# Patient Record
Sex: MALE | Employment: FULL TIME | URBAN - METROPOLITAN AREA
[De-identification: names, ages, dates, MRNs, and addresses within clinical notes are randomized per-mention and may not be internally consistent; named-entity substitution may affect disease eponyms.]

---

## 2017-02-22 ENCOUNTER — TRANSFERRED RECORDS (OUTPATIENT)
Dept: HEALTH INFORMATION MANAGEMENT | Facility: CLINIC | Age: 37
End: 2017-02-22

## 2017-07-10 DIAGNOSIS — A60.00 GENITAL HERPES SIMPLEX: ICD-10-CM

## 2017-07-10 RX ORDER — VALACYCLOVIR HYDROCHLORIDE 500 MG/1
TABLET, FILM COATED ORAL
Qty: 30 TABLET | Refills: 4 | Status: SHIPPED | OUTPATIENT
Start: 2017-07-10 | End: 2017-08-17

## 2017-07-10 NOTE — TELEPHONE ENCOUNTER
valACYclovir (VALTREX) 500 MG tablet  Last Written Prescription Date: 7/18/2016  Last Fill Quantity: 30, # refills: 11  Last Office Visit with [facility] or [facility] prescribing provider: 12/30/2016        Creatinine   Date Value Ref Range Status   07/01/2016 0.85 0.66 - 1.25 mg/dL Final     Refilled per [facility] protocol.    Neela Mitchell RN

## 2017-08-13 DIAGNOSIS — A60.00 GENITAL HERPES SIMPLEX: ICD-10-CM

## 2017-08-14 RX ORDER — VALACYCLOVIR HYDROCHLORIDE 500 MG/1
TABLET, FILM COATED ORAL
Qty: 30 TABLET | Refills: 10 | OUTPATIENT
Start: 2017-08-14

## 2017-08-14 NOTE — TELEPHONE ENCOUNTER
valACYclovir (VALTREX) 500 MG tablet   Last Written Prescription Date: 7/10/2017  Last Fill Quantity: 30, # refills: 4  Last Office Visit with [facility], [facility] or [facility] prescribing provider:         Creatinine   Date Value Ref Range Status   07/01/2016 0.85 0.66 - 1.25 mg/dL Final     Duplicate request. Refused. Neela Mitchell RN

## 2017-08-17 DIAGNOSIS — A60.00 GENITAL HERPES SIMPLEX: ICD-10-CM

## 2017-08-21 RX ORDER — VALACYCLOVIR HYDROCHLORIDE 500 MG/1
TABLET, FILM COATED ORAL
Qty: 30 TABLET | Refills: 0 | Status: SHIPPED | OUTPATIENT
Start: 2017-08-21 | End: 2017-10-02

## 2017-08-21 NOTE — TELEPHONE ENCOUNTER
Valacyvlovir (VALTREX) 500 mg tablet  Last Written Prescription Date: 7/10/2017  Last Fill Quantity: 30, # refills: 4  Last Office Visit with [facility] or [facility] prescribing provider: 12/30/2016   Next 5 appointments (look out 90 days)     Sep 01, 2017  3:20 PM CDT   Return Visit with MARILIN Lehman CNP   [facility]    [address] [place] [id number] [phone]                   Creatinine   Date Value Ref Range Status   07/01/2016 0.85 0.66 - 1.25 mg/dL Final     30 day palmira refill. Apt sched for 9/1/2017. Neela Mitchell RN

## 2017-10-02 ENCOUNTER — OFFICE VISIT (OUTPATIENT)
Dept: PEDIATRICS | Facility: CLINIC | Age: 37
End: 2017-10-02
Payer: COMMERCIAL

## 2017-10-02 VITALS
TEMPERATURE: 97.9 F | HEIGHT: 64 IN | BODY MASS INDEX: 30.08 KG/M2 | OXYGEN SATURATION: 97 % | HEART RATE: 85 BPM | WEIGHT: 176.2 LBS | SYSTOLIC BLOOD PRESSURE: 130 MMHG | DIASTOLIC BLOOD PRESSURE: 60 MMHG

## 2017-10-02 DIAGNOSIS — Z13.1 SCREENING FOR DIABETES MELLITUS: ICD-10-CM

## 2017-10-02 DIAGNOSIS — F41.0 ANXIETY ATTACK: Primary | ICD-10-CM

## 2017-10-02 DIAGNOSIS — A60.00 GENITAL HERPES SIMPLEX, UNSPECIFIED SITE: ICD-10-CM

## 2017-10-02 DIAGNOSIS — R79.89 ELEVATED TSH: ICD-10-CM

## 2017-10-02 DIAGNOSIS — Z13.6 CARDIOVASCULAR SCREENING; LDL GOAL LESS THAN 160: ICD-10-CM

## 2017-10-02 PROCEDURE — 99214 OFFICE O/P EST MOD 30 MIN: CPT | Performed by: NURSE PRACTITIONER

## 2017-10-02 RX ORDER — VALACYCLOVIR HYDROCHLORIDE 500 MG/1
TABLET, FILM COATED ORAL
Qty: 90 TABLET | Refills: 3 | Status: SHIPPED | OUTPATIENT
Start: 2017-10-02 | End: 2018-01-01

## 2017-10-02 RX ORDER — HYDROXYZINE HYDROCHLORIDE 25 MG/1
25-50 TABLET, FILM COATED ORAL EVERY 6 HOURS PRN
Qty: 20 TABLET | Refills: 3 | Status: SHIPPED | OUTPATIENT
Start: 2017-10-02 | End: 2018-01-01

## 2017-10-02 NOTE — MR AVS SNAPSHOT
After Visit Summary   10/2/2017    Ernie Song    MRN: [id number]           Patient Information     Date Of Birth          1980        Visit Information        Provider Department      10/2/2017 3:50 PM Gay Winslow APRN [facility]        Today's Diagnoses     Anxiety attack    -  1    Genital herpes simplex, unspecified site        Elevated TSH        CARDIOVASCULAR SCREENING; LDL GOAL LESS THAN 160        Screening for diabetes mellitus          Care Instructions    PLAN:   1.   Symptomatic therapy suggested: Continue current medications.  2.  Orders Placed This Encounter   Medications     valACYclovir (VALTREX) 500 MG tablet     Sig: TAKE 1 TABLET (500 MG) BY MOUTH DAILY     Dispense:  90 tablet     Refill:  3     hydrOXYzine (ATARAX) 25 MG tablet     Sig: Take 1-2 tablets (25-50 mg) by mouth every 6 hours as needed     Dispense:  20 tablet     Refill:  3       3. Patient needs to follow up in if no improvement,or sooner if worsening of symptoms or other symptoms develop.  FUTURE LABS:       - Schedule a fasting blood draw   Will follow up and/or notify patient of  results via My Chart to determine further need for followup    It was a pleasure seeing you today at the [facility]. Thank you for allowing us to care for you today. We truly hope we provided you with the excellent service you deserve. Please let us know if there is anything else we can do for you so we can be sure you are leaving completley satisfied with your care experience.       General information about your clinic   Clinic Hours Lab Hours (Appointments are required)   Mon-Thurs: 7:30 AM - 7 PM Mon-Thurs: 7:30 AM - 7 PM   Fri: 7:30 AM - 5 PM Fri: 7:30 AM - 5 PM        After Hours Nurse Advise & Appts:  [facility] Nurse Advisors: [phone]  [facility] On Call: to make appointments anytime: [phone] On Call Physician: call [phone] and answering  service will page the on call physician.        For urgent appointments, please call [phone] and ask for the triage nurse or your care team clinic nurse.  How to contact my care team:  [facility]: [email or web address]   Phone: [phone]   Fax: [phone]       [facility]:   Phone: [phone]  Hours: 8:00 AM - 6:00 PM  Medication Refills:  Call your pharmacy and they will forward the refill to us. Please allow 3 business days for your refills to be completed.       Normal or non-critical lab and imaging results will be communicated to you by MyChart, letter or phone within 7 days.  If you do not hear from us within 10 days, please call the clinic. If you have a critical or abnormal lab result, we will notify you by phone as soon as possible.       We now have PWIC (Pediatric Walk in Care)  Monday-Friday from 7:30-4. Simply walk in and be seen for your urgent needs like cough, fever, rash, diarrhea or vomiting, pink eye, UTI. No appointments needed. Ask one of the team for more information      -Your Care Team:    Dr. Jaky Young - Internal Medicine/Pediatrics   Dr. Nini Marcial - Family Medicine  Dr. Lissy Thomas - Pediatrics  Gay Winslow CNP - Family Practice Nurse Practitioner  Dr. Concha Rosado - Pediatrics                       Follow-ups after your visit        Future tests that were ordered for you today     Open Future Orders        Priority Expected Expires Ordered    JUST IN CASE Routine  12/2/2017 10/2/2017    Comprehensive metabolic panel Routine  12/2/2017 10/2/2017    Lipid panel reflex to direct LDL Routine  12/2/2017 10/2/2017    TSH Routine  12/2/2017 10/2/2017    T4 free Routine  12/2/2017 10/2/2017            Who to contact     If you have questions or need follow up information about today's clinic visit or your schedule please contact [facility] directly at [phone].  Normal or non-critical lab and imaging results will be communicated to you by  "MyChart, letter or phone within 4 business days after the clinic has received the results. If you do not hear from us within 7 days, please contact the clinic through [employer] or phone. If you have a critical or abnormal lab result, we will notify you by phone as soon as possible.  Submit refill requests through [facility] or call your pharmacy and they will forward the refill request to us. Please allow 3 business days for your refill to be completed.          Additional Information About Your Visit        [employer] App Information     [facility] gives you secure access to your electronic health record. If you see a primary care provider, you can also send messages to your care team and make appointments. If you have questions, please call your primary care clinic.  If you do not have a primary care provider, please call [phone] and they will assist you.      [facility] is an electronic gateway that provides easy, online access to your medical records. With [facility], you can request a clinic appointment, read your test results, renew a prescription or communicate with your care team.     To access your existing account, please contact your [facility] or call [phone] for assistance.        Care EveryWhere ID     This is your Care EveryWhere ID. This could be used by other organizations to access your [place] medical records  [id number]        Your Vitals Were     Pulse Temperature Height Pulse Oximetry BMI (Body Mass Index)       85 97.9  F (36.6  C) (Temporal) 5' 3.75\" (1.619 m) 97% 30.48 kg/m2        Blood Pressure from Last 3 Encounters:   10/02/17 130/60   12/30/16 115/71   06/30/16 113/69    Weight from Last 3 Encounters:   10/02/17 176 lb 3.2 oz (79.9 kg)   12/30/16 165 lb 9.6 oz (75.1 kg)   06/30/16 164 lb 6.4 oz (74.6 kg)                 Today's Medication Changes          These changes are accurate as of: 10/2/17  4:19 PM.  If you have any questions, ask your nurse or doctor.    "            These medicines have changed or have updated prescriptions.        Dose/Directions    hydrOXYzine 25 MG tablet   Commonly known as:  ATARAX   This may have changed:    - when to take this  - reasons to take this   Used for:  Anxiety attack   Changed by:  Gay Winslow APRN CNP        Dose:  25-50 mg   Take 1-2 tablets (25-50 mg) by mouth every 6 hours as needed   Quantity:  20 tablet   Refills:  3            Where to get your medicines      These medications were sent to [facility] - [facility], [place] - [address]  [address], [facility] [address]     Phone:  [phone]     hydrOXYzine 25 MG tablet    valACYclovir 500 MG tablet                Primary Care Provider Office Phone # Fax #    MARILIN Lehman [phone]       [address]  [place] [address]        Equal Access to Services     [facility] Hospital[facility]INALDO : Hadii panda ku hadasho Soomaali, waaxda luqadaha, qaybta kaalmada adeegyada, waxay aleain hayphuc sutton . So [facility] [phone].    ATENCIÓN: Si habla español, tiene a valente disposición servicios gratuitos de asistencia lingüística. Anju al [phone].    We comply with applicable federal civil rights laws and [place] laws. We do not discriminate on the basis of race, color, national origin, age, disability, sex, sexual orientation, or gender identity.            Thank you!     Thank you for choosing [facility]  for your care. Our goal is always to provide you with excellent care. Hearing back from our patients is one way we can continue to improve our services. Please take a few minutes to complete the written survey that you may receive in the mail after your visit with us. Thank you!             Your Updated Medication List - Protect others around you: Learn how to safely use, store and throw away your medicines at www.disposemymeds.org.          This list is accurate as of: 10/2/17  4:19  PM.  Always use your most recent med list.                   Brand Name Dispense Instructions for use Diagnosis    hydrOXYzine 25 MG tablet    ATARAX    20 tablet    Take 1-2 tablets (25-50 mg) by mouth every 6 hours as needed    Anxiety attack       valACYclovir 500 MG tablet    VALTREX    90 tablet    TAKE 1 TABLET (500 MG) BY MOUTH DAILY    Genital herpes simplex, unspecified site

## 2017-10-02 NOTE — PATIENT INSTRUCTIONS
PLAN:   1.   Symptomatic therapy suggested: Continue current medications.  2.  Orders Placed This Encounter   Medications     valACYclovir (VALTREX) 500 MG tablet     Sig: TAKE 1 TABLET (500 MG) BY MOUTH DAILY     Dispense:  90 tablet     Refill:  3     hydrOXYzine (ATARAX) 25 MG tablet     Sig: Take 1-2 tablets (25-50 mg) by mouth every 6 hours as needed     Dispense:  20 tablet     Refill:  3       3. Patient needs to follow up in if no improvement,or sooner if worsening of symptoms or other symptoms develop.  FUTURE LABS:       - Schedule a fasting blood draw   Will follow up and/or notify patient of  results via My Chart to determine further need for followup    It was a pleasure seeing you today at the [facility]. Thank you for allowing us to care for you today. We truly hope we provided you with the excellent service you deserve. Please let us know if there is anything else we can do for you so we can be sure you are leaving completley satisfied with your care experience.       General information about your clinic   Clinic Hours Lab Hours (Appointments are required)   Mon-Thurs: 7:30 AM - 7 PM Mon-Thurs: 7:30 AM - 7 PM   Fri: 7:30 AM - 5 PM Fri: 7:30 AM - 5 PM        After Hours Nurse Advise & Appts:  [facility] Nurse Advisors: [phone]  [facility] On Call: to make appointments anytime: [phone] On Call Physician: call [phone] and answering service will page the on call physician.        For urgent appointments, please call [phone] and ask for the triage nurse or your care team clinic nurse.  How to contact my care team:  MyChart: [email or web address]   Phone: [phone]   Fax: [phone]       [facility]:   Phone: [phone]  Hours: 8:00 AM - 6:00 PM  Medication Refills:  Call your pharmacy and they will forward the refill to us. Please allow 3 business days for your refills to be completed.       Normal or non-critical lab and imaging results  will be communicated to you by MyChart, letter or phone within 7 days.  If you do not hear from us within 10 days, please call the clinic. If you have a critical or abnormal lab result, we will notify you by phone as soon as possible.       We now have PWIC (Pediatric Walk in Care)  Monday-Friday from 7:30-4. Simply walk in and be seen for your urgent needs like cough, fever, rash, diarrhea or vomiting, pink eye, UTI. No appointments needed. Ask one of the team for more information      -Your Care Team:    Dr. Jaky Young - Internal Medicine/Pediatrics   Dr. Nini Marcial - Family Medicine  Dr. Lissy Thomas - Pediatrics  Gay Winslow CNP - Family Practice Nurse Practitioner  Dr. Concha Rosado - Pediatrics

## 2017-10-02 NOTE — PROGRESS NOTES
SUBJECTIVE:   Ernie Song is a 37 year old male who presents to clinic today for the following health issues:      Medication Followup of valtrex     Taking Medication as prescribed: yes    Side Effects:  None    Medication Helping Symptoms:  yes     Dizziness  Onset: 2 months    Description:   Do you feel faint:  YES  Does it feel like the surroundings (bed, room) are moving: YES  Unsteady/off balance: YES  Have you passed out or fallen: no     Intensity: moderate, severe    Progression of Symptoms:  same    Accompanying Signs & Symptoms:  Heart palpitations: YES- feels like heart is racing  Nausea, vomiting: YES  Weakness in arms or legs: YES- tingling in the arms  Fatigue: no   Vision or speech changes: YES- blurred   Ringing in ears (Tinnitus): no   Hearing Loss: no     History:   Head trauma/concussion hx: no   Previous similar symptoms: no   Recent bleeding history: no     Precipitating factors:   Worse with activity or head movement: no   Any new medications (BP?): no   Alcohol/drug abuse/withdrawal: no     Alleviating factors:   Does staying in a fixed position give relief:  YES    Therapies Tried and outcome: none  States will feel a little bit weird.  Feels like heart is going fast and can not breath very well and arms will feel tingly  Has a history of a panic attack about a year ago. Was seen in the ER and given some Atarax which helped.  Does drink about 5 or 6 beers a night for 4 months or longer.      Problem list and histories reviewed & adjusted, as indicated.  Additional history: as documented    Patient Active Problem List   Diagnosis     CARDIOVASCULAR SCREENING; LDL GOAL LESS THAN 160     Genital herpes     Tension headache     Blastocystis hominis infection     Palpitations     Elevated TSH     Past Surgical History:   Procedure Laterality Date     NO HISTORY OF SURGERY         Social History   Substance Use Topics     Smoking status: Former Smoker     Packs/day: 1.00     Years:  "12.00     Types: Cigarettes     Quit date: 10/15/2004     Smokeless tobacco: Never Used     Alcohol use 0.0 oz/week     0 Standard drinks or equivalent per week      Comment: 1  or 4 beers every night      Family History   Problem Relation Age of Onset     CANCER Father      Other Cancer Maternal Grandmother      Other Cancer Other      Asthma Mother          Current Outpatient Prescriptions   Medication Sig Dispense Refill     valACYclovir (VALTREX) 500 MG tablet TAKE 1 TABLET (500 MG) BY MOUTH DAILY 30 tablet 0     No Known Allergies  BP Readings from Last 3 Encounters:   10/02/17 130/60   12/30/16 115/71   06/30/16 113/69    Wt Readings from Last 3 Encounters:   10/02/17 176 lb 3.2 oz (79.9 kg)   12/30/16 165 lb 9.6 oz (75.1 kg)   06/30/16 164 lb 6.4 oz (74.6 kg)                  Labs reviewed in EPIC        Reviewed and updated as needed this visit by clinical staffTobacco  Allergies  Meds  Med Hx  Surg Hx  Fam Hx  Soc Hx      Reviewed and updated as needed this visit by Provider         ROS:  CONSTITUTIONAL:NEGATIVE for fever, chills, change in weight  ENT/MOUTH: NEGATIVE for ear, mouth and throat problems  RESP:NEGATIVE for significant cough or SOB  CV: POSITIVE for palpitations and NEGATIVE for chest pain/chest pressure, dyspnea on exertion and lower extremity edema  GI: NEGATIVE for nausea, abdominal pain, heartburn, or change in bowel habits  MUSCULOSKELETAL: NEGATIVE for significant arthralgias or myalgia  NEURO: NEGATIVE for weakness, dizziness or paresthesias  ENDOCRINE: NEGATIVE for temperature intolerance, skin/hair changes  PSYCHIATRIC: POSITIVE foranxiety, Hx anxiety and panic attack and NEGATIVE fordepressed mood, Hx depression, thoughts of hurting someone else and thoughts of self harm    OBJECTIVE:     /60 (BP Location: Right arm, Patient Position: Sitting, Cuff Size: Adult Regular)  Pulse 85  Temp 97.9  F (36.6  C) (Temporal)  Ht 5' 3.75\" (1.619 m)  Wt 176 lb 3.2 oz (79.9 kg)  " SpO2 97%  BMI 30.48 kg/m2  Body mass index is 30.48 kg/(m^2).  GENERAL APPEARANCE: healthy, alert and no distress  HENT: nose and mouth without ulcers or lesions  NECK: no adenopathy  RESP: lungs clear to auscultation - no rales, rhonchi or wheezes  CV: regular rates and rhythm and no murmur, click or rub  MS: extremities normal- no gross deformities noted  SKIN: no suspicious lesions or rashes  NEURO: Normal strength and tone, mentation intact and speech normal  Neurological exam reveals normal without focal findings, mental status, speech normal, alert and oriented x iii, RACHELLE, muscle tone and strength normal and symmetric, reflexes normal and symmetric, gait and station normal, finger to nose and cerebellar exam normal, no tremors, cogwheeling or rigidity noted.  PSYCH: mentation appears normal and affect normal/bright    Diagnostic Test Results:  Pending orders       ASSESSMENT/PLAN:       Ernie was seen today for recheck medication and dizziness.    Diagnoses and all orders for this visit:    Anxiety attack  -     hydrOXYzine (ATARAX) 25 MG tablet; Take 1-2 tablets (25-50 mg) by mouth every 6 hours as needed  Discussed the pathophysiology of anxiety episodes and the various symptoms seen associated with anxiety episodes.  Discussed possible triggers including fatigue, depression, stress, and chemicals such as alcohol, caffeine and certain drugs.  Discussed the treatment including an aerobic exercise program, adequate rest, and both rescue meds and maintenance meds.  Continue current medications.    Genital herpes simplex, unspecified site  -     valACYclovir (VALTREX) 500 MG tablet; TAKE 1 TABLET (500 MG) BY MOUTH DAILY  -     Comprehensive metabolic panel; Future  Continue current medications.    Elevated TSH  -     JUST IN CASE; Future  -     TSH; Future  -     T4 free; Future  Will follow up and/or notify patient of  results via My Chart to determine further need for followup      CARDIOVASCULAR  SCREENING; LDL GOAL LESS THAN 160  -     Lipid panel reflex to direct LDL; Future  Will follow up and/or notify patient of  results via My Chart to determine further need for followup      Screening for diabetes mellitus  -     JUST IN CASE; Future  -     Comprehensive metabolic panel; Future  Will follow up and/or notify patient of  results via My Chart to determine further need for followup      PLAN:    Patient needs to follow up in if no improvement,or sooner if worsening of symptoms or other symptoms develop.  FUTURE LABS:       - Schedule a fasting blood draw   Will follow up and/or notify patient of  results via My Chart to determine further need for followup      See Patient Instructions    MARILIN Lehman [facility]

## 2017-10-02 NOTE — NURSING NOTE
"Chief Complaint   Patient presents with     Recheck Medication     refill on valtrex       Initial /60 (BP Location: Right arm, Patient Position: Sitting, Cuff Size: Adult Regular)  Pulse 85  Temp 97.9  F (36.6  C) (Temporal)  Ht 5' 3.75\" (1.619 m)  Wt 176 lb 3.2 oz (79.9 kg)  SpO2 97%  BMI 30.48 kg/m2 Estimated body mass index is 30.48 kg/(m^2) as calculated from the following:    Height as of this encounter: 5' 3.75\" (1.619 m).    Weight as of this encounter: 176 lb 3.2 oz (79.9 kg).  Medication Reconciliation: complete      FABIOLA Mays      "

## 2017-10-02 NOTE — NURSING NOTE
"Chief Complaint   Patient presents with     Recheck Medication     refill on valtrex     Dizziness     dizziness x 2 months       Initial /60 (BP Location: Right arm, Patient Position: Sitting, Cuff Size: Adult Regular)  Pulse 85  Temp 97.9  F (36.6  C) (Temporal)  Ht 5' 3.75\" (1.619 m)  Wt 176 lb 3.2 oz (79.9 kg)  SpO2 97%  BMI 30.48 kg/m2 Estimated body mass index is 30.48 kg/(m^2) as calculated from the following:    Height as of this encounter: 5' 3.75\" (1.619 m).    Weight as of this encounter: 176 lb 3.2 oz (79.9 kg).  Medication Reconciliation: complete      FABIOLA Mays      "

## 2017-10-10 DIAGNOSIS — Z13.1 SCREENING FOR DIABETES MELLITUS: ICD-10-CM

## 2017-10-10 DIAGNOSIS — E55.9 VITAMIN D INSUFFICIENCY: ICD-10-CM

## 2017-10-10 DIAGNOSIS — R79.89 ELEVATED TSH: ICD-10-CM

## 2017-10-10 DIAGNOSIS — A60.00 GENITAL HERPES SIMPLEX, UNSPECIFIED SITE: ICD-10-CM

## 2017-10-10 DIAGNOSIS — Z13.6 CARDIOVASCULAR SCREENING; LDL GOAL LESS THAN 160: ICD-10-CM

## 2017-10-10 LAB
ALBUMIN SERPL-MCNC: 3.7 G/DL (ref 3.4–5)
ALP SERPL-CCNC: 101 U/L (ref 40–150)
ALT SERPL W P-5'-P-CCNC: 47 U/L (ref 0–70)
ANION GAP SERPL CALCULATED.3IONS-SCNC: 6 MMOL/L (ref 3–14)
AST SERPL W P-5'-P-CCNC: 30 U/L (ref 0–45)
BILIRUB SERPL-MCNC: 1.2 MG/DL (ref 0.2–1.3)
BUN SERPL-MCNC: 13 MG/DL (ref 7–30)
CALCIUM SERPL-MCNC: 8.6 MG/DL (ref 8.5–10.1)
CHLORIDE SERPL-SCNC: 103 MMOL/L (ref 94–109)
CHOLEST SERPL-MCNC: 264 MG/DL
CO2 SERPL-SCNC: 28 MMOL/L (ref 20–32)
CREAT SERPL-MCNC: 0.89 MG/DL (ref 0.66–1.25)
DEPRECATED CALCIDIOL+CALCIFEROL SERPL-MC: 25 UG/L (ref 20–75)
GFR SERPL CREATININE-BSD FRML MDRD: >90 ML/MIN/1.7M2
GLUCOSE SERPL-MCNC: 104 MG/DL (ref 70–99)
HDLC SERPL-MCNC: 53 MG/DL
LDLC SERPL CALC-MCNC: 169 MG/DL
NONHDLC SERPL-MCNC: 211 MG/DL
POTASSIUM SERPL-SCNC: 3.9 MMOL/L (ref 3.4–5.3)
PROT SERPL-MCNC: 7.8 G/DL (ref 6.8–8.8)
SODIUM SERPL-SCNC: 137 MMOL/L (ref 133–144)
T4 FREE SERPL-MCNC: 0.9 NG/DL (ref 0.76–1.46)
TRIGL SERPL-MCNC: 210 MG/DL
TSH SERPL DL<=0.005 MIU/L-ACNC: 3.49 MU/L (ref 0.4–4)

## 2017-10-10 PROCEDURE — 80053 COMPREHEN METABOLIC PANEL: CPT | Performed by: NURSE PRACTITIONER

## 2017-10-10 PROCEDURE — 84443 ASSAY THYROID STIM HORMONE: CPT | Performed by: NURSE PRACTITIONER

## 2017-10-10 PROCEDURE — 82306 VITAMIN D 25 HYDROXY: CPT | Performed by: NURSE PRACTITIONER

## 2017-10-10 PROCEDURE — 84439 ASSAY OF FREE THYROXINE: CPT | Performed by: NURSE PRACTITIONER

## 2017-10-10 PROCEDURE — 36415 COLL VENOUS BLD VENIPUNCTURE: CPT | Performed by: NURSE PRACTITIONER

## 2017-10-10 PROCEDURE — 80061 LIPID PANEL: CPT | Performed by: NURSE PRACTITIONER

## 2017-10-12 NOTE — PROGRESS NOTES
Ben Song,    Attached are your test results.  -Liver and gallbladder tests (ALT,AST, Alk phos,bilirubin) are normal.  -Kidney function (GFR) is normal.  -Sodium is normal.  -Potassium is normal.  -Glucose is slight elevated and may be sign of early diabetes (prediabetes). ADVISE:: low carbohydrate diet, exercise, try to lose weight (if necessary) and recheck glucose in 12 months. (GLU,A1C, DX: prediabetes)  -LDL(bad) cholesterol level is elevated, HDL(good) cholesterol level is normal  and your triglycerides are elevated which can increase your heart disease risk.  A diet high in fat and simple carbohydrates, genetics and being overweight can contribute to this. ADVISE: Exercise, a low fat, low carbohydrate diet, weight control, and omega-3 fatty acids (fish oil) daily are helpful to improve this.  Rechecking your fasting cholesterol panel in 6 months is recommended (Lipid w/ LDL reflex, DX: hyperlipidemia)  -TSH (thyroid stimulating hormone) level is normal which indicates normal thyroid function.  -Vitamin D level is normal, 1000 IU daily in diet or supplements is recommended.    Please contact us if you have any questions.    Gay Winslow, CNP

## 2018-01-01 ENCOUNTER — APPOINTMENT (OUTPATIENT)
Dept: GENERAL RADIOLOGY | Facility: CLINIC | Age: 38
End: 2018-01-01
Attending: THORACIC SURGERY (CARDIOTHORACIC VASCULAR SURGERY)
Payer: COMMERCIAL

## 2018-01-01 ENCOUNTER — APPOINTMENT (OUTPATIENT)
Dept: INTERVENTIONAL RADIOLOGY/VASCULAR | Facility: CLINIC | Age: 38
DRG: 374 | End: 2018-01-01
Attending: NURSE PRACTITIONER
Payer: COMMERCIAL

## 2018-01-01 ENCOUNTER — APPOINTMENT (OUTPATIENT)
Dept: ULTRASOUND IMAGING | Facility: CLINIC | Age: 38
DRG: 374 | End: 2018-01-01
Attending: NURSE PRACTITIONER
Payer: COMMERCIAL

## 2018-01-01 ENCOUNTER — HOSPITAL ENCOUNTER (INPATIENT)
Facility: CLINIC | Age: 38
LOS: 2 days | Discharge: HOME HEALTH | DRG: 374 | End: 2018-10-12
Attending: INTERNAL MEDICINE | Admitting: INTERNAL MEDICINE
Payer: COMMERCIAL

## 2018-01-01 ENCOUNTER — HOSPITAL ENCOUNTER (OUTPATIENT)
Facility: CLINIC | Age: 38
Discharge: HOME | End: 2018-09-17
Attending: INTERNAL MEDICINE | Admitting: INTERNAL MEDICINE
Payer: COMMERCIAL

## 2018-01-01 ENCOUNTER — HOSPITAL ENCOUNTER (INPATIENT)
Facility: CLINIC | Age: 38
LOS: 4 days | Discharge: HOME | DRG: 375 | End: 2018-09-09
Attending: INTERNAL MEDICINE | Admitting: INTERNAL MEDICINE
Payer: COMMERCIAL

## 2018-01-01 ENCOUNTER — TRANSFERRED RECORDS (OUTPATIENT)
Dept: HEALTH INFORMATION MANAGEMENT | Facility: CLINIC | Age: 38
End: 2018-01-01

## 2018-01-01 ENCOUNTER — ANESTHESIA EVENT (OUTPATIENT)
Dept: GASTROENTEROLOGY | Facility: CLINIC | Age: 38
DRG: 375 | End: 2018-01-01
Payer: COMMERCIAL

## 2018-01-01 ENCOUNTER — APPOINTMENT (OUTPATIENT)
Dept: GENERAL RADIOLOGY | Facility: CLINIC | Age: 38
End: 2018-01-01
Attending: PHYSICIAN ASSISTANT
Payer: COMMERCIAL

## 2018-01-01 ENCOUNTER — HOSPITAL ENCOUNTER (OUTPATIENT)
Facility: CLINIC | Age: 38
Discharge: HOME | End: 2018-09-20
Attending: THORACIC SURGERY (CARDIOTHORACIC VASCULAR SURGERY) | Admitting: THORACIC SURGERY (CARDIOTHORACIC VASCULAR SURGERY)
Payer: COMMERCIAL

## 2018-01-01 ENCOUNTER — HOSPITAL ENCOUNTER (OUTPATIENT)
Facility: CLINIC | Age: 38
Discharge: HOME | End: 2018-10-22
Attending: RADIOLOGY | Admitting: RADIOLOGY
Payer: COMMERCIAL

## 2018-01-01 ENCOUNTER — HOSPITAL ENCOUNTER (INPATIENT)
Facility: CLINIC | Age: 38
LOS: 7 days | Discharge: HOME HEALTH | DRG: 175 | End: 2018-09-30
Attending: EMERGENCY MEDICINE | Admitting: INTERNAL MEDICINE
Payer: COMMERCIAL

## 2018-01-01 ENCOUNTER — APPOINTMENT (OUTPATIENT)
Dept: GENERAL RADIOLOGY | Facility: CLINIC | Age: 38
DRG: 175 | End: 2018-01-01
Attending: STUDENT IN AN ORGANIZED HEALTH CARE EDUCATION/TRAINING PROGRAM
Payer: COMMERCIAL

## 2018-01-01 ENCOUNTER — DOCUMENTATION ONLY (OUTPATIENT)
Dept: ONCOLOGY | Facility: CLINIC | Age: 38
End: 2018-01-01

## 2018-01-01 ENCOUNTER — APPOINTMENT (OUTPATIENT)
Dept: ULTRASOUND IMAGING | Facility: CLINIC | Age: 38
End: 2018-01-01
Attending: INTERNAL MEDICINE
Payer: COMMERCIAL

## 2018-01-01 ENCOUNTER — HOSPITAL ENCOUNTER (OUTPATIENT)
Facility: CLINIC | Age: 38
Discharge: HOME | End: 2018-10-03
Attending: COLON & RECTAL SURGERY | Admitting: COLON & RECTAL SURGERY
Payer: COMMERCIAL

## 2018-01-01 ENCOUNTER — HOSPITAL ENCOUNTER (OUTPATIENT)
Facility: CLINIC | Age: 38
End: 2018-01-01
Payer: COMMERCIAL

## 2018-01-01 ENCOUNTER — APPOINTMENT (OUTPATIENT)
Dept: CT IMAGING | Facility: CLINIC | Age: 38
DRG: 375 | End: 2018-01-01
Attending: INTERNAL MEDICINE
Payer: COMMERCIAL

## 2018-01-01 ENCOUNTER — HOSPITAL ENCOUNTER (OUTPATIENT)
Dept: ULTRASOUND IMAGING | Facility: CLINIC | Age: 38
End: 2018-10-16
Attending: INTERNAL MEDICINE | Admitting: COLON & RECTAL SURGERY
Payer: COMMERCIAL

## 2018-01-01 ENCOUNTER — HOSPITAL ENCOUNTER (INPATIENT)
Facility: CLINIC | Age: 38
LOS: 2 days | Discharge: HOME HEALTH | DRG: 374 | End: 2018-10-27
Attending: EMERGENCY MEDICINE | Admitting: HOSPITALIST
Payer: COMMERCIAL

## 2018-01-01 ENCOUNTER — MEDICAL CORRESPONDENCE (OUTPATIENT)
Dept: HEALTH INFORMATION MANAGEMENT | Facility: CLINIC | Age: 38
End: 2018-01-01

## 2018-01-01 ENCOUNTER — APPOINTMENT (OUTPATIENT)
Dept: PHYSICAL THERAPY | Facility: CLINIC | Age: 38
DRG: 374 | End: 2018-01-01
Attending: NURSE PRACTITIONER
Payer: COMMERCIAL

## 2018-01-01 ENCOUNTER — APPOINTMENT (OUTPATIENT)
Dept: GENERAL RADIOLOGY | Facility: CLINIC | Age: 38
End: 2018-01-01
Attending: EMERGENCY MEDICINE
Payer: COMMERCIAL

## 2018-01-01 ENCOUNTER — HOSPITAL ENCOUNTER (EMERGENCY)
Facility: CLINIC | Age: 38
Discharge: HOME | End: 2018-07-13
Attending: EMERGENCY MEDICINE | Admitting: EMERGENCY MEDICINE

## 2018-01-01 ENCOUNTER — HOSPITAL ENCOUNTER (OUTPATIENT)
Dept: ULTRASOUND IMAGING | Facility: CLINIC | Age: 38
End: 2018-10-18
Attending: NURSE PRACTITIONER | Admitting: COLON & RECTAL SURGERY
Payer: COMMERCIAL

## 2018-01-01 ENCOUNTER — TELEPHONE (OUTPATIENT)
Dept: PEDIATRICS | Facility: CLINIC | Age: 38
End: 2018-01-01

## 2018-01-01 ENCOUNTER — HOSPITAL ENCOUNTER (OUTPATIENT)
Facility: CLINIC | Age: 38
Discharge: HOME | End: 2018-10-18
Attending: COLON & RECTAL SURGERY | Admitting: COLON & RECTAL SURGERY
Payer: COMMERCIAL

## 2018-01-01 ENCOUNTER — APPOINTMENT (OUTPATIENT)
Dept: CT IMAGING | Facility: CLINIC | Age: 38
DRG: 175 | End: 2018-01-01
Attending: EMERGENCY MEDICINE
Payer: COMMERCIAL

## 2018-01-01 ENCOUNTER — APPOINTMENT (OUTPATIENT)
Dept: GENERAL RADIOLOGY | Facility: CLINIC | Age: 38
DRG: 374 | End: 2018-01-01
Attending: INTERNAL MEDICINE
Payer: COMMERCIAL

## 2018-01-01 ENCOUNTER — SURGERY (OUTPATIENT)
Age: 38
End: 2018-01-01

## 2018-01-01 ENCOUNTER — OFFICE VISIT (OUTPATIENT)
Dept: PEDIATRICS | Facility: CLINIC | Age: 38
End: 2018-01-01
Payer: COMMERCIAL

## 2018-01-01 ENCOUNTER — HOSPITAL ENCOUNTER (OUTPATIENT)
Dept: ULTRASOUND IMAGING | Facility: CLINIC | Age: 38
End: 2018-10-03
Attending: INTERNAL MEDICINE | Admitting: COLON & RECTAL SURGERY
Payer: COMMERCIAL

## 2018-01-01 ENCOUNTER — OFFICE VISIT (OUTPATIENT)
Dept: INTERPRETER SERVICES | Facility: CLINIC | Age: 38
End: 2018-01-01
Payer: COMMERCIAL

## 2018-01-01 ENCOUNTER — APPOINTMENT (OUTPATIENT)
Dept: GENERAL RADIOLOGY | Facility: CLINIC | Age: 38
DRG: 374 | End: 2018-01-01
Attending: EMERGENCY MEDICINE
Payer: COMMERCIAL

## 2018-01-01 ENCOUNTER — APPOINTMENT (OUTPATIENT)
Dept: INTERVENTIONAL RADIOLOGY/VASCULAR | Facility: CLINIC | Age: 38
End: 2018-01-01
Attending: NURSE PRACTITIONER
Payer: COMMERCIAL

## 2018-01-01 ENCOUNTER — APPOINTMENT (OUTPATIENT)
Dept: GENERAL RADIOLOGY | Facility: CLINIC | Age: 38
End: 2018-01-01
Attending: RADIOLOGY
Payer: COMMERCIAL

## 2018-01-01 ENCOUNTER — HOSPITAL ENCOUNTER (OUTPATIENT)
Dept: ULTRASOUND IMAGING | Facility: CLINIC | Age: 38
End: 2018-09-17
Attending: INTERNAL MEDICINE | Admitting: INTERNAL MEDICINE
Payer: COMMERCIAL

## 2018-01-01 ENCOUNTER — OFFICE VISIT (OUTPATIENT)
Dept: URGENT CARE | Facility: URGENT CARE | Age: 38
End: 2018-01-01

## 2018-01-01 ENCOUNTER — APPOINTMENT (OUTPATIENT)
Dept: ULTRASOUND IMAGING | Facility: CLINIC | Age: 38
DRG: 175 | End: 2018-01-01
Attending: STUDENT IN AN ORGANIZED HEALTH CARE EDUCATION/TRAINING PROGRAM
Payer: COMMERCIAL

## 2018-01-01 ENCOUNTER — ANESTHESIA (OUTPATIENT)
Dept: GASTROENTEROLOGY | Facility: CLINIC | Age: 38
DRG: 375 | End: 2018-01-01
Payer: COMMERCIAL

## 2018-01-01 ENCOUNTER — HOSPITAL ENCOUNTER (OUTPATIENT)
Facility: CLINIC | Age: 38
Discharge: HOME | End: 2018-09-14
Attending: COLON & RECTAL SURGERY | Admitting: COLON & RECTAL SURGERY
Payer: COMMERCIAL

## 2018-01-01 ENCOUNTER — HOSPITAL ENCOUNTER (INPATIENT)
Facility: CLINIC | Age: 38
LOS: 2 days | DRG: 374 | End: 2018-11-02
Attending: EMERGENCY MEDICINE | Admitting: INTERNAL MEDICINE
Payer: COMMERCIAL

## 2018-01-01 ENCOUNTER — ANESTHESIA EVENT (OUTPATIENT)
Dept: SURGERY | Facility: CLINIC | Age: 38
End: 2018-01-01
Payer: COMMERCIAL

## 2018-01-01 ENCOUNTER — HOSPITAL ENCOUNTER (OUTPATIENT)
Facility: CLINIC | Age: 38
Setting detail: OBSERVATION
Discharge: HOME | End: 2018-09-01
Attending: HOSPITALIST | Admitting: INTERNAL MEDICINE
Payer: COMMERCIAL

## 2018-01-01 ENCOUNTER — APPOINTMENT (OUTPATIENT)
Dept: INTERVENTIONAL RADIOLOGY/VASCULAR | Facility: CLINIC | Age: 38
DRG: 175 | End: 2018-01-01
Attending: INTERNAL MEDICINE
Payer: COMMERCIAL

## 2018-01-01 ENCOUNTER — APPOINTMENT (OUTPATIENT)
Dept: ULTRASOUND IMAGING | Facility: CLINIC | Age: 38
DRG: 175 | End: 2018-01-01
Attending: INTERNAL MEDICINE
Payer: COMMERCIAL

## 2018-01-01 ENCOUNTER — ANESTHESIA (OUTPATIENT)
Dept: SURGERY | Facility: CLINIC | Age: 38
End: 2018-01-01
Payer: COMMERCIAL

## 2018-01-01 ENCOUNTER — APPOINTMENT (OUTPATIENT)
Dept: CT IMAGING | Facility: CLINIC | Age: 38
DRG: 374 | End: 2018-01-01
Attending: EMERGENCY MEDICINE
Payer: COMMERCIAL

## 2018-01-01 ENCOUNTER — HOSPITAL ENCOUNTER (OUTPATIENT)
Facility: CLINIC | Age: 38
Discharge: HOME | End: 2018-10-16
Admitting: INTERNAL MEDICINE
Payer: COMMERCIAL

## 2018-01-01 ENCOUNTER — APPOINTMENT (OUTPATIENT)
Dept: CT IMAGING | Facility: CLINIC | Age: 38
End: 2018-01-01
Attending: EMERGENCY MEDICINE

## 2018-01-01 ENCOUNTER — HOSPITAL ENCOUNTER (OUTPATIENT)
Facility: CLINIC | Age: 38
Setting detail: OBSERVATION
Discharge: HOME | End: 2018-02-27
Attending: EMERGENCY MEDICINE | Admitting: INTERNAL MEDICINE
Payer: COMMERCIAL

## 2018-01-01 ENCOUNTER — HOSPITAL ENCOUNTER (OUTPATIENT)
Dept: ULTRASOUND IMAGING | Facility: CLINIC | Age: 38
End: 2018-09-14
Attending: INTERNAL MEDICINE | Admitting: COLON & RECTAL SURGERY
Payer: COMMERCIAL

## 2018-01-01 ENCOUNTER — HOSPITAL ENCOUNTER (OUTPATIENT)
Facility: CLINIC | Age: 38
Setting detail: OBSERVATION
Discharge: HOME | End: 2018-10-08
Attending: EMERGENCY MEDICINE | Admitting: INTERNAL MEDICINE
Payer: COMMERCIAL

## 2018-01-01 VITALS
TEMPERATURE: 98.5 F | OXYGEN SATURATION: 94 % | DIASTOLIC BLOOD PRESSURE: 80 MMHG | WEIGHT: 148 LBS | RESPIRATION RATE: 20 BRPM | HEART RATE: 120 BPM | HEIGHT: 64 IN | SYSTOLIC BLOOD PRESSURE: 124 MMHG | BODY MASS INDEX: 25.27 KG/M2

## 2018-01-01 VITALS
TEMPERATURE: 98.5 F | RESPIRATION RATE: 15 BRPM | OXYGEN SATURATION: 97 % | WEIGHT: 150.8 LBS | DIASTOLIC BLOOD PRESSURE: 69 MMHG | SYSTOLIC BLOOD PRESSURE: 124 MMHG | BODY MASS INDEX: 25.87 KG/M2 | HEART RATE: 107 BPM

## 2018-01-01 VITALS
HEART RATE: 126 BPM | SYSTOLIC BLOOD PRESSURE: 119 MMHG | DIASTOLIC BLOOD PRESSURE: 72 MMHG | BODY MASS INDEX: 25.73 KG/M2 | OXYGEN SATURATION: 94 % | RESPIRATION RATE: 20 BRPM | TEMPERATURE: 99.7 F | WEIGHT: 150 LBS

## 2018-01-01 VITALS
DIASTOLIC BLOOD PRESSURE: 83 MMHG | TEMPERATURE: 98.2 F | RESPIRATION RATE: 18 BRPM | OXYGEN SATURATION: 95 % | WEIGHT: 157 LBS | BODY MASS INDEX: 26.95 KG/M2 | SYSTOLIC BLOOD PRESSURE: 125 MMHG | HEART RATE: 90 BPM

## 2018-01-01 VITALS
OXYGEN SATURATION: 98 % | WEIGHT: 167.11 LBS | TEMPERATURE: 98.3 F | BODY MASS INDEX: 28.53 KG/M2 | SYSTOLIC BLOOD PRESSURE: 127 MMHG | HEART RATE: 79 BPM | RESPIRATION RATE: 16 BRPM | HEIGHT: 64 IN | DIASTOLIC BLOOD PRESSURE: 86 MMHG

## 2018-01-01 VITALS
RESPIRATION RATE: 18 BRPM | DIASTOLIC BLOOD PRESSURE: 73 MMHG | TEMPERATURE: 99.7 F | OXYGEN SATURATION: 94 % | SYSTOLIC BLOOD PRESSURE: 128 MMHG | HEART RATE: 95 BPM

## 2018-01-01 VITALS
RESPIRATION RATE: 18 BRPM | TEMPERATURE: 99 F | BODY MASS INDEX: 27.83 KG/M2 | SYSTOLIC BLOOD PRESSURE: 126 MMHG | OXYGEN SATURATION: 94 % | HEART RATE: 104 BPM | WEIGHT: 163 LBS | HEIGHT: 64 IN | DIASTOLIC BLOOD PRESSURE: 79 MMHG

## 2018-01-01 VITALS
RESPIRATION RATE: 16 BRPM | HEIGHT: 64 IN | BODY MASS INDEX: 25.61 KG/M2 | OXYGEN SATURATION: 96 % | TEMPERATURE: 98.9 F | HEART RATE: 112 BPM | WEIGHT: 150 LBS | SYSTOLIC BLOOD PRESSURE: 121 MMHG | DIASTOLIC BLOOD PRESSURE: 79 MMHG

## 2018-01-01 VITALS
TEMPERATURE: 98.7 F | HEIGHT: 64 IN | WEIGHT: 166 LBS | OXYGEN SATURATION: 100 % | DIASTOLIC BLOOD PRESSURE: 73 MMHG | SYSTOLIC BLOOD PRESSURE: 133 MMHG | BODY MASS INDEX: 28.34 KG/M2

## 2018-01-01 VITALS
TEMPERATURE: 99 F | SYSTOLIC BLOOD PRESSURE: 126 MMHG | OXYGEN SATURATION: 98 % | DIASTOLIC BLOOD PRESSURE: 74 MMHG | HEIGHT: 65 IN | BODY MASS INDEX: 27.27 KG/M2 | WEIGHT: 163.7 LBS | HEART RATE: 77 BPM

## 2018-01-01 VITALS
HEART RATE: 119 BPM | RESPIRATION RATE: 18 BRPM | SYSTOLIC BLOOD PRESSURE: 124 MMHG | OXYGEN SATURATION: 96 % | TEMPERATURE: 97 F | DIASTOLIC BLOOD PRESSURE: 81 MMHG

## 2018-01-01 VITALS
DIASTOLIC BLOOD PRESSURE: 81 MMHG | HEIGHT: 64 IN | BODY MASS INDEX: 26.29 KG/M2 | OXYGEN SATURATION: 93 % | SYSTOLIC BLOOD PRESSURE: 133 MMHG | WEIGHT: 154 LBS | RESPIRATION RATE: 16 BRPM | TEMPERATURE: 97.7 F

## 2018-01-01 VITALS
BODY MASS INDEX: 25.27 KG/M2 | SYSTOLIC BLOOD PRESSURE: 129 MMHG | HEIGHT: 64 IN | DIASTOLIC BLOOD PRESSURE: 86 MMHG | WEIGHT: 148 LBS | HEART RATE: 111 BPM | RESPIRATION RATE: 16 BRPM | TEMPERATURE: 97 F | OXYGEN SATURATION: 97 %

## 2018-01-01 VITALS
DIASTOLIC BLOOD PRESSURE: 87 MMHG | SYSTOLIC BLOOD PRESSURE: 134 MMHG | BODY MASS INDEX: 25.1 KG/M2 | RESPIRATION RATE: 16 BRPM | TEMPERATURE: 96.8 F | OXYGEN SATURATION: 97 % | HEIGHT: 64 IN | HEART RATE: 117 BPM | WEIGHT: 147 LBS

## 2018-01-01 VITALS
OXYGEN SATURATION: 96 % | SYSTOLIC BLOOD PRESSURE: 121 MMHG | TEMPERATURE: 98.7 F | BODY MASS INDEX: 28.12 KG/M2 | HEART RATE: 95 BPM | DIASTOLIC BLOOD PRESSURE: 80 MMHG | WEIGHT: 163.8 LBS

## 2018-01-01 VITALS
BODY MASS INDEX: 27.46 KG/M2 | DIASTOLIC BLOOD PRESSURE: 66 MMHG | HEART RATE: 63 BPM | RESPIRATION RATE: 16 BRPM | TEMPERATURE: 98.4 F | SYSTOLIC BLOOD PRESSURE: 131 MMHG | WEIGHT: 165 LBS | OXYGEN SATURATION: 99 %

## 2018-01-01 VITALS
TEMPERATURE: 97.2 F | SYSTOLIC BLOOD PRESSURE: 135 MMHG | RESPIRATION RATE: 18 BRPM | OXYGEN SATURATION: 96 % | HEART RATE: 73 BPM | HEIGHT: 64 IN | DIASTOLIC BLOOD PRESSURE: 79 MMHG | BODY MASS INDEX: 27.14 KG/M2 | WEIGHT: 159 LBS

## 2018-01-01 VITALS
DIASTOLIC BLOOD PRESSURE: 52 MMHG | TEMPERATURE: 97.6 F | SYSTOLIC BLOOD PRESSURE: 102 MMHG | OXYGEN SATURATION: 97 % | RESPIRATION RATE: 18 BRPM | WEIGHT: 170.2 LBS | HEIGHT: 65 IN | BODY MASS INDEX: 28.36 KG/M2

## 2018-01-01 VITALS
RESPIRATION RATE: 26 BRPM | OXYGEN SATURATION: 95 % | SYSTOLIC BLOOD PRESSURE: 138 MMHG | DIASTOLIC BLOOD PRESSURE: 85 MMHG | TEMPERATURE: 98.2 F

## 2018-01-01 DIAGNOSIS — C16.9 GASTRIC ADENOCARCINOMA (H): Primary | ICD-10-CM

## 2018-01-01 DIAGNOSIS — C79.9 METASTATIC CANCER (H): ICD-10-CM

## 2018-01-01 DIAGNOSIS — R18.0 MALIGNANT ASCITES (H): ICD-10-CM

## 2018-01-01 DIAGNOSIS — C16.9 GASTRIC ADENOCARCINOMA (H): ICD-10-CM

## 2018-01-01 DIAGNOSIS — R06.02 SOB (SHORTNESS OF BREATH): ICD-10-CM

## 2018-01-01 DIAGNOSIS — J91.0 MALIGNANT PLEURAL EFFUSION (H): ICD-10-CM

## 2018-01-01 DIAGNOSIS — J90 PLEURAL EFFUSION, LEFT: ICD-10-CM

## 2018-01-01 DIAGNOSIS — K59.03 DRUG-INDUCED CONSTIPATION: Primary | ICD-10-CM

## 2018-01-01 DIAGNOSIS — E87.1 HYPONATREMIA: ICD-10-CM

## 2018-01-01 DIAGNOSIS — F32.9 REACTIVE DEPRESSION: ICD-10-CM

## 2018-01-01 DIAGNOSIS — C16.9 MALIGNANT NEOPLASM OF STOMACH, UNSPECIFIED LOCATION (H): ICD-10-CM

## 2018-01-01 DIAGNOSIS — A41.9 BACTERIAL SEPSIS (H): ICD-10-CM

## 2018-01-01 DIAGNOSIS — J90 PLEURAL EFFUSION, RIGHT: ICD-10-CM

## 2018-01-01 DIAGNOSIS — J90 LOCULATED PLEURAL EFFUSION: ICD-10-CM

## 2018-01-01 DIAGNOSIS — R18.0 MALIGNANT ASCITES (H): Primary | ICD-10-CM

## 2018-01-01 DIAGNOSIS — R18.8 ASCITES: ICD-10-CM

## 2018-01-01 DIAGNOSIS — Z51.5 ENCOUNTER FOR PALLIATIVE CARE: ICD-10-CM

## 2018-01-01 DIAGNOSIS — K27.9 PEPTIC ULCER: ICD-10-CM

## 2018-01-01 DIAGNOSIS — R41.82 ALTERED MENTAL STATUS, UNSPECIFIED ALTERED MENTAL STATUS TYPE: ICD-10-CM

## 2018-01-01 DIAGNOSIS — R14.0 ABDOMINAL BLOATING: ICD-10-CM

## 2018-01-01 DIAGNOSIS — I26.99 OTHER ACUTE PULMONARY EMBOLISM WITHOUT ACUTE COR PULMONALE (H): ICD-10-CM

## 2018-01-01 DIAGNOSIS — Z85.028 HISTORY OF GASTRIC CANCER: ICD-10-CM

## 2018-01-01 DIAGNOSIS — M54.50 ACUTE LEFT-SIDED LOW BACK PAIN WITHOUT SCIATICA: ICD-10-CM

## 2018-01-01 DIAGNOSIS — R10.9 ABDOMINAL PAIN, ACUTE: Primary | ICD-10-CM

## 2018-01-01 DIAGNOSIS — R10.12 ABDOMINAL PAIN, LEFT UPPER QUADRANT: ICD-10-CM

## 2018-01-01 DIAGNOSIS — R82.90 ABNORMAL URINE FINDINGS: ICD-10-CM

## 2018-01-01 DIAGNOSIS — Z87.11 H/O GASTRIC ULCER: ICD-10-CM

## 2018-01-01 DIAGNOSIS — A60.00 GENITAL HERPES SIMPLEX, UNSPECIFIED SITE: ICD-10-CM

## 2018-01-01 DIAGNOSIS — K27.9 PEPTIC ULCER: Primary | ICD-10-CM

## 2018-01-01 DIAGNOSIS — C16.9 GASTRIC CANCER (H): ICD-10-CM

## 2018-01-01 DIAGNOSIS — C79.9 METASTASIS FROM GASTRIC CANCER (H): ICD-10-CM

## 2018-01-01 DIAGNOSIS — K25.9 GASTRIC ULCER, UNSPECIFIED CHRONICITY, UNSPECIFIED WHETHER GASTRIC ULCER HEMORRHAGE OR PERFORATION PRESENT: ICD-10-CM

## 2018-01-01 DIAGNOSIS — R30.0 DYSURIA: Primary | ICD-10-CM

## 2018-01-01 DIAGNOSIS — I27.82 OTHER CHRONIC PULMONARY EMBOLISM WITHOUT ACUTE COR PULMONALE (H): Primary | ICD-10-CM

## 2018-01-01 DIAGNOSIS — D64.9 ANEMIA, UNSPECIFIED TYPE: ICD-10-CM

## 2018-01-01 DIAGNOSIS — R11.0 NAUSEA: ICD-10-CM

## 2018-01-01 DIAGNOSIS — R53.1 WEAKNESS: ICD-10-CM

## 2018-01-01 DIAGNOSIS — R10.13 EPIGASTRIC PAIN: Primary | ICD-10-CM

## 2018-01-01 DIAGNOSIS — I26.99 PULMONARY EMBOLISM, BILATERAL (H): ICD-10-CM

## 2018-01-01 DIAGNOSIS — K92.2 GASTROINTESTINAL HEMORRHAGE, UNSPECIFIED GASTROINTESTINAL HEMORRHAGE TYPE: ICD-10-CM

## 2018-01-01 DIAGNOSIS — C16.9 METASTASIS FROM GASTRIC CANCER (H): ICD-10-CM

## 2018-01-01 DIAGNOSIS — D50.9 IRON DEFICIENCY ANEMIA, UNSPECIFIED IRON DEFICIENCY ANEMIA TYPE: ICD-10-CM

## 2018-01-01 DIAGNOSIS — R10.84 ABDOMINAL PAIN, GENERALIZED: ICD-10-CM

## 2018-01-01 DIAGNOSIS — K25.9 GASTRIC ULCER, UNSPECIFIED CHRONICITY, UNSPECIFIED WHETHER GASTRIC ULCER HEMORRHAGE OR PERFORATION PRESENT: Primary | ICD-10-CM

## 2018-01-01 LAB
ABO + RH BLD: NORMAL
ABO + RH BLD: NORMAL
ALBUMIN FLD-MCNC: 2.2 G/DL
ALBUMIN SERPL-MCNC: 1.5 G/DL (ref 3.4–5)
ALBUMIN SERPL-MCNC: 1.8 G/DL (ref 3.4–5)
ALBUMIN SERPL-MCNC: 1.9 G/DL (ref 3.4–5)
ALBUMIN SERPL-MCNC: 1.9 G/DL (ref 3.4–5)
ALBUMIN SERPL-MCNC: 2.4 G/DL (ref 3.4–5)
ALBUMIN SERPL-MCNC: 2.9 G/DL (ref 3.4–5)
ALBUMIN SERPL-MCNC: 3.4 G/DL (ref 3.4–5)
ALBUMIN SERPL-MCNC: 3.6 G/DL (ref 3.4–5)
ALBUMIN UR-MCNC: 30 MG/DL
ALBUMIN UR-MCNC: NEGATIVE MG/DL
ALBUMIN UR-MCNC: NEGATIVE MG/DL
ALP SERPL-CCNC: 110 U/L (ref 40–150)
ALP SERPL-CCNC: 116 U/L (ref 40–150)
ALP SERPL-CCNC: 74 U/L (ref 40–150)
ALP SERPL-CCNC: 76 U/L (ref 40–150)
ALP SERPL-CCNC: 87 U/L (ref 40–150)
ALP SERPL-CCNC: 88 U/L (ref 40–150)
ALP SERPL-CCNC: 92 U/L (ref 40–150)
ALP SERPL-CCNC: 94 U/L (ref 40–150)
ALT SERPL W P-5'-P-CCNC: 18 U/L (ref 0–70)
ALT SERPL W P-5'-P-CCNC: 20 U/L (ref 0–70)
ALT SERPL W P-5'-P-CCNC: 21 U/L (ref 0–70)
ALT SERPL W P-5'-P-CCNC: 23 U/L (ref 0–70)
ALT SERPL W P-5'-P-CCNC: 27 U/L (ref 0–70)
ALT SERPL W P-5'-P-CCNC: 30 U/L (ref 0–70)
ALT SERPL W P-5'-P-CCNC: 37 U/L (ref 0–70)
ALT SERPL W P-5'-P-CCNC: 38 U/L (ref 0–70)
ANION GAP SERPL CALCULATED.3IONS-SCNC: 3 MMOL/L (ref 3–14)
ANION GAP SERPL CALCULATED.3IONS-SCNC: 5 MMOL/L (ref 3–14)
ANION GAP SERPL CALCULATED.3IONS-SCNC: 5 MMOL/L (ref 3–14)
ANION GAP SERPL CALCULATED.3IONS-SCNC: 6 MMOL/L (ref 3–14)
ANION GAP SERPL CALCULATED.3IONS-SCNC: 7 MMOL/L (ref 3–14)
ANION GAP SERPL CALCULATED.3IONS-SCNC: 8 MMOL/L (ref 3–14)
APPEARANCE FLD: NORMAL
APPEARANCE UR: CLEAR
APTT PPP: 41 SEC (ref 22–37)
AST SERPL W P-5'-P-CCNC: 15 U/L (ref 0–45)
AST SERPL W P-5'-P-CCNC: 17 U/L (ref 0–45)
AST SERPL W P-5'-P-CCNC: 19 U/L (ref 0–45)
AST SERPL W P-5'-P-CCNC: 21 U/L (ref 0–45)
AST SERPL W P-5'-P-CCNC: 21 U/L (ref 0–45)
AST SERPL W P-5'-P-CCNC: 22 U/L (ref 0–45)
AST SERPL W P-5'-P-CCNC: 28 U/L (ref 0–45)
AST SERPL W P-5'-P-CCNC: 32 U/L (ref 0–45)
BACTERIA #/AREA URNS HPF: ABNORMAL /HPF
BACTERIA #/AREA URNS HPF: ABNORMAL /HPF
BACTERIA SPEC CULT: NO GROWTH
BASE EXCESS BLDV CALC-SCNC: 5 MMOL/L
BASOPHILS # BLD AUTO: 0 10E9/L (ref 0–0.2)
BASOPHILS NFR BLD AUTO: 0 %
BASOPHILS NFR BLD AUTO: 0.1 %
BASOPHILS NFR BLD AUTO: 0.3 %
BASOPHILS NFR BLD AUTO: 0.4 %
BASOPHILS NFR FLD MANUAL: 3 %
BILIRUB SERPL-MCNC: 0.2 MG/DL (ref 0.2–1.3)
BILIRUB SERPL-MCNC: 0.3 MG/DL (ref 0.2–1.3)
BILIRUB SERPL-MCNC: 0.4 MG/DL (ref 0.2–1.3)
BILIRUB SERPL-MCNC: 0.5 MG/DL (ref 0.2–1.3)
BILIRUB SERPL-MCNC: 0.8 MG/DL (ref 0.2–1.3)
BILIRUB SERPL-MCNC: 1.1 MG/DL (ref 0.2–1.3)
BILIRUB UR QL STRIP: ABNORMAL
BILIRUB UR QL STRIP: NEGATIVE
BILIRUB UR QL STRIP: NEGATIVE
BLD GP AB SCN SERPL QL: NORMAL
BLOOD BANK CMNT PATIENT-IMP: NORMAL
BUN SERPL-MCNC: 11 MG/DL (ref 7–30)
BUN SERPL-MCNC: 11 MG/DL (ref 7–30)
BUN SERPL-MCNC: 16 MG/DL (ref 7–30)
BUN SERPL-MCNC: 25 MG/DL (ref 7–30)
BUN SERPL-MCNC: 4 MG/DL (ref 7–30)
BUN SERPL-MCNC: 5 MG/DL (ref 7–30)
BUN SERPL-MCNC: 7 MG/DL (ref 7–30)
BUN SERPL-MCNC: 9 MG/DL (ref 7–30)
C TRACH DNA SPEC QL NAA+PROBE: NEGATIVE
CALCIUM SERPL-MCNC: 7.4 MG/DL (ref 8.5–10.1)
CALCIUM SERPL-MCNC: 7.7 MG/DL (ref 8.5–10.1)
CALCIUM SERPL-MCNC: 8 MG/DL (ref 8.5–10.1)
CALCIUM SERPL-MCNC: 8.1 MG/DL (ref 8.5–10.1)
CALCIUM SERPL-MCNC: 8.1 MG/DL (ref 8.5–10.1)
CALCIUM SERPL-MCNC: 8.2 MG/DL (ref 8.5–10.1)
CALCIUM SERPL-MCNC: 8.2 MG/DL (ref 8.5–10.1)
CALCIUM SERPL-MCNC: 8.3 MG/DL (ref 8.5–10.1)
CALCIUM SERPL-MCNC: 8.4 MG/DL (ref 8.5–10.1)
CALCIUM SERPL-MCNC: 8.6 MG/DL (ref 8.5–10.1)
CALCIUM SERPL-MCNC: 8.7 MG/DL (ref 8.5–10.1)
CALCIUM SERPL-MCNC: 8.7 MG/DL (ref 8.5–10.1)
CEA SERPL-MCNC: <0.5 UG/L (ref 0–2.5)
CHLORIDE SERPL-SCNC: 102 MMOL/L (ref 94–109)
CHLORIDE SERPL-SCNC: 103 MMOL/L (ref 94–109)
CHLORIDE SERPL-SCNC: 104 MMOL/L (ref 94–109)
CHLORIDE SERPL-SCNC: 105 MMOL/L (ref 94–109)
CHLORIDE SERPL-SCNC: 107 MMOL/L (ref 94–109)
CHLORIDE SERPL-SCNC: 87 MMOL/L (ref 94–109)
CHLORIDE SERPL-SCNC: 88 MMOL/L (ref 94–109)
CHLORIDE SERPL-SCNC: 91 MMOL/L (ref 94–109)
CHLORIDE SERPL-SCNC: 93 MMOL/L (ref 94–109)
CHLORIDE SERPL-SCNC: 94 MMOL/L (ref 94–109)
CHLORIDE SERPL-SCNC: 97 MMOL/L (ref 94–109)
CHLORIDE SERPL-SCNC: 98 MMOL/L (ref 94–109)
CO2 BLDCOV-SCNC: 33 MMOL/L (ref 21–28)
CO2 SERPL-SCNC: 26 MMOL/L (ref 20–32)
CO2 SERPL-SCNC: 26 MMOL/L (ref 20–32)
CO2 SERPL-SCNC: 28 MMOL/L (ref 20–32)
CO2 SERPL-SCNC: 28 MMOL/L (ref 20–32)
CO2 SERPL-SCNC: 29 MMOL/L (ref 20–32)
CO2 SERPL-SCNC: 29 MMOL/L (ref 20–32)
CO2 SERPL-SCNC: 30 MMOL/L (ref 20–32)
CO2 SERPL-SCNC: 30 MMOL/L (ref 20–32)
CO2 SERPL-SCNC: 31 MMOL/L (ref 20–32)
CO2 SERPL-SCNC: 31 MMOL/L (ref 20–32)
CO2 SERPL-SCNC: 32 MMOL/L (ref 20–32)
CO2 SERPL-SCNC: 38 MMOL/L (ref 20–32)
COLOR FLD: YELLOW
COLOR UR AUTO: ABNORMAL
COLOR UR AUTO: YELLOW
COLOR UR AUTO: YELLOW
COPATH REPORT: NORMAL
CREAT SERPL-MCNC: 0.47 MG/DL (ref 0.66–1.25)
CREAT SERPL-MCNC: 0.48 MG/DL (ref 0.66–1.25)
CREAT SERPL-MCNC: 0.52 MG/DL (ref 0.66–1.25)
CREAT SERPL-MCNC: 0.54 MG/DL (ref 0.66–1.25)
CREAT SERPL-MCNC: 0.58 MG/DL (ref 0.66–1.25)
CREAT SERPL-MCNC: 0.59 MG/DL (ref 0.66–1.25)
CREAT SERPL-MCNC: 0.67 MG/DL (ref 0.66–1.25)
CREAT SERPL-MCNC: 0.69 MG/DL (ref 0.66–1.25)
CREAT SERPL-MCNC: 0.76 MG/DL (ref 0.66–1.25)
CREAT SERPL-MCNC: 0.82 MG/DL (ref 0.66–1.25)
CREAT SERPL-MCNC: 0.84 MG/DL (ref 0.66–1.25)
CREAT SERPL-MCNC: 0.87 MG/DL (ref 0.66–1.25)
CREAT SERPL-MCNC: 1.05 MG/DL (ref 0.66–1.25)
DIFFERENTIAL METHOD BLD: ABNORMAL
EOSINOPHIL # BLD AUTO: 0 10E9/L (ref 0–0.7)
EOSINOPHIL # BLD AUTO: 0.1 10E9/L (ref 0–0.7)
EOSINOPHIL # BLD AUTO: 0.2 10E9/L (ref 0–0.7)
EOSINOPHIL # BLD AUTO: 0.3 10E9/L (ref 0–0.7)
EOSINOPHIL NFR BLD AUTO: 0 %
EOSINOPHIL NFR BLD AUTO: 0.1 %
EOSINOPHIL NFR BLD AUTO: 0.8 %
EOSINOPHIL NFR BLD AUTO: 2.1 %
EOSINOPHIL NFR BLD AUTO: 2.6 %
EOSINOPHIL NFR BLD AUTO: 2.8 %
EOSINOPHIL NFR BLD AUTO: 3.1 %
EOSINOPHIL NFR BLD AUTO: 3.4 %
ERYTHROCYTE [DISTWIDTH] IN BLOOD BY AUTOMATED COUNT: 13.8 % (ref 10–15)
ERYTHROCYTE [DISTWIDTH] IN BLOOD BY AUTOMATED COUNT: 14.1 % (ref 10–15)
ERYTHROCYTE [DISTWIDTH] IN BLOOD BY AUTOMATED COUNT: 16.8 % (ref 10–15)
ERYTHROCYTE [DISTWIDTH] IN BLOOD BY AUTOMATED COUNT: 17 % (ref 10–15)
ERYTHROCYTE [DISTWIDTH] IN BLOOD BY AUTOMATED COUNT: 17.1 % (ref 10–15)
ERYTHROCYTE [DISTWIDTH] IN BLOOD BY AUTOMATED COUNT: 17.7 % (ref 10–15)
ERYTHROCYTE [DISTWIDTH] IN BLOOD BY AUTOMATED COUNT: 17.9 % (ref 10–15)
ERYTHROCYTE [DISTWIDTH] IN BLOOD BY AUTOMATED COUNT: 18.1 % (ref 10–15)
ERYTHROCYTE [DISTWIDTH] IN BLOOD BY AUTOMATED COUNT: 18.1 % (ref 10–15)
ERYTHROCYTE [DISTWIDTH] IN BLOOD BY AUTOMATED COUNT: 18.7 % (ref 10–15)
ERYTHROCYTE [DISTWIDTH] IN BLOOD BY AUTOMATED COUNT: 18.9 % (ref 10–15)
ERYTHROCYTE [DISTWIDTH] IN BLOOD BY AUTOMATED COUNT: 20.4 % (ref 10–15)
ERYTHROCYTE [DISTWIDTH] IN BLOOD BY AUTOMATED COUNT: 21.4 % (ref 10–15)
ERYTHROCYTE [DISTWIDTH] IN BLOOD BY AUTOMATED COUNT: 21.7 % (ref 10–15)
ERYTHROCYTE [DISTWIDTH] IN BLOOD BY AUTOMATED COUNT: 22 % (ref 10–15)
ETHANOL SERPL-MCNC: <0.01 G/DL
FERRITIN SERPL-MCNC: 10 NG/ML (ref 26–388)
FLUAV+FLUBV AG SPEC QL: NEGATIVE
FLUAV+FLUBV AG SPEC QL: NEGATIVE
GFR SERPL CREATININE-BSD FRML MDRD: 78 ML/MIN/1.7M2
GFR SERPL CREATININE-BSD FRML MDRD: >90 ML/MIN/1.7M2
GLUCOSE BLDC GLUCOMTR-MCNC: 104 MG/DL (ref 70–99)
GLUCOSE BLDC GLUCOMTR-MCNC: 153 MG/DL (ref 70–99)
GLUCOSE FLD-MCNC: 100 MG/DL
GLUCOSE SERPL-MCNC: 107 MG/DL (ref 70–99)
GLUCOSE SERPL-MCNC: 109 MG/DL (ref 70–99)
GLUCOSE SERPL-MCNC: 111 MG/DL (ref 70–99)
GLUCOSE SERPL-MCNC: 115 MG/DL (ref 70–99)
GLUCOSE SERPL-MCNC: 135 MG/DL (ref 70–99)
GLUCOSE SERPL-MCNC: 144 MG/DL (ref 70–99)
GLUCOSE SERPL-MCNC: 147 MG/DL (ref 70–99)
GLUCOSE SERPL-MCNC: 164 MG/DL (ref 70–99)
GLUCOSE SERPL-MCNC: 77 MG/DL (ref 70–99)
GLUCOSE SERPL-MCNC: 91 MG/DL (ref 70–99)
GLUCOSE SERPL-MCNC: 94 MG/DL (ref 70–99)
GLUCOSE SERPL-MCNC: 98 MG/DL (ref 70–99)
GLUCOSE UR STRIP-MCNC: NEGATIVE MG/DL
GRAM STN SPEC: NORMAL
HCO3 BLDV-SCNC: 30 MMOL/L (ref 21–28)
HCT VFR BLD AUTO: 27.5 % (ref 40–53)
HCT VFR BLD AUTO: 28.9 % (ref 40–53)
HCT VFR BLD AUTO: 29.3 % (ref 40–53)
HCT VFR BLD AUTO: 31 % (ref 40–53)
HCT VFR BLD AUTO: 32 % (ref 40–53)
HCT VFR BLD AUTO: 32.4 % (ref 40–53)
HCT VFR BLD AUTO: 32.4 % (ref 40–53)
HCT VFR BLD AUTO: 32.8 % (ref 40–53)
HCT VFR BLD AUTO: 34 % (ref 40–53)
HCT VFR BLD AUTO: 34.5 % (ref 40–53)
HCT VFR BLD AUTO: 35.7 % (ref 40–53)
HCT VFR BLD AUTO: 36 % (ref 40–53)
HCT VFR BLD AUTO: 36.9 % (ref 40–53)
HCT VFR BLD AUTO: 37 % (ref 40–53)
HCT VFR BLD AUTO: 37.2 % (ref 40–53)
HEMOCCULT STL QL: POSITIVE
HGB BLD-MCNC: 10.1 G/DL (ref 13.3–17.7)
HGB BLD-MCNC: 10.2 G/DL (ref 13.3–17.7)
HGB BLD-MCNC: 10.4 G/DL (ref 13.3–17.7)
HGB BLD-MCNC: 10.7 G/DL (ref 13.3–17.7)
HGB BLD-MCNC: 10.7 G/DL (ref 13.3–17.7)
HGB BLD-MCNC: 10.8 G/DL (ref 13.3–17.7)
HGB BLD-MCNC: 11.3 G/DL (ref 13.3–17.7)
HGB BLD-MCNC: 11.4 G/DL (ref 13.3–17.7)
HGB BLD-MCNC: 11.5 G/DL (ref 13.3–17.7)
HGB BLD-MCNC: 11.5 G/DL (ref 13.3–17.7)
HGB BLD-MCNC: 11.8 G/DL (ref 13.3–17.7)
HGB BLD-MCNC: 11.8 G/DL (ref 13.3–17.7)
HGB BLD-MCNC: 9 G/DL (ref 13.3–17.7)
HGB BLD-MCNC: 9.4 G/DL (ref 13.3–17.7)
HGB BLD-MCNC: 9.5 G/DL (ref 13.3–17.7)
HGB BLD-MCNC: 9.7 G/DL (ref 13.3–17.7)
HGB BLD-MCNC: 9.8 G/DL (ref 13.3–17.7)
HGB BLD-MCNC: 9.8 G/DL (ref 13.3–17.7)
HGB UR QL STRIP: ABNORMAL
HGB UR QL STRIP: NEGATIVE
HGB UR QL STRIP: NEGATIVE
HYALINE CASTS #/AREA URNS LPF: 1 /LPF (ref 0–2)
IMM GRANULOCYTES # BLD: 0 10E9/L (ref 0–0.4)
IMM GRANULOCYTES # BLD: 0.1 10E9/L (ref 0–0.4)
IMM GRANULOCYTES NFR BLD: 0.2 %
IMM GRANULOCYTES NFR BLD: 0.3 %
IMM GRANULOCYTES NFR BLD: 0.3 %
IMM GRANULOCYTES NFR BLD: 0.4 %
IMM GRANULOCYTES NFR BLD: 0.6 %
IMM GRANULOCYTES NFR BLD: 0.7 %
IMM GRANULOCYTES NFR BLD: 1 %
IMM GRANULOCYTES NFR BLD: 1.2 %
INR PPP: 1.04 (ref 0.86–1.14)
INR PPP: 1.13 (ref 0.86–1.14)
INR PPP: 1.14 (ref 0.86–1.14)
INR PPP: 1.28 (ref 0.86–1.14)
INTERPRETATION ECG - MUSE: NORMAL
INTERPRETATION ECG - MUSE: NORMAL
IRON SATN MFR SERPL: 5 % (ref 15–46)
IRON SERPL-MCNC: 19 UG/DL (ref 35–180)
KETONES UR STRIP-MCNC: 40 MG/DL
KETONES UR STRIP-MCNC: NEGATIVE MG/DL
KETONES UR STRIP-MCNC: NEGATIVE MG/DL
LACTATE BLD-SCNC: 0.6 MMOL/L (ref 0.7–2)
LACTATE BLD-SCNC: 0.7 MMOL/L (ref 0.7–2)
LACTATE BLD-SCNC: 0.9 MMOL/L (ref 0.7–2.1)
LACTATE BLD-SCNC: 1.1 MMOL/L (ref 0.7–2)
LEUKOCYTE ESTERASE UR QL STRIP: NEGATIVE
LIPASE SERPL-CCNC: 110 U/L (ref 73–393)
LIPASE SERPL-CCNC: 55 U/L (ref 73–393)
LMWH PPP CHRO-ACNC: 0.19 IU/ML
LMWH PPP CHRO-ACNC: 0.24 IU/ML
LMWH PPP CHRO-ACNC: 0.24 IU/ML
LMWH PPP CHRO-ACNC: 0.3 IU/ML
LMWH PPP CHRO-ACNC: 0.41 IU/ML
LMWH PPP CHRO-ACNC: 0.49 IU/ML
LMWH PPP CHRO-ACNC: 0.65 IU/ML
LMWH PPP CHRO-ACNC: 0.72 IU/ML
LMWH PPP CHRO-ACNC: <0.1 IU/ML
LYMPHOCYTES # BLD AUTO: 0.3 10E9/L (ref 0.8–5.3)
LYMPHOCYTES # BLD AUTO: 0.4 10E9/L (ref 0.8–5.3)
LYMPHOCYTES # BLD AUTO: 0.6 10E9/L (ref 0.8–5.3)
LYMPHOCYTES # BLD AUTO: 0.8 10E9/L (ref 0.8–5.3)
LYMPHOCYTES # BLD AUTO: 0.8 10E9/L (ref 0.8–5.3)
LYMPHOCYTES # BLD AUTO: 1.2 10E9/L (ref 0.8–5.3)
LYMPHOCYTES # BLD AUTO: 2.1 10E9/L (ref 0.8–5.3)
LYMPHOCYTES # BLD AUTO: 2.8 10E9/L (ref 0.8–5.3)
LYMPHOCYTES NFR BLD AUTO: 12.6 %
LYMPHOCYTES NFR BLD AUTO: 21.9 %
LYMPHOCYTES NFR BLD AUTO: 34.6 %
LYMPHOCYTES NFR BLD AUTO: 4.6 %
LYMPHOCYTES NFR BLD AUTO: 40.1 %
LYMPHOCYTES NFR BLD AUTO: 5 %
LYMPHOCYTES NFR BLD AUTO: 8.5 %
LYMPHOCYTES NFR BLD AUTO: 9 %
LYMPHOCYTES NFR FLD MANUAL: 24 %
Lab: NORMAL
MCH RBC QN AUTO: 22.1 PG (ref 26.5–33)
MCH RBC QN AUTO: 22.4 PG (ref 26.5–33)
MCH RBC QN AUTO: 22.5 PG (ref 26.5–33)
MCH RBC QN AUTO: 22.8 PG (ref 26.5–33)
MCH RBC QN AUTO: 23.1 PG (ref 26.5–33)
MCH RBC QN AUTO: 23.2 PG (ref 26.5–33)
MCH RBC QN AUTO: 23.4 PG (ref 26.5–33)
MCH RBC QN AUTO: 23.6 PG (ref 26.5–33)
MCH RBC QN AUTO: 24 PG (ref 26.5–33)
MCH RBC QN AUTO: 24.4 PG (ref 26.5–33)
MCH RBC QN AUTO: 24.6 PG (ref 26.5–33)
MCH RBC QN AUTO: 25.1 PG (ref 26.5–33)
MCH RBC QN AUTO: 25.8 PG (ref 26.5–33)
MCH RBC QN AUTO: 27.1 PG (ref 26.5–33)
MCH RBC QN AUTO: 27.1 PG (ref 26.5–33)
MCHC RBC AUTO-ENTMCNC: 30.2 G/DL (ref 31.5–36.5)
MCHC RBC AUTO-ENTMCNC: 30.3 G/DL (ref 31.5–36.5)
MCHC RBC AUTO-ENTMCNC: 30.6 G/DL (ref 31.5–36.5)
MCHC RBC AUTO-ENTMCNC: 31.2 G/DL (ref 31.5–36.5)
MCHC RBC AUTO-ENTMCNC: 31.7 G/DL (ref 31.5–36.5)
MCHC RBC AUTO-ENTMCNC: 31.7 G/DL (ref 31.5–36.5)
MCHC RBC AUTO-ENTMCNC: 31.8 G/DL (ref 31.5–36.5)
MCHC RBC AUTO-ENTMCNC: 31.9 G/DL (ref 31.5–36.5)
MCHC RBC AUTO-ENTMCNC: 32 G/DL (ref 31.5–36.5)
MCHC RBC AUTO-ENTMCNC: 32.7 G/DL (ref 31.5–36.5)
MCHC RBC AUTO-ENTMCNC: 32.9 G/DL (ref 31.5–36.5)
MCHC RBC AUTO-ENTMCNC: 33.1 G/DL (ref 31.5–36.5)
MCHC RBC AUTO-ENTMCNC: 33.3 G/DL (ref 31.5–36.5)
MCV RBC AUTO: 70 FL (ref 78–100)
MCV RBC AUTO: 72 FL (ref 78–100)
MCV RBC AUTO: 72 FL (ref 78–100)
MCV RBC AUTO: 73 FL (ref 78–100)
MCV RBC AUTO: 74 FL (ref 78–100)
MCV RBC AUTO: 74 FL (ref 78–100)
MCV RBC AUTO: 75 FL (ref 78–100)
MCV RBC AUTO: 79 FL (ref 78–100)
MCV RBC AUTO: 79 FL (ref 78–100)
MCV RBC AUTO: 81 FL (ref 78–100)
MCV RBC AUTO: 82 FL (ref 78–100)
MCV RBC AUTO: 85 FL (ref 78–100)
MISMATCH REPAIR BY IMMUNOHISTOCHEMISTRY: NORMAL
MONOCYTES # BLD AUTO: 0.3 10E9/L (ref 0–1.3)
MONOCYTES # BLD AUTO: 0.4 10E9/L (ref 0–1.3)
MONOCYTES # BLD AUTO: 0.4 10E9/L (ref 0–1.3)
MONOCYTES # BLD AUTO: 0.5 10E9/L (ref 0–1.3)
MONOCYTES # BLD AUTO: 0.5 10E9/L (ref 0–1.3)
MONOCYTES # BLD AUTO: 0.8 10E9/L (ref 0–1.3)
MONOCYTES # BLD AUTO: 0.8 10E9/L (ref 0–1.3)
MONOCYTES # BLD AUTO: 1 10E9/L (ref 0–1.3)
MONOCYTES NFR BLD AUTO: 10.1 %
MONOCYTES NFR BLD AUTO: 10.5 %
MONOCYTES NFR BLD AUTO: 5.3 %
MONOCYTES NFR BLD AUTO: 5.4 %
MONOCYTES NFR BLD AUTO: 7.3 %
MONOCYTES NFR BLD AUTO: 8.5 %
MONOCYTES NFR BLD AUTO: 9.2 %
MONOCYTES NFR BLD AUTO: 9.7 %
MONOS+MACROS NFR FLD MANUAL: 10 %
MUCOUS THREADS #/AREA URNS LPF: PRESENT /LPF
N GONORRHOEA DNA SPEC QL NAA+PROBE: NEGATIVE
NEUTROPHILS # BLD AUTO: 3.4 10E9/L (ref 1.6–8.3)
NEUTROPHILS # BLD AUTO: 3.5 10E9/L (ref 1.6–8.3)
NEUTROPHILS # BLD AUTO: 3.5 10E9/L (ref 1.6–8.3)
NEUTROPHILS # BLD AUTO: 3.8 10E9/L (ref 1.6–8.3)
NEUTROPHILS # BLD AUTO: 6.1 10E9/L (ref 1.6–8.3)
NEUTROPHILS # BLD AUTO: 6.5 10E9/L (ref 1.6–8.3)
NEUTROPHILS # BLD AUTO: 7.1 10E9/L (ref 1.6–8.3)
NEUTROPHILS # BLD AUTO: 7.5 10E9/L (ref 1.6–8.3)
NEUTROPHILS NFR BLD AUTO: 50.2 %
NEUTROPHILS NFR BLD AUTO: 56.6 %
NEUTROPHILS NFR BLD AUTO: 65.3 %
NEUTROPHILS NFR BLD AUTO: 76.9 %
NEUTROPHILS NFR BLD AUTO: 77.7 %
NEUTROPHILS NFR BLD AUTO: 78.8 %
NEUTROPHILS NFR BLD AUTO: 83.9 %
NEUTROPHILS NFR BLD AUTO: 87.3 %
NEUTS BAND NFR FLD MANUAL: 56 %
NITRATE UR QL: NEGATIVE
NON-SQ EPI CELLS #/AREA URNS LPF: ABNORMAL /LPF
NRBC # BLD AUTO: 0 10*3/UL
NRBC BLD AUTO-RTO: 0 /100
NT-PROBNP SERPL-MCNC: 124 PG/ML (ref 0–450)
OSMOLALITY UR: 232 MMOL/KG (ref 100–1200)
OTHER CELLS FLD MANUAL: 7 %
PCO2 BLDV: 47 MM HG (ref 40–50)
PCO2 BLDV: 63 MM HG (ref 40–50)
PD-L1 BY IMMUNOHISTOCHEMISTRY: NORMAL
PH BLDV: 7.34 PH (ref 7.32–7.43)
PH BLDV: 7.42 PH (ref 7.32–7.43)
PH UR STRIP: 6 PH (ref 5–7)
PH UR STRIP: 6 PH (ref 5–7)
PH UR STRIP: 6.5 PH (ref 5–7)
PLATELET # BLD AUTO: 199 10E9/L (ref 150–450)
PLATELET # BLD AUTO: 236 10E9/L (ref 150–450)
PLATELET # BLD AUTO: 286 10E9/L (ref 150–450)
PLATELET # BLD AUTO: 314 10E9/L (ref 150–450)
PLATELET # BLD AUTO: 322 10E9/L (ref 150–450)
PLATELET # BLD AUTO: 350 10E9/L (ref 150–450)
PLATELET # BLD AUTO: 361 10E9/L (ref 150–450)
PLATELET # BLD AUTO: 362 10E9/L (ref 150–450)
PLATELET # BLD AUTO: 367 10E9/L (ref 150–450)
PLATELET # BLD AUTO: 375 10E9/L (ref 150–450)
PLATELET # BLD AUTO: 379 10E9/L (ref 150–450)
PLATELET # BLD AUTO: 385 10E9/L (ref 150–450)
PLATELET # BLD AUTO: 387 10E9/L (ref 150–450)
PLATELET # BLD AUTO: 438 10E9/L (ref 150–450)
PLATELET # BLD AUTO: 472 10E9/L (ref 150–450)
PLATELET # BLD AUTO: 549 10E9/L (ref 150–450)
PO2 BLDV: 32 MM HG (ref 25–47)
PO2 BLDV: 66 MM HG (ref 25–47)
POTASSIUM SERPL-SCNC: 3.5 MMOL/L (ref 3.4–5.3)
POTASSIUM SERPL-SCNC: 3.7 MMOL/L (ref 3.4–5.3)
POTASSIUM SERPL-SCNC: 3.8 MMOL/L (ref 3.4–5.3)
POTASSIUM SERPL-SCNC: 3.9 MMOL/L (ref 3.4–5.3)
POTASSIUM SERPL-SCNC: 3.9 MMOL/L (ref 3.4–5.3)
POTASSIUM SERPL-SCNC: 4.1 MMOL/L (ref 3.4–5.3)
POTASSIUM SERPL-SCNC: 4.2 MMOL/L (ref 3.4–5.3)
POTASSIUM SERPL-SCNC: 4.5 MMOL/L (ref 3.4–5.3)
PROCALCITONIN SERPL-MCNC: 0.18 NG/ML
PROCALCITONIN SERPL-MCNC: 0.33 NG/ML
PROCALCITONIN SERPL-MCNC: 0.4 NG/ML
PROT FLD-MCNC: 4.4 G/DL
PROT SERPL-MCNC: 5.5 G/DL (ref 6.8–8.8)
PROT SERPL-MCNC: 5.7 G/DL (ref 6.8–8.8)
PROT SERPL-MCNC: 6.2 G/DL (ref 6.8–8.8)
PROT SERPL-MCNC: 6.3 G/DL (ref 6.8–8.8)
PROT SERPL-MCNC: 6.4 G/DL (ref 6.8–8.8)
PROT SERPL-MCNC: 6.7 G/DL (ref 6.8–8.8)
PROT SERPL-MCNC: 6.9 G/DL (ref 6.8–8.8)
PROT SERPL-MCNC: 7.9 G/DL (ref 6.8–8.8)
RADIOLOGIST FLAGS: ABNORMAL
RBC # BLD AUTO: 3.58 10E12/L (ref 4.4–5.9)
RBC # BLD AUTO: 3.66 10E12/L (ref 4.4–5.9)
RBC # BLD AUTO: 3.8 10E12/L (ref 4.4–5.9)
RBC # BLD AUTO: 3.89 10E12/L (ref 4.4–5.9)
RBC # BLD AUTO: 3.91 10E12/L (ref 4.4–5.9)
RBC # BLD AUTO: 4.06 10E12/L (ref 4.4–5.9)
RBC # BLD AUTO: 4.24 10E12/L (ref 4.4–5.9)
RBC # BLD AUTO: 4.49 10E12/L (ref 4.4–5.9)
RBC # BLD AUTO: 4.7 10E12/L (ref 4.4–5.9)
RBC # BLD AUTO: 4.73 10E12/L (ref 4.4–5.9)
RBC # BLD AUTO: 4.88 10E12/L (ref 4.4–5.9)
RBC # BLD AUTO: 4.88 10E12/L (ref 4.4–5.9)
RBC # BLD AUTO: 5.05 10E12/L (ref 4.4–5.9)
RBC # BLD AUTO: 5.09 10E12/L (ref 4.4–5.9)
RBC # BLD AUTO: 5.1 10E12/L (ref 4.4–5.9)
RBC #/AREA URNS AUTO: <1 /HPF (ref 0–2)
RBC #/AREA URNS AUTO: ABNORMAL /HPF
SAO2 % BLDV FROM PO2: 91 %
SEE SCANNED REPORT: NORMAL
SODIUM SERPL-SCNC: 123 MMOL/L (ref 133–144)
SODIUM SERPL-SCNC: 124 MMOL/L (ref 133–144)
SODIUM SERPL-SCNC: 129 MMOL/L (ref 133–144)
SODIUM SERPL-SCNC: 129 MMOL/L (ref 133–144)
SODIUM SERPL-SCNC: 135 MMOL/L (ref 133–144)
SODIUM SERPL-SCNC: 136 MMOL/L (ref 133–144)
SODIUM SERPL-SCNC: 136 MMOL/L (ref 133–144)
SODIUM SERPL-SCNC: 138 MMOL/L (ref 133–144)
SODIUM SERPL-SCNC: 139 MMOL/L (ref 133–144)
SODIUM SERPL-SCNC: 140 MMOL/L (ref 133–144)
SODIUM UR-SCNC: <5 MMOL/L
SOURCE: ABNORMAL
SP GR UR STRIP: 1 (ref 1–1.03)
SP GR UR STRIP: 1.01 (ref 1–1.03)
SP GR UR STRIP: 1.03 (ref 1–1.03)
SPECIMEN EXP DATE BLD: NORMAL
SPECIMEN SOURCE FLD: NORMAL
SPECIMEN SOURCE: NORMAL
SQUAMOUS #/AREA URNS AUTO: <1 /HPF (ref 0–1)
TIBC SERPL-MCNC: 390 UG/DL (ref 240–430)
TROPONIN I SERPL-MCNC: <0.015 UG/L (ref 0–0.04)
TROPONIN I SERPL-MCNC: <0.015 UG/L (ref 0–0.04)
UPPER EUS: NORMAL
UPPER GI ENDOSCOPY: NORMAL
UROBILINOGEN UR STRIP-ACNC: 0.2 EU/DL (ref 0.2–1)
UROBILINOGEN UR STRIP-MCNC: 4 MG/DL (ref 0–2)
UROBILINOGEN UR STRIP-MCNC: NORMAL MG/DL (ref 0–2)
VANCOMYCIN SERPL-MCNC: 9.2 MG/L
WBC # BLD AUTO: 10.2 10E9/L (ref 4–11)
WBC # BLD AUTO: 12.5 10E9/L (ref 4–11)
WBC # BLD AUTO: 4.5 10E9/L (ref 4–11)
WBC # BLD AUTO: 4.7 10E9/L (ref 4–11)
WBC # BLD AUTO: 4.9 10E9/L (ref 4–11)
WBC # BLD AUTO: 5.4 10E9/L (ref 4–11)
WBC # BLD AUTO: 5.9 10E9/L (ref 4–11)
WBC # BLD AUTO: 6.1 10E9/L (ref 4–11)
WBC # BLD AUTO: 6.3 10E9/L (ref 4–11)
WBC # BLD AUTO: 7 10E9/L (ref 4–11)
WBC # BLD AUTO: 7.3 10E9/L (ref 4–11)
WBC # BLD AUTO: 7.4 10E9/L (ref 4–11)
WBC # BLD AUTO: 7.6 10E9/L (ref 4–11)
WBC # BLD AUTO: 9 10E9/L (ref 4–11)
WBC # BLD AUTO: 9.8 10E9/L (ref 4–11)
WBC # FLD AUTO: 5964 /UL
WBC #/AREA URNS AUTO: 1 /HPF (ref 0–5)
WBC #/AREA URNS AUTO: ABNORMAL /HPF

## 2018-01-01 PROCEDURE — 88173 CYTOPATH EVAL FNA REPORT: CPT | Mod: 26 | Performed by: INTERNAL MEDICINE

## 2018-01-01 PROCEDURE — 99285 EMERGENCY DEPT VISIT HI MDM: CPT | Mod: 25

## 2018-01-01 PROCEDURE — 25000125 ZZHC RX 250

## 2018-01-01 PROCEDURE — 71045 X-RAY EXAM CHEST 1 VIEW: CPT | Mod: XU

## 2018-01-01 PROCEDURE — 25000125 ZZHC RX 250: Performed by: RADIOLOGY

## 2018-01-01 PROCEDURE — 40000894 ZZH STATISTIC OT IP EVAL DEFER: Performed by: OCCUPATIONAL THERAPIST

## 2018-01-01 PROCEDURE — 25000125 ZZHC RX 250: Performed by: INTERNAL MEDICINE

## 2018-01-01 PROCEDURE — 00000102 ZZHCL STATISTIC CYTO WRIGHT STAIN TC: Performed by: INTERNAL MEDICINE

## 2018-01-01 PROCEDURE — 27210190 US PARACENTESIS

## 2018-01-01 PROCEDURE — 25000125 ZZHC RX 250: Performed by: NURSE ANESTHETIST, CERTIFIED REGISTERED

## 2018-01-01 PROCEDURE — 81003 URINALYSIS AUTO W/O SCOPE: CPT | Performed by: EMERGENCY MEDICINE

## 2018-01-01 PROCEDURE — 82565 ASSAY OF CREATININE: CPT | Performed by: INTERNAL MEDICINE

## 2018-01-01 PROCEDURE — 25000128 H RX IP 250 OP 636: Performed by: NURSE PRACTITIONER

## 2018-01-01 PROCEDURE — 40000863 ZZH STATISTIC RADIOLOGY XRAY, US, CT, MAR, NM

## 2018-01-01 PROCEDURE — T1013 SIGN LANG/ORAL INTERPRETER: HCPCS | Mod: U3

## 2018-01-01 PROCEDURE — 99207 ZZC CDG-CODE CATEGORY CHANGED: CPT | Performed by: INTERNAL MEDICINE

## 2018-01-01 PROCEDURE — 25000128 H RX IP 250 OP 636: Performed by: EMERGENCY MEDICINE

## 2018-01-01 PROCEDURE — 25000128 H RX IP 250 OP 636: Performed by: HOSPITALIST

## 2018-01-01 PROCEDURE — 32550 INSERT PLEURAL CATH: CPT

## 2018-01-01 PROCEDURE — 25000132 ZZH RX MED GY IP 250 OP 250 PS 637: Performed by: HOSPITALIST

## 2018-01-01 PROCEDURE — 99222 1ST HOSP IP/OBS MODERATE 55: CPT | Mod: AI | Performed by: INTERNAL MEDICINE

## 2018-01-01 PROCEDURE — 88112 CYTOPATH CELL ENHANCE TECH: CPT | Mod: 26 | Performed by: INTERNAL MEDICINE

## 2018-01-01 PROCEDURE — 85025 COMPLETE CBC W/AUTO DIFF WBC: CPT | Performed by: EMERGENCY MEDICINE

## 2018-01-01 PROCEDURE — 25000128 H RX IP 250 OP 636: Performed by: INTERNAL MEDICINE

## 2018-01-01 PROCEDURE — 25000132 ZZH RX MED GY IP 250 OP 250 PS 637: Performed by: NURSE PRACTITIONER

## 2018-01-01 PROCEDURE — 36415 COLL VENOUS BLD VENIPUNCTURE: CPT | Performed by: INTERNAL MEDICINE

## 2018-01-01 PROCEDURE — 25000128 H RX IP 250 OP 636

## 2018-01-01 PROCEDURE — 00000159 ZZHCL STATISTIC H-SEND OUTS PREP: Performed by: INTERNAL MEDICINE

## 2018-01-01 PROCEDURE — 80053 COMPREHEN METABOLIC PANEL: CPT | Performed by: EMERGENCY MEDICINE

## 2018-01-01 PROCEDURE — 40000793 ZZHCL STATISTIC FNA ADDL PASSES AFTER ADEQUATE PF: Performed by: INTERNAL MEDICINE

## 2018-01-01 PROCEDURE — 85025 COMPLETE CBC W/AUTO DIFF WBC: CPT | Performed by: INTERNAL MEDICINE

## 2018-01-01 PROCEDURE — 99232 SBSQ HOSP IP/OBS MODERATE 35: CPT | Performed by: STUDENT IN AN ORGANIZED HEALTH CARE EDUCATION/TRAINING PROGRAM

## 2018-01-01 PROCEDURE — 87491 CHLMYD TRACH DNA AMP PROBE: CPT | Performed by: PHYSICIAN ASSISTANT

## 2018-01-01 PROCEDURE — 88112 CYTOPATH CELL ENHANCE TECH: CPT | Performed by: INTERNAL MEDICINE

## 2018-01-01 PROCEDURE — 84145 PROCALCITONIN (PCT): CPT | Performed by: EMERGENCY MEDICINE

## 2018-01-01 PROCEDURE — 99214 OFFICE O/P EST MOD 30 MIN: CPT | Performed by: NURSE PRACTITIONER

## 2018-01-01 PROCEDURE — 88172 CYTP DX EVAL FNA 1ST EA SITE: CPT | Mod: 26 | Performed by: INTERNAL MEDICINE

## 2018-01-01 PROCEDURE — 88342 IMHCHEM/IMCYTCHM 1ST ANTB: CPT | Mod: 26 | Performed by: INTERNAL MEDICINE

## 2018-01-01 PROCEDURE — 0W9G30Z DRAINAGE OF PERITONEAL CAVITY WITH DRAINAGE DEVICE, PERCUTANEOUS APPROACH: ICD-10-PCS | Performed by: RADIOLOGY

## 2018-01-01 PROCEDURE — 88305 TISSUE EXAM BY PATHOLOGIST: CPT | Mod: 26 | Performed by: INTERNAL MEDICINE

## 2018-01-01 PROCEDURE — 12000000 ZZH R&B MED SURG/OB

## 2018-01-01 PROCEDURE — 97161 PT EVAL LOW COMPLEX 20 MIN: CPT | Mod: GP

## 2018-01-01 PROCEDURE — 25000132 ZZH RX MED GY IP 250 OP 250 PS 637: Performed by: INTERNAL MEDICINE

## 2018-01-01 PROCEDURE — 96376 TX/PRO/DX INJ SAME DRUG ADON: CPT

## 2018-01-01 PROCEDURE — 25000125 ZZHC RX 250: Performed by: EMERGENCY MEDICINE

## 2018-01-01 PROCEDURE — 96361 HYDRATE IV INFUSION ADD-ON: CPT

## 2018-01-01 PROCEDURE — 88342 IMHCHEM/IMCYTCHM 1ST ANTB: CPT | Performed by: INTERNAL MEDICINE

## 2018-01-01 PROCEDURE — 07DK3ZX EXTRACTION OF THORACIC DUCT, PERCUTANEOUS APPROACH, DIAGNOSTIC: ICD-10-PCS | Performed by: INTERNAL MEDICINE

## 2018-01-01 PROCEDURE — 88305 TISSUE EXAM BY PATHOLOGIST: CPT | Performed by: INTERNAL MEDICINE

## 2018-01-01 PROCEDURE — 27210995 ZZH RX 272: Performed by: THORACIC SURGERY (CARDIOTHORACIC VASCULAR SURGERY)

## 2018-01-01 PROCEDURE — 99233 SBSQ HOSP IP/OBS HIGH 50: CPT | Performed by: STUDENT IN AN ORGANIZED HEALTH CARE EDUCATION/TRAINING PROGRAM

## 2018-01-01 PROCEDURE — 85610 PROTHROMBIN TIME: CPT | Performed by: STUDENT IN AN ORGANIZED HEALTH CARE EDUCATION/TRAINING PROGRAM

## 2018-01-01 PROCEDURE — 25000128 H RX IP 250 OP 636: Performed by: PHYSICIAN ASSISTANT

## 2018-01-01 PROCEDURE — 85520 HEPARIN ASSAY: CPT | Performed by: STUDENT IN AN ORGANIZED HEALTH CARE EDUCATION/TRAINING PROGRAM

## 2018-01-01 PROCEDURE — C1769 GUIDE WIRE: HCPCS

## 2018-01-01 PROCEDURE — 00000146 ZZHCL STATISTIC GLUCOSE BY METER IP

## 2018-01-01 PROCEDURE — 71046 X-RAY EXAM CHEST 2 VIEWS: CPT

## 2018-01-01 PROCEDURE — 25000131 ZZH RX MED GY IP 250 OP 636 PS 637: Performed by: NURSE PRACTITIONER

## 2018-01-01 PROCEDURE — 25000128 H RX IP 250 OP 636: Performed by: THORACIC SURGERY (CARDIOTHORACIC VASCULAR SURGERY)

## 2018-01-01 PROCEDURE — 99207 ZZC CDG-MDM COMPONENT: MEETS MODERATE - UP CODED: CPT | Performed by: STUDENT IN AN ORGANIZED HEALTH CARE EDUCATION/TRAINING PROGRAM

## 2018-01-01 PROCEDURE — 81001 URINALYSIS AUTO W/SCOPE: CPT | Performed by: PHYSICIAN ASSISTANT

## 2018-01-01 PROCEDURE — C1729 CATH, DRAINAGE: HCPCS

## 2018-01-01 PROCEDURE — 99239 HOSP IP/OBS DSCHRG MGMT >30: CPT | Performed by: INTERNAL MEDICINE

## 2018-01-01 PROCEDURE — 87205 SMEAR GRAM STAIN: CPT | Performed by: EMERGENCY MEDICINE

## 2018-01-01 PROCEDURE — 25000125 ZZHC RX 250: Performed by: THORACIC SURGERY (CARDIOTHORACIC VASCULAR SURGERY)

## 2018-01-01 PROCEDURE — 88173 CYTOPATH EVAL FNA REPORT: CPT | Performed by: INTERNAL MEDICINE

## 2018-01-01 PROCEDURE — 99232 SBSQ HOSP IP/OBS MODERATE 35: CPT | Performed by: INTERNAL MEDICINE

## 2018-01-01 PROCEDURE — 25000132 ZZH RX MED GY IP 250 OP 250 PS 637: Performed by: STUDENT IN AN ORGANIZED HEALTH CARE EDUCATION/TRAINING PROGRAM

## 2018-01-01 PROCEDURE — 85610 PROTHROMBIN TIME: CPT | Performed by: EMERGENCY MEDICINE

## 2018-01-01 PROCEDURE — 71045 X-RAY EXAM CHEST 1 VIEW: CPT

## 2018-01-01 PROCEDURE — 82042 OTHER SOURCE ALBUMIN QUAN EA: CPT | Performed by: EMERGENCY MEDICINE

## 2018-01-01 PROCEDURE — 87040 BLOOD CULTURE FOR BACTERIA: CPT | Performed by: EMERGENCY MEDICINE

## 2018-01-01 PROCEDURE — 83605 ASSAY OF LACTIC ACID: CPT | Performed by: EMERGENCY MEDICINE

## 2018-01-01 PROCEDURE — 96375 TX/PRO/DX INJ NEW DRUG ADDON: CPT

## 2018-01-01 PROCEDURE — 49418 INSERT TUN IP CATH PERC: CPT

## 2018-01-01 PROCEDURE — 27210190 US THORACENTESIS

## 2018-01-01 PROCEDURE — 80320 DRUG SCREEN QUANTALCOHOLS: CPT | Performed by: EMERGENCY MEDICINE

## 2018-01-01 PROCEDURE — G0378 HOSPITAL OBSERVATION PER HR: HCPCS

## 2018-01-01 PROCEDURE — 32555 ASPIRATE PLEURA W/ IMAGING: CPT

## 2018-01-01 PROCEDURE — 85520 HEPARIN ASSAY: CPT | Performed by: INTERNAL MEDICINE

## 2018-01-01 PROCEDURE — 99223 1ST HOSP IP/OBS HIGH 75: CPT | Performed by: INTERNAL MEDICINE

## 2018-01-01 PROCEDURE — 87591 N.GONORRHOEAE DNA AMP PROB: CPT | Performed by: PHYSICIAN ASSISTANT

## 2018-01-01 PROCEDURE — 36415 COLL VENOUS BLD VENIPUNCTURE: CPT | Performed by: STUDENT IN AN ORGANIZED HEALTH CARE EDUCATION/TRAINING PROGRAM

## 2018-01-01 PROCEDURE — 25000125 ZZHC RX 250: Performed by: PHYSICIAN ASSISTANT

## 2018-01-01 PROCEDURE — 96374 THER/PROPH/DIAG INJ IV PUSH: CPT

## 2018-01-01 PROCEDURE — 83690 ASSAY OF LIPASE: CPT | Performed by: EMERGENCY MEDICINE

## 2018-01-01 PROCEDURE — 99207 ZZC APP CREDIT; MD BILLING SHARED VISIT: CPT | Performed by: INTERNAL MEDICINE

## 2018-01-01 PROCEDURE — 82378 CARCINOEMBRYONIC ANTIGEN: CPT | Performed by: INTERNAL MEDICINE

## 2018-01-01 PROCEDURE — 83880 ASSAY OF NATRIURETIC PEPTIDE: CPT | Performed by: EMERGENCY MEDICINE

## 2018-01-01 PROCEDURE — 71000027 ZZH RECOVERY PHASE 2 EACH 15 MINS: Performed by: THORACIC SURGERY (CARDIOTHORACIC VASCULAR SURGERY)

## 2018-01-01 PROCEDURE — G0500 MOD SEDAT ENDO SERVICE >5YRS: HCPCS | Performed by: INTERNAL MEDICINE

## 2018-01-01 PROCEDURE — 74177 CT ABD & PELVIS W/CONTRAST: CPT

## 2018-01-01 PROCEDURE — 99233 SBSQ HOSP IP/OBS HIGH 50: CPT | Performed by: INTERNAL MEDICINE

## 2018-01-01 PROCEDURE — 88341 IMHCHEM/IMCYTCHM EA ADD ANTB: CPT | Performed by: INTERNAL MEDICINE

## 2018-01-01 PROCEDURE — 40000986 XR CHEST 1 VW

## 2018-01-01 PROCEDURE — 49083 ABD PARACENTESIS W/IMAGING: CPT

## 2018-01-01 PROCEDURE — 96374 THER/PROPH/DIAG INJ IV PUSH: CPT | Mod: 59

## 2018-01-01 PROCEDURE — 0DB63ZX EXCISION OF STOMACH, PERCUTANEOUS APPROACH, DIAGNOSTIC: ICD-10-PCS | Performed by: INTERNAL MEDICINE

## 2018-01-01 PROCEDURE — 99221 1ST HOSP IP/OBS SF/LOW 40: CPT | Performed by: SURGERY

## 2018-01-01 PROCEDURE — 99220 ZZC INITIAL OBSERVATION CARE,LEVL III: CPT | Performed by: INTERNAL MEDICINE

## 2018-01-01 PROCEDURE — 85025 COMPLETE CBC W/AUTO DIFF WBC: CPT | Performed by: HOSPITALIST

## 2018-01-01 PROCEDURE — 87804 INFLUENZA ASSAY W/OPTIC: CPT | Performed by: EMERGENCY MEDICINE

## 2018-01-01 PROCEDURE — 76705 ECHO EXAM OF ABDOMEN: CPT

## 2018-01-01 PROCEDURE — 25000125 ZZHC RX 250: Performed by: NURSE PRACTITIONER

## 2018-01-01 PROCEDURE — 82272 OCCULT BLD FECES 1-3 TESTS: CPT | Performed by: EMERGENCY MEDICINE

## 2018-01-01 PROCEDURE — 80202 ASSAY OF VANCOMYCIN: CPT | Performed by: INTERNAL MEDICINE

## 2018-01-01 PROCEDURE — 25000128 H RX IP 250 OP 636: Performed by: RADIOLOGY

## 2018-01-01 PROCEDURE — 80048 BASIC METABOLIC PNL TOTAL CA: CPT | Performed by: INTERNAL MEDICINE

## 2018-01-01 PROCEDURE — 85027 COMPLETE CBC AUTOMATED: CPT | Performed by: INTERNAL MEDICINE

## 2018-01-01 PROCEDURE — 40000854 ZZH STATISTIC SIMPLE TUBE INSERTION/CHARGE, PORT, CATH, FISTULOGRAM

## 2018-01-01 PROCEDURE — 99232 SBSQ HOSP IP/OBS MODERATE 35: CPT | Performed by: HOSPITALIST

## 2018-01-01 PROCEDURE — 80048 BASIC METABOLIC PNL TOTAL CA: CPT | Performed by: EMERGENCY MEDICINE

## 2018-01-01 PROCEDURE — 88360 TUMOR IMMUNOHISTOCHEM/MANUAL: CPT | Performed by: INTERNAL MEDICINE

## 2018-01-01 PROCEDURE — 32552 REMOVE LUNG CATHETER: CPT | Mod: RT

## 2018-01-01 PROCEDURE — 81001 URINALYSIS AUTO W/SCOPE: CPT | Performed by: EMERGENCY MEDICINE

## 2018-01-01 PROCEDURE — 00000155 ZZHCL STATISTIC H-CELL BLOCK W/STAIN: Performed by: INTERNAL MEDICINE

## 2018-01-01 PROCEDURE — 82803 BLOOD GASES ANY COMBINATION: CPT | Performed by: EMERGENCY MEDICINE

## 2018-01-01 PROCEDURE — 88172 CYTP DX EVAL FNA 1ST EA SITE: CPT | Performed by: INTERNAL MEDICINE

## 2018-01-01 PROCEDURE — 43239 EGD BIOPSY SINGLE/MULTIPLE: CPT | Performed by: INTERNAL MEDICINE

## 2018-01-01 PROCEDURE — 99238 HOSP IP/OBS DSCHRG MGMT 30/<: CPT | Performed by: INTERNAL MEDICINE

## 2018-01-01 PROCEDURE — 99213 OFFICE O/P EST LOW 20 MIN: CPT | Performed by: PHYSICIAN ASSISTANT

## 2018-01-01 PROCEDURE — 88112 CYTOPATH CELL ENHANCE TECH: CPT | Mod: 26,59 | Performed by: INTERNAL MEDICINE

## 2018-01-01 PROCEDURE — 87086 URINE CULTURE/COLONY COUNT: CPT | Performed by: EMERGENCY MEDICINE

## 2018-01-01 PROCEDURE — 99215 OFFICE O/P EST HI 40 MIN: CPT | Performed by: INTERNAL MEDICINE

## 2018-01-01 PROCEDURE — 40000985 XR CHEST PORT 1 VW

## 2018-01-01 PROCEDURE — 80053 COMPREHEN METABOLIC PANEL: CPT | Performed by: HOSPITALIST

## 2018-01-01 PROCEDURE — 71000012 ZZH RECOVERY PHASE 1 LEVEL 1 FIRST HR: Performed by: THORACIC SURGERY (CARDIOTHORACIC VASCULAR SURGERY)

## 2018-01-01 PROCEDURE — 00000158 ZZHCL STATISTIC H-FISH PROCESS B/S: Performed by: INTERNAL MEDICINE

## 2018-01-01 PROCEDURE — 82803 BLOOD GASES ANY COMBINATION: CPT

## 2018-01-01 PROCEDURE — 93005 ELECTROCARDIOGRAM TRACING: CPT

## 2018-01-01 PROCEDURE — 84145 PROCALCITONIN (PCT): CPT | Performed by: HOSPITALIST

## 2018-01-01 PROCEDURE — 40000010 ZZH STATISTIC ANES STAT CODE-CRNA PER MINUTE: Performed by: INTERNAL MEDICINE

## 2018-01-01 PROCEDURE — 85610 PROTHROMBIN TIME: CPT | Performed by: INTERNAL MEDICINE

## 2018-01-01 PROCEDURE — 83605 ASSAY OF LACTIC ACID: CPT | Performed by: STUDENT IN AN ORGANIZED HEALTH CARE EDUCATION/TRAINING PROGRAM

## 2018-01-01 PROCEDURE — 94640 AIRWAY INHALATION TREATMENT: CPT

## 2018-01-01 PROCEDURE — 84157 ASSAY OF PROTEIN OTHER: CPT | Performed by: EMERGENCY MEDICINE

## 2018-01-01 PROCEDURE — 84300 ASSAY OF URINE SODIUM: CPT | Performed by: INTERNAL MEDICINE

## 2018-01-01 PROCEDURE — 71000013 ZZH RECOVERY PHASE 1 LEVEL 1 EA ADDTL HR: Performed by: THORACIC SURGERY (CARDIOTHORACIC VASCULAR SURGERY)

## 2018-01-01 PROCEDURE — 25000132 ZZH RX MED GY IP 250 OP 250 PS 637: Performed by: EMERGENCY MEDICINE

## 2018-01-01 PROCEDURE — 85520 HEPARIN ASSAY: CPT | Performed by: EMERGENCY MEDICINE

## 2018-01-01 PROCEDURE — 85027 COMPLETE CBC AUTOMATED: CPT | Performed by: STUDENT IN AN ORGANIZED HEALTH CARE EDUCATION/TRAINING PROGRAM

## 2018-01-01 PROCEDURE — 86901 BLOOD TYPING SEROLOGIC RH(D): CPT | Performed by: EMERGENCY MEDICINE

## 2018-01-01 PROCEDURE — 87086 URINE CULTURE/COLONY COUNT: CPT | Performed by: PHYSICIAN ASSISTANT

## 2018-01-01 PROCEDURE — 37000008 ZZH ANESTHESIA TECHNICAL FEE, 1ST 30 MIN: Performed by: THORACIC SURGERY (CARDIOTHORACIC VASCULAR SURGERY)

## 2018-01-01 PROCEDURE — 0W9G3ZZ DRAINAGE OF PERITONEAL CAVITY, PERCUTANEOUS APPROACH: ICD-10-PCS | Performed by: EMERGENCY MEDICINE

## 2018-01-01 PROCEDURE — 27211193 ZZ H WOUND GLUE CR1

## 2018-01-01 PROCEDURE — 27210282 US PARACENTESIS

## 2018-01-01 PROCEDURE — 25000128 H RX IP 250 OP 636: Performed by: NURSE ANESTHETIST, CERTIFIED REGISTERED

## 2018-01-01 PROCEDURE — 83935 ASSAY OF URINE OSMOLALITY: CPT | Performed by: INTERNAL MEDICINE

## 2018-01-01 PROCEDURE — 0BPQX0Z REMOVAL OF DRAINAGE DEVICE FROM PLEURA, EXTERNAL APPROACH: ICD-10-PCS | Performed by: RADIOLOGY

## 2018-01-01 PROCEDURE — 86900 BLOOD TYPING SEROLOGIC ABO: CPT | Performed by: EMERGENCY MEDICINE

## 2018-01-01 PROCEDURE — 84484 ASSAY OF TROPONIN QUANT: CPT | Performed by: EMERGENCY MEDICINE

## 2018-01-01 PROCEDURE — 0W9B30Z DRAINAGE OF LEFT PLEURAL CAVITY WITH DRAINAGE DEVICE, PERCUTANEOUS APPROACH: ICD-10-PCS | Performed by: RADIOLOGY

## 2018-01-01 PROCEDURE — 88377 M/PHMTRC ALYS ISHQUANT/SEMIQ: CPT | Performed by: PATHOLOGY

## 2018-01-01 PROCEDURE — 36415 COLL VENOUS BLD VENIPUNCTURE: CPT | Performed by: NURSE PRACTITIONER

## 2018-01-01 PROCEDURE — 25000128 H RX IP 250 OP 636: Performed by: STUDENT IN AN ORGANIZED HEALTH CARE EDUCATION/TRAINING PROGRAM

## 2018-01-01 PROCEDURE — 37000008 ZZH ANESTHESIA TECHNICAL FEE, 1ST 30 MIN: Performed by: INTERNAL MEDICINE

## 2018-01-01 PROCEDURE — 40000193 ZZH STATISTIC PT WARD VISIT

## 2018-01-01 PROCEDURE — 85018 HEMOGLOBIN: CPT | Performed by: INTERNAL MEDICINE

## 2018-01-01 PROCEDURE — 27210905 ZZH KIT CR7

## 2018-01-01 PROCEDURE — 86850 RBC ANTIBODY SCREEN: CPT | Performed by: EMERGENCY MEDICINE

## 2018-01-01 PROCEDURE — 25000128 H RX IP 250 OP 636: Performed by: ANESTHESIOLOGY

## 2018-01-01 PROCEDURE — 85730 THROMBOPLASTIN TIME PARTIAL: CPT | Performed by: STUDENT IN AN ORGANIZED HEALTH CARE EDUCATION/TRAINING PROGRAM

## 2018-01-01 PROCEDURE — 83550 IRON BINDING TEST: CPT | Performed by: INTERNAL MEDICINE

## 2018-01-01 PROCEDURE — 83605 ASSAY OF LACTIC ACID: CPT

## 2018-01-01 PROCEDURE — 85027 COMPLETE CBC AUTOMATED: CPT | Performed by: NURSE PRACTITIONER

## 2018-01-01 PROCEDURE — 99223 1ST HOSP IP/OBS HIGH 75: CPT | Mod: AI | Performed by: INTERNAL MEDICINE

## 2018-01-01 PROCEDURE — 27211039 ZZH NEEDLE CR2

## 2018-01-01 PROCEDURE — 36000052 ZZH SURGERY LEVEL 2 EA 15 ADDTL MIN: Performed by: THORACIC SURGERY (CARDIOTHORACIC VASCULAR SURGERY)

## 2018-01-01 PROCEDURE — 37000009 ZZH ANESTHESIA TECHNICAL FEE, EACH ADDTL 15 MIN: Performed by: THORACIC SURGERY (CARDIOTHORACIC VASCULAR SURGERY)

## 2018-01-01 PROCEDURE — 96365 THER/PROPH/DIAG IV INF INIT: CPT | Mod: 59

## 2018-01-01 PROCEDURE — 80053 COMPREHEN METABOLIC PANEL: CPT | Performed by: INTERNAL MEDICINE

## 2018-01-01 PROCEDURE — 99153 MOD SED SAME PHYS/QHP EA: CPT

## 2018-01-01 PROCEDURE — 97140 MANUAL THERAPY 1/> REGIONS: CPT | Mod: GP

## 2018-01-01 PROCEDURE — 36415 COLL VENOUS BLD VENIPUNCTURE: CPT | Performed by: HOSPITALIST

## 2018-01-01 PROCEDURE — 27210794 ZZH OR GENERAL SUPPLY STERILE: Performed by: THORACIC SURGERY (CARDIOTHORACIC VASCULAR SURGERY)

## 2018-01-01 PROCEDURE — 36415 COLL VENOUS BLD VENIPUNCTURE: CPT | Performed by: RADIOLOGY

## 2018-01-01 PROCEDURE — C1788 PORT, INDWELLING, IMP: HCPCS | Performed by: THORACIC SURGERY (CARDIOTHORACIC VASCULAR SURGERY)

## 2018-01-01 PROCEDURE — 85049 AUTOMATED PLATELET COUNT: CPT | Performed by: STUDENT IN AN ORGANIZED HEALTH CARE EDUCATION/TRAINING PROGRAM

## 2018-01-01 PROCEDURE — 71260 CT THORAX DX C+: CPT

## 2018-01-01 PROCEDURE — 74176 CT ABD & PELVIS W/O CONTRAST: CPT

## 2018-01-01 PROCEDURE — 99217 ZZC OBSERVATION CARE DISCHARGE: CPT | Performed by: INTERNAL MEDICINE

## 2018-01-01 PROCEDURE — 40000170 ZZH STATISTIC PRE-PROCEDURE ASSESSMENT II: Performed by: THORACIC SURGERY (CARDIOTHORACIC VASCULAR SURGERY)

## 2018-01-01 PROCEDURE — 82945 GLUCOSE OTHER FLUID: CPT | Performed by: EMERGENCY MEDICINE

## 2018-01-01 PROCEDURE — 87070 CULTURE OTHR SPECIMN AEROBIC: CPT | Performed by: EMERGENCY MEDICINE

## 2018-01-01 PROCEDURE — 89051 BODY FLUID CELL COUNT: CPT | Performed by: EMERGENCY MEDICINE

## 2018-01-01 PROCEDURE — 82728 ASSAY OF FERRITIN: CPT | Performed by: INTERNAL MEDICINE

## 2018-01-01 PROCEDURE — 36415 COLL VENOUS BLD VENIPUNCTURE: CPT

## 2018-01-01 PROCEDURE — 37000009 ZZH ANESTHESIA TECHNICAL FEE, EACH ADDTL 15 MIN: Performed by: INTERNAL MEDICINE

## 2018-01-01 PROCEDURE — 99223 1ST HOSP IP/OBS HIGH 75: CPT | Mod: AI | Performed by: HOSPITALIST

## 2018-01-01 PROCEDURE — 88341 IMHCHEM/IMCYTCHM EA ADD ANTB: CPT | Mod: 26 | Performed by: INTERNAL MEDICINE

## 2018-01-01 PROCEDURE — 36000054 ZZH SURGERY LEVEL 2 W FLUORO 1ST 30 MIN: Performed by: THORACIC SURGERY (CARDIOTHORACIC VASCULAR SURGERY)

## 2018-01-01 PROCEDURE — 83540 ASSAY OF IRON: CPT | Performed by: INTERNAL MEDICINE

## 2018-01-01 PROCEDURE — 25000132 ZZH RX MED GY IP 250 OP 250 PS 637: Performed by: ANESTHESIOLOGY

## 2018-01-01 PROCEDURE — 99217 ZZC OBSERVATION CARE DISCHARGE: CPT | Performed by: PHYSICIAN ASSISTANT

## 2018-01-01 PROCEDURE — 88360 TUMOR IMMUNOHISTOCHEM/MANUAL: CPT | Mod: 26 | Performed by: INTERNAL MEDICINE

## 2018-01-01 PROCEDURE — 43242 EGD US FINE NEEDLE BX/ASPIR: CPT | Performed by: INTERNAL MEDICINE

## 2018-01-01 PROCEDURE — 99236 HOSP IP/OBS SAME DATE HI 85: CPT | Performed by: INTERNAL MEDICINE

## 2018-01-01 PROCEDURE — 85018 HEMOGLOBIN: CPT | Mod: 91 | Performed by: INTERNAL MEDICINE

## 2018-01-01 PROCEDURE — 99239 HOSP IP/OBS DSCHRG MGMT >30: CPT | Performed by: STUDENT IN AN ORGANIZED HEALTH CARE EDUCATION/TRAINING PROGRAM

## 2018-01-01 PROCEDURE — 0W9G3ZZ DRAINAGE OF PERITONEAL CAVITY, PERCUTANEOUS APPROACH: ICD-10-PCS | Performed by: RADIOLOGY

## 2018-01-01 PROCEDURE — 85610 PROTHROMBIN TIME: CPT | Performed by: RADIOLOGY

## 2018-01-01 PROCEDURE — 93970 EXTREMITY STUDY: CPT

## 2018-01-01 DEVICE — CATH PORT POWERPORT CLEARVUE ISP 8FR 5608062: Type: IMPLANTABLE DEVICE | Site: CHEST  WALL | Status: FUNCTIONAL

## 2018-01-01 RX ORDER — CEFAZOLIN SODIUM 2 G/100ML
2 INJECTION, SOLUTION INTRAVENOUS
Status: COMPLETED | OUTPATIENT
Start: 2018-01-01 | End: 2018-01-01

## 2018-01-01 RX ORDER — OXYCODONE HCL 40 MG/1
40 TABLET, FILM COATED, EXTENDED RELEASE ORAL EVERY 8 HOURS
Status: DISCONTINUED | OUTPATIENT
Start: 2018-01-01 | End: 2018-01-01 | Stop reason: HOSPADM

## 2018-01-01 RX ORDER — LIDOCAINE HYDROCHLORIDE 10 MG/ML
1-30 INJECTION, SOLUTION EPIDURAL; INFILTRATION; INTRACAUDAL; PERINEURAL
Status: CANCELLED | OUTPATIENT
Start: 2018-01-01

## 2018-01-01 RX ORDER — FENTANYL CITRATE 50 UG/ML
INJECTION, SOLUTION INTRAMUSCULAR; INTRAVENOUS
Status: DISCONTINUED
Start: 2018-01-01 | End: 2018-01-01 | Stop reason: HOSPADM

## 2018-01-01 RX ORDER — HEPARIN SODIUM (PORCINE) LOCK FLUSH IV SOLN 100 UNIT/ML 100 UNIT/ML
5 SOLUTION INTRAVENOUS
Status: DISCONTINUED | OUTPATIENT
Start: 2018-01-01 | End: 2018-01-01 | Stop reason: HOSPADM

## 2018-01-01 RX ORDER — POTASSIUM CHLORIDE 7.45 MG/ML
10 INJECTION INTRAVENOUS
Status: DISCONTINUED | OUTPATIENT
Start: 2018-01-01 | End: 2018-01-01 | Stop reason: HOSPADM

## 2018-01-01 RX ORDER — ALBUMIN (HUMAN) 12.5 G/50ML
12.5 SOLUTION INTRAVENOUS ONCE
Status: CANCELLED | OUTPATIENT
Start: 2018-01-01 | End: 2018-01-01

## 2018-01-01 RX ORDER — LIDOCAINE HYDROCHLORIDE 10 MG/ML
10 INJECTION, SOLUTION EPIDURAL; INFILTRATION; INTRACAUDAL; PERINEURAL ONCE
Status: COMPLETED | OUTPATIENT
Start: 2018-01-01 | End: 2018-01-01

## 2018-01-01 RX ORDER — VANCOMYCIN HYDROCHLORIDE 1 G/200ML
1000 INJECTION, SOLUTION INTRAVENOUS EVERY 8 HOURS
Status: DISCONTINUED | OUTPATIENT
Start: 2018-01-01 | End: 2018-01-01

## 2018-01-01 RX ORDER — FOLIC ACID 5 MG/ML
1 INJECTION, SOLUTION INTRAMUSCULAR; INTRAVENOUS; SUBCUTANEOUS DAILY
Status: DISCONTINUED | OUTPATIENT
Start: 2018-01-01 | End: 2018-01-01

## 2018-01-01 RX ORDER — BISACODYL 10 MG
10 SUPPOSITORY, RECTAL RECTAL
Status: DISCONTINUED | OUTPATIENT
Start: 2018-01-01 | End: 2018-01-01 | Stop reason: HOSPADM

## 2018-01-01 RX ORDER — LIDOCAINE 40 MG/G
CREAM TOPICAL
Status: DISCONTINUED | OUTPATIENT
Start: 2018-01-01 | End: 2018-01-01 | Stop reason: HOSPADM

## 2018-01-01 RX ORDER — OXYCODONE HCL 20 MG/1
40 TABLET, FILM COATED, EXTENDED RELEASE ORAL EVERY 8 HOURS
Status: DISCONTINUED | OUTPATIENT
Start: 2018-01-01 | End: 2018-01-01 | Stop reason: HOSPADM

## 2018-01-01 RX ORDER — DEXAMETHASONE SODIUM PHOSPHATE 4 MG/ML
4 INJECTION, SOLUTION INTRA-ARTICULAR; INTRALESIONAL; INTRAMUSCULAR; INTRAVENOUS; SOFT TISSUE EVERY 10 MIN PRN
Status: DISCONTINUED | OUTPATIENT
Start: 2018-01-01 | End: 2018-01-01 | Stop reason: HOSPADM

## 2018-01-01 RX ORDER — ALBUTEROL SULFATE 0.83 MG/ML
3 SOLUTION RESPIRATORY (INHALATION)
Status: DISCONTINUED | OUTPATIENT
Start: 2018-01-01 | End: 2018-01-01 | Stop reason: HOSPADM

## 2018-01-01 RX ORDER — ALBUMIN (HUMAN) 12.5 G/50ML
12.5 SOLUTION INTRAVENOUS ONCE
Status: DISCONTINUED | OUTPATIENT
Start: 2018-01-01 | End: 2018-01-01 | Stop reason: HOSPADM

## 2018-01-01 RX ORDER — LIDOCAINE HYDROCHLORIDE 10 MG/ML
5 INJECTION, SOLUTION EPIDURAL; INFILTRATION; INTRACAUDAL; PERINEURAL ONCE
Status: COMPLETED | OUTPATIENT
Start: 2018-01-01 | End: 2018-01-01

## 2018-01-01 RX ORDER — ONDANSETRON 8 MG/1
16 TABLET, FILM COATED ORAL ONCE
Status: COMPLETED | OUTPATIENT
Start: 2018-01-01 | End: 2018-01-01

## 2018-01-01 RX ORDER — LORAZEPAM 2 MG/ML
0.5 INJECTION INTRAMUSCULAR EVERY 4 HOURS PRN
Status: DISCONTINUED | OUTPATIENT
Start: 2018-01-01 | End: 2018-01-01 | Stop reason: HOSPADM

## 2018-01-01 RX ORDER — OXYCODONE HCL 40 MG/1
40 TABLET, FILM COATED, EXTENDED RELEASE ORAL EVERY 8 HOURS
Qty: 50 TABLET | Refills: 0 | Status: SHIPPED | OUTPATIENT
Start: 2018-01-01 | End: 2018-01-01

## 2018-01-01 RX ORDER — VALACYCLOVIR HYDROCHLORIDE 500 MG/1
500 TABLET, FILM COATED ORAL DAILY
Status: DISCONTINUED | OUTPATIENT
Start: 2018-01-01 | End: 2018-01-01 | Stop reason: HOSPADM

## 2018-01-01 RX ORDER — HYDROMORPHONE HYDROCHLORIDE 4 MG/1
4 TABLET ORAL
Qty: 40 TABLET | Refills: 0 | Status: SHIPPED | OUTPATIENT
Start: 2018-01-01

## 2018-01-01 RX ORDER — LORAZEPAM 0.5 MG/1
.5-1 TABLET ORAL
Status: DISCONTINUED | OUTPATIENT
Start: 2018-01-01 | End: 2018-01-01

## 2018-01-01 RX ORDER — ACETAMINOPHEN 650 MG/1
650 SUPPOSITORY RECTAL EVERY 4 HOURS PRN
Status: DISCONTINUED | OUTPATIENT
Start: 2018-01-01 | End: 2018-01-01 | Stop reason: HOSPADM

## 2018-01-01 RX ORDER — IOPAMIDOL 755 MG/ML
78 INJECTION, SOLUTION INTRAVASCULAR ONCE
Status: COMPLETED | OUTPATIENT
Start: 2018-01-01 | End: 2018-01-01

## 2018-01-01 RX ORDER — POLYETHYLENE GLYCOL 3350 17 G/17G
17 POWDER, FOR SOLUTION ORAL DAILY
Status: DISCONTINUED | OUTPATIENT
Start: 2018-01-01 | End: 2018-01-01 | Stop reason: HOSPADM

## 2018-01-01 RX ORDER — SULFAMETHOXAZOLE/TRIMETHOPRIM 800-160 MG
1 TABLET ORAL 2 TIMES DAILY
Qty: 14 TABLET | Refills: 0 | Status: SHIPPED | OUTPATIENT
Start: 2018-01-01 | End: 2018-01-01

## 2018-01-01 RX ORDER — POLYETHYLENE GLYCOL 3350 17 G/17G
17 POWDER, FOR SOLUTION ORAL DAILY PRN
Status: DISCONTINUED | OUTPATIENT
Start: 2018-01-01 | End: 2018-01-01 | Stop reason: HOSPADM

## 2018-01-01 RX ORDER — ESCITALOPRAM OXALATE 10 MG/1
10 TABLET ORAL DAILY
Status: DISCONTINUED | OUTPATIENT
Start: 2018-01-01 | End: 2018-01-01 | Stop reason: HOSPADM

## 2018-01-01 RX ORDER — LIDOCAINE HYDROCHLORIDE 10 MG/ML
1-30 INJECTION, SOLUTION EPIDURAL; INFILTRATION; INTRACAUDAL; PERINEURAL
Status: COMPLETED | OUTPATIENT
Start: 2018-01-01 | End: 2018-01-01

## 2018-01-01 RX ORDER — HYDROMORPHONE HYDROCHLORIDE 1 MG/ML
0.3 INJECTION, SOLUTION INTRAMUSCULAR; INTRAVENOUS; SUBCUTANEOUS
Status: DISCONTINUED | OUTPATIENT
Start: 2018-01-01 | End: 2018-01-01 | Stop reason: HOSPADM

## 2018-01-01 RX ORDER — ACETAMINOPHEN 650 MG/1
650 SUPPOSITORY RECTAL EVERY 6 HOURS PRN
Status: DISCONTINUED | OUTPATIENT
Start: 2018-01-01 | End: 2018-01-01 | Stop reason: HOSPADM

## 2018-01-01 RX ORDER — LIDOCAINE HYDROCHLORIDE 20 MG/ML
INJECTION, SOLUTION INFILTRATION; PERINEURAL PRN
Status: DISCONTINUED | OUTPATIENT
Start: 2018-01-01 | End: 2018-01-01

## 2018-01-01 RX ORDER — EPINEPHRINE 0.3 MG/.3ML
0.3 INJECTION SUBCUTANEOUS EVERY 5 MIN PRN
Status: DISCONTINUED | OUTPATIENT
Start: 2018-01-01 | End: 2018-01-01

## 2018-01-01 RX ORDER — ONDANSETRON 2 MG/ML
4 INJECTION INTRAMUSCULAR; INTRAVENOUS EVERY 6 HOURS PRN
Status: DISCONTINUED | OUTPATIENT
Start: 2018-01-01 | End: 2018-01-01 | Stop reason: HOSPADM

## 2018-01-01 RX ORDER — OXYCODONE HYDROCHLORIDE 5 MG/1
5-15 TABLET ORAL
Status: DISCONTINUED | OUTPATIENT
Start: 2018-01-01 | End: 2018-01-01 | Stop reason: HOSPADM

## 2018-01-01 RX ORDER — METOCLOPRAMIDE HYDROCHLORIDE 5 MG/ML
10 INJECTION INTRAMUSCULAR; INTRAVENOUS EVERY 6 HOURS PRN
Status: DISCONTINUED | OUTPATIENT
Start: 2018-01-01 | End: 2018-01-01 | Stop reason: HOSPADM

## 2018-01-01 RX ORDER — PROCHLORPERAZINE 25 MG
25 SUPPOSITORY, RECTAL RECTAL EVERY 12 HOURS PRN
Status: DISCONTINUED | OUTPATIENT
Start: 2018-01-01 | End: 2018-01-01 | Stop reason: HOSPADM

## 2018-01-01 RX ORDER — PIPERACILLIN SODIUM, TAZOBACTAM SODIUM 4; .5 G/20ML; G/20ML
4.5 INJECTION, POWDER, LYOPHILIZED, FOR SOLUTION INTRAVENOUS ONCE
Status: COMPLETED | OUTPATIENT
Start: 2018-01-01 | End: 2018-01-01

## 2018-01-01 RX ORDER — FENTANYL CITRATE 50 UG/ML
INJECTION, SOLUTION INTRAMUSCULAR; INTRAVENOUS PRN
Status: DISCONTINUED | OUTPATIENT
Start: 2018-01-01 | End: 2018-01-01 | Stop reason: HOSPADM

## 2018-01-01 RX ORDER — HYDROMORPHONE HYDROCHLORIDE 1 MG/ML
0.5 INJECTION, SOLUTION INTRAMUSCULAR; INTRAVENOUS; SUBCUTANEOUS ONCE
Status: COMPLETED | OUTPATIENT
Start: 2018-01-01 | End: 2018-01-01

## 2018-01-01 RX ORDER — CEFTRIAXONE 2 G/1
2 INJECTION, POWDER, FOR SOLUTION INTRAMUSCULAR; INTRAVENOUS ONCE
Status: COMPLETED | OUTPATIENT
Start: 2018-01-01 | End: 2018-01-01

## 2018-01-01 RX ORDER — DOCUSATE SODIUM 100 MG/1
100 CAPSULE, LIQUID FILLED ORAL DAILY
Status: DISCONTINUED | OUTPATIENT
Start: 2018-01-01 | End: 2018-01-01 | Stop reason: HOSPADM

## 2018-01-01 RX ORDER — OLANZAPINE 2.5 MG/1
2.5 TABLET, FILM COATED ORAL EVERY MORNING
Status: DISCONTINUED | OUTPATIENT
Start: 2018-01-01 | End: 2018-01-01 | Stop reason: HOSPADM

## 2018-01-01 RX ORDER — ACETAMINOPHEN 325 MG/1
650 TABLET ORAL EVERY 4 HOURS PRN
Qty: 100 TABLET | Status: ON HOLD | COMMUNITY
Start: 2018-01-01 | End: 2018-01-01

## 2018-01-01 RX ORDER — ONDANSETRON 4 MG/1
4 TABLET, ORALLY DISINTEGRATING ORAL EVERY 6 HOURS PRN
Status: DISCONTINUED | OUTPATIENT
Start: 2018-01-01 | End: 2018-01-01 | Stop reason: HOSPADM

## 2018-01-01 RX ORDER — PANTOPRAZOLE SODIUM 40 MG/1
40 TABLET, DELAYED RELEASE ORAL 2 TIMES DAILY
Status: DISCONTINUED | OUTPATIENT
Start: 2018-01-01 | End: 2018-01-01 | Stop reason: HOSPADM

## 2018-01-01 RX ORDER — MIRTAZAPINE 7.5 MG/1
7.5 TABLET, FILM COATED ORAL AT BEDTIME
Qty: 30 TABLET | Refills: 1 | Status: ON HOLD | OUTPATIENT
Start: 2018-01-01 | End: 2018-01-01

## 2018-01-01 RX ORDER — FENTANYL CITRATE 50 UG/ML
25-50 INJECTION, SOLUTION INTRAMUSCULAR; INTRAVENOUS EVERY 5 MIN PRN
Status: DISCONTINUED | OUTPATIENT
Start: 2018-01-01 | End: 2018-01-01 | Stop reason: HOSPADM

## 2018-01-01 RX ORDER — CEFAZOLIN SODIUM 1 G/3ML
INJECTION, POWDER, FOR SOLUTION INTRAMUSCULAR; INTRAVENOUS PRN
Status: DISCONTINUED | OUTPATIENT
Start: 2018-01-01 | End: 2018-01-01

## 2018-01-01 RX ORDER — ONDANSETRON 2 MG/ML
4 INJECTION INTRAMUSCULAR; INTRAVENOUS EVERY 30 MIN PRN
Status: DISCONTINUED | OUTPATIENT
Start: 2018-01-01 | End: 2018-01-01 | Stop reason: HOSPADM

## 2018-01-01 RX ORDER — ONDANSETRON 2 MG/ML
8 INJECTION INTRAMUSCULAR; INTRAVENOUS ONCE
Status: COMPLETED | OUTPATIENT
Start: 2018-01-01 | End: 2018-01-01

## 2018-01-01 RX ORDER — HYDROCODONE BITARTRATE AND ACETAMINOPHEN 5; 325 MG/1; MG/1
1 TABLET ORAL
Status: DISCONTINUED | OUTPATIENT
Start: 2018-01-01 | End: 2018-01-01 | Stop reason: HOSPADM

## 2018-01-01 RX ORDER — IOPAMIDOL 755 MG/ML
84 INJECTION, SOLUTION INTRAVASCULAR ONCE
Status: COMPLETED | OUTPATIENT
Start: 2018-01-01 | End: 2018-01-01

## 2018-01-01 RX ORDER — PANTOPRAZOLE SODIUM 40 MG/1
40 TABLET, DELAYED RELEASE ORAL 2 TIMES DAILY
Qty: 90 TABLET | Refills: 1 | Status: SHIPPED | OUTPATIENT
Start: 2018-01-01 | End: 2018-01-01

## 2018-01-01 RX ORDER — SODIUM CHLORIDE, SODIUM LACTATE, POTASSIUM CHLORIDE, CALCIUM CHLORIDE 600; 310; 30; 20 MG/100ML; MG/100ML; MG/100ML; MG/100ML
INJECTION, SOLUTION INTRAVENOUS CONTINUOUS PRN
Status: DISCONTINUED | OUTPATIENT
Start: 2018-01-01 | End: 2018-01-01

## 2018-01-01 RX ORDER — SODIUM CHLORIDE 9 MG/ML
INJECTION, SOLUTION INTRAVENOUS CONTINUOUS
Status: DISCONTINUED | OUTPATIENT
Start: 2018-01-01 | End: 2018-01-01 | Stop reason: HOSPADM

## 2018-01-01 RX ORDER — FERROUS SULFATE 325(65) MG
325 TABLET ORAL EVERY OTHER DAY
Qty: 90 TABLET | Refills: 2 | Status: ON HOLD | COMMUNITY
Start: 2018-01-01 | End: 2018-01-01

## 2018-01-01 RX ORDER — SALIVA STIMULANT COMB. NO.3
2 SPRAY, NON-AEROSOL (ML) MUCOUS MEMBRANE
Status: DISCONTINUED | OUTPATIENT
Start: 2018-01-01 | End: 2018-01-01 | Stop reason: HOSPADM

## 2018-01-01 RX ORDER — CALCIUM CARBONATE 500 MG/1
1000 TABLET, CHEWABLE ORAL
Status: DISCONTINUED | OUTPATIENT
Start: 2018-01-01 | End: 2018-01-01

## 2018-01-01 RX ORDER — ALBUMIN (HUMAN) 12.5 G/50ML
12.5 SOLUTION INTRAVENOUS ONCE
Status: DISCONTINUED | OUTPATIENT
Start: 2018-01-01 | End: 2018-01-01

## 2018-01-01 RX ORDER — LORAZEPAM 0.5 MG/1
0.5 TABLET ORAL EVERY 4 HOURS PRN
Qty: 30 TABLET | Refills: 2 | Status: CANCELLED | OUTPATIENT
Start: 2018-01-01

## 2018-01-01 RX ORDER — HEPARIN SODIUM,PORCINE 10 UNIT/ML
5-10 VIAL (ML) INTRAVENOUS EVERY 24 HOURS
Status: DISCONTINUED | OUTPATIENT
Start: 2018-01-01 | End: 2018-01-01 | Stop reason: HOSPADM

## 2018-01-01 RX ORDER — OXYCODONE HCL 10 MG/1
10 TABLET, FILM COATED, EXTENDED RELEASE ORAL EVERY 12 HOURS
Status: DISCONTINUED | OUTPATIENT
Start: 2018-01-01 | End: 2018-01-01

## 2018-01-01 RX ORDER — OXYCODONE HYDROCHLORIDE 5 MG/1
10 TABLET ORAL EVERY 4 HOURS PRN
Qty: 40 TABLET | Refills: 0 | Status: ON HOLD | OUTPATIENT
Start: 2018-01-01 | End: 2018-01-01

## 2018-01-01 RX ORDER — OXYCODONE HYDROCHLORIDE 5 MG/1
10 TABLET ORAL EVERY 4 HOURS PRN
Status: ON HOLD | COMMUNITY
End: 2018-01-01

## 2018-01-01 RX ORDER — ONDANSETRON 4 MG/1
4 TABLET, ORALLY DISINTEGRATING ORAL EVERY 30 MIN PRN
Status: DISCONTINUED | OUTPATIENT
Start: 2018-01-01 | End: 2018-01-01 | Stop reason: HOSPADM

## 2018-01-01 RX ORDER — PIPERACILLIN SODIUM, TAZOBACTAM SODIUM 4; .5 G/20ML; G/20ML
4.5 INJECTION, POWDER, LYOPHILIZED, FOR SOLUTION INTRAVENOUS EVERY 6 HOURS
Status: DISCONTINUED | OUTPATIENT
Start: 2018-01-01 | End: 2018-01-01

## 2018-01-01 RX ORDER — LORAZEPAM 0.5 MG/1
.5-1 TABLET ORAL EVERY 6 HOURS PRN
Status: DISCONTINUED | OUTPATIENT
Start: 2018-01-01 | End: 2018-01-01 | Stop reason: HOSPADM

## 2018-01-01 RX ORDER — HYDRALAZINE HYDROCHLORIDE 20 MG/ML
2.5-5 INJECTION INTRAMUSCULAR; INTRAVENOUS EVERY 10 MIN PRN
Status: DISCONTINUED | OUTPATIENT
Start: 2018-01-01 | End: 2018-01-01 | Stop reason: HOSPADM

## 2018-01-01 RX ORDER — LORAZEPAM 2 MG/ML
.5-1 INJECTION INTRAMUSCULAR
Status: DISCONTINUED | OUTPATIENT
Start: 2018-01-01 | End: 2018-01-01

## 2018-01-01 RX ORDER — ALUMINA, MAGNESIA, AND SIMETHICONE 2400; 2400; 240 MG/30ML; MG/30ML; MG/30ML
30 SUSPENSION ORAL EVERY 4 HOURS PRN
Status: DISCONTINUED | OUTPATIENT
Start: 2018-01-01 | End: 2018-01-01 | Stop reason: HOSPADM

## 2018-01-01 RX ORDER — CIPROFLOXACIN 2 MG/ML
INJECTION, SOLUTION INTRAVENOUS PRN
Status: DISCONTINUED | OUTPATIENT
Start: 2018-01-01 | End: 2018-01-01

## 2018-01-01 RX ORDER — PROCHLORPERAZINE MALEATE 10 MG
10 TABLET ORAL EVERY 6 HOURS PRN
Status: DISCONTINUED | OUTPATIENT
Start: 2018-01-01 | End: 2018-01-01 | Stop reason: HOSPADM

## 2018-01-01 RX ORDER — DEXTROMETHORPHAN POLISTIREX 30 MG/5ML
60 SUSPENSION ORAL EVERY 4 HOURS PRN
Status: ON HOLD | COMMUNITY
End: 2018-01-01

## 2018-01-01 RX ORDER — DEXAMETHASONE 4 MG/1
8 TABLET ORAL ONCE
Status: DISCONTINUED | OUTPATIENT
Start: 2018-01-01 | End: 2018-01-01

## 2018-01-01 RX ORDER — OXYCODONE HYDROCHLORIDE 5 MG/1
5 TABLET ORAL ONCE
Status: COMPLETED | OUTPATIENT
Start: 2018-01-01 | End: 2018-01-01

## 2018-01-01 RX ORDER — LANOLIN ALCOHOL/MO/W.PET/CERES
100 CREAM (GRAM) TOPICAL DAILY
Status: DISCONTINUED | OUTPATIENT
Start: 2018-01-01 | End: 2018-01-01

## 2018-01-01 RX ORDER — OXYCODONE HYDROCHLORIDE 5 MG/1
5 TABLET ORAL EVERY 4 HOURS PRN
Qty: 20 TABLET | Refills: 0 | Status: ON HOLD | OUTPATIENT
Start: 2018-01-01 | End: 2018-01-01

## 2018-01-01 RX ORDER — ONDANSETRON 8 MG/1
8 TABLET, ORALLY DISINTEGRATING ORAL EVERY 8 HOURS PRN
Qty: 40 TABLET | Refills: 3 | Status: ON HOLD | OUTPATIENT
Start: 2018-01-01 | End: 2018-01-01

## 2018-01-01 RX ORDER — ONDANSETRON 4 MG/1
4 TABLET, ORALLY DISINTEGRATING ORAL EVERY 30 MIN PRN
Status: DISCONTINUED | OUTPATIENT
Start: 2018-01-01 | End: 2018-01-01

## 2018-01-01 RX ORDER — POLYETHYLENE GLYCOL 3350 17 G/17G
17 POWDER, FOR SOLUTION ORAL 2 TIMES DAILY PRN
Status: DISCONTINUED | OUTPATIENT
Start: 2018-01-01 | End: 2018-01-01 | Stop reason: HOSPADM

## 2018-01-01 RX ORDER — HEPARIN SODIUM,PORCINE 10 UNIT/ML
5-10 VIAL (ML) INTRAVENOUS
Status: DISCONTINUED | OUTPATIENT
Start: 2018-01-01 | End: 2018-01-01 | Stop reason: HOSPADM

## 2018-01-01 RX ORDER — FOLIC ACID 1 MG/1
1 TABLET ORAL DAILY
Status: DISCONTINUED | OUTPATIENT
Start: 2018-01-01 | End: 2018-01-01

## 2018-01-01 RX ORDER — OXYCODONE HCL 20 MG/1
20 TABLET, FILM COATED, EXTENDED RELEASE ORAL EVERY 8 HOURS
Status: DISCONTINUED | OUTPATIENT
Start: 2018-01-01 | End: 2018-01-01 | Stop reason: HOSPADM

## 2018-01-01 RX ORDER — NICOTINE POLACRILEX 4 MG
15-30 LOZENGE BUCCAL
Status: DISCONTINUED | OUTPATIENT
Start: 2018-01-01 | End: 2018-01-01 | Stop reason: HOSPADM

## 2018-01-01 RX ORDER — OXYCODONE HYDROCHLORIDE 5 MG/1
5-10 TABLET ORAL EVERY 4 HOURS PRN
Qty: 40 TABLET | Refills: 0 | Status: ON HOLD | OUTPATIENT
Start: 2018-01-01 | End: 2018-01-01

## 2018-01-01 RX ORDER — HYDROMORPHONE HYDROCHLORIDE 1 MG/ML
.3-.5 INJECTION, SOLUTION INTRAMUSCULAR; INTRAVENOUS; SUBCUTANEOUS
Status: DISCONTINUED | OUTPATIENT
Start: 2018-01-01 | End: 2018-01-01

## 2018-01-01 RX ORDER — NALOXONE HYDROCHLORIDE 0.4 MG/ML
.1-.4 INJECTION, SOLUTION INTRAMUSCULAR; INTRAVENOUS; SUBCUTANEOUS
Status: DISCONTINUED | OUTPATIENT
Start: 2018-01-01 | End: 2018-01-01 | Stop reason: HOSPADM

## 2018-01-01 RX ORDER — LABETALOL HYDROCHLORIDE 5 MG/ML
10 INJECTION, SOLUTION INTRAVENOUS
Status: DISCONTINUED | OUTPATIENT
Start: 2018-01-01 | End: 2018-01-01 | Stop reason: HOSPADM

## 2018-01-01 RX ORDER — PANTOPRAZOLE SODIUM 40 MG/1
40 TABLET, DELAYED RELEASE ORAL
Status: DISCONTINUED | OUTPATIENT
Start: 2018-01-01 | End: 2018-01-01 | Stop reason: HOSPADM

## 2018-01-01 RX ORDER — LIDOCAINE HYDROCHLORIDE 10 MG/ML
INJECTION, SOLUTION INFILTRATION; PERINEURAL
Status: DISCONTINUED
Start: 2018-01-01 | End: 2018-01-01 | Stop reason: HOSPADM

## 2018-01-01 RX ORDER — PROCHLORPERAZINE MALEATE 5 MG
10 TABLET ORAL EVERY 6 HOURS PRN
Status: DISCONTINUED | OUTPATIENT
Start: 2018-01-01 | End: 2018-01-01 | Stop reason: HOSPADM

## 2018-01-01 RX ORDER — POTASSIUM CL/LIDO/0.9 % NACL 10MEQ/0.1L
10 INTRAVENOUS SOLUTION, PIGGYBACK (ML) INTRAVENOUS
Status: DISCONTINUED | OUTPATIENT
Start: 2018-01-01 | End: 2018-01-01 | Stop reason: HOSPADM

## 2018-01-01 RX ORDER — IPRATROPIUM BROMIDE AND ALBUTEROL SULFATE 2.5; .5 MG/3ML; MG/3ML
3 SOLUTION RESPIRATORY (INHALATION) ONCE
Status: DISCONTINUED | OUTPATIENT
Start: 2018-01-01 | End: 2018-01-01

## 2018-01-01 RX ORDER — FENTANYL CITRATE 50 UG/ML
25-50 INJECTION, SOLUTION INTRAMUSCULAR; INTRAVENOUS
Status: DISCONTINUED | OUTPATIENT
Start: 2018-01-01 | End: 2018-01-01

## 2018-01-01 RX ORDER — PANTOPRAZOLE SODIUM 40 MG/1
40 TABLET, DELAYED RELEASE ORAL 2 TIMES DAILY
Qty: 180 TABLET | Refills: 1 | Status: SHIPPED | OUTPATIENT
Start: 2018-01-01

## 2018-01-01 RX ORDER — OXYCODONE HCL 20 MG/1
20 TABLET, FILM COATED, EXTENDED RELEASE ORAL EVERY 12 HOURS
Status: DISCONTINUED | OUTPATIENT
Start: 2018-01-01 | End: 2018-01-01

## 2018-01-01 RX ORDER — ACETAMINOPHEN 325 MG/1
650 TABLET ORAL EVERY 4 HOURS PRN
Status: DISCONTINUED | OUTPATIENT
Start: 2018-01-01 | End: 2018-01-01 | Stop reason: HOSPADM

## 2018-01-01 RX ORDER — IOPAMIDOL 755 MG/ML
75 INJECTION, SOLUTION INTRAVASCULAR ONCE
Status: COMPLETED | OUTPATIENT
Start: 2018-01-01 | End: 2018-01-01

## 2018-01-01 RX ORDER — CEFAZOLIN SODIUM 1 G/50ML
1250 SOLUTION INTRAVENOUS EVERY 8 HOURS
Status: DISCONTINUED | OUTPATIENT
Start: 2018-01-01 | End: 2018-01-01

## 2018-01-01 RX ORDER — HYDROMORPHONE HYDROCHLORIDE 4 MG/1
4 TABLET ORAL
Status: DISCONTINUED | OUTPATIENT
Start: 2018-01-01 | End: 2018-01-01 | Stop reason: HOSPADM

## 2018-01-01 RX ORDER — FENTANYL CITRATE 50 UG/ML
INJECTION, SOLUTION INTRAMUSCULAR; INTRAVENOUS
Status: COMPLETED
Start: 2018-01-01 | End: 2018-01-01

## 2018-01-01 RX ORDER — OXYCODONE HCL 10 MG/1
20 TABLET, FILM COATED, EXTENDED RELEASE ORAL EVERY 12 HOURS
Status: DISCONTINUED | OUTPATIENT
Start: 2018-01-01 | End: 2018-01-01 | Stop reason: HOSPADM

## 2018-01-01 RX ORDER — MIRTAZAPINE 7.5 MG/1
7.5 TABLET, FILM COATED ORAL AT BEDTIME
Status: ON HOLD | COMMUNITY
End: 2018-01-01

## 2018-01-01 RX ORDER — SENNOSIDES 8.6 MG
1-2 TABLET ORAL 2 TIMES DAILY PRN
Status: DISCONTINUED | OUTPATIENT
Start: 2018-01-01 | End: 2018-01-01 | Stop reason: HOSPADM

## 2018-01-01 RX ORDER — DEXAMETHASONE SODIUM PHOSPHATE 4 MG/ML
INJECTION, SOLUTION INTRA-ARTICULAR; INTRALESIONAL; INTRAMUSCULAR; INTRAVENOUS; SOFT TISSUE PRN
Status: DISCONTINUED | OUTPATIENT
Start: 2018-01-01 | End: 2018-01-01

## 2018-01-01 RX ORDER — ONDANSETRON 2 MG/ML
4 INJECTION INTRAMUSCULAR; INTRAVENOUS EVERY 30 MIN PRN
Status: DISCONTINUED | OUTPATIENT
Start: 2018-01-01 | End: 2018-01-01

## 2018-01-01 RX ORDER — MORPHINE SULFATE 10 MG/5ML
10-15 SOLUTION ORAL
Status: DISCONTINUED | OUTPATIENT
Start: 2018-01-01 | End: 2018-01-01 | Stop reason: HOSPADM

## 2018-01-01 RX ORDER — NALOXONE HYDROCHLORIDE 0.4 MG/ML
.1-.4 INJECTION, SOLUTION INTRAMUSCULAR; INTRAVENOUS; SUBCUTANEOUS
Status: CANCELLED | OUTPATIENT
Start: 2018-01-01

## 2018-01-01 RX ORDER — HALOPERIDOL 5 MG/ML
2 INJECTION INTRAMUSCULAR EVERY 6 HOURS PRN
Status: DISCONTINUED | OUTPATIENT
Start: 2018-01-01 | End: 2018-01-01 | Stop reason: HOSPADM

## 2018-01-01 RX ORDER — DEXAMETHASONE 4 MG/1
8 TABLET ORAL ONCE
Status: CANCELLED
Start: 2018-01-01 | End: 2018-01-01

## 2018-01-01 RX ORDER — BACLOFEN 10 MG/1
5 TABLET ORAL ONCE
Status: COMPLETED | OUTPATIENT
Start: 2018-01-01 | End: 2018-01-01

## 2018-01-01 RX ORDER — OXYCODONE HCL 40 MG/1
40 TABLET, FILM COATED, EXTENDED RELEASE ORAL EVERY 8 HOURS
Qty: 50 TABLET | Refills: 0 | Status: SHIPPED | OUTPATIENT
Start: 2018-01-01

## 2018-01-01 RX ORDER — ACETAMINOPHEN 325 MG/1
650 TABLET ORAL
Status: DISCONTINUED | OUTPATIENT
Start: 2018-01-01 | End: 2018-01-01 | Stop reason: HOSPADM

## 2018-01-01 RX ORDER — FENTANYL CITRATE 50 UG/ML
25-50 INJECTION, SOLUTION INTRAMUSCULAR; INTRAVENOUS
Status: DISCONTINUED | OUTPATIENT
Start: 2018-01-01 | End: 2018-01-01 | Stop reason: HOSPADM

## 2018-01-01 RX ORDER — LORAZEPAM 2 MG/ML
.5-1 INJECTION INTRAMUSCULAR EVERY 6 HOURS PRN
Status: DISCONTINUED | OUTPATIENT
Start: 2018-01-01 | End: 2018-01-01 | Stop reason: HOSPADM

## 2018-01-01 RX ORDER — OXYCODONE HYDROCHLORIDE 5 MG/1
5-10 TABLET ORAL EVERY 4 HOURS PRN
Qty: 30 TABLET | Refills: 0 | Status: ON HOLD | OUTPATIENT
Start: 2018-01-01 | End: 2018-01-01

## 2018-01-01 RX ORDER — METHYLPREDNISOLONE SODIUM SUCCINATE 125 MG/2ML
125 INJECTION, POWDER, LYOPHILIZED, FOR SOLUTION INTRAMUSCULAR; INTRAVENOUS
Status: DISCONTINUED | OUTPATIENT
Start: 2018-01-01 | End: 2018-01-01 | Stop reason: HOSPADM

## 2018-01-01 RX ORDER — MORPHINE SULFATE 100 MG/5ML
5-10 SOLUTION ORAL
Status: DISCONTINUED | OUTPATIENT
Start: 2018-01-01 | End: 2018-01-01

## 2018-01-01 RX ORDER — CIPROFLOXACIN 2 MG/ML
400 INJECTION, SOLUTION INTRAVENOUS EVERY 12 HOURS
Status: DISCONTINUED | OUTPATIENT
Start: 2018-01-01 | End: 2018-01-01

## 2018-01-01 RX ORDER — CEFAZOLIN SODIUM 1 G/50ML
1250 SOLUTION INTRAVENOUS ONCE
Status: DISCONTINUED | OUTPATIENT
Start: 2018-01-01 | End: 2018-01-01

## 2018-01-01 RX ORDER — SODIUM CHLORIDE, SODIUM LACTATE, POTASSIUM CHLORIDE, CALCIUM CHLORIDE 600; 310; 30; 20 MG/100ML; MG/100ML; MG/100ML; MG/100ML
INJECTION, SOLUTION INTRAVENOUS CONTINUOUS
Status: DISCONTINUED | OUTPATIENT
Start: 2018-01-01 | End: 2018-01-01 | Stop reason: HOSPADM

## 2018-01-01 RX ORDER — AMOXICILLIN 250 MG
2 CAPSULE ORAL 2 TIMES DAILY PRN
Status: DISCONTINUED | OUTPATIENT
Start: 2018-01-01 | End: 2018-01-01 | Stop reason: HOSPADM

## 2018-01-01 RX ORDER — MEPERIDINE HYDROCHLORIDE 25 MG/ML
25 INJECTION INTRAMUSCULAR; INTRAVENOUS; SUBCUTANEOUS EVERY 30 MIN PRN
Status: DISCONTINUED | OUTPATIENT
Start: 2018-01-01 | End: 2018-01-01 | Stop reason: HOSPADM

## 2018-01-01 RX ORDER — ONDANSETRON 2 MG/ML
INJECTION INTRAMUSCULAR; INTRAVENOUS PRN
Status: DISCONTINUED | OUTPATIENT
Start: 2018-01-01 | End: 2018-01-01

## 2018-01-01 RX ORDER — LORAZEPAM 2 MG/ML
.5-1 INJECTION INTRAMUSCULAR EVERY 4 HOURS PRN
Status: DISCONTINUED | OUTPATIENT
Start: 2018-01-01 | End: 2018-01-01 | Stop reason: HOSPADM

## 2018-01-01 RX ORDER — BENZONATATE 100 MG/1
100 CAPSULE ORAL 3 TIMES DAILY PRN
Status: DISCONTINUED | OUTPATIENT
Start: 2018-01-01 | End: 2018-01-01 | Stop reason: HOSPADM

## 2018-01-01 RX ORDER — DOCUSATE SODIUM 100 MG/1
100 CAPSULE, LIQUID FILLED ORAL 2 TIMES DAILY PRN
Status: DISCONTINUED | OUTPATIENT
Start: 2018-01-01 | End: 2018-01-01 | Stop reason: HOSPADM

## 2018-01-01 RX ORDER — DEXAMETHASONE 4 MG/1
12 TABLET ORAL ONCE
Status: COMPLETED | OUTPATIENT
Start: 2018-01-01 | End: 2018-01-01

## 2018-01-01 RX ORDER — OXYCODONE HCL 20 MG/1
20 TABLET, FILM COATED, EXTENDED RELEASE ORAL EVERY 12 HOURS
Qty: 30 TABLET | Refills: 0 | Status: ON HOLD | OUTPATIENT
Start: 2018-01-01 | End: 2018-01-01

## 2018-01-01 RX ORDER — HEPARIN SODIUM 1000 [USP'U]/ML
INJECTION, SOLUTION INTRAVENOUS; SUBCUTANEOUS
Status: DISCONTINUED
Start: 2018-01-01 | End: 2018-01-01 | Stop reason: HOSPADM

## 2018-01-01 RX ORDER — LIDOCAINE HYDROCHLORIDE 10 MG/ML
INJECTION, SOLUTION INFILTRATION; PERINEURAL PRN
Status: DISCONTINUED | OUTPATIENT
Start: 2018-01-01 | End: 2018-01-01 | Stop reason: HOSPADM

## 2018-01-01 RX ORDER — OXYCODONE HYDROCHLORIDE 5 MG/1
5-10 TABLET ORAL
Status: DISCONTINUED | OUTPATIENT
Start: 2018-01-01 | End: 2018-01-01 | Stop reason: HOSPADM

## 2018-01-01 RX ORDER — OXYCODONE HYDROCHLORIDE 5 MG/1
5-10 TABLET ORAL EVERY 4 HOURS PRN
Qty: 30 TABLET | Refills: 0 | Status: SHIPPED | OUTPATIENT
Start: 2018-01-01 | End: 2018-01-01

## 2018-01-01 RX ORDER — FOLIC ACID 5 MG/ML
1 INJECTION, SOLUTION INTRAMUSCULAR; INTRAVENOUS; SUBCUTANEOUS DAILY
Status: DISCONTINUED | OUTPATIENT
Start: 2018-01-01 | End: 2018-01-01 | Stop reason: HOSPADM

## 2018-01-01 RX ORDER — FLUMAZENIL 0.1 MG/ML
0.2 INJECTION, SOLUTION INTRAVENOUS
Status: DISCONTINUED | OUTPATIENT
Start: 2018-01-01 | End: 2018-01-01 | Stop reason: HOSPADM

## 2018-01-01 RX ORDER — HYDROMORPHONE HYDROCHLORIDE 2 MG/1
4 TABLET ORAL
Status: DISCONTINUED | OUTPATIENT
Start: 2018-01-01 | End: 2018-01-01 | Stop reason: HOSPADM

## 2018-01-01 RX ORDER — PROCHLORPERAZINE MALEATE 10 MG
10 TABLET ORAL EVERY 6 HOURS PRN
Qty: 30 TABLET | Refills: 2 | Status: CANCELLED | OUTPATIENT
Start: 2018-01-01

## 2018-01-01 RX ORDER — OXYCODONE HYDROCHLORIDE 5 MG/1
10 TABLET ORAL EVERY 4 HOURS PRN
Status: DISCONTINUED | OUTPATIENT
Start: 2018-01-01 | End: 2018-01-01

## 2018-01-01 RX ORDER — PANTOPRAZOLE SODIUM 40 MG/1
40 TABLET, DELAYED RELEASE ORAL 2 TIMES DAILY
Qty: 30 TABLET | Refills: 1 | Status: SHIPPED | OUTPATIENT
Start: 2018-01-01 | End: 2018-01-01

## 2018-01-01 RX ORDER — PROPOFOL 10 MG/ML
INJECTION, EMULSION INTRAVENOUS CONTINUOUS PRN
Status: DISCONTINUED | OUTPATIENT
Start: 2018-01-01 | End: 2018-01-01

## 2018-01-01 RX ORDER — PROCHLORPERAZINE MALEATE 10 MG
10 TABLET ORAL EVERY 6 HOURS PRN
Status: DISCONTINUED | OUTPATIENT
Start: 2018-01-01 | End: 2018-01-01

## 2018-01-01 RX ORDER — MINERAL OIL/HYDROPHIL PETROLAT
OINTMENT (GRAM) TOPICAL EVERY 8 HOURS PRN
Status: DISCONTINUED | OUTPATIENT
Start: 2018-01-01 | End: 2018-01-01 | Stop reason: HOSPADM

## 2018-01-01 RX ORDER — OLANZAPINE 5 MG/1
5 TABLET, ORALLY DISINTEGRATING ORAL DAILY
Status: DISCONTINUED | OUTPATIENT
Start: 2018-01-01 | End: 2018-01-01 | Stop reason: HOSPADM

## 2018-01-01 RX ORDER — POTASSIUM CHLORIDE 29.8 MG/ML
20 INJECTION INTRAVENOUS
Status: DISCONTINUED | OUTPATIENT
Start: 2018-01-01 | End: 2018-01-01 | Stop reason: HOSPADM

## 2018-01-01 RX ORDER — MORPHINE SULFATE 4 MG/ML
4 INJECTION, SOLUTION INTRAMUSCULAR; INTRAVENOUS
Status: COMPLETED | OUTPATIENT
Start: 2018-01-01 | End: 2018-01-01

## 2018-01-01 RX ORDER — MIRTAZAPINE 7.5 MG/1
7.5 TABLET, FILM COATED ORAL AT BEDTIME
Status: DISCONTINUED | OUTPATIENT
Start: 2018-01-01 | End: 2018-01-01 | Stop reason: HOSPADM

## 2018-01-01 RX ORDER — DOCUSATE SODIUM 100 MG/1
100 CAPSULE, LIQUID FILLED ORAL DAILY
Qty: 60 CAPSULE | Refills: 3 | Status: ON HOLD | OUTPATIENT
Start: 2018-01-01 | End: 2018-01-01

## 2018-01-01 RX ORDER — SODIUM CHLORIDE, SODIUM LACTATE, POTASSIUM CHLORIDE, CALCIUM CHLORIDE 600; 310; 30; 20 MG/100ML; MG/100ML; MG/100ML; MG/100ML
INJECTION, SOLUTION INTRAVENOUS CONTINUOUS
Status: DISCONTINUED | OUTPATIENT
Start: 2018-01-01 | End: 2018-01-01

## 2018-01-01 RX ORDER — OXYCODONE HCL 20 MG/1
40 TABLET, FILM COATED, EXTENDED RELEASE ORAL EVERY 8 HOURS
Status: DISCONTINUED | OUTPATIENT
Start: 2018-01-01 | End: 2018-01-01

## 2018-01-01 RX ORDER — HYDRALAZINE HYDROCHLORIDE 20 MG/ML
2.5-5 INJECTION INTRAMUSCULAR; INTRAVENOUS EVERY 10 MIN PRN
Status: DISCONTINUED | OUTPATIENT
Start: 2018-01-01 | End: 2018-01-01

## 2018-01-01 RX ORDER — MORPHINE SULFATE 100 MG/5ML
10-15 SOLUTION ORAL
Status: DISCONTINUED | OUTPATIENT
Start: 2018-01-01 | End: 2018-01-01 | Stop reason: HOSPADM

## 2018-01-01 RX ORDER — HEPARIN SODIUM (PORCINE) LOCK FLUSH IV SOLN 100 UNIT/ML 100 UNIT/ML
SOLUTION INTRAVENOUS
Status: DISCONTINUED
Start: 2018-01-01 | End: 2018-01-01 | Stop reason: HOSPADM

## 2018-01-01 RX ORDER — OXYCODONE HCL 20 MG/1
20 TABLET, FILM COATED, EXTENDED RELEASE ORAL EVERY 12 HOURS
Status: DISCONTINUED | OUTPATIENT
Start: 2018-01-01 | End: 2018-01-01 | Stop reason: HOSPADM

## 2018-01-01 RX ORDER — OXYCODONE HYDROCHLORIDE 5 MG/1
5-10 TABLET ORAL EVERY 4 HOURS PRN
Status: DISCONTINUED | OUTPATIENT
Start: 2018-01-01 | End: 2018-01-01

## 2018-01-01 RX ORDER — HYDROMORPHONE HYDROCHLORIDE 1 MG/ML
.3-.5 INJECTION, SOLUTION INTRAMUSCULAR; INTRAVENOUS; SUBCUTANEOUS EVERY 10 MIN PRN
Status: DISCONTINUED | OUTPATIENT
Start: 2018-01-01 | End: 2018-01-01 | Stop reason: HOSPADM

## 2018-01-01 RX ORDER — LORAZEPAM 2 MG/ML
1-2 INJECTION INTRAMUSCULAR EVERY 30 MIN PRN
Status: DISCONTINUED | OUTPATIENT
Start: 2018-01-01 | End: 2018-01-01 | Stop reason: HOSPADM

## 2018-01-01 RX ORDER — LABETALOL HYDROCHLORIDE 5 MG/ML
10 INJECTION, SOLUTION INTRAVENOUS
Status: DISCONTINUED | OUTPATIENT
Start: 2018-01-01 | End: 2018-01-01

## 2018-01-01 RX ORDER — PROPOFOL 10 MG/ML
INJECTION, EMULSION INTRAVENOUS PRN
Status: DISCONTINUED | OUTPATIENT
Start: 2018-01-01 | End: 2018-01-01

## 2018-01-01 RX ORDER — OXYCODONE HYDROCHLORIDE 5 MG/1
5-10 TABLET ORAL EVERY 4 HOURS PRN
Status: DISCONTINUED | OUTPATIENT
Start: 2018-01-01 | End: 2018-01-01 | Stop reason: HOSPADM

## 2018-01-01 RX ORDER — OXYCODONE AND ACETAMINOPHEN 5; 325 MG/1; MG/1
1-2 TABLET ORAL EVERY 4 HOURS PRN
Status: DISCONTINUED | OUTPATIENT
Start: 2018-01-01 | End: 2018-01-01

## 2018-01-01 RX ORDER — SODIUM CHLORIDE 9 MG/ML
1000 INJECTION, SOLUTION INTRAVENOUS CONTINUOUS PRN
Status: DISCONTINUED | OUTPATIENT
Start: 2018-01-01 | End: 2018-01-01 | Stop reason: HOSPADM

## 2018-01-01 RX ORDER — HEPARIN SODIUM (PORCINE) LOCK FLUSH IV SOLN 100 UNIT/ML 100 UNIT/ML
SOLUTION INTRAVENOUS PRN
Status: DISCONTINUED | OUTPATIENT
Start: 2018-01-01 | End: 2018-01-01 | Stop reason: HOSPADM

## 2018-01-01 RX ORDER — MORPHINE SULFATE 10 MG/5ML
5-10 SOLUTION ORAL
Status: DISCONTINUED | OUTPATIENT
Start: 2018-01-01 | End: 2018-01-01

## 2018-01-01 RX ORDER — LOPERAMIDE HCL 2 MG
2 CAPSULE ORAL 4 TIMES DAILY PRN
Status: DISCONTINUED | OUTPATIENT
Start: 2018-01-01 | End: 2018-01-01 | Stop reason: HOSPADM

## 2018-01-01 RX ORDER — HYDROMORPHONE HYDROCHLORIDE 1 MG/ML
.5-1 INJECTION, SOLUTION INTRAMUSCULAR; INTRAVENOUS; SUBCUTANEOUS
Status: DISCONTINUED | OUTPATIENT
Start: 2018-01-01 | End: 2018-01-01 | Stop reason: HOSPADM

## 2018-01-01 RX ORDER — PANTOPRAZOLE SODIUM 40 MG/1
40 TABLET, DELAYED RELEASE ORAL DAILY
Qty: 30 TABLET | Refills: 1 | Status: ON HOLD | OUTPATIENT
Start: 2018-01-01 | End: 2018-01-01

## 2018-01-01 RX ORDER — POLYETHYLENE GLYCOL 3350 17 G/17G
1 POWDER, FOR SOLUTION ORAL DAILY
COMMUNITY

## 2018-01-01 RX ORDER — MIRTAZAPINE 7.5 MG/1
7.5 TABLET, FILM COATED ORAL AT BEDTIME
Qty: 30 TABLET | Refills: 1 | Status: SHIPPED | OUTPATIENT
Start: 2018-01-01 | End: 2018-01-01

## 2018-01-01 RX ORDER — FLUMAZENIL 0.1 MG/ML
0.2 INJECTION, SOLUTION INTRAVENOUS
Status: CANCELLED | OUTPATIENT
Start: 2018-01-01

## 2018-01-01 RX ORDER — CALCIUM CARBONATE 500 MG/1
1000 TABLET, CHEWABLE ORAL
Status: DISCONTINUED | OUTPATIENT
Start: 2018-01-01 | End: 2018-01-01 | Stop reason: HOSPADM

## 2018-01-01 RX ORDER — MULTIPLE VITAMINS W/ MINERALS TAB 9MG-400MCG
1 TAB ORAL DAILY
Status: DISCONTINUED | OUTPATIENT
Start: 2018-01-01 | End: 2018-01-01

## 2018-01-01 RX ORDER — ACETAMINOPHEN 325 MG/1
650 TABLET ORAL EVERY 6 HOURS PRN
Status: DISCONTINUED | OUTPATIENT
Start: 2018-01-01 | End: 2018-01-01 | Stop reason: HOSPADM

## 2018-01-01 RX ORDER — LIDOCAINE HYDROCHLORIDE 10 MG/ML
8 INJECTION, SOLUTION EPIDURAL; INFILTRATION; INTRACAUDAL; PERINEURAL ONCE
Status: DISCONTINUED | OUTPATIENT
Start: 2018-01-01 | End: 2018-01-01

## 2018-01-01 RX ORDER — LORAZEPAM 0.5 MG/1
.5-1 TABLET ORAL EVERY 6 HOURS PRN
Qty: 60 TABLET | Refills: 0 | Status: SHIPPED | OUTPATIENT
Start: 2018-01-01

## 2018-01-01 RX ORDER — DIPHENHYDRAMINE HYDROCHLORIDE 50 MG/ML
50 INJECTION INTRAMUSCULAR; INTRAVENOUS
Status: DISCONTINUED | OUTPATIENT
Start: 2018-01-01 | End: 2018-01-01 | Stop reason: HOSPADM

## 2018-01-01 RX ORDER — DOCUSATE SODIUM 100 MG/1
100 CAPSULE, LIQUID FILLED ORAL 2 TIMES DAILY
Status: DISCONTINUED | OUTPATIENT
Start: 2018-01-01 | End: 2018-01-01 | Stop reason: HOSPADM

## 2018-01-01 RX ORDER — MEPERIDINE HYDROCHLORIDE 25 MG/ML
12.5 INJECTION INTRAMUSCULAR; INTRAVENOUS; SUBCUTANEOUS
Status: DISCONTINUED | OUTPATIENT
Start: 2018-01-01 | End: 2018-01-01

## 2018-01-01 RX ORDER — LORAZEPAM 2 MG/ML
.5-1 INJECTION INTRAMUSCULAR
Status: DISCONTINUED | OUTPATIENT
Start: 2018-01-01 | End: 2018-01-01 | Stop reason: HOSPADM

## 2018-01-01 RX ORDER — POTASSIUM CHLORIDE 1.5 G/1.58G
20-40 POWDER, FOR SOLUTION ORAL
Status: DISCONTINUED | OUTPATIENT
Start: 2018-01-01 | End: 2018-01-01 | Stop reason: HOSPADM

## 2018-01-01 RX ORDER — MORPHINE SULFATE 2 MG/ML
2-4 INJECTION, SOLUTION INTRAMUSCULAR; INTRAVENOUS
Status: DISCONTINUED | OUTPATIENT
Start: 2018-01-01 | End: 2018-01-01 | Stop reason: HOSPADM

## 2018-01-01 RX ORDER — FENTANYL CITRATE 50 UG/ML
INJECTION, SOLUTION INTRAMUSCULAR; INTRAVENOUS PRN
Status: DISCONTINUED | OUTPATIENT
Start: 2018-01-01 | End: 2018-01-01

## 2018-01-01 RX ORDER — LIDOCAINE 40 MG/G
CREAM TOPICAL
Status: CANCELLED | OUTPATIENT
Start: 2018-01-01

## 2018-01-01 RX ORDER — IPRATROPIUM BROMIDE AND ALBUTEROL SULFATE 2.5; .5 MG/3ML; MG/3ML
SOLUTION RESPIRATORY (INHALATION)
Status: COMPLETED
Start: 2018-01-01 | End: 2018-01-01

## 2018-01-01 RX ORDER — MEPERIDINE HYDROCHLORIDE 25 MG/ML
12.5 INJECTION INTRAMUSCULAR; INTRAVENOUS; SUBCUTANEOUS
Status: DISCONTINUED | OUTPATIENT
Start: 2018-01-01 | End: 2018-01-01 | Stop reason: HOSPADM

## 2018-01-01 RX ORDER — MAGNESIUM SULFATE HEPTAHYDRATE 40 MG/ML
4 INJECTION, SOLUTION INTRAVENOUS EVERY 4 HOURS PRN
Status: DISCONTINUED | OUTPATIENT
Start: 2018-01-01 | End: 2018-01-01 | Stop reason: HOSPADM

## 2018-01-01 RX ORDER — SODIUM CHLORIDE 9 MG/ML
INJECTION, SOLUTION INTRAVENOUS CONTINUOUS
Status: DISCONTINUED | OUTPATIENT
Start: 2018-01-01 | End: 2018-01-01

## 2018-01-01 RX ORDER — BISACODYL 10 MG
10 SUPPOSITORY, RECTAL RECTAL DAILY PRN
Status: DISCONTINUED | OUTPATIENT
Start: 2018-01-01 | End: 2018-01-01 | Stop reason: HOSPADM

## 2018-01-01 RX ORDER — METOCLOPRAMIDE 10 MG/1
10 TABLET ORAL EVERY 6 HOURS PRN
Status: DISCONTINUED | OUTPATIENT
Start: 2018-01-01 | End: 2018-01-01 | Stop reason: HOSPADM

## 2018-01-01 RX ORDER — DEXTROSE MONOHYDRATE 25 G/50ML
25-50 INJECTION, SOLUTION INTRAVENOUS
Status: DISCONTINUED | OUTPATIENT
Start: 2018-01-01 | End: 2018-01-01 | Stop reason: HOSPADM

## 2018-01-01 RX ORDER — HYDROMORPHONE HYDROCHLORIDE 4 MG/1
4 TABLET ORAL
Qty: 40 TABLET | Refills: 0 | Status: SHIPPED | OUTPATIENT
Start: 2018-01-01 | End: 2018-01-01

## 2018-01-01 RX ORDER — ONDANSETRON 2 MG/ML
4 INJECTION INTRAMUSCULAR; INTRAVENOUS
Status: COMPLETED | OUTPATIENT
Start: 2018-01-01 | End: 2018-01-01

## 2018-01-01 RX ORDER — ONDANSETRON 8 MG/1
8 TABLET, FILM COATED ORAL EVERY 8 HOURS PRN
Qty: 10 TABLET | Refills: 2 | Status: CANCELLED | OUTPATIENT
Start: 2018-01-01

## 2018-01-01 RX ORDER — LIDOCAINE 40 MG/G
CREAM TOPICAL
Status: DISCONTINUED | OUTPATIENT
Start: 2018-01-01 | End: 2018-01-01 | Stop reason: CLARIF

## 2018-01-01 RX ORDER — ATROPINE SULFATE 10 MG/ML
1-2 SOLUTION/ DROPS OPHTHALMIC
Status: DISCONTINUED | OUTPATIENT
Start: 2018-01-01 | End: 2018-01-01 | Stop reason: HOSPADM

## 2018-01-01 RX ORDER — OXYCODONE HCL 20 MG/1
20 TABLET, FILM COATED, EXTENDED RELEASE ORAL EVERY 12 HOURS
Qty: 30 TABLET | Refills: 0 | Status: SHIPPED | OUTPATIENT
Start: 2018-01-01 | End: 2018-01-01

## 2018-01-01 RX ORDER — THIAMINE HYDROCHLORIDE 100 MG/ML
100 INJECTION, SOLUTION INTRAMUSCULAR; INTRAVENOUS DAILY
Status: DISCONTINUED | OUTPATIENT
Start: 2018-01-01 | End: 2018-01-01 | Stop reason: HOSPADM

## 2018-01-01 RX ORDER — PROCHLORPERAZINE MALEATE 10 MG
10 TABLET ORAL EVERY 6 HOURS PRN
Qty: 60 TABLET | Refills: 3 | Status: SHIPPED | OUTPATIENT
Start: 2018-01-01

## 2018-01-01 RX ORDER — AMOXICILLIN 250 MG
1 CAPSULE ORAL 2 TIMES DAILY PRN
Status: DISCONTINUED | OUTPATIENT
Start: 2018-01-01 | End: 2018-01-01 | Stop reason: HOSPADM

## 2018-01-01 RX ORDER — EPINEPHRINE 1 MG/ML
0.3 INJECTION, SOLUTION, CONCENTRATE INTRAVENOUS EVERY 5 MIN PRN
Status: DISCONTINUED | OUTPATIENT
Start: 2018-01-01 | End: 2018-01-01 | Stop reason: HOSPADM

## 2018-01-01 RX ORDER — FENTANYL CITRATE 50 UG/ML
100 INJECTION, SOLUTION INTRAMUSCULAR; INTRAVENOUS ONCE
Status: DISCONTINUED | OUTPATIENT
Start: 2018-01-01 | End: 2018-01-01 | Stop reason: HOSPADM

## 2018-01-01 RX ORDER — MORPHINE SULFATE 2 MG/ML
1 INJECTION, SOLUTION INTRAMUSCULAR; INTRAVENOUS
Status: DISCONTINUED | OUTPATIENT
Start: 2018-01-01 | End: 2018-01-01 | Stop reason: HOSPADM

## 2018-01-01 RX ORDER — CEFTRIAXONE 2 G/1
2 INJECTION, POWDER, FOR SOLUTION INTRAMUSCULAR; INTRAVENOUS EVERY 24 HOURS
Status: DISCONTINUED | OUTPATIENT
Start: 2018-01-01 | End: 2018-01-01

## 2018-01-01 RX ORDER — POTASSIUM CHLORIDE 1500 MG/1
20-40 TABLET, EXTENDED RELEASE ORAL
Status: DISCONTINUED | OUTPATIENT
Start: 2018-01-01 | End: 2018-01-01 | Stop reason: HOSPADM

## 2018-01-01 RX ORDER — OXYCODONE HCL 20 MG/1
20 TABLET, FILM COATED, EXTENDED RELEASE ORAL 3 TIMES DAILY
Status: ON HOLD | COMMUNITY
End: 2018-01-01

## 2018-01-01 RX ORDER — HYDROMORPHONE HYDROCHLORIDE 1 MG/ML
INJECTION, SOLUTION INTRAMUSCULAR; INTRAVENOUS; SUBCUTANEOUS
Status: COMPLETED
Start: 2018-01-01 | End: 2018-01-01

## 2018-01-01 RX ORDER — FLUOROURACIL 50 MG/ML
400 INJECTION, SOLUTION INTRAVENOUS ONCE
Status: COMPLETED | OUTPATIENT
Start: 2018-01-01 | End: 2018-01-01

## 2018-01-01 RX ORDER — ONDANSETRON 2 MG/ML
4 INJECTION INTRAMUSCULAR; INTRAVENOUS ONCE
Status: COMPLETED | OUTPATIENT
Start: 2018-01-01 | End: 2018-01-01

## 2018-01-01 RX ORDER — LORAZEPAM 1 MG/1
1-2 TABLET ORAL EVERY 30 MIN PRN
Status: DISCONTINUED | OUTPATIENT
Start: 2018-01-01 | End: 2018-01-01 | Stop reason: HOSPADM

## 2018-01-01 RX ORDER — FENTANYL CITRATE 50 UG/ML
50 INJECTION, SOLUTION INTRAMUSCULAR; INTRAVENOUS ONCE
Status: DISCONTINUED | OUTPATIENT
Start: 2018-01-01 | End: 2018-01-01

## 2018-01-01 RX ORDER — HYDROMORPHONE HYDROCHLORIDE 1 MG/ML
.3-.5 INJECTION, SOLUTION INTRAMUSCULAR; INTRAVENOUS; SUBCUTANEOUS EVERY 10 MIN PRN
Status: DISCONTINUED | OUTPATIENT
Start: 2018-01-01 | End: 2018-01-01

## 2018-01-01 RX ORDER — VALACYCLOVIR HYDROCHLORIDE 500 MG/1
TABLET, FILM COATED ORAL
Qty: 90 TABLET | Refills: 3 | Status: SHIPPED | OUTPATIENT
Start: 2018-01-01

## 2018-01-01 RX ORDER — FENTANYL CITRATE 50 UG/ML
25-50 INJECTION, SOLUTION INTRAMUSCULAR; INTRAVENOUS EVERY 5 MIN PRN
Status: CANCELLED | OUTPATIENT
Start: 2018-01-01

## 2018-01-01 RX ORDER — ALBUTEROL SULFATE 0.83 MG/ML
2.5 SOLUTION RESPIRATORY (INHALATION)
Status: DISCONTINUED | OUTPATIENT
Start: 2018-01-01 | End: 2018-01-01 | Stop reason: HOSPADM

## 2018-01-01 RX ORDER — AMOXICILLIN 250 MG
1 CAPSULE ORAL DAILY
Status: DISCONTINUED | OUTPATIENT
Start: 2018-01-01 | End: 2018-01-01 | Stop reason: HOSPADM

## 2018-01-01 RX ORDER — OMEPRAZOLE 20 MG/1
20 TABLET, DELAYED RELEASE ORAL DAILY PRN
Status: ON HOLD | COMMUNITY
End: 2018-01-01

## 2018-01-01 RX ORDER — IOPAMIDOL 755 MG/ML
59 INJECTION, SOLUTION INTRAVASCULAR ONCE
Status: COMPLETED | OUTPATIENT
Start: 2018-01-01 | End: 2018-01-01

## 2018-01-01 RX ORDER — ALBUTEROL SULFATE 90 UG/1
1-2 AEROSOL, METERED RESPIRATORY (INHALATION)
Status: DISCONTINUED | OUTPATIENT
Start: 2018-01-01 | End: 2018-01-01 | Stop reason: HOSPADM

## 2018-01-01 RX ORDER — NALOXONE HYDROCHLORIDE 0.4 MG/ML
.1-.4 INJECTION, SOLUTION INTRAMUSCULAR; INTRAVENOUS; SUBCUTANEOUS
Status: DISCONTINUED | OUTPATIENT
Start: 2018-01-01 | End: 2018-01-01

## 2018-01-01 RX ORDER — ONDANSETRON 4 MG/1
8 TABLET, FILM COATED ORAL EVERY 8 HOURS PRN
Status: DISCONTINUED | OUTPATIENT
Start: 2018-01-01 | End: 2018-01-01

## 2018-01-01 RX ORDER — FENTANYL CITRATE 50 UG/ML
25-50 INJECTION, SOLUTION INTRAMUSCULAR; INTRAVENOUS EVERY 5 MIN PRN
Status: DISCONTINUED | OUTPATIENT
Start: 2018-01-01 | End: 2018-01-01

## 2018-01-01 RX ORDER — OXYCODONE HCL 20 MG/1
60 TABLET, FILM COATED, EXTENDED RELEASE ORAL EVERY 8 HOURS
Status: DISCONTINUED | OUTPATIENT
Start: 2018-01-01 | End: 2018-01-01

## 2018-01-01 RX ORDER — OXYCODONE HYDROCHLORIDE 5 MG/1
10 TABLET ORAL ONCE
Status: COMPLETED | OUTPATIENT
Start: 2018-01-01 | End: 2018-01-01

## 2018-01-01 RX ORDER — HYDROMORPHONE HYDROCHLORIDE 1 MG/ML
0.5 INJECTION, SOLUTION INTRAMUSCULAR; INTRAVENOUS; SUBCUTANEOUS
Status: COMPLETED | OUTPATIENT
Start: 2018-01-01 | End: 2018-01-01

## 2018-01-01 RX ORDER — IPRATROPIUM BROMIDE AND ALBUTEROL SULFATE 2.5; .5 MG/3ML; MG/3ML
3 SOLUTION RESPIRATORY (INHALATION) ONCE
Status: COMPLETED | OUTPATIENT
Start: 2018-01-01 | End: 2018-01-01

## 2018-01-01 RX ADMIN — DOCUSATE SODIUM 100 MG: 100 CAPSULE, LIQUID FILLED ORAL at 10:07

## 2018-01-01 RX ADMIN — PANTOPRAZOLE SODIUM 40 MG: 40 TABLET, DELAYED RELEASE ORAL at 16:40

## 2018-01-01 RX ADMIN — HYDROMORPHONE HYDROCHLORIDE 4 MG: 2 TABLET ORAL at 19:26

## 2018-01-01 RX ADMIN — HEPARIN SODIUM 20 ML: 1000 INJECTION, SOLUTION INTRAVENOUS; SUBCUTANEOUS at 14:00

## 2018-01-01 RX ADMIN — SODIUM CHLORIDE 500 ML: 9 INJECTION, SOLUTION INTRAVENOUS at 03:31

## 2018-01-01 RX ADMIN — DOCUSATE SODIUM 100 MG: 100 CAPSULE, LIQUID FILLED ORAL at 21:50

## 2018-01-01 RX ADMIN — Medication 1 MG: at 19:20

## 2018-01-01 RX ADMIN — DOCUSATE SODIUM 100 MG: 100 CAPSULE, LIQUID FILLED ORAL at 08:37

## 2018-01-01 RX ADMIN — CEFTRIAXONE SODIUM 2 G: 2 INJECTION, POWDER, FOR SOLUTION INTRAMUSCULAR; INTRAVENOUS at 14:32

## 2018-01-01 RX ADMIN — HEPARIN SODIUM (PORCINE) LOCK FLUSH IV SOLN 100 UNIT/ML 5 ML: 100 SOLUTION at 17:54

## 2018-01-01 RX ADMIN — HYDROMORPHONE HYDROCHLORIDE 0.5 MG: 1 INJECTION, SOLUTION INTRAMUSCULAR; INTRAVENOUS; SUBCUTANEOUS at 12:16

## 2018-01-01 RX ADMIN — LIDOCAINE HYDROCHLORIDE 25 ML: 10 INJECTION, SOLUTION INFILTRATION; PERINEURAL at 12:56

## 2018-01-01 RX ADMIN — HYDROMORPHONE HYDROCHLORIDE 1 MG: 1 INJECTION, SOLUTION INTRAMUSCULAR; INTRAVENOUS; SUBCUTANEOUS at 23:34

## 2018-01-01 RX ADMIN — CIPROFLOXACIN 400 MG: 2 INJECTION, SOLUTION INTRAVENOUS at 23:09

## 2018-01-01 RX ADMIN — VALACYCLOVIR HYDROCHLORIDE 500 MG: 500 TABLET, FILM COATED ORAL at 09:43

## 2018-01-01 RX ADMIN — HEPARIN SODIUM (PORCINE) LOCK FLUSH IV SOLN 100 UNIT/ML 5 ML: 100 SOLUTION at 13:52

## 2018-01-01 RX ADMIN — MORPHINE SULFATE 15 MG: 100 SOLUTION ORAL at 11:10

## 2018-01-01 RX ADMIN — OXYCODONE HYDROCHLORIDE 5 MG: 5 TABLET ORAL at 14:34

## 2018-01-01 RX ADMIN — OXYCODONE HYDROCHLORIDE 20 MG: 20 TABLET, FILM COATED, EXTENDED RELEASE ORAL at 23:04

## 2018-01-01 RX ADMIN — SODIUM CHLORIDE: 9 INJECTION, SOLUTION INTRAVENOUS at 10:56

## 2018-01-01 RX ADMIN — SODIUM CHLORIDE: 9 INJECTION, SOLUTION INTRAVENOUS at 09:42

## 2018-01-01 RX ADMIN — CIPROFLOXACIN 400 MG: 2 INJECTION, SOLUTION INTRAVENOUS at 10:11

## 2018-01-01 RX ADMIN — Medication 1 MG: at 02:49

## 2018-01-01 RX ADMIN — LORAZEPAM 0.5 MG: 2 INJECTION INTRAMUSCULAR; INTRAVENOUS at 21:33

## 2018-01-01 RX ADMIN — DOCUSATE SODIUM 100 MG: 100 CAPSULE, LIQUID FILLED ORAL at 08:47

## 2018-01-01 RX ADMIN — VALACYCLOVIR HYDROCHLORIDE 500 MG: 500 TABLET, FILM COATED ORAL at 13:14

## 2018-01-01 RX ADMIN — OXYCODONE HYDROCHLORIDE 5 MG: 5 TABLET ORAL at 18:34

## 2018-01-01 RX ADMIN — HYDROMORPHONE HYDROCHLORIDE 4 MG: 4 TABLET ORAL at 00:51

## 2018-01-01 RX ADMIN — MIDAZOLAM HYDROCHLORIDE 1 MG: 1 INJECTION, SOLUTION INTRAMUSCULAR; INTRAVENOUS at 10:15

## 2018-01-01 RX ADMIN — CEFAZOLIN 2 G: 1 INJECTION, POWDER, FOR SOLUTION INTRAMUSCULAR; INTRAVENOUS at 13:48

## 2018-01-01 RX ADMIN — FENTANYL CITRATE 50 MCG: 50 INJECTION INTRAMUSCULAR; INTRAVENOUS at 10:27

## 2018-01-01 RX ADMIN — OXYCODONE HYDROCHLORIDE 60 MG: 20 TABLET, FILM COATED, EXTENDED RELEASE ORAL at 08:34

## 2018-01-01 RX ADMIN — OLANZAPINE 2.5 MG: 2.5 TABLET, FILM COATED ORAL at 08:29

## 2018-01-01 RX ADMIN — PROCHLORPERAZINE MALEATE 10 MG: 10 TABLET, FILM COATED ORAL at 03:00

## 2018-01-01 RX ADMIN — OXYCODONE HYDROCHLORIDE 20 MG: 20 TABLET, FILM COATED, EXTENDED RELEASE ORAL at 12:53

## 2018-01-01 RX ADMIN — ENOXAPARIN SODIUM 80 MG: 80 INJECTION SUBCUTANEOUS at 21:59

## 2018-01-01 RX ADMIN — OXYCODONE HYDROCHLORIDE AND ACETAMINOPHEN 2 TABLET: 5; 325 TABLET ORAL at 06:38

## 2018-01-01 RX ADMIN — FENTANYL CITRATE 50 MCG: 50 INJECTION, SOLUTION INTRAMUSCULAR; INTRAVENOUS at 13:40

## 2018-01-01 RX ADMIN — LORAZEPAM 1 MG: 2 INJECTION INTRAMUSCULAR; INTRAVENOUS at 08:15

## 2018-01-01 RX ADMIN — ONDANSETRON 4 MG: 2 INJECTION INTRAMUSCULAR; INTRAVENOUS at 15:43

## 2018-01-01 RX ADMIN — SODIUM CHLORIDE: 9 INJECTION, SOLUTION INTRAVENOUS at 10:49

## 2018-01-01 RX ADMIN — LIDOCAINE HYDROCHLORIDE 5 ML: 10 INJECTION, SOLUTION EPIDURAL; INFILTRATION; INTRACAUDAL; PERINEURAL at 14:05

## 2018-01-01 RX ADMIN — PANTOPRAZOLE SODIUM 40 MG: 40 TABLET, DELAYED RELEASE ORAL at 08:49

## 2018-01-01 RX ADMIN — VALACYCLOVIR HYDROCHLORIDE 500 MG: 500 TABLET, FILM COATED ORAL at 08:30

## 2018-01-01 RX ADMIN — OXYCODONE HYDROCHLORIDE 10 MG: 5 TABLET ORAL at 01:38

## 2018-01-01 RX ADMIN — MORPHINE SULFATE 1 MG: 2 INJECTION, SOLUTION INTRAMUSCULAR; INTRAVENOUS at 01:13

## 2018-01-01 RX ADMIN — LIDOCAINE HYDROCHLORIDE 5 ML: 10 INJECTION, SOLUTION EPIDURAL; INFILTRATION; INTRACAUDAL; PERINEURAL at 12:52

## 2018-01-01 RX ADMIN — Medication 1900 UNITS: at 18:26

## 2018-01-01 RX ADMIN — OXYCODONE HYDROCHLORIDE 20 MG: 10 TABLET, FILM COATED, EXTENDED RELEASE ORAL at 22:30

## 2018-01-01 RX ADMIN — ONDANSETRON HYDROCHLORIDE 16 MG: 8 TABLET, FILM COATED ORAL at 13:31

## 2018-01-01 RX ADMIN — MORPHINE SULFATE 15 MG: 100 SOLUTION ORAL at 16:47

## 2018-01-01 RX ADMIN — DOCUSATE SODIUM 100 MG: 100 CAPSULE, LIQUID FILLED ORAL at 21:12

## 2018-01-01 RX ADMIN — PIPERACILLIN SODIUM,TAZOBACTAM SODIUM 4.5 G: 4; .5 INJECTION, POWDER, FOR SOLUTION INTRAVENOUS at 13:21

## 2018-01-01 RX ADMIN — VANCOMYCIN HYDROCHLORIDE 1000 MG: 1 INJECTION, SOLUTION INTRAVENOUS at 00:14

## 2018-01-01 RX ADMIN — PANTOPRAZOLE SODIUM 40 MG: 40 TABLET, DELAYED RELEASE ORAL at 18:17

## 2018-01-01 RX ADMIN — LIDOCAINE HYDROCHLORIDE 20 ML: 10 INJECTION, SOLUTION INFILTRATION; PERINEURAL at 10:35

## 2018-01-01 RX ADMIN — ATROPINE SULFATE 2 DROP: 10 SOLUTION/ DROPS OPHTHALMIC at 01:08

## 2018-01-01 RX ADMIN — MORPHINE SULFATE 15 MG: 100 SOLUTION ORAL at 06:38

## 2018-01-01 RX ADMIN — FENTANYL CITRATE 50 MCG: 50 INJECTION, SOLUTION INTRAMUSCULAR; INTRAVENOUS at 13:37

## 2018-01-01 RX ADMIN — MORPHINE SULFATE 10 MG: 10 SOLUTION ORAL at 12:16

## 2018-01-01 RX ADMIN — MORPHINE SULFATE 15 MG: 100 SOLUTION ORAL at 07:33

## 2018-01-01 RX ADMIN — CEFTRIAXONE SODIUM 2 G: 2 INJECTION, POWDER, FOR SOLUTION INTRAMUSCULAR; INTRAVENOUS at 14:56

## 2018-01-01 RX ADMIN — FLUOROURACIL 720 MG: 50 INJECTION, SOLUTION INTRAVENOUS at 17:27

## 2018-01-01 RX ADMIN — ATROPINE SULFATE 2 DROP: 10 SOLUTION/ DROPS OPHTHALMIC at 21:04

## 2018-01-01 RX ADMIN — PANTOPRAZOLE SODIUM 40 MG: 40 TABLET, DELAYED RELEASE ORAL at 09:30

## 2018-01-01 RX ADMIN — LORAZEPAM 1 MG: 2 INJECTION INTRAMUSCULAR; INTRAVENOUS at 23:23

## 2018-01-01 RX ADMIN — LORAZEPAM 1 MG: 2 INJECTION INTRAMUSCULAR; INTRAVENOUS at 07:35

## 2018-01-01 RX ADMIN — DOCUSATE SODIUM 100 MG: 100 CAPSULE, LIQUID FILLED ORAL at 22:05

## 2018-01-01 RX ADMIN — OXYCODONE HYDROCHLORIDE 20 MG: 10 TABLET, FILM COATED, EXTENDED RELEASE ORAL at 09:02

## 2018-01-01 RX ADMIN — FENTANYL CITRATE 50 MCG: 50 INJECTION, SOLUTION INTRAMUSCULAR; INTRAVENOUS at 12:30

## 2018-01-01 RX ADMIN — HEPARIN SODIUM 1600 UNITS/HR: 10000 INJECTION, SOLUTION INTRAVENOUS at 18:11

## 2018-01-01 RX ADMIN — METRONIDAZOLE 500 MG: 500 INJECTION, SOLUTION INTRAVENOUS at 08:37

## 2018-01-01 RX ADMIN — VALACYCLOVIR HYDROCHLORIDE 500 MG: 500 TABLET, FILM COATED ORAL at 09:00

## 2018-01-01 RX ADMIN — CEFAZOLIN SODIUM 2 G: 2 INJECTION, SOLUTION INTRAVENOUS at 09:54

## 2018-01-01 RX ADMIN — PANTOPRAZOLE SODIUM 40 MG: 40 TABLET, DELAYED RELEASE ORAL at 16:56

## 2018-01-01 RX ADMIN — OXYCODONE HYDROCHLORIDE 10 MG: 5 TABLET ORAL at 10:05

## 2018-01-01 RX ADMIN — OXYCODONE HYDROCHLORIDE 20 MG: 20 TABLET, FILM COATED, EXTENDED RELEASE ORAL at 23:52

## 2018-01-01 RX ADMIN — HYDROMORPHONE HYDROCHLORIDE 1 MG: 1 INJECTION, SOLUTION INTRAMUSCULAR; INTRAVENOUS; SUBCUTANEOUS at 05:44

## 2018-01-01 RX ADMIN — HYDROMORPHONE HYDROCHLORIDE 0.5 MG: 1 INJECTION, SOLUTION INTRAMUSCULAR; INTRAVENOUS; SUBCUTANEOUS at 01:48

## 2018-01-01 RX ADMIN — OXYCODONE HYDROCHLORIDE 10 MG: 5 TABLET ORAL at 16:40

## 2018-01-01 RX ADMIN — MORPHINE SULFATE 15 MG: 100 SOLUTION ORAL at 04:10

## 2018-01-01 RX ADMIN — OXYCODONE HYDROCHLORIDE 10 MG: 5 TABLET ORAL at 11:15

## 2018-01-01 RX ADMIN — HYDROMORPHONE HYDROCHLORIDE 0.5 MG: 1 INJECTION, SOLUTION INTRAMUSCULAR; INTRAVENOUS; SUBCUTANEOUS at 19:18

## 2018-01-01 RX ADMIN — VALACYCLOVIR HYDROCHLORIDE 500 MG: 500 TABLET, FILM COATED ORAL at 08:37

## 2018-01-01 RX ADMIN — SODIUM CHLORIDE, PRESERVATIVE FREE 70 ML: 5 INJECTION INTRAVENOUS at 16:58

## 2018-01-01 RX ADMIN — OXYCODONE HYDROCHLORIDE 20 MG: 20 TABLET, FILM COATED, EXTENDED RELEASE ORAL at 12:15

## 2018-01-01 RX ADMIN — VALACYCLOVIR HYDROCHLORIDE 500 MG: 500 TABLET, FILM COATED ORAL at 15:38

## 2018-01-01 RX ADMIN — VALACYCLOVIR HYDROCHLORIDE 500 MG: 500 TABLET, FILM COATED ORAL at 13:11

## 2018-01-01 RX ADMIN — SODIUM CHLORIDE: 9 INJECTION, SOLUTION INTRAVENOUS at 04:10

## 2018-01-01 RX ADMIN — Medication 1900 UNITS: at 22:08

## 2018-01-01 RX ADMIN — DOCUSATE SODIUM 100 MG: 100 CAPSULE, LIQUID FILLED ORAL at 13:11

## 2018-01-01 RX ADMIN — HEPARIN SODIUM (PORCINE) LOCK FLUSH IV SOLN 100 UNIT/ML 5 ML: 100 SOLUTION at 15:59

## 2018-01-01 RX ADMIN — ENOXAPARIN SODIUM 80 MG: 80 INJECTION SUBCUTANEOUS at 20:40

## 2018-01-01 RX ADMIN — OXYCODONE HYDROCHLORIDE 20 MG: 20 TABLET, FILM COATED, EXTENDED RELEASE ORAL at 09:53

## 2018-01-01 RX ADMIN — OXYCODONE HYDROCHLORIDE 5 MG: 5 TABLET ORAL at 17:51

## 2018-01-01 RX ADMIN — OXYCODONE HYDROCHLORIDE 10 MG: 5 TABLET ORAL at 10:07

## 2018-01-01 RX ADMIN — HEPARIN SODIUM 1550 UNITS/HR: 10000 INJECTION, SOLUTION INTRAVENOUS at 11:09

## 2018-01-01 RX ADMIN — IPRATROPIUM BROMIDE AND ALBUTEROL SULFATE 3 ML: .5; 3 SOLUTION RESPIRATORY (INHALATION) at 05:29

## 2018-01-01 RX ADMIN — MORPHINE SULFATE 5 MG: 100 SOLUTION ORAL at 08:20

## 2018-01-01 RX ADMIN — PANTOPRAZOLE SODIUM 40 MG: 40 TABLET, DELAYED RELEASE ORAL at 06:57

## 2018-01-01 RX ADMIN — PANTOPRAZOLE SODIUM 40 MG: 40 INJECTION, POWDER, FOR SOLUTION INTRAVENOUS at 07:28

## 2018-01-01 RX ADMIN — HYDROMORPHONE HYDROCHLORIDE 1 MG: 1 INJECTION, SOLUTION INTRAMUSCULAR; INTRAVENOUS; SUBCUTANEOUS at 01:37

## 2018-01-01 RX ADMIN — MORPHINE SULFATE 15 MG: 100 SOLUTION ORAL at 02:00

## 2018-01-01 RX ADMIN — LORAZEPAM 1 MG: 2 INJECTION INTRAMUSCULAR; INTRAVENOUS at 20:01

## 2018-01-01 RX ADMIN — HEPARIN SODIUM 1600 UNITS/HR: 10000 INJECTION, SOLUTION INTRAVENOUS at 15:33

## 2018-01-01 RX ADMIN — ATROPINE SULFATE 2 DROP: 10 SOLUTION/ DROPS OPHTHALMIC at 02:00

## 2018-01-01 RX ADMIN — ATROPINE SULFATE 2 DROP: 10 SOLUTION/ DROPS OPHTHALMIC at 22:00

## 2018-01-01 RX ADMIN — ENOXAPARIN SODIUM 60 MG: 60 INJECTION SUBCUTANEOUS at 19:55

## 2018-01-01 RX ADMIN — FOLIC ACID 1 MG: 5 INJECTION, SOLUTION INTRAMUSCULAR; INTRAVENOUS; SUBCUTANEOUS at 10:12

## 2018-01-01 RX ADMIN — ESCITALOPRAM 10 MG: 10 TABLET, FILM COATED ORAL at 09:01

## 2018-01-01 RX ADMIN — OXYCODONE HYDROCHLORIDE 10 MG: 5 TABLET ORAL at 22:14

## 2018-01-01 RX ADMIN — OXYCODONE HYDROCHLORIDE 20 MG: 20 TABLET, FILM COATED, EXTENDED RELEASE ORAL at 00:13

## 2018-01-01 RX ADMIN — PANTOPRAZOLE SODIUM 40 MG: 40 TABLET, DELAYED RELEASE ORAL at 06:38

## 2018-01-01 RX ADMIN — OXYCODONE HYDROCHLORIDE 10 MG: 5 TABLET ORAL at 11:50

## 2018-01-01 RX ADMIN — LORAZEPAM 0.5 MG: 0.5 TABLET ORAL at 07:00

## 2018-01-01 RX ADMIN — FENTANYL CITRATE 50 MCG: 50 INJECTION, SOLUTION INTRAMUSCULAR; INTRAVENOUS at 12:41

## 2018-01-01 RX ADMIN — FENTANYL CITRATE 50 MCG: 50 INJECTION, SOLUTION INTRAMUSCULAR; INTRAVENOUS at 12:37

## 2018-01-01 RX ADMIN — HEPARIN SODIUM (PORCINE) LOCK FLUSH IV SOLN 100 UNIT/ML 5 ML: 100 SOLUTION at 13:35

## 2018-01-01 RX ADMIN — HYDROMORPHONE HYDROCHLORIDE 1 MG: 1 INJECTION, SOLUTION INTRAMUSCULAR; INTRAVENOUS; SUBCUTANEOUS at 00:40

## 2018-01-01 RX ADMIN — ATROPINE SULFATE 2 DROP: 10 SOLUTION/ DROPS OPHTHALMIC at 13:44

## 2018-01-01 RX ADMIN — Medication 5 ML: at 08:42

## 2018-01-01 RX ADMIN — SODIUM CHLORIDE, POTASSIUM CHLORIDE, SODIUM LACTATE AND CALCIUM CHLORIDE 1000 ML: 600; 310; 30; 20 INJECTION, SOLUTION INTRAVENOUS at 09:20

## 2018-01-01 RX ADMIN — LORAZEPAM 0.5 MG: 0.5 TABLET ORAL at 18:05

## 2018-01-01 RX ADMIN — PANTOPRAZOLE SODIUM 40 MG: 40 TABLET, DELAYED RELEASE ORAL at 06:54

## 2018-01-01 RX ADMIN — SODIUM CHLORIDE: 9 INJECTION, SOLUTION INTRAVENOUS at 20:00

## 2018-01-01 RX ADMIN — PIPERACILLIN SODIUM AND TAZOBACTAM SODIUM 4.5 G: .5; 4 INJECTION, POWDER, LYOPHILIZED, FOR SOLUTION INTRAVENOUS at 04:42

## 2018-01-01 RX ADMIN — HEPARIN SODIUM 1300 UNITS/HR: 10000 INJECTION, SOLUTION INTRAVENOUS at 00:19

## 2018-01-01 RX ADMIN — OXYCODONE HYDROCHLORIDE 20 MG: 20 TABLET, FILM COATED, EXTENDED RELEASE ORAL at 11:03

## 2018-01-01 RX ADMIN — MORPHINE SULFATE 15 MG: 100 SOLUTION ORAL at 03:06

## 2018-01-01 RX ADMIN — ATROPINE SULFATE 2 DROP: 10 SOLUTION/ DROPS OPHTHALMIC at 00:09

## 2018-01-01 RX ADMIN — OXYCODONE HYDROCHLORIDE 10 MG: 5 TABLET ORAL at 19:29

## 2018-01-01 RX ADMIN — ATROPINE SULFATE 2 DROP: 10 SOLUTION/ DROPS OPHTHALMIC at 03:06

## 2018-01-01 RX ADMIN — FENTANYL CITRATE 50 MCG: 50 INJECTION, SOLUTION INTRAMUSCULAR; INTRAVENOUS at 15:35

## 2018-01-01 RX ADMIN — PANTOPRAZOLE SODIUM 40 MG: 40 TABLET, DELAYED RELEASE ORAL at 22:00

## 2018-01-01 RX ADMIN — MORPHINE SULFATE 15 MG: 100 SOLUTION ORAL at 02:47

## 2018-01-01 RX ADMIN — PROCHLORPERAZINE MALEATE 10 MG: 10 TABLET, FILM COATED ORAL at 10:03

## 2018-01-01 RX ADMIN — LORAZEPAM 0.5 MG: 2 INJECTION INTRAMUSCULAR; INTRAVENOUS at 17:18

## 2018-01-01 RX ADMIN — PANTOPRAZOLE SODIUM 40 MG: 40 TABLET, DELAYED RELEASE ORAL at 20:10

## 2018-01-01 RX ADMIN — Medication 5 ML: at 06:23

## 2018-01-01 RX ADMIN — MORPHINE SULFATE 15 MG: 100 SOLUTION ORAL at 14:25

## 2018-01-01 RX ADMIN — IOPAMIDOL 59 ML: 755 INJECTION, SOLUTION INTRAVENOUS at 03:49

## 2018-01-01 RX ADMIN — FLUOROURACIL 4320 MG: 50 INJECTION, SOLUTION INTRAVENOUS at 17:28

## 2018-01-01 RX ADMIN — HYDROMORPHONE HYDROCHLORIDE 0.5 MG: 1 INJECTION, SOLUTION INTRAMUSCULAR; INTRAVENOUS; SUBCUTANEOUS at 04:27

## 2018-01-01 RX ADMIN — DOCUSATE SODIUM 100 MG: 100 CAPSULE, LIQUID FILLED ORAL at 20:19

## 2018-01-01 RX ADMIN — MORPHINE SULFATE 15 MG: 100 SOLUTION ORAL at 21:05

## 2018-01-01 RX ADMIN — PANTOPRAZOLE SODIUM 40 MG: 40 TABLET, DELAYED RELEASE ORAL at 13:33

## 2018-01-01 RX ADMIN — HYDROMORPHONE HYDROCHLORIDE 1 MG: 1 INJECTION, SOLUTION INTRAMUSCULAR; INTRAVENOUS; SUBCUTANEOUS at 04:40

## 2018-01-01 RX ADMIN — OLANZAPINE 5 MG: 5 TABLET, ORALLY DISINTEGRATING ORAL at 08:29

## 2018-01-01 RX ADMIN — HEPARIN SODIUM (PORCINE) LOCK FLUSH IV SOLN 100 UNIT/ML 5 ML: 100 SOLUTION at 14:37

## 2018-01-01 RX ADMIN — POLYETHYLENE GLYCOL 3350 17 G: 17 POWDER, FOR SOLUTION ORAL at 11:35

## 2018-01-01 RX ADMIN — ONDANSETRON 4 MG: 2 INJECTION INTRAMUSCULAR; INTRAVENOUS at 07:00

## 2018-01-01 RX ADMIN — PANTOPRAZOLE SODIUM 40 MG: 40 TABLET, DELAYED RELEASE ORAL at 09:03

## 2018-01-01 RX ADMIN — OLANZAPINE 5 MG: 5 TABLET, ORALLY DISINTEGRATING ORAL at 11:09

## 2018-01-01 RX ADMIN — HEPARIN SODIUM 1600 UNITS/HR: 10000 INJECTION, SOLUTION INTRAVENOUS at 02:25

## 2018-01-01 RX ADMIN — Medication 5 ML: at 12:16

## 2018-01-01 RX ADMIN — CEFAZOLIN SODIUM 2 G: 2 INJECTION, SOLUTION INTRAVENOUS at 12:24

## 2018-01-01 RX ADMIN — HYDROMORPHONE HYDROCHLORIDE 0.5 MG: 1 INJECTION, SOLUTION INTRAMUSCULAR; INTRAVENOUS; SUBCUTANEOUS at 18:13

## 2018-01-01 RX ADMIN — ATROPINE SULFATE 2 DROP: 10 SOLUTION/ DROPS OPHTHALMIC at 20:02

## 2018-01-01 RX ADMIN — ESCITALOPRAM 10 MG: 10 TABLET, FILM COATED ORAL at 13:33

## 2018-01-01 RX ADMIN — HYDROMORPHONE HYDROCHLORIDE 4 MG: 4 TABLET ORAL at 15:29

## 2018-01-01 RX ADMIN — PANTOPRAZOLE SODIUM 40 MG: 40 INJECTION, POWDER, FOR SOLUTION INTRAVENOUS at 08:40

## 2018-01-01 RX ADMIN — LIDOCAINE HYDROCHLORIDE 10 ML: 10 INJECTION, SOLUTION INFILTRATION; PERINEURAL at 13:42

## 2018-01-01 RX ADMIN — Medication 5 ML: at 06:31

## 2018-01-01 RX ADMIN — FENTANYL CITRATE 50 MCG: 50 INJECTION, SOLUTION INTRAMUSCULAR; INTRAVENOUS at 13:57

## 2018-01-01 RX ADMIN — OXYCODONE HYDROCHLORIDE AND ACETAMINOPHEN 2 TABLET: 5; 325 TABLET ORAL at 00:51

## 2018-01-01 RX ADMIN — OXYCODONE HYDROCHLORIDE 20 MG: 20 TABLET, FILM COATED, EXTENDED RELEASE ORAL at 11:43

## 2018-01-01 RX ADMIN — MORPHINE SULFATE 15 MG: 100 SOLUTION ORAL at 01:08

## 2018-01-01 RX ADMIN — MORPHINE SULFATE 10 MG: 100 SOLUTION ORAL at 17:56

## 2018-01-01 RX ADMIN — CALCIUM CARBONATE (ANTACID) CHEW TAB 500 MG 1000 MG: 500 CHEW TAB at 15:07

## 2018-01-01 RX ADMIN — HYDROMORPHONE HYDROCHLORIDE 0.5 MG: 1 INJECTION, SOLUTION INTRAMUSCULAR; INTRAVENOUS; SUBCUTANEOUS at 07:03

## 2018-01-01 RX ADMIN — GUAIFENESIN 10 ML: 200 SOLUTION ORAL at 19:09

## 2018-01-01 RX ADMIN — ENOXAPARIN SODIUM 60 MG: 60 INJECTION SUBCUTANEOUS at 20:10

## 2018-01-01 RX ADMIN — OXYCODONE HYDROCHLORIDE 10 MG: 5 TABLET ORAL at 02:31

## 2018-01-01 RX ADMIN — Medication 1 MG: at 22:27

## 2018-01-01 RX ADMIN — OXYCODONE HYDROCHLORIDE AND ACETAMINOPHEN 2 TABLET: 5; 325 TABLET ORAL at 18:21

## 2018-01-01 RX ADMIN — Medication 5 ML: at 08:16

## 2018-01-01 RX ADMIN — IOPAMIDOL 75 ML: 755 INJECTION, SOLUTION INTRAVENOUS at 16:58

## 2018-01-01 RX ADMIN — HYDROMORPHONE HYDROCHLORIDE 1 MG: 1 INJECTION, SOLUTION INTRAMUSCULAR; INTRAVENOUS; SUBCUTANEOUS at 20:42

## 2018-01-01 RX ADMIN — OXYCODONE HYDROCHLORIDE 10 MG: 5 TABLET ORAL at 21:45

## 2018-01-01 RX ADMIN — HYDROMORPHONE HYDROCHLORIDE 4 MG: 4 TABLET ORAL at 09:07

## 2018-01-01 RX ADMIN — PANTOPRAZOLE SODIUM 40 MG: 40 INJECTION, POWDER, FOR SOLUTION INTRAVENOUS at 10:12

## 2018-01-01 RX ADMIN — HYDROMORPHONE HYDROCHLORIDE 4 MG: 2 TABLET ORAL at 07:43

## 2018-01-01 RX ADMIN — PIPERACILLIN SODIUM,TAZOBACTAM SODIUM 4.5 G: 4; .5 INJECTION, POWDER, FOR SOLUTION INTRAVENOUS at 06:44

## 2018-01-01 RX ADMIN — PROPOFOL 30 MG: 10 INJECTION, EMULSION INTRAVENOUS at 13:45

## 2018-01-01 RX ADMIN — LORAZEPAM 1 MG: 2 INJECTION INTRAMUSCULAR; INTRAVENOUS at 05:30

## 2018-01-01 RX ADMIN — PROPOFOL 200 MCG/KG/MIN: 10 INJECTION, EMULSION INTRAVENOUS at 13:37

## 2018-01-01 RX ADMIN — ATROPINE SULFATE 2 DROP: 10 SOLUTION/ DROPS OPHTHALMIC at 17:56

## 2018-01-01 RX ADMIN — DOCUSATE SODIUM 100 MG: 100 CAPSULE, LIQUID FILLED ORAL at 09:54

## 2018-01-01 RX ADMIN — OXYCODONE HYDROCHLORIDE 5 MG: 5 TABLET ORAL at 15:01

## 2018-01-01 RX ADMIN — Medication 1 EACH: at 13:53

## 2018-01-01 RX ADMIN — OXYCODONE HYDROCHLORIDE 20 MG: 10 TABLET, FILM COATED, EXTENDED RELEASE ORAL at 21:50

## 2018-01-01 RX ADMIN — MORPHINE SULFATE 15 MG: 100 SOLUTION ORAL at 22:24

## 2018-01-01 RX ADMIN — DOCUSATE SODIUM 100 MG: 100 CAPSULE, LIQUID FILLED ORAL at 10:01

## 2018-01-01 RX ADMIN — PROPOFOL 150 MCG/KG/MIN: 10 INJECTION, EMULSION INTRAVENOUS at 13:59

## 2018-01-01 RX ADMIN — VALACYCLOVIR HYDROCHLORIDE 500 MG: 500 TABLET, FILM COATED ORAL at 08:49

## 2018-01-01 RX ADMIN — SODIUM CHLORIDE, PRESERVATIVE FREE 10 ML: 5 INJECTION INTRAVENOUS at 14:05

## 2018-01-01 RX ADMIN — OXYCODONE HYDROCHLORIDE 20 MG: 20 TABLET, FILM COATED, EXTENDED RELEASE ORAL at 16:18

## 2018-01-01 RX ADMIN — PANTOPRAZOLE SODIUM 40 MG: 40 TABLET, DELAYED RELEASE ORAL at 16:47

## 2018-01-01 RX ADMIN — PANTOPRAZOLE SODIUM 40 MG: 40 TABLET, DELAYED RELEASE ORAL at 16:54

## 2018-01-01 RX ADMIN — SODIUM CHLORIDE 500 ML: 9 INJECTION, SOLUTION INTRAVENOUS at 05:08

## 2018-01-01 RX ADMIN — VALACYCLOVIR HYDROCHLORIDE 500 MG: 500 TABLET, FILM COATED ORAL at 10:01

## 2018-01-01 RX ADMIN — MIDAZOLAM HYDROCHLORIDE 1 MG: 1 INJECTION, SOLUTION INTRAMUSCULAR; INTRAVENOUS at 12:46

## 2018-01-01 RX ADMIN — Medication 5 ML: at 05:45

## 2018-01-01 RX ADMIN — MORPHINE SULFATE 10 MG: 100 SOLUTION ORAL at 08:01

## 2018-01-01 RX ADMIN — ENOXAPARIN SODIUM 80 MG: 80 INJECTION SUBCUTANEOUS at 09:43

## 2018-01-01 RX ADMIN — PIPERACILLIN SODIUM,TAZOBACTAM SODIUM 4.5 G: 4; .5 INJECTION, POWDER, FOR SOLUTION INTRAVENOUS at 01:29

## 2018-01-01 RX ADMIN — OXYCODONE HYDROCHLORIDE 10 MG: 5 TABLET ORAL at 06:41

## 2018-01-01 RX ADMIN — MIDAZOLAM 2 MG: 1 INJECTION INTRAMUSCULAR; INTRAVENOUS at 13:37

## 2018-01-01 RX ADMIN — MORPHINE SULFATE 15 MG: 100 SOLUTION ORAL at 03:42

## 2018-01-01 RX ADMIN — FENTANYL CITRATE 50 MCG: 50 INJECTION, SOLUTION INTRAMUSCULAR; INTRAVENOUS at 12:45

## 2018-01-01 RX ADMIN — GUAIFENESIN 20 ML: 200 SOLUTION ORAL at 17:54

## 2018-01-01 RX ADMIN — DOCUSATE SODIUM 100 MG: 100 CAPSULE, LIQUID FILLED ORAL at 08:30

## 2018-01-01 RX ADMIN — LIDOCAINE HYDROCHLORIDE 20 ML: 10 INJECTION, SOLUTION INFILTRATION; PERINEURAL at 18:43

## 2018-01-01 RX ADMIN — OXYCODONE HYDROCHLORIDE 10 MG: 5 TABLET ORAL at 12:53

## 2018-01-01 RX ADMIN — OXYCODONE HYDROCHLORIDE 20 MG: 20 TABLET, FILM COATED, EXTENDED RELEASE ORAL at 23:38

## 2018-01-01 RX ADMIN — PROCHLORPERAZINE MALEATE 10 MG: 10 TABLET, FILM COATED ORAL at 19:44

## 2018-01-01 RX ADMIN — FENTANYL CITRATE 50 MCG: 50 INJECTION, SOLUTION INTRAMUSCULAR; INTRAVENOUS at 14:04

## 2018-01-01 RX ADMIN — HYDROMORPHONE HYDROCHLORIDE 0.5 MG: 1 INJECTION, SOLUTION INTRAMUSCULAR; INTRAVENOUS; SUBCUTANEOUS at 17:07

## 2018-01-01 RX ADMIN — ONDANSETRON 4 MG: 2 INJECTION INTRAMUSCULAR; INTRAVENOUS at 14:38

## 2018-01-01 RX ADMIN — SODIUM CHLORIDE: 9 INJECTION, SOLUTION INTRAVENOUS at 00:12

## 2018-01-01 RX ADMIN — LIDOCAINE HYDROCHLORIDE 100 MG: 20 INJECTION, SOLUTION INFILTRATION; PERINEURAL at 13:37

## 2018-01-01 RX ADMIN — SODIUM CHLORIDE, PRESERVATIVE FREE 87 ML: 5 INJECTION INTRAVENOUS at 08:01

## 2018-01-01 RX ADMIN — Medication 1 MG: at 00:42

## 2018-01-01 RX ADMIN — MORPHINE SULFATE 15 MG: 100 SOLUTION ORAL at 23:33

## 2018-01-01 RX ADMIN — MIDAZOLAM HYDROCHLORIDE 1 MG: 1 INJECTION, SOLUTION INTRAMUSCULAR; INTRAVENOUS at 09:54

## 2018-01-01 RX ADMIN — METRONIDAZOLE 500 MG: 500 INJECTION, SOLUTION INTRAVENOUS at 20:36

## 2018-01-01 RX ADMIN — MORPHINE SULFATE 15 MG: 100 SOLUTION ORAL at 18:37

## 2018-01-01 RX ADMIN — OXYCODONE HYDROCHLORIDE 10 MG: 5 TABLET ORAL at 19:09

## 2018-01-01 RX ADMIN — MIDAZOLAM HYDROCHLORIDE 1 MG: 1 INJECTION, SOLUTION INTRAMUSCULAR; INTRAVENOUS at 12:37

## 2018-01-01 RX ADMIN — ONDANSETRON 4 MG: 2 INJECTION INTRAMUSCULAR; INTRAVENOUS at 23:44

## 2018-01-01 RX ADMIN — BENZONATATE 100 MG: 100 CAPSULE ORAL at 16:03

## 2018-01-01 RX ADMIN — PROCHLORPERAZINE EDISYLATE 10 MG: 5 INJECTION INTRAMUSCULAR; INTRAVENOUS at 23:08

## 2018-01-01 RX ADMIN — LIDOCAINE HYDROCHLORIDE 10 ML: 10 INJECTION, SOLUTION INFILTRATION; PERINEURAL at 13:53

## 2018-01-01 RX ADMIN — PANTOPRAZOLE SODIUM 40 MG: 40 TABLET, DELAYED RELEASE ORAL at 09:01

## 2018-01-01 RX ADMIN — ALUMINUM HYDROXIDE, MAGNESIUM HYDROXIDE, AND DIMETHICONE 30 ML: 400; 400; 40 SUSPENSION ORAL at 21:12

## 2018-01-01 RX ADMIN — VALACYCLOVIR HYDROCHLORIDE 500 MG: 500 TABLET, FILM COATED ORAL at 12:53

## 2018-01-01 RX ADMIN — OLANZAPINE 2.5 MG: 2.5 TABLET, FILM COATED ORAL at 08:49

## 2018-01-01 RX ADMIN — Medication 5 ML: at 20:28

## 2018-01-01 RX ADMIN — HYDROMORPHONE HYDROCHLORIDE 4 MG: 2 TABLET ORAL at 04:23

## 2018-01-01 RX ADMIN — ATROPINE SULFATE 2 DROP: 10 SOLUTION/ DROPS OPHTHALMIC at 05:10

## 2018-01-01 RX ADMIN — ESCITALOPRAM 10 MG: 10 TABLET, FILM COATED ORAL at 09:58

## 2018-01-01 RX ADMIN — Medication 5 ML: at 06:13

## 2018-01-01 RX ADMIN — Medication 5 ML: at 07:06

## 2018-01-01 RX ADMIN — DOCUSATE SODIUM 100 MG: 100 CAPSULE, LIQUID FILLED ORAL at 10:29

## 2018-01-01 RX ADMIN — HEPARIN SODIUM 1600 UNITS/HR: 10000 INJECTION, SOLUTION INTRAVENOUS at 22:07

## 2018-01-01 RX ADMIN — OXYCODONE HYDROCHLORIDE 10 MG: 5 TABLET ORAL at 17:43

## 2018-01-01 RX ADMIN — MORPHINE SULFATE 10 MG: 100 SOLUTION ORAL at 05:31

## 2018-01-01 RX ADMIN — MIDAZOLAM 2 MG: 1 INJECTION INTRAMUSCULAR; INTRAVENOUS at 13:57

## 2018-01-01 RX ADMIN — HEPARIN SODIUM 1600 UNITS/HR: 10000 INJECTION, SOLUTION INTRAVENOUS at 23:39

## 2018-01-01 RX ADMIN — OXYCODONE HYDROCHLORIDE 10 MG: 10 TABLET, FILM COATED, EXTENDED RELEASE ORAL at 16:54

## 2018-01-01 RX ADMIN — METRONIDAZOLE 500 MG: 500 INJECTION, SOLUTION INTRAVENOUS at 22:10

## 2018-01-01 RX ADMIN — IPRATROPIUM BROMIDE AND ALBUTEROL SULFATE 3 ML: 2.5; .5 SOLUTION RESPIRATORY (INHALATION) at 05:29

## 2018-01-01 RX ADMIN — HYDROMORPHONE HYDROCHLORIDE 4 MG: 4 TABLET ORAL at 19:35

## 2018-01-01 RX ADMIN — OXYCODONE HYDROCHLORIDE 40 MG: 40 TABLET, FILM COATED, EXTENDED RELEASE ORAL at 20:26

## 2018-01-01 RX ADMIN — OXYCODONE HYDROCHLORIDE 10 MG: 5 TABLET ORAL at 05:15

## 2018-01-01 RX ADMIN — OXYCODONE HYDROCHLORIDE 40 MG: 40 TABLET, FILM COATED, EXTENDED RELEASE ORAL at 11:31

## 2018-01-01 RX ADMIN — DOCUSATE SODIUM 100 MG: 100 CAPSULE, LIQUID FILLED ORAL at 23:04

## 2018-01-01 RX ADMIN — ONDANSETRON 4 MG: 2 INJECTION INTRAMUSCULAR; INTRAVENOUS at 13:59

## 2018-01-01 RX ADMIN — PROPOFOL 30 MG: 10 INJECTION, EMULSION INTRAVENOUS at 14:03

## 2018-01-01 RX ADMIN — Medication 0.5 MG: at 09:07

## 2018-01-01 RX ADMIN — OXYCODONE HYDROCHLORIDE 20 MG: 20 TABLET, FILM COATED, EXTENDED RELEASE ORAL at 11:45

## 2018-01-01 RX ADMIN — ONDANSETRON 4 MG: 4 TABLET, ORALLY DISINTEGRATING ORAL at 11:31

## 2018-01-01 RX ADMIN — PANTOPRAZOLE SODIUM 40 MG: 40 TABLET, DELAYED RELEASE ORAL at 17:43

## 2018-01-01 RX ADMIN — OLANZAPINE 2.5 MG: 2.5 TABLET, FILM COATED ORAL at 09:58

## 2018-01-01 RX ADMIN — ATROPINE SULFATE 2 DROP: 10 SOLUTION/ DROPS OPHTHALMIC at 04:10

## 2018-01-01 RX ADMIN — MORPHINE SULFATE 15 MG: 100 SOLUTION ORAL at 20:02

## 2018-01-01 RX ADMIN — ATROPINE SULFATE 2 DROP: 10 SOLUTION/ DROPS OPHTHALMIC at 08:15

## 2018-01-01 RX ADMIN — MIDAZOLAM 2 MG: 1 INJECTION INTRAMUSCULAR; INTRAVENOUS at 12:25

## 2018-01-01 RX ADMIN — LORAZEPAM 1 MG: 2 INJECTION INTRAMUSCULAR; INTRAVENOUS at 22:01

## 2018-01-01 RX ADMIN — THIAMINE HYDROCHLORIDE 100 MG: 100 INJECTION, SOLUTION INTRAMUSCULAR; INTRAVENOUS at 10:12

## 2018-01-01 RX ADMIN — MORPHINE SULFATE 5 MG: 100 SOLUTION ORAL at 08:47

## 2018-01-01 RX ADMIN — HYDROMORPHONE HYDROCHLORIDE 0.5 MG: 1 INJECTION, SOLUTION INTRAMUSCULAR; INTRAVENOUS; SUBCUTANEOUS at 08:29

## 2018-01-01 RX ADMIN — DOCUSATE SODIUM 100 MG: 100 CAPSULE, LIQUID FILLED ORAL at 12:53

## 2018-01-01 RX ADMIN — PROCHLORPERAZINE MALEATE 10 MG: 10 TABLET, FILM COATED ORAL at 09:08

## 2018-01-01 RX ADMIN — ENOXAPARIN SODIUM 60 MG: 60 INJECTION SUBCUTANEOUS at 08:50

## 2018-01-01 RX ADMIN — SODIUM CHLORIDE 150 MG: 9 INJECTION, SOLUTION INTRAVENOUS at 14:14

## 2018-01-01 RX ADMIN — VANCOMYCIN HYDROCHLORIDE 1000 MG: 1 INJECTION, SOLUTION INTRAVENOUS at 08:12

## 2018-01-01 RX ADMIN — MIDAZOLAM HYDROCHLORIDE 1 MG: 1 INJECTION, SOLUTION INTRAMUSCULAR; INTRAVENOUS at 12:41

## 2018-01-01 RX ADMIN — OXYCODONE HYDROCHLORIDE 20 MG: 10 TABLET, FILM COATED, EXTENDED RELEASE ORAL at 10:07

## 2018-01-01 RX ADMIN — LIDOCAINE HYDROCHLORIDE 10 ML: 10 INJECTION, SOLUTION EPIDURAL; INFILTRATION; INTRACAUDAL; PERINEURAL at 12:16

## 2018-01-01 RX ADMIN — HEPARIN SODIUM 1150 UNITS/HR: 10000 INJECTION, SOLUTION INTRAVENOUS at 09:58

## 2018-01-01 RX ADMIN — MIDAZOLAM HYDROCHLORIDE 1 MG: 1 INJECTION, SOLUTION INTRAMUSCULAR; INTRAVENOUS at 10:26

## 2018-01-01 RX ADMIN — PANTOPRAZOLE SODIUM 40 MG: 40 TABLET, DELAYED RELEASE ORAL at 06:46

## 2018-01-01 RX ADMIN — OXYCODONE HYDROCHLORIDE 20 MG: 20 TABLET, FILM COATED, EXTENDED RELEASE ORAL at 23:01

## 2018-01-01 RX ADMIN — MORPHINE SULFATE 10 MG: 100 SOLUTION ORAL at 22:19

## 2018-01-01 RX ADMIN — LORAZEPAM 1 MG: 2 INJECTION INTRAMUSCULAR; INTRAVENOUS at 10:50

## 2018-01-01 RX ADMIN — MORPHINE SULFATE 10 MG: 100 SOLUTION ORAL at 15:37

## 2018-01-01 RX ADMIN — LORAZEPAM 1 MG: 2 INJECTION INTRAMUSCULAR; INTRAVENOUS at 02:32

## 2018-01-01 RX ADMIN — POLYETHYLENE GLYCOL 3350 17 G: 17 POWDER, FOR SOLUTION ORAL at 09:53

## 2018-01-01 RX ADMIN — ONDANSETRON 4 MG: 2 INJECTION INTRAMUSCULAR; INTRAVENOUS at 16:28

## 2018-01-01 RX ADMIN — HYDROMORPHONE HYDROCHLORIDE 0.5 MG: 1 INJECTION, SOLUTION INTRAMUSCULAR; INTRAVENOUS; SUBCUTANEOUS at 10:50

## 2018-01-01 RX ADMIN — OLANZAPINE 2.5 MG: 2.5 TABLET, FILM COATED ORAL at 15:38

## 2018-01-01 RX ADMIN — OXYCODONE HYDROCHLORIDE AND ACETAMINOPHEN 2 TABLET: 5; 325 TABLET ORAL at 20:19

## 2018-01-01 RX ADMIN — MORPHINE SULFATE 10 MG: 10 SOLUTION ORAL at 14:48

## 2018-01-01 RX ADMIN — MORPHINE SULFATE 10 MG: 100 SOLUTION ORAL at 10:52

## 2018-01-01 RX ADMIN — OXYCODONE HYDROCHLORIDE 40 MG: 40 TABLET, FILM COATED, EXTENDED RELEASE ORAL at 03:31

## 2018-01-01 RX ADMIN — MORPHINE SULFATE 10 MG: 100 SOLUTION ORAL at 06:40

## 2018-01-01 RX ADMIN — VALACYCLOVIR HYDROCHLORIDE 500 MG: 500 TABLET, FILM COATED ORAL at 10:29

## 2018-01-01 RX ADMIN — MORPHINE SULFATE 15 MG: 100 SOLUTION ORAL at 23:07

## 2018-01-01 RX ADMIN — LORAZEPAM 1 MG: 2 INJECTION INTRAMUSCULAR; INTRAVENOUS at 14:20

## 2018-01-01 RX ADMIN — LORAZEPAM 1 MG: 2 INJECTION INTRAMUSCULAR; INTRAVENOUS at 16:29

## 2018-01-01 RX ADMIN — HYDROMORPHONE HYDROCHLORIDE 0.5 MG: 1 INJECTION, SOLUTION INTRAMUSCULAR; INTRAVENOUS; SUBCUTANEOUS at 19:16

## 2018-01-01 RX ADMIN — Medication 0.5 MG: at 06:00

## 2018-01-01 RX ADMIN — OXYCODONE HYDROCHLORIDE 5 MG: 5 TABLET ORAL at 06:23

## 2018-01-01 RX ADMIN — LIDOCAINE HYDROCHLORIDE 10 ML: 10 INJECTION, SOLUTION EPIDURAL; INFILTRATION; INTRACAUDAL; PERINEURAL at 09:10

## 2018-01-01 RX ADMIN — ATROPINE SULFATE 2 DROP: 10 SOLUTION/ DROPS OPHTHALMIC at 12:19

## 2018-01-01 RX ADMIN — SODIUM CHLORIDE: 9 INJECTION, SOLUTION INTRAVENOUS at 06:23

## 2018-01-01 RX ADMIN — SODIUM CHLORIDE: 9 INJECTION, SOLUTION INTRAVENOUS at 20:11

## 2018-01-01 RX ADMIN — MORPHINE SULFATE 15 MG: 100 SOLUTION ORAL at 00:09

## 2018-01-01 RX ADMIN — HYDROMORPHONE HYDROCHLORIDE 0.5 MG: 1 INJECTION, SOLUTION INTRAMUSCULAR; INTRAVENOUS; SUBCUTANEOUS at 09:07

## 2018-01-01 RX ADMIN — OXYCODONE HYDROCHLORIDE 40 MG: 40 TABLET, FILM COATED, EXTENDED RELEASE ORAL at 13:33

## 2018-01-01 RX ADMIN — LIDOCAINE HYDROCHLORIDE 10 ML: 10 INJECTION, SOLUTION EPIDURAL; INFILTRATION; INTRACAUDAL; PERINEURAL at 14:01

## 2018-01-01 RX ADMIN — LEUCOVORIN CALCIUM 630 MG: 100 INJECTION, POWDER, LYOPHILIZED, FOR SOLUTION INTRAMUSCULAR; INTRAVENOUS at 14:55

## 2018-01-01 RX ADMIN — HYDROMORPHONE HYDROCHLORIDE 4 MG: 2 TABLET ORAL at 23:49

## 2018-01-01 RX ADMIN — HALOPERIDOL LACTATE 2 MG: 5 INJECTION, SOLUTION INTRAMUSCULAR at 03:42

## 2018-01-01 RX ADMIN — ONDANSETRON 8 MG: 4 TABLET, FILM COATED ORAL at 22:59

## 2018-01-01 RX ADMIN — HYDROMORPHONE HYDROCHLORIDE 1 MG: 1 INJECTION, SOLUTION INTRAMUSCULAR; INTRAVENOUS; SUBCUTANEOUS at 21:36

## 2018-01-01 RX ADMIN — PROCHLORPERAZINE MALEATE 10 MG: 10 TABLET, FILM COATED ORAL at 09:47

## 2018-01-01 RX ADMIN — ENOXAPARIN SODIUM 80 MG: 80 INJECTION SUBCUTANEOUS at 20:52

## 2018-01-01 RX ADMIN — PANTOPRAZOLE SODIUM 40 MG: 40 TABLET, DELAYED RELEASE ORAL at 19:55

## 2018-01-01 RX ADMIN — HALOPERIDOL LACTATE 2 MG: 5 INJECTION, SOLUTION INTRAMUSCULAR at 19:38

## 2018-01-01 RX ADMIN — VALACYCLOVIR HYDROCHLORIDE 500 MG: 500 TABLET, FILM COATED ORAL at 11:45

## 2018-01-01 RX ADMIN — HYDROMORPHONE HYDROCHLORIDE 1 MG: 1 INJECTION, SOLUTION INTRAMUSCULAR; INTRAVENOUS; SUBCUTANEOUS at 22:36

## 2018-01-01 RX ADMIN — MORPHINE SULFATE 15 MG: 100 SOLUTION ORAL at 00:43

## 2018-01-01 RX ADMIN — SODIUM CHLORIDE, PRESERVATIVE FREE 85 ML: 5 INJECTION INTRAVENOUS at 03:49

## 2018-01-01 RX ADMIN — ONDANSETRON 8 MG: 2 INJECTION INTRAMUSCULAR; INTRAVENOUS at 13:03

## 2018-01-01 RX ADMIN — MORPHINE SULFATE 15 MG: 100 SOLUTION ORAL at 06:05

## 2018-01-01 RX ADMIN — MORPHINE SULFATE 10 MG: 100 SOLUTION ORAL at 03:27

## 2018-01-01 RX ADMIN — LORAZEPAM 0.5 MG: 0.5 TABLET ORAL at 06:12

## 2018-01-01 RX ADMIN — IOPAMIDOL 78 ML: 755 INJECTION, SOLUTION INTRAVENOUS at 08:01

## 2018-01-01 RX ADMIN — ONDANSETRON 4 MG: 2 INJECTION INTRAMUSCULAR; INTRAVENOUS at 22:00

## 2018-01-01 RX ADMIN — MORPHINE SULFATE 15 MG: 100 SOLUTION ORAL at 20:47

## 2018-01-01 RX ADMIN — ATROPINE SULFATE 2 DROP: 10 SOLUTION/ DROPS OPHTHALMIC at 07:33

## 2018-01-01 RX ADMIN — OXYCODONE HYDROCHLORIDE 40 MG: 40 TABLET, FILM COATED, EXTENDED RELEASE ORAL at 04:35

## 2018-01-01 RX ADMIN — GUAIFENESIN 10 ML: 200 SOLUTION ORAL at 10:11

## 2018-01-01 RX ADMIN — ATROPINE SULFATE 2 DROP: 10 SOLUTION/ DROPS OPHTHALMIC at 06:06

## 2018-01-01 RX ADMIN — VALACYCLOVIR HYDROCHLORIDE 500 MG: 500 TABLET, FILM COATED ORAL at 08:47

## 2018-01-01 RX ADMIN — MORPHINE SULFATE 15 MG: 100 SOLUTION ORAL at 12:56

## 2018-01-01 RX ADMIN — PROCHLORPERAZINE MALEATE 10 MG: 10 TABLET, FILM COATED ORAL at 01:57

## 2018-01-01 RX ADMIN — CIPROFLOXACIN 400 MG: 2 INJECTION, SOLUTION INTRAVENOUS at 21:38

## 2018-01-01 RX ADMIN — PANTOPRAZOLE SODIUM 40 MG: 40 TABLET, DELAYED RELEASE ORAL at 06:30

## 2018-01-01 RX ADMIN — PANTOPRAZOLE SODIUM 40 MG: 40 TABLET, DELAYED RELEASE ORAL at 18:19

## 2018-01-01 RX ADMIN — Medication 5 ML: at 09:31

## 2018-01-01 RX ADMIN — MORPHINE SULFATE 10 MG: 100 SOLUTION ORAL at 16:30

## 2018-01-01 RX ADMIN — IRON SUCROSE 300 MG: 20 INJECTION, SOLUTION INTRAVENOUS at 16:41

## 2018-01-01 RX ADMIN — CALCIUM CARBONATE (ANTACID) CHEW TAB 500 MG 1000 MG: 500 CHEW TAB at 22:06

## 2018-01-01 RX ADMIN — LORAZEPAM 1 MG: 2 INJECTION INTRAMUSCULAR; INTRAVENOUS at 18:20

## 2018-01-01 RX ADMIN — Medication 5 MG: at 21:44

## 2018-01-01 RX ADMIN — CEFTRIAXONE SODIUM 2 G: 2 INJECTION, POWDER, FOR SOLUTION INTRAMUSCULAR; INTRAVENOUS at 09:37

## 2018-01-01 RX ADMIN — PANTOPRAZOLE SODIUM 40 MG: 40 TABLET, DELAYED RELEASE ORAL at 15:43

## 2018-01-01 RX ADMIN — HYDROMORPHONE HYDROCHLORIDE 1 MG: 1 INJECTION, SOLUTION INTRAMUSCULAR; INTRAVENOUS; SUBCUTANEOUS at 13:51

## 2018-01-01 RX ADMIN — PANTOPRAZOLE SODIUM 40 MG: 40 TABLET, DELAYED RELEASE ORAL at 12:53

## 2018-01-01 RX ADMIN — PROCHLORPERAZINE MALEATE 10 MG: 10 TABLET, FILM COATED ORAL at 09:54

## 2018-01-01 RX ADMIN — OXYCODONE HYDROCHLORIDE 10 MG: 5 TABLET ORAL at 08:47

## 2018-01-01 RX ADMIN — ATROPINE SULFATE 2 DROP: 10 SOLUTION/ DROPS OPHTHALMIC at 10:58

## 2018-01-01 RX ADMIN — DOCUSATE SODIUM 100 MG: 100 CAPSULE, LIQUID FILLED ORAL at 09:03

## 2018-01-01 RX ADMIN — VANCOMYCIN HYDROCHLORIDE 1000 MG: 1 INJECTION, SOLUTION INTRAVENOUS at 17:02

## 2018-01-01 RX ADMIN — MORPHINE SULFATE 4 MG: 4 INJECTION INTRAVENOUS at 23:45

## 2018-01-01 RX ADMIN — OXYCODONE HYDROCHLORIDE 40 MG: 20 TABLET, FILM COATED, EXTENDED RELEASE ORAL at 05:05

## 2018-01-01 RX ADMIN — HEPARIN SODIUM 1600 UNITS/HR: 10000 INJECTION, SOLUTION INTRAVENOUS at 11:44

## 2018-01-01 RX ADMIN — PANTOPRAZOLE SODIUM 40 MG: 40 INJECTION, POWDER, FOR SOLUTION INTRAVENOUS at 01:13

## 2018-01-01 RX ADMIN — DEXAMETHASONE 12 MG: 4 TABLET ORAL at 13:31

## 2018-01-01 RX ADMIN — OXYCODONE HYDROCHLORIDE 60 MG: 20 TABLET, FILM COATED, EXTENDED RELEASE ORAL at 23:48

## 2018-01-01 RX ADMIN — OXYCODONE HYDROCHLORIDE 10 MG: 5 TABLET ORAL at 06:47

## 2018-01-01 RX ADMIN — FENTANYL CITRATE 50 MCG: 50 INJECTION INTRAMUSCULAR; INTRAVENOUS at 18:37

## 2018-01-01 RX ADMIN — DOCUSATE SODIUM 100 MG: 100 CAPSULE, LIQUID FILLED ORAL at 11:45

## 2018-01-01 RX ADMIN — OLANZAPINE 5 MG: 5 TABLET, ORALLY DISINTEGRATING ORAL at 09:43

## 2018-01-01 RX ADMIN — FENTANYL CITRATE 50 MCG: 50 INJECTION INTRAMUSCULAR; INTRAVENOUS at 09:54

## 2018-01-01 RX ADMIN — Medication 5 ML: at 13:40

## 2018-01-01 RX ADMIN — OXYCODONE HYDROCHLORIDE 20 MG: 20 TABLET, FILM COATED, EXTENDED RELEASE ORAL at 23:44

## 2018-01-01 RX ADMIN — ATROPINE SULFATE 2 DROP: 10 SOLUTION/ DROPS OPHTHALMIC at 23:07

## 2018-01-01 RX ADMIN — PANTOPRAZOLE SODIUM 40 MG: 40 TABLET, DELAYED RELEASE ORAL at 08:30

## 2018-01-01 RX ADMIN — ATROPINE SULFATE 2 DROP: 10 SOLUTION/ DROPS OPHTHALMIC at 14:49

## 2018-01-01 RX ADMIN — Medication 1 MG: at 11:10

## 2018-01-01 RX ADMIN — Medication 1 MG: at 04:41

## 2018-01-01 RX ADMIN — ONDANSETRON 8 MG: 4 TABLET, FILM COATED ORAL at 05:46

## 2018-01-01 RX ADMIN — MIDAZOLAM HYDROCHLORIDE 1 MG: 1 INJECTION, SOLUTION INTRAMUSCULAR; INTRAVENOUS at 12:30

## 2018-01-01 RX ADMIN — OXYCODONE HYDROCHLORIDE 5 MG: 5 TABLET ORAL at 11:31

## 2018-01-01 RX ADMIN — MORPHINE SULFATE 15 MG: 100 SOLUTION ORAL at 22:01

## 2018-01-01 RX ADMIN — PROPOFOL 20 MG: 10 INJECTION, EMULSION INTRAVENOUS at 13:59

## 2018-01-01 RX ADMIN — ENOXAPARIN SODIUM 60 MG: 60 INJECTION SUBCUTANEOUS at 19:26

## 2018-01-01 RX ADMIN — Medication 1 MG: at 07:27

## 2018-01-01 RX ADMIN — SODIUM CHLORIDE 1000 ML: 9 INJECTION, SOLUTION INTRAVENOUS at 06:08

## 2018-01-01 RX ADMIN — VALACYCLOVIR HYDROCHLORIDE 500 MG: 500 TABLET, FILM COATED ORAL at 09:03

## 2018-01-01 RX ADMIN — HYDROMORPHONE HYDROCHLORIDE 0.3 MG: 1 INJECTION, SOLUTION INTRAMUSCULAR; INTRAVENOUS; SUBCUTANEOUS at 12:42

## 2018-01-01 RX ADMIN — TOPICAL ANESTHETIC 1 APPLICATOR: 200 SPRAY DENTAL; PERIODONTAL at 12:24

## 2018-01-01 RX ADMIN — PANTOPRAZOLE SODIUM 40 MG: 40 TABLET, DELAYED RELEASE ORAL at 20:26

## 2018-01-01 RX ADMIN — FENTANYL CITRATE 50 MCG: 50 INJECTION, SOLUTION INTRAMUSCULAR; INTRAVENOUS at 14:48

## 2018-01-01 RX ADMIN — ENOXAPARIN SODIUM 60 MG: 60 INJECTION SUBCUTANEOUS at 09:17

## 2018-01-01 RX ADMIN — ACETAMINOPHEN 650 MG: 325 TABLET, FILM COATED ORAL at 09:54

## 2018-01-01 RX ADMIN — ONDANSETRON 8 MG: 4 TABLET, FILM COATED ORAL at 20:41

## 2018-01-01 RX ADMIN — OXALIPLATIN 153 MG: 100 INJECTION, SOLUTION, CONCENTRATE INTRAVENOUS at 14:54

## 2018-01-01 RX ADMIN — VALACYCLOVIR HYDROCHLORIDE 500 MG: 500 TABLET, FILM COATED ORAL at 20:20

## 2018-01-01 RX ADMIN — ONDANSETRON 4 MG: 2 INJECTION INTRAMUSCULAR; INTRAVENOUS at 05:03

## 2018-01-01 RX ADMIN — IOPAMIDOL 84 ML: 755 INJECTION, SOLUTION INTRAVENOUS at 20:15

## 2018-01-01 RX ADMIN — PANTOPRAZOLE SODIUM 40 MG: 40 INJECTION, POWDER, FOR SOLUTION INTRAVENOUS at 20:00

## 2018-01-01 RX ADMIN — OXYCODONE HYDROCHLORIDE 5 MG: 5 TABLET ORAL at 09:59

## 2018-01-01 RX ADMIN — Medication 2700 UNITS: at 02:30

## 2018-01-01 RX ADMIN — HYDROMORPHONE HYDROCHLORIDE 1 MG: 1 INJECTION, SOLUTION INTRAMUSCULAR; INTRAVENOUS; SUBCUTANEOUS at 02:38

## 2018-01-01 RX ADMIN — PROCHLORPERAZINE MALEATE 10 MG: 10 TABLET, FILM COATED ORAL at 03:31

## 2018-01-01 RX ADMIN — MORPHINE SULFATE 10 MG: 10 SOLUTION ORAL at 13:42

## 2018-01-01 RX ADMIN — LORAZEPAM 1 MG: 2 INJECTION INTRAMUSCULAR; INTRAVENOUS at 09:24

## 2018-01-01 RX ADMIN — PANTOPRAZOLE SODIUM 40 MG: 40 TABLET, DELAYED RELEASE ORAL at 06:36

## 2018-01-01 RX ADMIN — OXYCODONE HYDROCHLORIDE 10 MG: 5 TABLET ORAL at 17:50

## 2018-01-01 RX ADMIN — ENOXAPARIN SODIUM 80 MG: 80 INJECTION SUBCUTANEOUS at 08:30

## 2018-01-01 RX ADMIN — LIDOCAINE HYDROCHLORIDE 10 ML: 10 INJECTION, SOLUTION EPIDURAL; INFILTRATION; INTRACAUDAL; PERINEURAL at 10:46

## 2018-01-01 RX ADMIN — CIPROFLOXACIN 400 MG: 2 INJECTION INTRAVENOUS at 14:37

## 2018-01-01 RX ADMIN — OXYCODONE HYDROCHLORIDE AND ACETAMINOPHEN 2 TABLET: 5; 325 TABLET ORAL at 06:48

## 2018-01-01 RX ADMIN — DOCUSATE SODIUM 100 MG: 100 CAPSULE, LIQUID FILLED ORAL at 09:43

## 2018-01-01 RX ADMIN — PROCHLORPERAZINE MALEATE 10 MG: 10 TABLET, FILM COATED ORAL at 13:06

## 2018-01-01 RX ADMIN — MORPHINE SULFATE 15 MG: 100 SOLUTION ORAL at 08:48

## 2018-01-01 RX ADMIN — Medication 5000 UNITS: at 09:59

## 2018-01-01 RX ADMIN — ATROPINE SULFATE 2 DROP: 10 SOLUTION/ DROPS OPHTHALMIC at 16:30

## 2018-01-01 RX ADMIN — OXYCODONE HYDROCHLORIDE 10 MG: 5 TABLET ORAL at 02:25

## 2018-01-01 RX ADMIN — VALACYCLOVIR HYDROCHLORIDE 500 MG: 500 TABLET, FILM COATED ORAL at 09:02

## 2018-01-01 RX ADMIN — ATROPINE SULFATE 2 DROP: 10 SOLUTION/ DROPS OPHTHALMIC at 18:59

## 2018-01-01 RX ADMIN — LORAZEPAM 1 MG: 2 INJECTION INTRAMUSCULAR; INTRAVENOUS at 05:18

## 2018-01-01 RX ADMIN — HYDROMORPHONE HYDROCHLORIDE 4 MG: 2 TABLET ORAL at 13:12

## 2018-01-01 RX ADMIN — LORAZEPAM 1 MG: 2 INJECTION INTRAMUSCULAR; INTRAVENOUS at 00:09

## 2018-01-01 RX ADMIN — ACETAMINOPHEN 650 MG: 325 TABLET, FILM COATED ORAL at 17:51

## 2018-01-01 RX ADMIN — MORPHINE SULFATE 10 MG: 100 SOLUTION ORAL at 00:40

## 2018-01-01 RX ADMIN — SODIUM CHLORIDE, PRESERVATIVE FREE 66 ML: 5 INJECTION INTRAVENOUS at 20:15

## 2018-01-01 RX ADMIN — OLANZAPINE 2.5 MG: 2.5 TABLET, FILM COATED ORAL at 09:01

## 2018-01-01 RX ADMIN — OXYCODONE HYDROCHLORIDE 5 MG: 5 TABLET ORAL at 06:12

## 2018-01-01 RX ADMIN — MORPHINE SULFATE 15 MG: 100 SOLUTION ORAL at 05:52

## 2018-01-01 RX ADMIN — HYDROMORPHONE HYDROCHLORIDE 4 MG: 4 TABLET ORAL at 20:32

## 2018-01-01 RX ADMIN — OXYCODONE HYDROCHLORIDE 40 MG: 40 TABLET, FILM COATED, EXTENDED RELEASE ORAL at 13:13

## 2018-01-01 RX ADMIN — SODIUM CHLORIDE, POTASSIUM CHLORIDE, SODIUM LACTATE AND CALCIUM CHLORIDE: 600; 310; 30; 20 INJECTION, SOLUTION INTRAVENOUS at 13:54

## 2018-01-01 RX ADMIN — Medication 1 MG: at 15:49

## 2018-01-01 RX ADMIN — OXYCODONE HYDROCHLORIDE 10 MG: 5 TABLET ORAL at 06:39

## 2018-01-01 RX ADMIN — PROCHLORPERAZINE MALEATE 10 MG: 10 TABLET, FILM COATED ORAL at 20:30

## 2018-01-01 RX ADMIN — OXYCODONE HYDROCHLORIDE 20 MG: 20 TABLET, FILM COATED, EXTENDED RELEASE ORAL at 22:56

## 2018-01-01 RX ADMIN — HEPARIN SODIUM (PORCINE) LOCK FLUSH IV SOLN 100 UNIT/ML 5 ML: 100 SOLUTION at 16:51

## 2018-01-01 RX ADMIN — LORAZEPAM 1 MG: 2 INJECTION INTRAMUSCULAR; INTRAVENOUS at 04:41

## 2018-01-01 RX ADMIN — OXYCODONE HYDROCHLORIDE 20 MG: 20 TABLET, FILM COATED, EXTENDED RELEASE ORAL at 15:35

## 2018-01-01 RX ADMIN — SODIUM CHLORIDE: 9 INJECTION, SOLUTION INTRAVENOUS at 01:12

## 2018-01-01 RX ADMIN — Medication 5 ML: at 23:23

## 2018-01-01 RX ADMIN — VANCOMYCIN HYDROCHLORIDE 1250 MG: 5 INJECTION, POWDER, LYOPHILIZED, FOR SOLUTION INTRAVENOUS at 09:14

## 2018-01-01 RX ADMIN — OXYCODONE HYDROCHLORIDE 5 MG: 5 TABLET ORAL at 18:37

## 2018-01-01 RX ADMIN — GUAIFENESIN 20 ML: 200 SOLUTION ORAL at 14:54

## 2018-01-01 RX ADMIN — HYDROMORPHONE HYDROCHLORIDE 4 MG: 2 TABLET ORAL at 20:11

## 2018-01-01 RX ADMIN — PANTOPRAZOLE SODIUM 40 MG: 40 TABLET, DELAYED RELEASE ORAL at 06:39

## 2018-01-01 RX ADMIN — PIPERACILLIN SODIUM,TAZOBACTAM SODIUM 4.5 G: 4; .5 INJECTION, POWDER, FOR SOLUTION INTRAVENOUS at 19:26

## 2018-01-01 RX ADMIN — SODIUM CHLORIDE, POTASSIUM CHLORIDE, SODIUM LACTATE AND CALCIUM CHLORIDE: 600; 310; 30; 20 INJECTION, SOLUTION INTRAVENOUS at 14:04

## 2018-01-01 RX ADMIN — MORPHINE SULFATE 10 MG: 100 SOLUTION ORAL at 18:23

## 2018-01-01 RX ADMIN — MORPHINE SULFATE 10 MG: 100 SOLUTION ORAL at 11:54

## 2018-01-01 RX ADMIN — LIDOCAINE HYDROCHLORIDE 20 ML: 10 INJECTION, SOLUTION EPIDURAL; INFILTRATION; INTRACAUDAL; PERINEURAL at 18:43

## 2018-01-01 RX ADMIN — LORAZEPAM 1 MG: 2 INJECTION INTRAMUSCULAR; INTRAVENOUS at 20:27

## 2018-01-01 RX ADMIN — IRON SUCROSE 200 MG: 20 INJECTION, SOLUTION INTRAVENOUS at 14:21

## 2018-01-01 RX ADMIN — OXYCODONE HYDROCHLORIDE 40 MG: 40 TABLET, FILM COATED, EXTENDED RELEASE ORAL at 19:55

## 2018-01-01 RX ADMIN — OXYCODONE HYDROCHLORIDE 40 MG: 20 TABLET, FILM COATED, EXTENDED RELEASE ORAL at 13:06

## 2018-01-01 RX ADMIN — FENTANYL CITRATE 50 MCG: 50 INJECTION, SOLUTION INTRAMUSCULAR; INTRAVENOUS at 18:37

## 2018-01-01 RX ADMIN — FENTANYL CITRATE 50 MCG: 50 INJECTION INTRAMUSCULAR; INTRAVENOUS at 10:15

## 2018-01-01 RX ADMIN — ONDANSETRON 4 MG: 2 INJECTION INTRAMUSCULAR; INTRAVENOUS at 10:17

## 2018-01-01 RX ADMIN — MORPHINE SULFATE 15 MG: 100 SOLUTION ORAL at 05:10

## 2018-01-01 RX ADMIN — Medication 5 ML: at 13:15

## 2018-01-01 RX ADMIN — DEXAMETHASONE SODIUM PHOSPHATE 4 MG: 4 INJECTION, SOLUTION INTRA-ARTICULAR; INTRALESIONAL; INTRAMUSCULAR; INTRAVENOUS; SOFT TISSUE at 14:00

## 2018-01-01 RX ADMIN — ONDANSETRON 4 MG: 2 INJECTION INTRAMUSCULAR; INTRAVENOUS at 18:51

## 2018-01-01 RX ADMIN — OXYCODONE HYDROCHLORIDE 20 MG: 20 TABLET, FILM COATED, EXTENDED RELEASE ORAL at 12:51

## 2018-01-01 RX ADMIN — OXYCODONE HYDROCHLORIDE 40 MG: 20 TABLET, FILM COATED, EXTENDED RELEASE ORAL at 20:10

## 2018-01-01 RX ADMIN — MORPHINE SULFATE 10 MG: 100 SOLUTION ORAL at 19:00

## 2018-01-01 RX ADMIN — HYDROMORPHONE HYDROCHLORIDE 4 MG: 2 TABLET ORAL at 17:45

## 2018-01-01 RX ADMIN — DEXMEDETOMIDINE HYDROCHLORIDE 8 MCG: 100 INJECTION, SOLUTION INTRAVENOUS at 13:49

## 2018-01-01 RX ADMIN — PANTOPRAZOLE SODIUM 40 MG: 40 TABLET, DELAYED RELEASE ORAL at 07:43

## 2018-01-01 RX ADMIN — VALACYCLOVIR HYDROCHLORIDE 500 MG: 500 TABLET, FILM COATED ORAL at 10:07

## 2018-01-01 RX ADMIN — MORPHINE SULFATE 15 MG: 100 SOLUTION ORAL at 01:44

## 2018-01-01 RX ADMIN — CEFTRIAXONE SODIUM 2 G: 2 INJECTION, POWDER, FOR SOLUTION INTRAMUSCULAR; INTRAVENOUS at 16:55

## 2018-01-01 RX ADMIN — PIPERACILLIN SODIUM,TAZOBACTAM SODIUM 4.5 G: 4; .5 INJECTION, POWDER, FOR SOLUTION INTRAVENOUS at 11:02

## 2018-01-01 RX ADMIN — ONDANSETRON 4 MG: 2 INJECTION INTRAMUSCULAR; INTRAVENOUS at 06:13

## 2018-01-01 RX ADMIN — GUAIFENESIN 20 ML: 200 SOLUTION ORAL at 09:01

## 2018-01-01 RX ADMIN — LIDOCAINE HYDROCHLORIDE 10 ML: 10 INJECTION, SOLUTION EPIDURAL; INFILTRATION; INTRACAUDAL; PERINEURAL at 11:10

## 2018-01-01 RX ADMIN — DOCUSATE SODIUM 100 MG: 100 CAPSULE, LIQUID FILLED ORAL at 09:02

## 2018-01-01 RX ADMIN — CEFTRIAXONE SODIUM 2 G: 2 INJECTION, POWDER, FOR SOLUTION INTRAMUSCULAR; INTRAVENOUS at 13:32

## 2018-01-01 RX ADMIN — LORAZEPAM 1 MG: 2 INJECTION INTRAMUSCULAR; INTRAVENOUS at 02:04

## 2018-01-01 RX ADMIN — HYDROMORPHONE HYDROCHLORIDE 1 MG: 1 INJECTION, SOLUTION INTRAMUSCULAR; INTRAVENOUS; SUBCUTANEOUS at 03:42

## 2018-01-01 RX ADMIN — FENTANYL CITRATE 100 MCG: 50 INJECTION, SOLUTION INTRAMUSCULAR; INTRAVENOUS at 12:25

## 2018-01-01 RX ADMIN — LORAZEPAM 1 MG: 2 INJECTION INTRAMUSCULAR; INTRAVENOUS at 13:42

## 2018-01-01 RX ADMIN — OXYCODONE HYDROCHLORIDE 10 MG: 5 TABLET ORAL at 21:26

## 2018-01-01 RX ADMIN — ONDANSETRON 4 MG: 2 INJECTION INTRAMUSCULAR; INTRAVENOUS at 09:50

## 2018-01-01 RX ADMIN — PANTOPRAZOLE SODIUM 40 MG: 40 TABLET, DELAYED RELEASE ORAL at 17:05

## 2018-01-01 RX ADMIN — GUAIFENESIN 10 ML: 200 SOLUTION ORAL at 13:02

## 2018-01-01 RX ADMIN — CALCIUM CARBONATE (ANTACID) CHEW TAB 500 MG 1000 MG: 500 CHEW TAB at 15:41

## 2018-01-01 RX ADMIN — SODIUM CHLORIDE, POTASSIUM CHLORIDE, SODIUM LACTATE AND CALCIUM CHLORIDE: 600; 310; 30; 20 INJECTION, SOLUTION INTRAVENOUS at 13:37

## 2018-01-01 RX ADMIN — MORPHINE SULFATE 15 MG: 100 SOLUTION ORAL at 04:41

## 2018-01-01 ASSESSMENT — ENCOUNTER SYMPTOMS
VOMITING: 1
FEVER: 0
ABDOMINAL PAIN: 1
VOMITING: 0
FEVER: 1
DYSURIA: 1
DIARRHEA: 0
COUGH: 1
SINUS PAIN: 0
ARTHRALGIAS: 0
POLYPHAGIA: 0
ABDOMINAL PAIN: 1
DIZZINESS: 0
ABDOMINAL PAIN: 0
SINUS PRESSURE: 0
RHINORRHEA: 0
COUGH: 0
ABDOMINAL PAIN: 1
DIARRHEA: 0
BLOOD IN STOOL: 1
COUGH: 1
EYE PAIN: 0
APPETITE CHANGE: 0
PALPITATIONS: 0
DIAPHORESIS: 0
DYSURIA: 0
WEAKNESS: 0
HEADACHES: 0
BACK PAIN: 1
SHORTNESS OF BREATH: 1
CHILLS: 0
NAUSEA: 0
MYALGIAS: 0
SEIZURES: 0
VOMITING: 1
LIGHT-HEADEDNESS: 1
UNEXPECTED WEIGHT CHANGE: 0
FREQUENCY: 0
ABDOMINAL PAIN: 0
NAUSEA: 0
EYE REDNESS: 0
VOMITING: 0
POLYDIPSIA: 0
CHEST TIGHTNESS: 0
NAUSEA: 1
LIGHT-HEADEDNESS: 0
SHORTNESS OF BREATH: 0
SHORTNESS OF BREATH: 1
EYE ITCHING: 0
FEVER: 0
ADENOPATHY: 0
NAUSEA: 0
CONSTIPATION: 0
ABDOMINAL PAIN: 0
FATIGUE: 0
WHEEZING: 0
SORE THROAT: 0
SHORTNESS OF BREATH: 1
SPEECH DIFFICULTY: 0
HEADACHES: 1
FATIGUE: 1
CHILLS: 0
NUMBNESS: 0
CHILLS: 0
EYE DISCHARGE: 0
FEVER: 0

## 2018-01-01 ASSESSMENT — ACTIVITIES OF DAILY LIVING (ADL)
ADLS_ACUITY_SCORE: 11
ADLS_ACUITY_SCORE: 12
ADLS_ACUITY_SCORE: 11
ADLS_ACUITY_SCORE: 11
ADLS_ACUITY_SCORE: 12
ADLS_ACUITY_SCORE: 11
ADLS_ACUITY_SCORE: 12
ADLS_ACUITY_SCORE: 12
ADLS_ACUITY_SCORE: 11
FALL_HISTORY_WITHIN_LAST_SIX_MONTHS: NO
ADLS_ACUITY_SCORE: 11
ADLS_ACUITY_SCORE: 12
ADLS_ACUITY_SCORE: 12
ADLS_ACUITY_SCORE: 11
ADLS_ACUITY_SCORE: 12
ADLS_ACUITY_SCORE: 11
ADLS_ACUITY_SCORE: 12
ADLS_ACUITY_SCORE: 11
ADLS_ACUITY_SCORE: 14
RETIRED_COMMUNICATION: 0-->UNDERSTANDS/COMMUNICATES WITHOUT DIFFICULTY
ADLS_ACUITY_SCORE: 11
ADLS_ACUITY_SCORE: 12
ADLS_ACUITY_SCORE: 11
COGNITION: 0 - NO COGNITION ISSUES REPORTED
ADLS_ACUITY_SCORE: 14
ADLS_ACUITY_SCORE: 14
ADLS_ACUITY_SCORE: 12
ADLS_ACUITY_SCORE: 11
ADLS_ACUITY_SCORE: 14
ADLS_ACUITY_SCORE: 11
ADLS_ACUITY_SCORE: 13
ADLS_ACUITY_SCORE: 14
AMBULATION: 0-->INDEPENDENT
ADLS_ACUITY_SCORE: 12
ADLS_ACUITY_SCORE: 11
ADLS_ACUITY_SCORE: 13
RETIRED_EATING: 0-->INDEPENDENT
ADLS_ACUITY_SCORE: 11
ADLS_ACUITY_SCORE: 14
ADLS_ACUITY_SCORE: 11
ADLS_ACUITY_SCORE: 11
DRESS: 0-->INDEPENDENT
ADLS_ACUITY_SCORE: 10
TRANSFERRING: 0-->INDEPENDENT
ADLS_ACUITY_SCORE: 14
ADLS_ACUITY_SCORE: 14
SWALLOWING: 0-->SWALLOWS FOODS/LIQUIDS WITHOUT DIFFICULTY
ADLS_ACUITY_SCORE: 14
ADLS_ACUITY_SCORE: 11
ADLS_ACUITY_SCORE: 11
ADLS_ACUITY_SCORE: 12
ADLS_ACUITY_SCORE: 11
ADLS_ACUITY_SCORE: 12
ADLS_ACUITY_SCORE: 12
ADLS_ACUITY_SCORE: 11
ADLS_ACUITY_SCORE: 12
ADLS_ACUITY_SCORE: 11
ADLS_ACUITY_SCORE: 12
ADLS_ACUITY_SCORE: 11
ADLS_ACUITY_SCORE: 14
ADLS_ACUITY_SCORE: 12
ADLS_ACUITY_SCORE: 11
ADLS_ACUITY_SCORE: 11
ADLS_ACUITY_SCORE: 14
ADLS_ACUITY_SCORE: 12
ADLS_ACUITY_SCORE: 11
ADLS_ACUITY_SCORE: 12
ADLS_ACUITY_SCORE: 12
ADLS_ACUITY_SCORE: 11
ADLS_ACUITY_SCORE: 11
ADLS_ACUITY_SCORE: 12
ADLS_ACUITY_SCORE: 11
ADLS_ACUITY_SCORE: 14
ADLS_ACUITY_SCORE: 12
ADLS_ACUITY_SCORE: 12
ADLS_ACUITY_SCORE: 11
TOILETING: 0-->INDEPENDENT
ADLS_ACUITY_SCORE: 11
ADLS_ACUITY_SCORE: 12
ADLS_ACUITY_SCORE: 11
ADLS_ACUITY_SCORE: 12
ADLS_ACUITY_SCORE: 12
ADLS_ACUITY_SCORE: 11
BATHING: 0-->INDEPENDENT
ADLS_ACUITY_SCORE: 11
ADLS_ACUITY_SCORE: 12
ADLS_ACUITY_SCORE: 11

## 2018-01-01 ASSESSMENT — PAIN DESCRIPTION - DESCRIPTORS
DESCRIPTORS: BURNING
DESCRIPTORS: STABBING
DESCRIPTORS: ACHING
DESCRIPTORS: ACHING;CRAMPING
DESCRIPTORS: ACHING
DESCRIPTORS: BURNING
DESCRIPTORS: CRAMPING
DESCRIPTORS: ACHING
DESCRIPTORS: SHARP
DESCRIPTORS: ACHING;CRAMPING
DESCRIPTORS: ACHING;CRAMPING
DESCRIPTORS: ACHING
DESCRIPTORS: ACHING;CRAMPING
DESCRIPTORS: ACHING
DESCRIPTORS: CRAMPING
DESCRIPTORS: ACHING
DESCRIPTORS: ACHING;CRAMPING
DESCRIPTORS: CRAMPING

## 2018-01-01 ASSESSMENT — PAIN SCALES - GENERAL: PAINLEVEL: WORST PAIN (10)

## 2018-02-26 PROBLEM — K92.1 MELENA: Status: ACTIVE | Noted: 2018-01-01

## 2018-02-26 NOTE — PROGRESS NOTES
[facility]      Thank you for this consultation.  We will see the patient shortly and provide further recommendations.  Please call in the meantime if immediate assistance is needed.        ALICIA Lopez  [facility]  Office:  [phone] call if needed after 5PM  Cell:  [phone], not available after 5PM at this number

## 2018-02-26 NOTE — PROGRESS NOTES
The following criteria to be met before discharge:    1.  -diagnostic tests and consults completed and resulted - not met  2.  -vital signs normal or at patient baseline - not met  3.  -tolerating oral intake to maintain hydration - not met  4.  -returns to baseline functional status - not met  5.  -GI evaluation complete - not met    February 26, 2018

## 2018-02-26 NOTE — PROGRESS NOTES
GI Attending  EGD revealed nodular appearing antrum and body of stomach.  Biopsies were taken.  Numerous ulcerations were also appreciated along the lesser curvature of the stomach.  Biopsies were taken of several ulcers. The duodenum was normal.   Once again, patient has no risk factors for PUD.  Endoscopic findings suspicious for possible lymphoma; B-cell secondary to H. Pylori?   Will await biopsy results.    Lalo Ibrahim MD  [facility]  [phone] Cell  [phone]  Office    Results were discussed with hospitalist and observation unit nurse via telephone.

## 2018-02-26 NOTE — PHARMACY-ADMISSION MEDICATION HISTORY
Admission medication history interview status for the 2/26/2018  admission is complete. See EPIC admission navigator for prior to admission medications     Medication history source reliability:Good    Actions taken by pharmacist (provider contacted, etc):None     Additional medication history information not noted on PTA med list :None    Medication reconciliation/reorder completed by provider prior to medication history? No    Time spent in this activity: 5 minutes    Prior to Admission medications    Medication Sig Last Dose Taking? Auth Provider   valACYclovir (VALTREX) 500 MG tablet TAKE 1 TABLET (500 MG) BY MOUTH DAILY 2/24/2018 Yes Gay Winslow APRN CNP   hydrOXYzine (ATARAX) 25 MG tablet Take 1-2 tablets (25-50 mg) by mouth every 6 hours as needed prn med Yes Gay Winslow APRN CNP

## 2018-02-26 NOTE — ED NOTES
Bed: ED04  Expected date:   Expected time:   Means of arrival:   Comments:  N716 37m GI Bleed. ETA [id number]

## 2018-02-26 NOTE — PLAN OF CARE
Problem: Patient Care Overview  Goal: Plan of Care/Patient Progress Review  Outcome: No Change  PT is alert and oriented X4. Up independently. Will formulate plan after pathology reports return tomorrow.  H-pylori vs. Leukemia.  Awaiting MD to discuss case with family.  Family and patient unaware of potential diagnosis.

## 2018-02-26 NOTE — PROGRESS NOTES
The following criteria to be met before discharge:     1.  -diagnostic tests and consults completed and resulted - EGD complete, pathology pending  2.  -vital signs normal or at patient baseline - met  3.  -tolerating oral intake to maintain hydration - NPO  4.  -returns to baseline functional status - met  5.  -GI evaluation complete - not met

## 2018-02-26 NOTE — IP AVS SNAPSHOT
MRN:[id number]                      After Visit Summary   2/26/2018    Ernie Song    MRN: [id number]           Thank you!     Thank you for choosing [facility] for your care. Our goal is always to provide you with excellent care. Hearing back from our patients is one way we can continue to improve our services. Please take a few minutes to complete the written survey that you may receive in the mail after you visit with us. Thank you!        Patient Information     Date Of Birth          1980        About your hospital stay     You were admitted on:  February 26, 2018 [facility]    You were discharged on:  February 27, 2018        Reason for your hospital stay       You were registered to observation due to suspected upper GI bleed, with diagnosis of gastric ulcerations.                  Who to Call     For medical emergencies, please call 911.  For non-urgent questions about your medical care, please call your primary care provider or clinic, [phone]  For questions related to your surgery, please call your surgery clinic        Attending Provider     Provider Specialty    Jam Coats DO Emergency Medicine    Homero James MD Internal Medicine       Primary Care Provider Office Phone # Fax #    MARILIN Lehman [facility] [phone]      After Care Instructions     Activity       Your activity upon discharge: activity as tolerated            Diet       Follow this diet upon discharge:   Regular                  Follow-up Appointments     Follow-up and recommended labs and tests        Follow up with primary care provider, Gay Winslow, within 7 days for hospital follow- up.  No follow up labs or test are needed.    Follow up with Dr. Ibrahim or Nusrat Burgess PA-C of [facility] per their recommendations.  They will contact you regarding pathology/biopsy results.                  Pending Results   "   Date and Time Order Name Status Description    2/26/2018 1237 Surgical pathology exam In process             Statement of Approval     Ordered          02/27/18 1048  I have reviewed and agree with all the recommendations and orders detailed in this document.  EFFECTIVE NOW     Approved and electronically signed by:  Gab Salas PA-C             Admission Information     Date & Time Provider Department Dept. Phone    2/26/2018 Homero James MD [facility] Observation Unit [phone]      Your Vitals Were     Blood Pressure Temperature Respirations Height Weight Pulse Oximetry    102/52 (BP Location: Right arm) 97.6  F (36.4  C) (Oral) 18 1.651 m (5' 5\") 77.2 kg (170 lb 3.2 oz) 97%    BMI (Body Mass Index)                   28.32 kg/m2           MyChart Information     [employer] gives you secure access to your electronic health record. If you see a primary care provider, you can also send messages to your care team and make appointments. If you have questions, please call your primary care clinic.  If you do not have a primary care provider, please call [phone] and they will assist you.        Care EveryWhere ID     This is your Care EveryWhere ID. This could be used by other organizations to access your [facility] [facility] medical records  [id number]        Equal Access to Services     NADINE ESCALANTE : Hadii panda arredondoo Soradhaali, waaxda luqadaha, qaybta kaalmada adeegyada, neymar morgan. So [facility] [phone].    ATENCIÓN: Si habla español, tiene a valente disposición servicios gratuitos de asistencia lingüística. Llame al [phone].    We comply with applicable federal civil rights laws and [place] laws. We do not discriminate on the basis of race, color, national origin, age, disability, sex, sexual orientation, or gender identity.               Review of your medicines      START taking        Dose / Directions    pantoprazole 40 MG EC tablet   Commonly known as:  " PROTONIX   Used for:  Peptic ulcer        Dose:  40 mg   Take 1 tablet (40 mg) by mouth 2 times daily For 8 weeks then once a day.  Take 30-60 minutes before a meal.   Quantity:  90 tablet   Refills:  1         CONTINUE these medicines which have NOT CHANGED        Dose / Directions    hydrOXYzine 25 MG tablet   Commonly known as:  ATARAX   Used for:  Anxiety attack        Dose:  25-50 mg   Take 1-2 tablets (25-50 mg) by mouth every 6 hours as needed   Quantity:  20 tablet   Refills:  3       valACYclovir 500 MG tablet   Commonly known as:  VALTREX   Used for:  Genital herpes simplex, unspecified site        TAKE 1 TABLET (500 MG) BY MOUTH DAILY   Quantity:  90 tablet   Refills:  3            Where to get your medicines      These medications were sent to [facility] - [address]  [address], [place] [address]     Phone:  [phone]     pantoprazole 40 MG EC tablet                Protect others around you: Learn how to safely use, store and throw away your medicines at www.disposemymeds.org.             Medication List: This is a list of all your medications and when to take them. Check marks below indicate your daily home schedule. Keep this list as a reference.      Medications           Morning Afternoon Evening Bedtime As Needed    hydrOXYzine 25 MG tablet   Commonly known as:  ATARAX   Take 1-2 tablets (25-50 mg) by mouth every 6 hours as needed                                   pantoprazole 40 MG EC tablet   Commonly known as:  PROTONIX   Take 1 tablet (40 mg) by mouth 2 times daily For 8 weeks then once a day.  Take 30-60 minutes before a meal.                                      valACYclovir 500 MG tablet   Commonly known as:  VALTREX   TAKE 1 TABLET (500 MG) BY MOUTH DAILY

## 2018-02-26 NOTE — IP AVS SNAPSHOT
[facility] Observation Unit    [address] [place] [address]    Phone:  [phone]                                       After Visit Summary   2/26/2018    Ernie Song    MRN: [id number]           After Visit Summary Signature Page     I have received my discharge instructions, and my questions have been answered. I have discussed any challenges I see with this plan with the nurse or doctor.    ..........................................................................................................................................  Patient/Patient Representative Signature      ..........................................................................................................................................  Patient Representative Print Name and Relationship to Patient    ..................................................               ................................................  Date                                            Time    ..........................................................................................................................................  Reviewed by Signature/Title    ...................................................              ..............................................  Date                                                            Time

## 2018-02-26 NOTE — PROGRESS NOTES
The following criteria to be met before discharge:      1.  -diagnostic tests and consults completed and resulted - EGD complete, pathology pending  2.  -vital signs normal or at patient baseline - met  3.  -tolerating oral intake to maintain hydration - not met  4.  -returns to baseline functional status - met  5.  -GI evaluation complete - not met

## 2018-02-26 NOTE — ED PROVIDER NOTES
History     Chief Complaint:  Hematemesis     HPI   Ernie Song is a 37 year old male with a history of suspected gastric ulcer who presents to the emergency department via EMS for evaluation of hematemesis. The patient states that he has had approximately two days of generalized, mild abdominal pain and had a very dark stool last night. Shortly after awaking this morning, the patient felt nauseated and had a single episode of hematemesis. Of note, the patient has had similar symptoms in the past with his history of suspected bleeding gastric ulcer approximately six months ago, for which he completed a course of Zantac. At that time (10/9/2017), hemoglobin was found to be 13.9 and he did not have an endoscopy performed. Given his history and current symptoms, he contacted EMS for further evaluation here in the ED.     Here in the ED now, he complains of slight dizziness/lightheadedness and mild headache, though does not currently have abdominal pain. He denies any recent vision changes or excessive NSAID use, though does drink alcohol (1-4 drinks) daily. Last drink was last night.     Allergies:  No Known Allergies     Medications:    Valtrex  Atarax    Past Medical History:    Genital herpes  Tension headaches  Blastocystis hominis infection  Palpitations  Gastric ulcer    Past Surgical History:    The patient does not have any pertinent past surgical history.    Family History:    Cancer  Asthma    Social History:  Presents alone.   Former Smoker, 1.00 ppd for 12 years.   Positive for alcohol use. 1-4 drinks daily.  PCP:Gay Winslow  Marital Status:   [2]    Review of Systems   Eyes: Negative for visual disturbance.   Gastrointestinal: Positive for abdominal pain, blood in stool (Dark), nausea and vomiting.   Neurological: Positive for light-headedness and headaches.   All other systems reviewed and are negative.    Physical Exam   First Vitals:  BP: 120/80  Heart Rate: 78  Temp: 98.3  " F (36.8  C)  Resp: 16  Height: 165.1 cm (5' 5\")  Weight: 76.2 kg (168 lb)  SpO2: 97 %    Patient Vitals for the past 24 hrs:   BP Temp Temp src Heart Rate Resp SpO2 Height Weight   02/26/18 0730 105/73 - - - - 97 % - -   02/26/18 0700 113/65 - - - - 96 % - -   02/26/18 0630 102/71 - - - - 96 % - -   02/26/18 0600 117/82 - - - - 96 % - -   02/26/18 0559 120/80 98.3  F (36.8  C) Oral 78 16 97 % 1.651 m (5' 5\") 76.2 kg (168 lb)       Physical Exam  Physical Exam   General:  Sitting on bed.   HENT:  No obvious trauma to head  Right Ear:  External ear normal.   Left Ear:  External ear normal.   Nose:  Nose normal.   Eyes:  Conjunctivae and EOM are normal. Pupils are equal, round, and reactive.   Neck: Normal range of motion. Neck supple. No tracheal deviation present.   CV:  Normal heart sounds. No murmur heard.  Pulm/Chest: Effort normal and breath sounds normal.   Abd: Soft. No distension. There is no tenderness. There is no rigidity, no rebound and no guarding.   Rectal:  Normal rectal tone. Black stool. No rectal fissure of hemorrhoid.  M/S: Normal range of motion.   Neuro: Alert. GCS 15.  Skin: Skin is warm and dry. No rash noted. Not diaphoretic.   Psych: Normal mood and affect. Behavior is normal.     Emergency Department Course   Laboratory:  CBC: WBC: 7.0, HGB: 11.5 (L), PLT: 236  CMP: Glucose 147 (H), Calcium 8.1 (L), Protein total 6.7 (L), o/w WNL (Creatinine: 0.69)    Lipase: 55 (L)    ABO/Rh type and screen: O positive   INR: 1.04    Alcohol ethyl: <0.01    Occult Blood stool: Positive     Interventions:  0608 NS 1L IV  0728 Protonix 40 mg IV    Emergency Department Course:  Nursing notes and vitals reviewed. 0605 I performed an exam of the patient as documented above.     IV inserted. Medicine administered as documented above. Blood drawn. This was sent to the lab for further testing, results above.    0714  I consulted with Dr. James of the hospitalist services. He is in agreement to accept the patient " for admission.    0718 I rechecked the patient and discussed the results of his workup thus far.     Findings and plan explained to the Patient who consents to admission. Discussed the patient with Dr. James, who will admit the patient to an observation bed for further monitoring, evaluation, and treatment.    Impression & Plan    Medical Decision Making:  Ernie Song is a very pleasant 37 year old male who presents with hematemesis.  This is consistent likely with an upper gastrointestinal bleed. Differential considered includes ulcer, gastritis, avm, tumor, esophagitis, variceal bleed, thanh-nixon tear, ingestion, etc.  Patient is hemodynamically stable.   No history of chronic liver disease and INR is normal. Protonix given. The patient is Guaiac positive.       Per chart review, the patient has had a three point hemoglobin drop in the past approximately 5 months. Given this drop in hemoglobin, I would hospitalize patient at this time. I will admit to hospitalist for further cares.  Type and cross completed. Blood was not transfused emergently in the ED.  The patient was monitored closely in ED for further drop in BP or HR elevation.  Dr. James has accepted the pt for admission.    Diagnosis:    ICD-10-CM   1. Gastrointestinal hemorrhage, unspecified gastrointestinal hemorrhage type K92.2   2. Anemia, unspecified type D64.9       Disposition:  Admitted to Dr. James of the hospitalist services  Lucero CHARLES, am serving as a scribe on 2/26/2018 at 6:05 AM to personally document services performed by Jam Coats DO based on my observations and the provider's statements to me.     Lucero Aguilar  2/26/2018    EMERGENCY DEPARTMENT       Jam Coats DO  02/26/18 0751

## 2018-02-26 NOTE — CONSULTS
"[facility]  Gastroenterology Consultation    Ernie Song  [address]  [place] [address]  37 year old male    Admission Date/Time: 2/26/2018  Primary Care Provider: Gay Winslow    We were asked to see the patient in consultation by Dr. James for evaluation of hematemesis.        HPI:  Ernie Song is a 37 year old male who with a PMH significant for genital herpes who is admitted to [facility] for evaluation of hematemesis which started this morning.  He also has noted black stools.  He has had abdominal pain for many months, perhaps 12.  He was seen previously and reports he was told he had \"an ulcer from stress\" and was given a medication that did not work and then a second medication that helped but he took for two weeks.      He denies any syncope but has been lightheaded.  He has not had any CP or SOB.  He has noted ongoing abdominal pain as above.  No weight loss.      He denies any usage of NSAID medications.      ROS: A comprehensive ten point review of systems was negative aside from those in mentioned in the HPI.      MEDICATIONS:   No current facility-administered medications on file prior to encounter.   Current Outpatient Prescriptions on File Prior to Encounter:  valACYclovir (VALTREX) 500 MG tablet TAKE 1 TABLET (500 MG) BY MOUTH DAILY   hydrOXYzine (ATARAX) 25 MG tablet Take 1-2 tablets (25-50 mg) by mouth every 6 hours as needed       ALLERGIES: No Known Allergies    Past Medical History:   Diagnosis Date     Genital herpes      Ulcer, gastric, acute        Past Surgical History:   Procedure Laterality Date     NO HISTORY OF SURGERY           SOCIAL HISTORY:  Social History   Substance Use Topics     Smoking status: Former Smoker     Packs/day: 1.00     Years: 12.00     Types: Cigarettes     Quit date: 10/15/2004     Smokeless tobacco: Never Used     Alcohol use 0.0 oz/week     0 Standard drinks or equivalent per week      Comment: 2 " "drinks a day       FAMILY HISTORY:  Family History   Problem Relation Age of Onset     CANCER Father      Other Cancer Maternal Grandmother      Other Cancer Other      Asthma Mother        PHYSICAL EXAM:   /64 (BP Location: Right arm)  Temp 97.6  F (36.4  C) (Oral)  Resp 16  Ht 1.651 m (5' 5\")  Wt 76.2 kg (168 lb)  SpO2 97%  BMI 27.96 kg/m2    Constitutional: NAD, comfortable  Cardiovascular: RRR, normal S1 and S2, no r/c/g/m  Respiratory: CTAB  Psychiatric: mentation appears normal and affect normal  Head: Normocephalic. Atraumatic.    Neck: Neck supple. No adenopathy. Thyroid symmetric, normal size, trachea midline  Eyes:  PERRL, no icterus  ENT: Hearing adequate, pharynx normal without erythema or exudate  Abdomen:   Auscultation: +BS  Appearance: normal  Palpation: soft, nontender  NEURO: grossly negative  SKIN: no suspicious lesions or rashes  LYMPH:   anterior cervical: no adenopathy  posterior cervical: no adenopathy  supraclavicular: no adenopathy          ADDITIONAL COMMENTS:   I reviewed the patient's new clinical lab test results.   Recent Labs   Lab Test  02/26/18   0600 01/23/13   1702   WBC  7.0  5.5   HGB  11.5*  14.2   MCV  81  79   PLT  236  280   INR  1.04   --      Recent Labs   Lab Test  02/26/18   0600  10/10/17   0750  07/01/16   0816   NA  136  137  139   POTASSIUM  3.7  3.9  4.4   CHLORIDE  103  103  105   CO2  26  28  29   BUN  25  13  12   CR  0.69  0.89  0.85   ANIONGAP  7  6  5   AROLDO  8.1*  8.6  8.8   GLC  147*  104*  105*     Recent Labs   Lab Test  02/26/18   0600  10/10/17   0750  07/01/16   0816   01/23/13   1628  09/19/11   0856   ALBUMIN  3.4  3.7  3.9   < >   --    --    BILITOTAL  0.8  1.2  0.8   < >   --    --    ALT  38  47  35   < >   --    --    AST  22  30  25   < >   --    --    ALKPHOS  76  101  101   < >   --    --    PROTEIN   --    --    --    --   Negative  Negative   LIPASE  55*   --    --    --    --    --     < > = values in this interval not displayed. " "            .    CONSULTATION ASSESSMENT AND PLAN:    Active Problems:    Melena    Assessment: 37 year old male with PMH as per HPI, diagnosed with PUD about 6-12 months ago per his report and placed on an unknown medication for 2 weeks.  He states he was told \"stress\" caused his ulcer and that should be avoided.  He denies NSAID usage.  Now admitted with hematemesis and melena.  He is a nonsmoker.      Plan:   -EGD today  -Bx for H. Pylori if ulcer found  -NPO  -IV PPI BID        I discussed the patient's findings and plan with Dr. Ibrahim who will independently examine the patient and add further recommendations as necessary.          Nusrat Rain, PAC  [facility] [facility]  Office:  [phone] call if needed after 5PM  Cell:  [phone], not available after 5PM at this number    "

## 2018-02-26 NOTE — PROGRESS NOTES
The following criteria to be met before discharge:    1.  -diagnostic tests and consults completed and resulted - not met  2.  -vital signs normal or at patient baseline - met  3.  -tolerating oral intake to maintain hydration - NPO  4.  -returns to baseline functional status - met  5.  -GI evaluation complete - not met

## 2018-02-26 NOTE — ED NOTES
"[facility]  ED Nurse Handoff Report    ED Chief complaint: Hematemesis (Pt has had abdominal pain for 2 days and this AM had a black stool then had a bloody emesis. Had a stomach ulcer 6months ago.)      ED Diagnosis:   Final diagnoses:   Gastrointestinal hemorrhage, unspecified gastrointestinal hemorrhage type   Anemia, unspecified type       Code Status: Full Code    Allergies: No Known Allergies    Activity level - Baseline/Home:  Independent    Activity Level - Current:   Stand with Assist-d/t dizziness     Needed?: No    Isolation: No  Infection: Not Applicable    Bariatric?: No    Vital Signs:   Vitals:    02/26/18 0559 02/26/18 0630   BP: 120/80 102/71   Resp: 16    Temp: 98.3  F (36.8  C)    TempSrc: Oral    SpO2: 97% 96%   Weight: 76.2 kg (168 lb)    Height: 1.651 m (5' 5\")        Cardiac Rhythm: ,        Pain level:      Is this patient confused?: No    Patient Report: Initial Complaint: Pt presents with upper abdominal pain that started 2 days ago and this AM had a black BM. Soon after he became nauseated and had a bloody emesis. Approx 6 months ago pt was dx with stomach ulcer and given medications. About 3 weeks ago having upper abdominal pain that resolved. Pt does drink 2 alcoholic drinks a day.  Focused Assessment: Pt's upper abdominal pain had resolved prior to arrival. C/o headache and slightly dizzy. No vomiting.  Tests Performed: labs and guaiac   Abnormal Results: guaiac positive and hgb 11.5  Treatments provided: 1L NS and protonix 40mg IV; received 4mg zofran from EMS    Family Comments: None present. Wife is at home with 2 young children.    OBS brochure/video discussed/provided to patient: N/A    ED Medications:   Medications   pantoprazole (PROTONIX) 40 mg IV push injection (not administered)   0.9% sodium chloride BOLUS (0 mLs Intravenous Stopped 2/26/18 0643)       Drips infusing?:  No      ED NURSE PHONE NUMBER: [phone]       "

## 2018-02-26 NOTE — H&P
Admitted:     02/26/2018      OBSERVATION HISTORY AND PHYSICAL       PRIMARY CARE PHYSICIAN:  Gay Winslow NP      CHIEF COMPLAINT:  Abdominal pain.      HISTORY OF PRESENT ILLNESS:  Ernie Song is a pleasant 37-year-old male with a history of suspected gastric ulcer who presents to the ED today for abdominal pain, black stools and throwing up blood.  The patient states his abdominal pain began about 2 days ago and is located above his belly button in the midline.  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  Upon returning home this morning, he had 1 bout of emesis and noted bright red flecks of blood in his emesis.  He also recalls having black stools over the past 2 bowel movements.  He also feels like his abdomen is slightly more bloated than usual.  He denies any loss of consciousness nor any bright red blood in his stools.  He says about 6 months ago, he was diagnosed with having a suspected gastric ulcer and was given Zantac for which he completed a 14 day course.  He did not have any GI evaluation at that time.  He states his abdominal pain at its worse was a 7/10, however, right now it is nearly gone.  He denies any chest pain, shortness of breath or lower extremity swelling.  He also denies having any fevers.      PAST MEDICAL HISTORY:     1.  Genital herpes.   2.  Tension headaches.   3.  Blastocystis hominis infection   4.  Palpitations.   5.  Gastric ulcer.      MEDICATIONS:  Valtrex and Atarax.      ALLERGIES:  NO KNOWN DRUG ALLERGIES.      SOCIAL HISTORY:  The patient denies tobacco use.  He states drinking 2-3 beers every day.  Denies any illicit drug use.  He works as a snow plow .      FAMILY HISTORY:  Notable for a maternal uncle who passed away in his 50s from stomach cancer.      REVIEW OF SYSTEMS:  A 10-point review of systems was performed and negative except as per HPI.      PHYSICAL  EXAMINATION:   VITAL SIGNS:  Temperature 98.3 degrees Fahrenheit, heart rate 78, blood pressure 105/73, respirations 16, satting 97% on room air.  Weight is 76.2 kilograms.   GENERAL:  No acute distress, pleasant, no family or friends present at bedside, appears comfortable lying flat in the ER bed.   HEENT:  Normocephalic, atraumatic.  Extraocular motions intact.  Moist mucous membranes, anicteric sclerae, uvula midline.   NECK:  No lymphadenopathy, trachea midline.   CARDIOVASCULAR:  Regular rate and rhythm with no added heart sounds.   PULMONARY:  Clear to auscultation bilaterally.   GASTROINTESTINAL:  Bowel sounds positive.  Abdomen soft, nondistended, moderate tenderness noted in the epigastric region without rebound or guarding.   EXTREMITIES:  No edema noted, warm.   NEUROLOGIC:  Moves all 4 extremities.  Cranial nerves II-XII grossly intact.     PSYCH: Appropriate mood and affect, oriented x3.   SKIN:  No rashes or bruising noted.      LABORATORY DATA:  BMP with a calcium of 8.1.  Total protein 6.7.  LFTs are normal.  Lipase is 55.  Occult blood is positive.  Glucose of 147.  CBC shows a white count of 7.0, hemoglobin 11.5, hematocrit 34.5.  INR is 1.04.  Ethanol level is negative.      ASSESSMENT AND PLAN:  Overall, Ernie Song is a 37-year-old male with a history of suspected gastric ulcer six months ago, treated with Zantac, who now presents with abdominal pain, melena and one bout of hemetemesis.  He is registered to observation for further evaluation.      PLAN:   1.  Probable upper GI bleed, suspect gastric ulcer.  The patient's vital signs are stable in the emergency department.  He has not had any further bouts of emesis and he states the abdominal pain is now improved.  His hemoglobin is noted to be 11.5; going back to 2013 his hemoglobin was noted to be up above 14.  He was given a 1 liter fluid bolus and 40 mg IV Protonix.  He is registered to observation as his hemoglobin is above 10,  his vital signs are stable, there is no other significant past medical history.   - Registered to observation, hemoglobin checks q.6 hours x4.   - IV fluids, normal saline at 100 mL per hour.   - Protonix 40 mg IV b.i.d.   - A formal GI consultation requested.   - Potassium and magnesium replacement protocol has been ordered.   - P.r.n. Zofran.   - P.r.n. Tylenol and p.r.n. Dilaudid for pain control.     2.  Alcohol abuse.  The patient is drinking 2-3 beers per day.  He denies any history of alcohol withdrawal or seizures.  Reportedly, his last drink was yesterday evening, however, his ethanol level is negative in the ED today.   - CIWA protocol with p.r.n. Ativan.   - Daily multivitamin, folic acid and thiamine.      CODE STATUS:  THE PATIENT IS A FULL CODE.         FRANCESCA GROSSMAN MD             D: 2018   T: 2018   MT: MD      Name:     ROMELIA MARK   MRN:      [id number]        Account:      [id number]   :      1980        Admitted:     2018                   Document: [id number]

## 2018-02-26 NOTE — PROGRESS NOTES
Emergency Department Note read by Edward Bojorquez RN on 02/26/18 at 7:39 AM.    Awaiting to receive patient in order to continue your compassionate care.    Thank You!\

## 2018-02-27 NOTE — PLAN OF CARE
Problem: Patient Care Overview  Goal: Plan of Care/Patient Progress Review  Outcome: Adequate for Discharge Date Met: 02/27/18  VSS. A/O. Tolerated regular diet. No signs of bleeding. Denies abdominal pain. GI will contact patient with biopsy results. DC instructions reviewed with patient and spouse. DC medication given to patient and reviewed including side effects/administration. All questions answered. Patient and spouse verbalize understanding of DC care.

## 2018-02-27 NOTE — PLAN OF CARE
Problem: Patient Care Overview  Goal: Individualization & Mutuality  Outcome: No Change  Observation Goals  -diagnostic tests and consults completed and resulted - no  -vital signs normal or at patient baseline -yes  -tolerating oral intake to maintain hydration -yes  -returns to baseline functional status -no  -GI evaluation complete -no  Pt A&O, VSS, CIWA at zero, pt remains in SR.  Pt stating he still feels tired and worn out.  Pt voiding. No other complaints at this time.  Will continue to monitor.

## 2018-02-27 NOTE — PROGRESS NOTES
Observation Goals:    -diagnostic tests and consults completed and resulted  - not met  -vital signs normal or at patient baseline - met  -tolerating oral intake to maintain hydration - met  -returns to baseline functional status - not met  -GI evaluation complete - not met

## 2018-02-27 NOTE — PLAN OF CARE
Problem: Patient Care Overview  Goal: Plan of Care/Patient Progress Review  Outcome: No Change  AVSS; pt denied pain/nausea overnight; hgb 9.8 down from 10.7; IVF continues at 100 cc/hr; no evidence of bleeding; pt sleepy, but able to ambulate to BR without difficulty; pt voiding; CIWA score 0.

## 2018-02-27 NOTE — PLAN OF CARE
Problem: Patient Care Overview  Goal: Plan of Care/Patient Progress Review  Outcome: No Change  PRIMARY DIAGNOSIS: GI BLEED    OUTPATIENT/OBSERVATION GOALS TO BE MET BEFORE DISCHARGE  1. Orthostatic performed: No    2. Stable Hgb Yes.   Recent Labs   Lab Test  02/27/18   0551  02/27/18   0055  02/26/18   1820   HGB  9.7*  9.8*  10.7*       3. Resolved or declined bleeding episodes: Yes Last episode: yesterday    4. Appropriate testing complete: Yes    5. Cleared for discharge by consultants (if involved): Yes    6. Safe discharge environment identified: Yes    Discharge Planner Nurse   Safe discharge environment identified: Yes  Barriers to discharge: No       Entered by: Ginny Sims 02/27/2018 12:14 PM     Please review provider order for any additional goals.   Nurse to notify provider when observation goals have been met and patient is ready for discharge.

## 2018-02-27 NOTE — PROGRESS NOTES
"[facility]  [facility]  Gastroenterology Progress note    Interval History:      Patient feels better.        Vital Signs:      /52 (BP Location: Right arm)  Temp 97.6  F (36.4  C) (Oral)  Resp 18  Ht 1.651 m (5' 5\")  Wt 77.2 kg (170 lb 3.2 oz)  SpO2 97%  BMI 28.32 kg/m2  Temp (24hrs), Av.9  F (36.6  C), Min:97.2  F (36.2  C), Max:99  F (37.2  C)    Patient Vitals for the past 72 hrs:   Weight   18 0600 77.2 kg (170 lb 3.2 oz)   18 1152 76.2 kg (168 lb)   18 0559 76.2 kg (168 lb)       Intake/Output Summary (Last 24 hours) at 18 1041  Last data filed at 18 1000   Gross per 24 hour   Intake              800 ml   Output              425 ml   Net              375 ml         Constitutional: NAD, comfortable      Additional Comments:  ROS, FH, SH: See initial GI consult for details.    Laboratory Data:  Recent Labs   Lab Test  18   0551  18   0055  18   1820   18   0600  13   1702   WBC  4.7   --    --    --   7.0  5.5   HGB  9.7*  9.8*  10.7*   < >  11.5*  14.2   MCV  82   --    --    --   81  79   PLT  199   --    --    --   236  280   INR   --    --    --    --   1.04   --     < > = values in this interval not displayed.     Recent Labs   Lab Test  18   0551  18   0600  10/10/17   0750   NA  138  136  137   POTASSIUM  3.7  3.7  3.9   CHLORIDE  105  103  103   CO2  28  26  28   BUN  16  25  13   CR  0.82  0.69  0.89   ANIONGAP  5  7  6   AROLDO  8.1*  8.1*  8.6     Recent Labs   Lab Test  18   0600  10/10/17   0750  16   0816   13   1628  11   0856   ALBUMIN  3.4  3.7  3.9   < >   --    --    BILITOTAL  0.8  1.2  0.8   < >   --    --    ALT  38  47  35   < >   --    --    AST  22  30  25   < >   --    --    ALKPHOS  76  101  101   < >   --    --    PROTEIN   --    --    --    --   Negative  Negative   LIPASE  55*   --    --    --    --    --     < > = values in this " interval not displayed.         Assessment:    1.  Gastric ulcer  2.  Abnormal appearance of gastric mucosa, bx pending  Plan:    -Protonix BID x 8 weeks  -EGD in 8 weeks  -Dr. Ibrahim will follow up on bx  -If H. Pylori positive, treatment will be initiated  -If bx negative-->would check H. Pylori breath test            ALICIA Lopez  [facility]  Office:  [phone] call if needed after 5PM  Cell:  [phone], not available after 5PM at this number

## 2018-02-27 NOTE — DISCHARGE SUMMARY
Admit Date:     02/26/2018   Discharge Date:           PRIMARY CARE PROVIDER:  Gay Winslow NP      DATE OF ADMISSION:  02/26/2018.      DATE OF DISCHARGE:  02/27/2018.      DISCHARGE DIAGNOSES:   1.  Gastric ulcerations with upper gastrointestinal bleed.   2.  Alcohol abuse.   3.  Genital herpes.      DISCHARGE MEDICATIONS:       Review of your medicines      START taking       Dose / Directions    pantoprazole 40 MG EC tablet   Commonly known as:  PROTONIX   Used for:  Peptic ulcer        Dose:  40 mg   Take 1 tablet (40 mg) by mouth 2 times daily For 8 weeks then once a day.  Take 30-60 minutes before a meal.   Quantity:  90 tablet   Refills:  1         CONTINUE these medicines which have NOT CHANGED       Dose / Directions    hydrOXYzine 25 MG tablet   Commonly known as:  ATARAX   Used for:  Anxiety attack        Dose:  25-50 mg   Take 1-2 tablets (25-50 mg) by mouth every 6 hours as needed   Quantity:  20 tablet   Refills:  3       valACYclovir 500 MG tablet   Commonly known as:  VALTREX   Used for:  Genital herpes simplex, unspecified site        TAKE 1 TABLET (500 MG) BY MOUTH DAILY   Quantity:  90 tablet   Refills:  3            Where to get your medicines      These medications were sent to [facility] [place], [place] - [address]  [address], [facility] [address]     Phone:  [phone]      pantoprazole 40 MG EC tablet            ALLERGIES:  No known drug allergies.      DISPOSITION:  Home.      FOLLOWUP WITH RECOMMENDATIONS:     1.  The patient should follow up with primary care provider within 1 week for hospital followup.   2.  The patient will follow up with [facility] with Dr. Ibrahim or Physician Assistant Nusrat Rain per their recommendations.  They plan on contacting the patient regarding biopsy and pathology results.      ACTIVITY:  As tolerated.      DIET:  Regular.      CONSULTS:  GI.      IMAGING AND PROCEDURES:   1.  Routine laboratory studies that included  blood type and screen, CBC with platelet differential, INR, comprehensive metabolic panel, alcohol ethyl level, lipase level, stool occult blood screening, serial hemoglobin, repeat basic metabolic panel and repeat CBC with platelets.   2.  EGD with biopsies taken.   3.  EKG.      PENDING RESULTS:  Biopsies are currently pending.      PHYSICAL EXAMINATION ON DAY OF DISCHARGE:   VITAL SIGNS:  Temperature is 97.6 degrees Fahrenheit with a blood pressure of 102/52, heart rate of 62 beats per minute, respiratory rate of 18, O2 saturation of 97% on room air.  The patient is denying any pain.   GENERAL:  The patient is awake, alert and cooperative, in no apparent distress, alert and oriented x3.   HEENT:  Normocephalic, atraumatic.  Moist mucous membranes present.  No exudates noted in the posterior pharynx.  Uvula is midline.   NECK:  Supple, normal range of motion, no tracheal deviation, no cervical lymphadenopathy present.   CARDIOVASCULAR:  Regular rate and rhythm, no rubs, murmurs or gallops appreciated.   PULMONARY:  Lungs are clear to auscultation bilaterally, no wheezes, rhonchi or rales appreciated.   GASTROINTESTINAL:  Bowel sounds present in all 4 quadrants, soft, nontender, nondistended.   NEUROLOGIC:  Cranial nerves II-XII are grossly intact.  The patient is seen moving all four extremities without difficulties.    EXTREMITIES:  No lower extremity edema noted bilaterally.  Calves are nontender to palpation.      BRIEF HISTORY OF PRESENT ILLNESS:  Ernie Song is a 37-year-old male with a past medical history significant for genital herpes, tension headaches, blastocystis hominis infection, palpitations and known gastric ulcerations who was registered to observation due to upper GI bleed thought to be secondary to gastric ulcerations.      1.  Gastric ulcerations with upper GI bleed:  Patient was followed by the GI service of [facility].  An EGD was performed, which showed some nodular appearance in the  antrum and body of the stomach with biopsies taken and currently pending.  Numerous ulcerations were noted along the lesser curvature of the stomach and biopsies were again taken there as well.  GI service will be following up with the patient as an outpatient and will contact him regarding biopsy results.  The patient was treated with IV Protonix during this stay and will be transitioned to oral Protonix 40 mg 2 times daily for a total of 8 weeks and then once daily following this.  A prescription has been written at time of discharge.  The patient was also treated with IV fluids, p.r.n. Zofran and p.r.n. Tylenol as well as p.r.n. Dilaudid.   2.  Alcohol abuse:  Patient admits consumption of 2-3 beers daily.  He has no known history of withdrawal or seizures.  The patient was placed on CIWA protocol and monitored.  No interventions were necessary.   3.  Genital herpes:  Patient takes daily Valtrex 500 mg.  This was held during this stay and will be resumed at time of discharge.   4.  Discharge Pain Plan: Patient currently has NO PAIN and is not being prescribed pain medications on discharge.     CODE STATUS:  Full code.      The patient was discussed with Dr. James who agrees with discharge at this time.  Dr. James will evaluate the patient independently.      Total discharge time less than 30 minutes.         FRANCESCA JAMES MD       As dictated by HILAD REYES PA-C            D: 2018   T: 2018   MT: SHEYLA      Name:     ROMELIA MARK   MRN:      [id number]        Account:        [id number]   :      1980           Admit Date:     2018                                  Discharge Date:       Document: [id number]       cc: Gay Winslow NP

## 2018-07-11 NOTE — PROGRESS NOTES
SUBJECTIVE:   Ernie Song is a 38 year old male presenting with a chief complaint of   Chief Complaint   Patient presents with     Flank Pain     sharp, shooting pain on left side x 2 days       He is a new patient of [facility].    Flank Pain    Onset of symptoms was 2 day(s) ago.  Location: left low back  Radiation: does not radiate  Context:       The pain occurs in the early morning and then gets better throughout the day      Mechanism: does have a recent work comp back injury but this pain feels different       Patient experienced delayed pain  Course of symptoms is same.    Severity moderate, wakes him up in the morning   Current and Associated symptoms: pain  Denies: fecal incontinence, urinary incontinence, lower extremity numbness, lower extremity weakness and paresthesia    Aggravating Factors: none   Therapies to improve symptoms include: none   Past history: no history of kidney stones or UTI    Had slight burning with urination yesterday. No fever/chills or abdominal/pelvic pain. Patient is generally healthy with no significant past medical history, surgical history, or family history.     Review of Systems   Constitutional: Negative for chills, fatigue, fever and unexpected weight change.   HENT: Negative for congestion, ear pain, rhinorrhea, sinus pain, sinus pressure and sore throat.    Eyes: Negative for pain, discharge, redness and itching.   Respiratory: Negative for cough, shortness of breath and wheezing.    Cardiovascular: Negative for chest pain, palpitations and leg swelling.   Gastrointestinal: Negative for abdominal pain, constipation, diarrhea, nausea and vomiting.   Endocrine: Negative for cold intolerance, heat intolerance, polydipsia and polyphagia.   Genitourinary: Positive for dysuria. Negative for discharge and frequency.   Musculoskeletal: Positive for back pain. Negative for arthralgias and myalgias.   Skin: Negative for rash.   Allergic/Immunologic: Negative for  immunocompromised state.   Neurological: Negative for dizziness, seizures, syncope, speech difficulty, weakness, light-headedness, numbness and headaches.   Hematological: Negative for adenopathy.       Past Medical History:   Diagnosis Date     Genital herpes      Ulcer, gastric, acute      Family History   Problem Relation Age of Onset     Cancer Father      Other Cancer Maternal Grandmother      Other Cancer Other      Asthma Mother      Current Outpatient Prescriptions   Medication Sig Dispense Refill     hydrOXYzine (ATARAX) 25 MG tablet Take 1-2 tablets (25-50 mg) by mouth every 6 hours as needed 20 tablet 3     pantoprazole (PROTONIX) 40 MG EC tablet Take 1 tablet (40 mg) by mouth 2 times daily For 8 weeks then once a day.  Take 30-60 minutes before a meal. (Patient not taking: Reported on 7/11/2018) 90 tablet 1     sulfamethoxazole-trimethoprim (BACTRIM DS/SEPTRA DS) 800-160 MG per tablet Take 1 tablet by mouth 2 times daily for 7 days 14 tablet 0     valACYclovir (VALTREX) 500 MG tablet TAKE 1 TABLET (500 MG) BY MOUTH DAILY 90 tablet 3     Social History   Substance Use Topics     Smoking status: Former Smoker     Packs/day: 1.00     Years: 12.00     Types: Cigarettes     Quit date: 10/15/2004     Smokeless tobacco: Never Used     Alcohol use 0.0 oz/week     0 Standard drinks or equivalent per week      Comment: 2 drinks a day       OBJECTIVE  /66 (BP Location: Left arm, Patient Position: Chair, Cuff Size: Adult Regular)  Pulse 63  Temp 98.4  F (36.9  C) (Oral)  Resp 16  Wt 165 lb (74.8 kg)  SpO2 99%  BMI 27.46 kg/m2    Physical Exam   Constitutional: He appears well-developed and well-nourished. No distress.   HENT:   Head: Normocephalic and atraumatic.   Right Ear: Tympanic membrane and ear canal normal.   Left Ear: Tympanic membrane and ear canal normal.   Mouth/Throat: Oropharynx is clear and moist.   Eyes: Conjunctivae are normal. Pupils are equal, round, and reactive to light.    Cardiovascular: Normal rate and regular rhythm.    Pulmonary/Chest: Effort normal and breath sounds normal.   Abdominal: Soft. Normal appearance and bowel sounds are normal. There is no tenderness. There is no CVA tenderness.   Musculoskeletal:   There is left lower back tenderness with palpation but the pain does not reproduce the discomfort he is feeling.    Skin: Skin is warm and dry. No rash noted.   Psychiatric: He has a normal mood and affect. His behavior is normal.       Labs:  Results for orders placed or performed in visit on 07/11/18 (from the past 24 hour(s))   UA reflex to Microscopic and Culture   Result Value Ref Range    Color Urine Yellow     Appearance Urine Clear     Glucose Urine Negative NEG^Negative mg/dL    Bilirubin Urine Negative NEG^Negative    Ketones Urine Negative NEG^Negative mg/dL    Specific Gravity Urine 1.010 1.003 - 1.035    Blood Urine Small (A) NEG^Negative    pH Urine 6.0 5.0 - 7.0 pH    Protein Albumin Urine Negative NEG^Negative mg/dL    Urobilinogen Urine 0.2 0.2 - 1.0 EU/dL    Nitrite Urine Negative NEG^Negative    Leukocyte Esterase Urine Negative NEG^Negative    Source Midstream Urine    Urine Microscopic   Result Value Ref Range    WBC Urine 5-10 (A) OTO5^0 - 5 /HPF    RBC Urine 2-5 (A) OTO2^O - 2 /HPF    Squamous Epithelial /LPF Urine Few FEW^Few /LPF    Bacteria Urine Few (A) NEG^Negative /HPF       X-Ray was not done.    ASSESSMENT:      ICD-10-CM    1. Dysuria R30.0 UA reflex to Microscopic and Culture     Urine Microscopic     Neisseria gonorrhoeae PCR     Chlamydia trachomatis PCR     sulfamethoxazole-trimethoprim (BACTRIM DS/SEPTRA DS) 800-160 MG per tablet   2. Acute left-sided low back pain without sciatica M54.5    3. Abnormal urine findings R82.90 Urine Culture Aerobic Bacterial        Medical Decision Making:    Differential Diagnosis:  UTI: UTI, Dysuria, Kidney Stone, STD and acute back pain     Serious Comorbid Conditions:  Adult:  None    PLAN:    UTI  Adult:  Will treat with bactrim x 7 days given symptoms and lab findings. Urine culture pending. Chlamydia/gonorrhea pending. Discussed in detail symptoms that would warrant emergent evaluation in the ED. Patient agrees with plan and will follow up as needed.      Followup:    If not improving or if condition worsens, follow up with your Primary Care Provider, If not improving or if conditions worsens over the next 12-24 hours, go to the Emergency Department    There are no Patient Instructions on file for this visit.    bac

## 2018-07-11 NOTE — MR AVS SNAPSHOT
After Visit Summary   7/11/2018    Ernie Song    MRN: [id number]           Patient Information     Date Of Birth          1980        Visit Information        Provider Department      7/11/2018 12:20 PM Alma Bustillos PA-C [facility]        Today's Diagnoses     Dysuria    -  1    Acute left-sided low back pain without sciatica        Abnormal urine findings           Follow-ups after your visit        Follow-up notes from your care team     Return if symptoms worsen or fail to improve.      Who to contact     If you have questions or need follow up information about today's clinic visit or your schedule please contact [facility] directly at [phone].  Normal or non-critical lab and imaging results will be communicated to you by VILOOPhart, letter or phone within 4 business days after the clinic has received the results. If you do not hear from us within 7 days, please contact the clinic through VILOOPhart or phone. If you have a critical or abnormal lab result, we will notify you by phone as soon as possible.  Submit refill requests through Dash or call your pharmacy and they will forward the refill request to us. Please allow 3 business days for your refill to be completed.          Additional Information About Your Visit        MyChart Information     Dash gives you secure access to your electronic health record. If you see a primary care provider, you can also send messages to your care team and make appointments. If you have questions, please call your primary care clinic.  If you do not have a primary care provider, please call [phone] and they will assist you.        Care EveryWhere ID     This is your Care EveryWhere ID. This could be used by other organizations to access your [facility] medical records  [id number]        Your Vitals Were     Pulse Temperature Respirations Pulse Oximetry BMI (Body Mass Index)       63 98.4  F  (36.9  C) (Oral) 16 99% 27.46 kg/m2        Blood Pressure from Last 3 Encounters:   07/11/18 131/66   02/27/18 102/52   10/02/17 130/60    Weight from Last 3 Encounters:   07/11/18 165 lb (74.8 kg)   02/27/18 170 lb 3.2 oz (77.2 kg)   10/02/17 176 lb 3.2 oz (79.9 kg)              We Performed the Following     Chlamydia trachomatis PCR     Neisseria gonorrhoeae PCR     UA reflex to Microscopic and Culture     Urine Culture Aerobic Bacterial     Urine Microscopic          Today's Medication Changes          These changes are accurate as of 7/11/18 11:59 PM.  If you have any questions, ask your nurse or doctor.               Start taking these medicines.        Dose/Directions    sulfamethoxazole-trimethoprim 800-160 MG per tablet   Commonly known as:  BACTRIM DS/SEPTRA DS   Used for:  Dysuria   Started by:  Alma Bustillos PA-C        Dose:  1 tablet   Take 1 tablet by mouth 2 times daily for 7 days   Quantity:  14 tablet   Refills:  0            Where to get your medicines      These medications were sent to [facility] [address] [place] [address] [facility] [address]     Phone:  [phone]     sulfamethoxazole-trimethoprim 800-160 MG per tablet                Primary Care Provider Office Phone # Fax #    Gay Glez Goldy, APRN [facility] [phone]       [address]  [place] [address]        Equal Access to Services     [facility] AVA : Hadii panda ku hadasho Soomaali, waaxda luqadaha, qaybta kaalmada adeegyada, waxay angelica sutton . So [facility] [phone].    ATENCIÓN: Si habla español, tiene a valente disposición servicios gratuitos de asistencia lingüística. Anju al [phone].    We comply with applicable federal civil rights laws and [place] laws. We do not discriminate on the basis of race, color, national origin, age, disability, sex, sexual orientation, or gender identity.            Thank you!     Thank you for  choosing [facility]  for your care. Our goal is always to provide you with excellent care. Hearing back from our patients is one way we can continue to improve our services. Please take a few minutes to complete the written survey that you may receive in the mail after your visit with us. Thank you!             Your Updated Medication List - Protect others around you: Learn how to safely use, store and throw away your medicines at www.disposemymeds.org.          This list is accurate as of 7/11/18 11:59 PM.  Always use your most recent med list.                   Brand Name Dispense Instructions for use Diagnosis    hydrOXYzine 25 MG tablet    ATARAX    20 tablet    Take 1-2 tablets (25-50 mg) by mouth every 6 hours as needed    Anxiety attack       pantoprazole 40 MG EC tablet    PROTONIX    90 tablet    Take 1 tablet (40 mg) by mouth 2 times daily For 8 weeks then once a day.  Take 30-60 minutes before a meal.    Peptic ulcer       sulfamethoxazole-trimethoprim 800-160 MG per tablet    BACTRIM DS/SEPTRA DS    14 tablet    Take 1 tablet by mouth 2 times daily for 7 days    Dysuria       valACYclovir 500 MG tablet    VALTREX    90 tablet    TAKE 1 TABLET (500 MG) BY MOUTH DAILY    Genital herpes simplex, unspecified site

## 2018-07-12 NOTE — ED AVS SNAPSHOT
Emergency Department    [address] [facility] [address]    Phone:  [phone]    Fax:  [phone]                                       Ernie Song   MRN: [id number]    Department:   Emergency Department   Date of Visit:  7/12/2018           Patient Information     Date Of Birth          1980        Your diagnoses for this visit were:     Abdominal pain, left upper quadrant     Peptic ulcer        You were seen by Kang Salinas MD.      Follow-up Information     Follow up with Gay Winslow APRN CNP.    Specialty:  Family Practice    Contact information:    [address]  [facility] [address]  [phone]          Schedule an appointment as soon as [facility]    Specialty:  Gastroenterology    Contact information:    [address]  [place] [address] [phone]        Discharge Instructions         Peptic Ulcer    A peptic ulcer is an open sore in the lining of the stomach. It may also occur in the duodenum (first part of the small intestine).   Causes  The most common causes of ulcers are:    H. pylori bacteria infection    Long-term use of nonsteroidal anti-inflammatory drugs (NSAIDs), such as aspirin or ibuprofen  Other factors that can increase the risk for ulcers include older age and family history of peptic ulcers. Tobacco and alcohol use are also risk factors. Emotional stress, worry, and spicy foods are not causes of peptic ulcers.  Symptoms  A peptic ulcer may or may not cause symptoms. If symptoms do occur, they can include:    Dull or burning pain in the stomach region (anywhere between your belly button and breastbone)    Loss of appetite    Heartburn or upset stomach    Frequent burping    Bloated feeling    Nausea or vomiting (vomit may be bloody or look like coffee grounds)    Black, tarry, or bloody stools (which means the ulcer is bleeding)  Sometimes  the bleeding is not seen. In these cases, it may be discovered by symptoms of low blood count (anemia) such as dizziness, weakness, shortness of breath, pale skin, difficulty with exercise, or a blood test.  If an ulcer is suspected, tests may be done to check for H. pylori infection. These can include blood, stool, or breath tests. Upper endoscopy (also called EGD) is usually done to check for ulcers and allows for samples (biopsies) to be taken and evaluated under the microscope. An X-ray test called an upper GI series can be done in some situations but it could miss small ulcers that would be seen on the upper endoscopy.  Without treatment, a peptic ulcer may worsen. This can lead to serious problems such as bleeding or perforation (a hole) in the stomach or duodenum. Treatment is needed to prevent these problems.  Medicines are the most common treatment for peptic ulcers. In severe cases, surgery may be needed.  Home care  Medicines  If you re prescribed medicines, be sure to take them as directed. Common medicines prescribed include:    Antibiotics. These kill H. pylori bacteria. In many cases, you ll need to take at least two types of antibiotics. The regimens can be complicated and require many medicines either at once, or taken in order. This is because this bacteria is often difficult to treat. It is very important to take the medicines as prescribed.     Proton pump inhibitors. These block your stomach from making any acid.    H2 blockers. These reduce the amount of acid your stomach makes. These are sometimes used for duodenal ulcers.    Bismuth subsalicylate. This helps protect the lining of your stomach and duodenum from acid.  General care    Don t take any NSAIDS without talking with your healthcare provider first. They may delay healing. Also check with your healthcare provider before taking any antacids.    Don't use alcohol or tobacco. These may delay healing. They may also make symptoms  worse.  Follow-up care  Follow up with your healthcare provider as directed. If testing was done, you ll be told the results when they are ready. In some situations of gastric ulcer, a repeat upper endoscopy is needed to check for healing. Your healthcare provider may also do a test of cure after treatment for H. pylori.  When to seek medical advice  Call your healthcare provider right away if any of these occur:    Fever of 100.4 F (38 C) or higher, or as directed by your healthcare provider    Stomach pain that worsens or moves to the lower right part of abdomen    Continued weight loss    Pale skin    Fast heartbeat    Weakness or dizziness    Extreme fatigue    Shortness of breath    Frequent vomiting, blood in your vomit, or coffee ground-like substance in your vomit    Black, tarry, or bloody stools  Call 911  Call 911 right away if any of these occur:    Sudden or severe pain in the stomach region    Stomach becomes rigid    Low body temperature    Unusually fast heart rate    Chest pain appears or worsens, or spreads to the back, neck, shoulder, or arm    Trouble breathing or swallowing    Confusion    Extreme drowsiness or trouble waking up    Fainting    Large amounts of blood present in vomit or stool  Date Last Reviewed: 9/1/2017 2000-2017 [employer]. [address] [place] [address]. All rights reserved. This information is not intended as a substitute for professional medical care. Always follow your healthcare professional's instructions.          24 Hour Appointment Hotline       To make an appointment at any [facility], call [phone] ([phone]). If you don't have a family doctor or clinic, we will help you find one. [facility] are conveniently located to serve the needs of you and your family.             Review of your medicines      START taking        Dose / Directions Last dose taken    lidocaine (viscous) 2 % solution   Commonly known as:   XYLOCAINE   Dose:  15 mL   Quantity:  100 mL        Take 15 mLs by mouth every 3 hours as needed for moderate pain May mix with liquid antacid; max 8 doses/24 hour period   Refills:  0          CONTINUE these medicines which may have CHANGED, or have new prescriptions. If we are uncertain of the size of tablets/capsules you have at home, strength may be listed as something that might have changed.        Dose / Directions Last dose taken    pantoprazole 40 MG EC tablet   Commonly known as:  PROTONIX   Dose:  40 mg   What changed:  when to take this   Quantity:  30 tablet        Take 1 tablet (40 mg) by mouth daily For 8 weeks then once a day.  Take 30-60 minutes before a meal.   Refills:  1          Our records show that you are taking the medicines listed below. If these are incorrect, please call your family doctor or clinic.        Dose / Directions Last dose taken    hydrOXYzine 25 MG tablet   Commonly known as:  ATARAX   Dose:  25-50 mg   Quantity:  20 tablet        Take 1-2 tablets (25-50 mg) by mouth every 6 hours as needed   Refills:  3        sulfamethoxazole-trimethoprim 800-160 MG per tablet   Commonly known as:  BACTRIM DS/SEPTRA DS   Dose:  1 tablet   Quantity:  14 tablet        Take 1 tablet by mouth 2 times daily for 7 days   Refills:  0        valACYclovir 500 MG tablet   Commonly known as:  VALTREX   Quantity:  90 tablet        TAKE 1 TABLET (500 MG) BY MOUTH DAILY   Refills:  3                Prescriptions were sent or printed at these locations (2 Prescriptions)                   Other Prescriptions                Printed at Department/Unit printer (2 of 2)         lidocaine, viscous, (XYLOCAINE) 2 % solution               pantoprazole (PROTONIX) 40 MG EC tablet                Procedures and tests performed during your visit     CBC with platelets differential    CT Abdomen Pelvis without Contrast (stone protocol)    Comprehensive metabolic panel    Lipase    Peripheral IV: Standard    UA reflex  to Microscopic and Culture      Orders Needing Specimen Collection     None      Pending Results     Date and Time Order Name Status Description    7/12/2018 2342 CT Abdomen Pelvis without Contrast (stone protocol) Preliminary             Pending Culture Results     No orders found for last 3 day(s).            Pending Results Instructions     If you had any lab results that were not finalized at the time of your Discharge, you can call the ED Lab Result RN at [phone]. You will be contacted by this team for any positive Lab results or changes in treatment. The nurses are available 7 days a week from 10A to 6:30P.  You can leave a message 24 hours per day and they will return your call.        Test Results From Your Hospital Stay        7/12/2018 11:52 PM      Component Results     Component Value Ref Range & Units Status    WBC 6.1 4.0 - 11.0 10e9/L Final    RBC Count 4.70 4.4 - 5.9 10e12/L Final    Hemoglobin 10.4 (L) 13.3 - 17.7 g/dL Final    Hematocrit 32.8 (L) 40.0 - 53.0 % Final    MCV 70 (L) 78 - 100 fl Final    MCH 22.1 (L) 26.5 - 33.0 pg Final    MCHC 31.7 31.5 - 36.5 g/dL Final    RDW 17.9 (H) 10.0 - 15.0 % Final    Platelet Count 286 150 - 450 10e9/L Final    Diff Method Automated Method  Final    % Neutrophils 56.6 % Final    % Lymphocytes 34.6 % Final    % Monocytes 5.4 % Final    % Eosinophils 2.8 % Final    % Basophils 0.3 % Final    % Immature Granulocytes 0.3 % Final    Nucleated RBCs 0 0 /100 Final    Absolute Neutrophil 3.4 1.6 - 8.3 10e9/L Final    Absolute Lymphocytes 2.1 0.8 - 5.3 10e9/L Final    Absolute Monocytes 0.3 0.0 - 1.3 10e9/L Final    Absolute Eosinophils 0.2 0.0 - 0.7 10e9/L Final    Absolute Basophils 0.0 0.0 - 0.2 10e9/L Final    Abs Immature Granulocytes 0.0 0 - 0.4 10e9/L Final    Absolute Nucleated RBC 0.0  Final         7/13/2018 12:05 AM      Component Results     Component Value Ref Range & Units Status    Lipase 110 73 - 393 U/L Final         7/13/2018 12:08 AM       Component Results     Component Value Ref Range & Units Status    Sodium 139 133 - 144 mmol/L Final    Potassium 3.7 3.4 - 5.3 mmol/L Final    Chloride 107 94 - 109 mmol/L Final    Carbon Dioxide 26 20 - 32 mmol/L Final    Anion Gap 6 3 - 14 mmol/L Final    Glucose 94 70 - 99 mg/dL Final    Urea Nitrogen 11 7 - 30 mg/dL Final    Creatinine 1.05 0.66 - 1.25 mg/dL Final    GFR Estimate 78 >60 mL/min/1.7m2 Final    Non  GFR Calc    GFR Estimate If Black >90 >60 mL/min/1.7m2 Final    African American GFR Calc    Calcium 8.6 8.5 - 10.1 mg/dL Final    Bilirubin Total 0.4 0.2 - 1.3 mg/dL Final    Albumin 3.6 3.4 - 5.0 g/dL Final    Protein Total 7.9 6.8 - 8.8 g/dL Final    Alkaline Phosphatase 94 40 - 150 U/L Final    ALT 27 0 - 70 U/L Final    AST 17 0 - 45 U/L Final         7/13/2018 12:51 AM      Narrative     CT ABDOMEN PELVIS W/O CONTRAST  7/13/2018 12:36 AM     HISTORY: Abdominal pain.    TECHNIQUE: Noncontrast CT abdomen and pelvis was performed. Radiation  dose for this scan was reduced using automated exposure control,  adjustment of the mA and/or kV according to patient size, or iterative  reconstruction technique.    COMPARISON: None.    FINDINGS:  Abdomen: There is dependent atelectasis at the lung bases. Calcified  granuloma in the left lower lobe posteriorly. Evaluation of the solid  abdominal organs is limited by the lack of intravenous contrast. The  liver, spleen, gallbladder, pancreas, adrenal glands and right kidney  are normal in appearance. There is mild dilatation of the left renal  collecting system and ureter into the pelvis. No cause of obstruction  is seen. There are multiple soft tissue nodules and fluid stranding in  the fat of the upper abdomen anteriorly as well as in both upper  quadrants. There is inflammation about the second portion of the  duodenum. Inflammatory changes posterior to the stomach with probable  soft tissue nodule. The wall of the stomach may be  thickened.    Pelvis: Small and large bowel appear normal. The appendix is normal.  There is trace free fluid in the pelvis. No free intraperitoneal gas.        Impression     IMPRESSION:  1. There are multiple soft tissue nodules as well as soft tissue  stranding in the fat of the upper abdomen and upper quadrant as well  as along the lesser curve of the stomach and adjacent to the duodenum.  Peritoneal metastatic disease could have this appearance.  2. Mild left hydronephrosis and dilated ureter into the pelvis. No  cause of obstruction is seen.  3. Possible wall thickening of the stomach, though the stomach is not  well distended.         7/13/2018 12:06 AM      Component Results     Component Value Ref Range & Units Status    Color Urine Straw  Final    Appearance Urine Clear  Final    Glucose Urine Negative NEG^Negative mg/dL Final    Bilirubin Urine Negative NEG^Negative Final    Ketones Urine Negative NEG^Negative mg/dL Final    Specific Gravity Urine 1.002 (L) 1.003 - 1.035 Final    Blood Urine Negative NEG^Negative Final    pH Urine 6.5 5.0 - 7.0 pH Final    Protein Albumin Urine Negative NEG^Negative mg/dL Final    Urobilinogen mg/dL Normal 0.0 - 2.0 mg/dL Final    Nitrite Urine Negative NEG^Negative Final    Leukocyte Esterase Urine Negative NEG^Negative Final    Source Midstream Urine  Final                Clinical Quality Measure: Blood Pressure Screening     Your blood pressure was checked while you were in the emergency department today. The last reading we obtained was  BP: 133/73 . Please read the guidelines below about what these numbers mean and what you should do about them.  If your systolic blood pressure (the top number) is less than 120 and your diastolic blood pressure (the bottom number) is less than 80, then your blood pressure is normal. There is nothing more that you need to do about it.  If your systolic blood pressure (the top number) is 120-139 or your diastolic blood pressure (the  bottom number) is 80-89, your blood pressure may be higher than it should be. You should have your blood pressure rechecked within a year by a primary care provider.  If your systolic blood pressure (the top number) is 140 or greater or your diastolic blood pressure (the bottom number) is 90 or greater, you may have high blood pressure. High blood pressure is treatable, but if left untreated over time it can put you at risk for heart attack, stroke, or kidney failure. You should have your blood pressure rechecked by a primary care provider within the next 4 weeks.  If your provider in the emergency department today gave you specific instructions to follow-up with your doctor or provider even sooner than that, you should follow that instruction and not wait for up to 4 weeks for your follow-up visit.        Thank you for choosing [facility]       Thank you for choosing [facility] for your care. Our goal is always to provide you with excellent care. Hearing back from our patients is one way we can continue to improve our services. Please take a few minutes to complete the written survey that you may receive in the mail after you visit with us. Thank you!        [employer] Information     [employer] gives you secure access to your electronic health record. If you see a primary care provider, you can also send messages to your care team and make appointments. If you have questions, please call your primary care clinic.  If you do not have a primary care provider, please call [phone] and they will assist you.        Care EveryWhere ID     This is your Care EveryWhere ID. This could be used by other organizations to access your [facility] medical records  [id number]        Equal Access to Services     NADINE ESCALANTE : Hadii panda Flor, waaxda james, qaybta kaalneymar taylor . So [facility] [phone].    ATENCIÓN: Si habla español, tiene a valente disposición servicios gratuitos  de asistencia lingüística. Anju feng [phone].    We comply with applicable federal civil rights laws and [place] laws. We do not discriminate on the basis of race, color, national origin, age, disability, sex, sexual orientation, or gender identity.            After Visit Summary       This is your record. Keep this with you and show to your community pharmacist(s) and doctor(s) at your next visit.

## 2018-07-12 NOTE — ED AVS SNAPSHOT
Emergency Department    [address] [place] [address]    Phone:  [phone]    Fax:  [phone]                                       Ernie Song   MRN: [id number]    Department:   Emergency Department   Date of Visit:  7/12/2018           After Visit Summary Signature Page     I have received my discharge instructions, and my questions have been answered. I have discussed any challenges I see with this plan with the nurse or doctor.    ..........................................................................................................................................  Patient/Patient Representative Signature      ..........................................................................................................................................  Patient Representative Print Name and Relationship to Patient    ..................................................               ................................................  Date                                            Time    ..........................................................................................................................................  Reviewed by Signature/Title    ...................................................              ..............................................  Date                                                            Time

## 2018-07-13 NOTE — DISCHARGE INSTRUCTIONS
Peptic Ulcer    A peptic ulcer is an open sore in the lining of the stomach. It may also occur in the duodenum (first part of the small intestine).   Causes  The most common causes of ulcers are:    H. pylori bacteria infection    Long-term use of nonsteroidal anti-inflammatory drugs (NSAIDs), such as aspirin or ibuprofen  Other factors that can increase the risk for ulcers include older age and family history of peptic ulcers. Tobacco and alcohol use are also risk factors. Emotional stress, worry, and spicy foods are not causes of peptic ulcers.  Symptoms  A peptic ulcer may or may not cause symptoms. If symptoms do occur, they can include:    Dull or burning pain in the stomach region (anywhere between your belly button and breastbone)    Loss of appetite    Heartburn or upset stomach    Frequent burping    Bloated feeling    Nausea or vomiting (vomit may be bloody or look like coffee grounds)    Black, tarry, or bloody stools (which means the ulcer is bleeding)  Sometimes the bleeding is not seen. In these cases, it may be discovered by symptoms of low blood count (anemia) such as dizziness, weakness, shortness of breath, pale skin, difficulty with exercise, or a blood test.  If an ulcer is suspected, tests may be done to check for H. pylori infection. These can include blood, stool, or breath tests. Upper endoscopy (also called EGD) is usually done to check for ulcers and allows for samples (biopsies) to be taken and evaluated under the microscope. An X-ray test called an upper GI series can be done in some situations but it could miss small ulcers that would be seen on the upper endoscopy.  Without treatment, a peptic ulcer may worsen. This can lead to serious problems such as bleeding or perforation (a hole) in the stomach or duodenum. Treatment is needed to prevent these problems.  Medicines are the most common treatment for peptic ulcers. In severe cases, surgery may be needed.  Home care  Medicines  If  you re prescribed medicines, be sure to take them as directed. Common medicines prescribed include:    Antibiotics. These kill H. pylori bacteria. In many cases, you ll need to take at least two types of antibiotics. The regimens can be complicated and require many medicines either at once, or taken in order. This is because this bacteria is often difficult to treat. It is very important to take the medicines as prescribed.     Proton pump inhibitors. These block your stomach from making any acid.    H2 blockers. These reduce the amount of acid your stomach makes. These are sometimes used for duodenal ulcers.    Bismuth subsalicylate. This helps protect the lining of your stomach and duodenum from acid.  General care    Don t take any NSAIDS without talking with your healthcare provider first. They may delay healing. Also check with your healthcare provider before taking any antacids.    Don't use alcohol or tobacco. These may delay healing. They may also make symptoms worse.  Follow-up care  Follow up with your healthcare provider as directed. If testing was done, you ll be told the results when they are ready. In some situations of gastric ulcer, a repeat upper endoscopy is needed to check for healing. Your healthcare provider may also do a test of cure after treatment for H. pylori.  When to seek medical advice  Call your healthcare provider right away if any of these occur:    Fever of 100.4 F (38 C) or higher, or as directed by your healthcare provider    Stomach pain that worsens or moves to the lower right part of abdomen    Continued weight loss    Pale skin    Fast heartbeat    Weakness or dizziness    Extreme fatigue    Shortness of breath    Frequent vomiting, blood in your vomit, or coffee ground-like substance in your vomit    Black, tarry, or bloody stools  Call 911  Call 911 right away if any of these occur:    Sudden or severe pain in the stomach region    Stomach becomes rigid    Low body  temperature    Unusually fast heart rate    Chest pain appears or worsens, or spreads to the back, neck, shoulder, or arm    Trouble breathing or swallowing    Confusion    Extreme drowsiness or trouble waking up    Fainting    Large amounts of blood present in vomit or stool  Date Last Reviewed: 9/1/2017 2000-2017 [employer]. [address] [place] [address]. All rights reserved. This information is not intended as a substitute for professional medical care. Always follow your healthcare professional's instructions.

## 2018-07-13 NOTE — ED PROVIDER NOTES
"  History     Chief Complaint:  abdominal pain     HPI   Ernie Song is a pleasant 38 year old male who presents to the emergency department with his wife for evaluation of some left sided abdominal pain. The patient reports that he developed these symptoms on 2 nights ago and went to work the following morning with the same pain. At work he complained of the same pain and was taken to see  through workers comp. At  he was told that his pain could be attributed to a pulled muscle on his left side. The following night patient went through the same series of events where he feels pain in the night, goes to work and is taken to  where he receives the same diagnosis. The patient therefore opted to go to their own clinic where he was informed that his pain could be a kidney stone. The patient has also developed some symptoms of nausea. The patient reports a hx of H pylori and notes that his medications have historically improved abdominal pain and when he doesn't take them pain resumes. The pain is only felt on the left side. He denies hx of diabetes.     Allergies:  No Known Allergies     Medications:    Atarax  Protonix  Bactrim  Valacyclovir     Past Medical History:    Genital herpes  Tension headaches  Blastocystis hominis infection  Palpitations  Gastric ulcer     Past Surgical History:    esophagogastroduodenoscopy     Family History:    Cancer  Asthma     Social History:  Patient presents with wife.   Former Smoker, 1.00 ppd for 12 years.   Positive for alcohol use. 1-4 drinks daily.  PCP:Gay Winslow  Marital Status:   [2]      Review of Systems   Gastrointestinal: Positive for abdominal pain.   All other systems reviewed and are negative.    Physical Exam   First Vitals:  BP: 133/73  Heart Rate: 71  Temp: 98.7  F (37.1  C)  Height: 162.6 cm (5' 4\")  Weight: 75.3 kg (166 lb)  SpO2: 100 %      Physical Exam  Constitutional: The patient is oriented to person, place, and time.  " Appears uncomfortable.   HENT:   Head: Atraumatic  Right Ear: Normal  Left Ear: Normal  Nose: Nose normal.   Mouth/Throat: Oropharynx is clear and moist. No erythema or exudate.   Eyes: Conjunctivae and EOM are normal. Pupils are equal, round, and reactive to light. No discharge  Neck: Normal range of motion. Neck supple.   Cardiovascular: Normal rate, regular rhythm, no murmur gallops or rubs. Intact distal pulses.    Pulmonary/Chest: CTA bilaterally. No wheezes rale or rhonchi.  Abdominal: Soft. Left Upper quadrant tenderness..  No masses   Musculoskeletal: No edema. No bony deformity. Normal range of motion  Lymphadenopathy:     The patient has no cervical adenopathy.   Neurological: The patient is alert and oriented to person, place, and time. The patient has normal strength and normal reflexes. No cranial nerve deficit. Coordination normal.  Skin: Skin is warm and dry. No rash noted. The patient is not diaphoretic.   Psychiatric: The patient has a normal mood and affect.    Emergency Department Course   Laboratory:  UA: Specific gravity Urine 1.002L  CBC: HGB 10.4L, HCT 32.8L, MCV 70L, MCH 22.1L, RDW 17.9H. otherwise within normal limits   Lipase 110  CMP: within normal limits     Interventions:  2344: Zofran 4mg   2345: Morphine 4mg    Emergency Department Course:  1134 Nursing notes and vitals reviewed.  I performed an exam of the patient as documented above.     IV inserted. Medicine administered as documented above. Blood drawn. This was sent to the lab for further testing, results above.    The patient was sent for a CT abdomen while in the emergency department, findings above.     2334 I rechecked the patient and discussed the results of his workup thus far.     Findings and plan explained to the Patient. Patient discharged home with instructions regarding supportive care, medications, and reasons to return. The importance of close follow-up was reviewed.     I personally reviewed the laboratory results  with the Patient and answered all related questions prior to discharge.   Impression & Plan    Medical Decision Making:  Ernie Song is a 38 year old male who presents with left upper quadrant abdominal pain and flank pain over the last several days. Work up here is fairly unremarkable with the exception of CT scan which shows some inflammation around the stomach as well as a mild left hydronephrosis. Certainly given the nature of his pain, can consider renal colic but he has no hematuria. Patient does have hx of peptic ulcer disease and this could be contributing to symptoms as well. Patient's pain was improved with oral morphine. I feel that he would be safe for discharge to home. Placed him on Protonix as he has recently run out of this. Have him use liquid antacid and viscous lidocaine for acute pain and follow up with [facility] and return for problems.   Critical Care time:  none    Diagnosis:    ICD-10-CM    1. Abdominal pain, left upper quadrant R10.12    2. Peptic ulcer K27.9 pantoprazole (PROTONIX) 40 MG EC tablet       Disposition:  discharged to home    Discharge Medications:  Discharge Medication List as of 7/13/2018  1:27 AM      START taking these medications    Details   lidocaine, viscous, (XYLOCAINE) 2 % solution Take 15 mLs by mouth every 3 hours as needed for moderate pain May mix with liquid antacid; max 8 doses/24 hour period, Disp-100 mL, R-0, Local Print           Tyler CHARLES am serving as a scribe at 11:34 PM on 7/12/2018 to document services personally performed by Kang Salinas MD based on my observations and the provider's statements to me.     EMERGENCY DEPARTMENT       Kang Salinas MD  07/13/18 0434

## 2018-08-31 NOTE — PROGRESS NOTES
SUBJECTIVE:   Ernie Song is a 38 year old male who presents to clinic today for the following health issues:    ABDOMINAL and FLANK PAIN     Onset: 1 week ago    Description:   Character: Sharp/hurts to touch  Location: epigastric region /right upper quadrant  Radiation: None and Sternal area    Intensity: severe    Progression of Symptoms:  worsening    Accompanying Signs & Symptoms:  Fever/Chills?: no   Gas/Bloating: YES- Bloating  Nausea: YES  Vomitting: no   Diarrhea?: no   Constipation:YES  Dysuria or Hematuria: no    History:   Trauma: no   Previous similar pain: No   Previous tests done: none    Precipitating factors:   Does the pain change with:     Food: YES     BM: YES- feels a little better    Urination: no     Alleviating factors:  OML Fiber and protonix    Therapies Tried and outcome: OML fiber and protonix    LMP:  not applicable     HPI  38-year-old gentleman comes in with severe abdominal pain.  The pain started about a week ago but in the past 48 hours it has become much more severe.  Last 48 hours he has not eaten much except small meals.  He has nausea but no vomiting.  He has not noticed any rectal bleeding or diarrhea but he has had small more frequent bowel movements.  No fever or chills.  Denies shortness of breath.    History is significant for having been hospitalized with abdominal pain in February.  He had endoscopy at that time and was found to have multiple gastric nodules and ulcers.  He was treated with PPI and symptoms improved.  He underwent a re-endoscopy on April 30, 2018 and the also has had resolved.  He had been taking Protonix 40 mg ever since.    He had recurrent abdominal pain and was seen in emergency room on 7/12/2018.  His lab work including CMP CBC was okay except for low hemoglobin of 10.44 with low MCV.  He had a CT abdomen at that time which was nondiagnostic.  Since his first hospitalization he has not been using alcohol.  Does not smoke.    Patient has  been taking Protonix 40 mg daily and also started over-the-counter omeprazole 20 mg daily.    Problem list and histories reviewed & adjusted, as indicated.  Additional history: as documented    Patient Active Problem List   Diagnosis     CARDIOVASCULAR SCREENING; LDL GOAL LESS THAN 160     Genital herpes     Tension headache     Blastocystis hominis infection     Palpitations     Elevated TSH     Melena     Past Surgical History:   Procedure Laterality Date     ESOPHAGOSCOPY, GASTROSCOPY, DUODENOSCOPY (EGD), COMBINED N/A 2/26/2018    Procedure: COMBINED ESOPHAGOSCOPY, GASTROSCOPY, DUODENOSCOPY (EGD), BIOPSY SINGLE OR MULTIPLE;  gastroscopy;  Surgeon: Lalo Ibrahim MD;  Location:  GI     NO HISTORY OF SURGERY         Social History   Substance Use Topics     Smoking status: Former Smoker     Packs/day: 1.00     Years: 12.00     Types: Cigarettes     Quit date: 10/15/2004     Smokeless tobacco: Never Used     Alcohol use 0.0 oz/week     0 Standard drinks or equivalent per week      Comment: 2 drinks a day     Family History   Problem Relation Age of Onset     Cancer Father      Other Cancer Maternal Grandmother      Other Cancer Other      Asthma Mother          Current Outpatient Prescriptions   Medication Sig Dispense Refill     Omeprazole Magnesium (PRILOSEC OTC PO)        pantoprazole (PROTONIX) 40 MG EC tablet Take 1 tablet (40 mg) by mouth daily For 8 weeks then once a day.  Take 30-60 minutes before a meal. 30 tablet 1     valACYclovir (VALTREX) 500 MG tablet TAKE 1 TABLET (500 MG) BY MOUTH DAILY 90 tablet 3     No Known Allergies    Reviewed and updated as needed this visit by clinical staff  Tobacco  Allergies  Meds  Med Hx  Surg Hx  Fam Hx  Soc Hx      Reviewed and updated as needed this visit by Provider         ROS:  Constitutional, HEENT, cardiovascular, pulmonary, GI, , musculoskeletal, neuro, skin, endocrine and psych systems are negative, except as otherwise noted.    OBJECTIVE:  "    /74 (BP Location: Right arm, Patient Position: Sitting, Cuff Size: Adult Regular)  Pulse 77  Temp 99  F (37.2  C) (Temporal)  Ht 5' 4.75\" (1.645 m)  Wt 163 lb 11.2 oz (74.3 kg)  SpO2 98%  BMI 27.45 kg/m2  Body mass index is 27.45 kg/(m^2).  GENERAL: healthy, alert and no distress  NECK: no adenopathy, no asymmetry, masses, or scars and thyroid normal to palpation  RESP: lungs clear to auscultation - no rales, rhonchi or wheezes  CV: regular rate and rhythm, normal S1 S2, no S3 or S4, no murmur, click or rub, no peripheral edema and peripheral pulses strong  ABDOMEN: Bowel sounds are present but few tinkling bowel sounds noted.  There is generalized tenderness of the entire abdomen more so in the mid abdomen.  Guarding noted.  No discrete mass felt.  MS: no gross musculoskeletal defects noted, no edema        ASSESSMENT/PLAN:     1.  Acute abdominal pain.  The pain is severe and the tenderness on exam is pretty significant.  I informed the patient he will need a thorough workup which would include complete battery of blood test including lipase amylase liver function study etc. as well as perhaps some other diagnostic studies such as CAT scan of the abdomen and pelvis.  For that reason I have felt that he he was best served by going to the emergency room at [facility].  I talked to the ER physician and she was gracious enough to see the patient.  2.  History of gastric ulcer as mentioned above.    3.  Anemia probably iron deficiency type based on low MCV.  Will need further workup in the future.    Fidel Hanson MD  [facility]  "

## 2018-08-31 NOTE — MR AVS SNAPSHOT
After Visit Summary   8/31/2018    Ernie Song    MRN: [id number]           Patient Information     Date Of Birth          1980        Visit Information        Provider Department      8/31/2018 3:10 PM Fidel Hanson MD [facility]        Today's Diagnoses     Abdominal pain, acute    -  1    H/O gastric ulcer        Iron deficiency anemia, unspecified iron deficiency anemia type           Follow-ups after your visit        Who to contact     If you have questions or need follow up information about today's clinic visit or your schedule please contact [facility] directly at [phone].  Normal or non-critical lab and imaging results will be communicated to you by [employer], letter or phone within 4 business days after the clinic has received the results. If you do not hear from us within 7 days, please contact the clinic through [employer] or phone. If you have a critical or abnormal lab result, we will notify you by phone as soon as possible.  Submit refill requests through Eng[employer] or call your pharmacy and they will forward the refill request to us. Please allow 3 business days for your refill to be completed.          Additional Information About Your Visit        MyChart Information     [employer] gives you secure access to your electronic health record. If you see a primary care provider, you can also send messages to your care team and make appointments. If you have questions, please call your primary care clinic.  If you do not have a primary care provider, please call [phone] and they will assist you.      [employer] is an electronic gateway that provides easy, online access to your medical records. With Engage, you can request a clinic appointment, read your test results, renew a prescription or communicate with your care team.     To access your existing account, please contact your [facility] or call [phone]  "for assistance.        Care EveryWhere ID     This is your Care EveryWhere ID. This could be used by other organizations to access your [place] medical records  [id number]        Your Vitals Were     Pulse Temperature Height Pulse Oximetry BMI (Body Mass Index)       77 99  F (37.2  C) (Temporal) 5' 4.75\" (1.645 m) 98% 27.45 kg/m2        Blood Pressure from Last 3 Encounters:   08/31/18 126/74   07/12/18 133/73   07/11/18 131/66    Weight from Last 3 Encounters:   08/31/18 163 lb 11.2 oz (74.3 kg)   07/12/18 166 lb (75.3 kg)   07/11/18 165 lb (74.8 kg)              Today, you had the following     No orders found for display       Primary Care Provider Office Phone # Fax #    Gay Winslow, APRN [phone]       [address]  [place] [address]        Equal Access to Services     [facility]: Hadii aad ku hadasho Soomaali, waaxda luqadaha, qaybta kaalmada adeegyada, waxay idiin hayaan adeeg kharaole la'aan . So [facility] [phone].    ATENCIÓN: Si habla español, tiene a valente disposición servicios gratuitos de asistencia lingüística. Llame al [phone].    We comply with applicable federal civil rights laws and [place] laws. We do not discriminate on the basis of race, color, national origin, age, disability, sex, sexual orientation, or gender identity.            Thank you!     Thank you for choosing [facility]  for your care. Our goal is always to provide you with excellent care. Hearing back from our patients is one way we can continue to improve our services. Please take a few minutes to complete the written survey that you may receive in the mail after your visit with us. Thank you!             Your Updated Medication List - Protect others around you: Learn how to safely use, store and throw away your medicines at www.disposemymeds.org.          This list is accurate as of 8/31/18  4:22 PM.  Always use your most recent med list.                   " Brand Name Dispense Instructions for use Diagnosis    pantoprazole 40 MG EC tablet    PROTONIX    30 tablet    Take 1 tablet (40 mg) by mouth daily For 8 weeks then once a day.  Take 30-60 minutes before a meal.    Peptic ulcer       PRILOSEC OTC PO           valACYclovir 500 MG tablet    VALTREX    90 tablet    TAKE 1 TABLET (500 MG) BY MOUTH DAILY    Genital herpes simplex, unspecified site

## 2018-08-31 NOTE — IP AVS SNAPSHOT
[facility] [address] [facility]    [address] [place] [address]    Phone:  [phone]                                       After Visit Summary   8/31/2018    Ernie Song    MRN: [id number]           After Visit Summary Signature Page     I have received my discharge instructions, and my questions have been answered. I have discussed any challenges I see with this plan with the nurse or doctor.    ..........................................................................................................................................  Patient/Patient Representative Signature      ..........................................................................................................................................  Patient Representative Print Name and Relationship to Patient    ..................................................               ................................................  Date                                            Time    ..........................................................................................................................................  Reviewed by Signature/Title    ...................................................              ..............................................  Date                                                            Time          22EPIC Rev 08/18

## 2018-08-31 NOTE — IP AVS SNAPSHOT
MRN:[id number]                      After Visit Summary   8/31/2018    Ernie Song    MRN: [id number]           Thank you!     Thank you for choosing [facility] for your care. Our goal is always to provide you with excellent care. Hearing back from our patients is one way we can continue to improve our services. Please take a few minutes to complete the written survey that you may receive in the mail after you visit with us. Thank you!        Patient Information     Date Of Birth          1980        Designated Caregiver       Most Recent Value    Caregiver    Will someone help with your care after discharge? yes    Name of designated caregiver Cielo    Phone number of caregiver [phone]    Caregiver address same as patient      About your hospital stay     You were admitted on:  August 31, 2018 You last received care in the:  [place] [address] [facility]    You were discharged on:  September 1, 2018        Reason for your hospital stay       You were admitted with abdominal pain.                  Who to Call     For medical emergencies, please call 911.  For non-urgent questions about your medical care, please call your primary care provider or clinic, [phone]          Attending Provider     Provider Specialty    Joseph Crespo MD Internal Medicine    [facility], Flash Jacobson MD Internal Medicine       Primary Care Provider Office Phone # Fax #    Gay MARILIN Garcia [facility] [phone]      After Care Instructions     Activity       Your activity upon discharge: activity as tolerated. No driving or operating machinery while on oxycodone.            Diet       Follow this diet upon discharge: Orders Placed This Encounter      Snacks/Supplements Pediatric: Ensure Plus; Between Meals      Regular Diet Adult                  Follow-up Appointments     Follow-up and recommended labs and tests        Follow up with [facility] next week, Tuesday,  for  "EUS (endoscopic ultrasound).  Follow up with PCP within 2 weeks with hgb.                  Pending Results     No orders found for last 3 day(s).            Statement of Approval     Ordered          09/01/18 1411  I have reviewed and agree with all the recommendations and orders detailed in this document.  EFFECTIVE NOW     Approved and electronically signed by:  Anette Quintana MD             Admission Information     Date & Time Provider Department Dept. Phone    8/31/2018 Flash Crane MD [place] [address] [facility] [phone]      Your Vitals Were     Blood Pressure Pulse Temperature Respirations Height Weight    127/86 79 98.3  F (36.8  C) 16 1.626 m (5' 4\") 75.8 kg (167 lb 1.7 oz)    Pulse Oximetry BMI (Body Mass Index)                98% 28.68 kg/m2          MyChart Information     [employer] gives you secure access to your electronic health record. If you see a primary care provider, you can also send messages to your care team and make appointments. If you have questions, please call your primary care clinic.  If you do not have a primary care provider, please call [phone] and they will assist you.        Care EveryWhere ID     This is your Care EveryWhere ID. This could be used by other organizations to access your [place] medical records  [id number]        Equal Access to Services     NADINE ESCALANTE : Aida arredondoo Somagnolia, waaxda luqadaha, qaybta kaalmada adeegyada, neymar morgan. So [facility] [phone].    ATENCIÓN: Si habla español, tiene a valente disposición servicios gratuitos de asistencia lingüística. Llame al [phone].    We comply with applicable federal civil rights laws and [place] laws. We do not discriminate on the basis of race, color, national origin, age, disability, sex, sexual orientation, or gender identity.               Review of your medicines      START taking        Dose / Directions    acetaminophen 325 MG tablet "   Commonly known as:  TYLENOL        Dose:  650 mg   Take 2 tablets (650 mg) by mouth every 4 hours as needed for mild pain   Quantity:  100 tablet   Refills:  0       oxyCODONE IR 5 MG tablet   Commonly known as:  ROXICODONE        Dose:  5 mg   Take 1 tablet (5 mg) by mouth every 4 hours as needed for moderate to severe pain   Quantity:  20 tablet   Refills:  0         CONTINUE these medicines which may have CHANGED, or have new prescriptions. If we are uncertain of the size of tablets/capsules you have at home, strength may be listed as something that might have changed.        Dose / Directions    pantoprazole 40 MG EC tablet   Commonly known as:  PROTONIX   This may have changed:    - when to take this  - additional instructions   Used for:  Peptic ulcer        Dose:  40 mg   Take 1 tablet (40 mg) by mouth 2 times daily Take 30-60 minutes before a meal.   Quantity:  30 tablet   Refills:  1         CONTINUE these medicines which have NOT CHANGED        Dose / Directions    ferrous sulfate 325 (65 Fe) MG tablet   Commonly known as:  IRON        Dose:  325 mg   Take 1 tablet (325 mg) by mouth every other day   Quantity:  90 tablet   Refills:  2       valACYclovir 500 MG tablet   Commonly known as:  VALTREX   Used for:  Genital herpes simplex, unspecified site        TAKE 1 TABLET (500 MG) BY MOUTH DAILY   Quantity:  90 tablet   Refills:  3         STOP taking     omeprazole 20 MG tablet   Commonly known as:  priLOSEC OTC                Where to get your medicines      These medications were sent to [facility] [address] [place] [address], [place] [id number] [place] [address], [facility] [address]     Phone:  [phone]     pantoprazole 40 MG EC tablet         Some of these will need a paper prescription and others can be bought over the counter. Ask your nurse if you have questions.     Bring a paper prescription for each of these medications     oxyCODONE IR 5 MG tablet       You don't need a  prescription for these medications     acetaminophen 325 MG tablet                Protect others around you: Learn how to safely use, store and throw away your medicines at www.disposemymeds.org.        Information about OPIOIDS     PRESCRIPTION OPIOIDS: WHAT YOU NEED TO KNOW   We gave you an opioid (narcotic) pain medicine. It is important to manage your pain, but opioids are not always the best choice. You should first try all the other options your care team gave you. Take this medicine for as short a time (and as few doses) as possible.    Some activities can increase your pain, such as bandage changes or therapy sessions. It may help to take your pain medicine 30 to 60 minutes before these activities. Reduce your stress by getting enough sleep, working on hobbies you enjoy and practicing relaxation or meditation. Talk to your care team about ways to manage your pain beyond prescription opioids.    These medicines have risks:    DO NOT drive when on new or higher doses of pain medicine. These medicines can affect your alertness and reaction times, and you could be arrested for driving under the influence (DUI). If you need to use opioids long-term, talk to your care team about driving.    DO NOT operate heavy machinery    DO NOT do any other dangerous activities while taking these medicines.    DO NOT drink any alcohol while taking these medicines.     If the opioid prescribed includes acetaminophen, DO NOT take with any other medicines that contain acetaminophen. Read all labels carefully. Look for the word  acetaminophen  or  Tylenol.  Ask your pharmacist if you have questions or are unsure.    You can get addicted to pain medicines, especially if you have a history of addiction (chemical, alcohol or substance dependence). Talk to your care team about ways to reduce this risk.    All opioids tend to cause constipation. Drink plenty of water and eat foods that have a lot of fiber, such as fruits, vegetables,  prune juice, apple juice and high-fiber cereal. Take a laxative (Miralax, milk of magnesia, Colace, Senna) if you don t move your bowels at least every other day. Other side effects include upset stomach, sleepiness, dizziness, throwing up, tolerance (needing more of the medicine to have the same effect), physical dependence and slowed breathing.    Store your pills in a secure place, locked if possible. We will not replace any lost or stolen medicine. If you don t finish your medicine, please throw away (dispose) as directed by your pharmacist. The [place] Pollution [employer] has more information about safe disposal: https://www.pca.[facility].[place].us/living-green/managing-unwanted-medications             Medication List: This is a list of all your medications and when to take them. Check marks below indicate your daily home schedule. Keep this list as a reference.      Medications           Morning Afternoon Evening Bedtime As Needed    acetaminophen 325 MG tablet   Commonly known as:  TYLENOL   Take 2 tablets (650 mg) by mouth every 4 hours as needed for mild pain                                ferrous sulfate 325 (65 Fe) MG tablet   Commonly known as:  IRON   Take 1 tablet (325 mg) by mouth every other day                                oxyCODONE IR 5 MG tablet   Commonly known as:  ROXICODONE   Take 1 tablet (5 mg) by mouth every 4 hours as needed for moderate to severe pain                                pantoprazole 40 MG EC tablet   Commonly known as:  PROTONIX   Take 1 tablet (40 mg) by mouth 2 times daily Take 30-60 minutes before a meal.                                valACYclovir 500 MG tablet   Commonly known as:  VALTREX   TAKE 1 TABLET (500 MG) BY MOUTH DAILY

## 2018-09-01 PROBLEM — R10.9 ABDOMINAL PAIN: Status: ACTIVE | Noted: 2018-01-01

## 2018-09-01 NOTE — PROGRESS NOTES
Patient up to 625 at 2300 on 8/31/18. Denies nausea. Moderate abdominal pain in all quadrants. Hypoactive bowel sounds, denies passing gas. Low appetite at home.

## 2018-09-01 NOTE — PLAN OF CARE
Problem: Patient Care Overview  Goal: Plan of Care/Patient Progress Review  Outcome: No Change  A&Ox4. VSS. Reports intermittent sharp pain in all quadrants, received one dose of morphine. Denies nausea. Up SBA. NPO except for ice chips and meds. Plan for possible CT or scope today.

## 2018-09-01 NOTE — PROGRESS NOTES
Chart check:  I have reviewed his chart and I will see him later today.  I am most concerned with malignancy given the CT scan findings and weight loss.    Last EGD April 30, 2018 showed normal esophagus. Mildly nodular appearing mucosa in the body and fundus with normal antrum.  Normal duodenum.  The gastric biopsies moderate non-specific chronic gastritis, compatible with treated helicobacter gastritis, no evidence of atrophic gastritis, no helicobacter organisms identified.    Ricky Muas MD  [facility]

## 2018-09-01 NOTE — CONSULTS
"  GASTROENTEROLOGY CONSULTATION     Ernie Song  [address]  [facility] [address]  38 year old male    Admission Date/Time: 8/31/2018  Primary Care Provider: Gay Winslow    We were asked to see the patient in consultation by Dr. Quintana  for evaluation of abd pain.        HPI:  Ernie Song is a 38 year old male admitted with abdominal pain.  2017 was told her likely had ulcer (no endoscopy done at that time) and was treated with PPI.  Feb 2018 presented with hematemesis and found to have gastric ulcers positive for h.pylori and treated.  Repeat egd April 2018 with mild nodularity of body and fundus, biopsies negative for h.pylori and had moderate non-specific chronic gastritis.  Patient reports he continued PPI BID for 2 more months after that and then stopped it for about a week and his symptoms of abd pain started up so her restarted it on his own and has been on it since July.  He has also made changes to his diet and avoiding high acid foods.  Despite this he continues to have abd pain and he has lost 5 pounds.  He has early satiety.   He went to [facility] and had a CT scan done yesterday that showed    From hospitalist admission note \"CT scan of the abdomen and pelvis which showed thickening of the wall of the stomach, small amount of ascites, lymphadenopathy along the gastrohepatic ligament and anterior to the distal body of the stomach, as well as thickening of the omentum.\"    He currently denies black stools, vomiting blood.   His maternal uncle had stomach cancer.     ROS: A comprehensive ten point review of systems was negative aside from those in mentioned in the HPI.      MEDICATIONS:   No current facility-administered medications on file prior to encounter.   Current Outpatient Prescriptions on File Prior to Encounter:  pantoprazole (PROTONIX) 40 MG EC tablet Take 1 tablet (40 mg) by mouth daily For 8 weeks then once a day.  Take 30-60 minutes before a meal. " "  valACYclovir (VALTREX) 500 MG tablet TAKE 1 TABLET (500 MG) BY MOUTH DAILY       ALLERGIES: No Known Allergies    Past Medical History:   Diagnosis Date     Genital herpes      Ulcer, gastric, acute        Past Surgical History:   Procedure Laterality Date     ESOPHAGOSCOPY, GASTROSCOPY, DUODENOSCOPY (EGD), COMBINED N/A 2/26/2018    Procedure: COMBINED ESOPHAGOSCOPY, GASTROSCOPY, DUODENOSCOPY (EGD), BIOPSY SINGLE OR MULTIPLE;  gastroscopy;  Surgeon: Lalo Ibrahim MD;  Location:  GI     NO HISTORY OF SURGERY           SOCIAL HISTORY:  Social History   Substance Use Topics     Smoking status: Former Smoker     Packs/day: 1.00     Years: 12.00     Types: Cigarettes     Quit date: 10/15/2004     Smokeless tobacco: Never Used     Alcohol use 0.0 oz/week     0 Standard drinks or equivalent per week      Comment: 2 drinks a day       FAMILY HISTORY:  As noted in HPI    PHYSICAL EXAM:   /69  Pulse 78  Temp 98.4  F (36.9  C) (Oral)  Resp 16  Ht 1.626 m (5' 4\")  Wt 75.8 kg (167 lb 1.7 oz)  SpO2 97%  BMI 28.68 kg/m2    Constitutional: NAD, comfortable  Cardiovascular: RRR, normal S1 and S2, no r/c/g/m  Respiratory: CTAB  Psychiatric: mentation appears normal and affect normal/bright  Head: Normocephalic. Atraumatic.    Neck: Neck supple. No adenopathy. Thyroid symmetric, normal size, trachea midline  Eyes:  PERRL, no icterus  ENT: Neck without nodes, hearing adequate, pharynx normal without erythema or exudate  Abdomen:   Auscultation: normal  Appearance: normal  Palpation: diffuse tenderness to palpation  NEURO: grossly negative  SKIN: no suspicious lesions or rashes  LYMPH:   anterior cervical: no adenopathy  posterior cervical: no adenopathy  supraclavicular: no adenopathy          ADDITIONAL COMMENTS:   I reviewed the patient's new clinical lab test results.   Recent Labs   Lab Test  09/01/18   0940  07/12/18   2341  02/27/18   0551   02/26/18   0600   WBC  5.4  6.1  4.7   --   7.0   HGB  10.8*  " 10.4*  9.7*   < >  11.5*   MCV  72*  70*  82   --   81   PLT  314  286  199   --   236   INR   --    --    --    --   1.04    < > = values in this interval not displayed.     Recent Labs   Lab Test  09/01/18   0940  07/12/18   2341  02/27/18   0551   NA  136  139  138   POTASSIUM  4.2  3.7  3.7   CHLORIDE  102  107  105   CO2  28  26  28   BUN  9  11  16   CR  0.87  1.05  0.82   ANIONGAP  6  6  5   AROLDO  8.2*  8.6  8.1*   GLC  91  94  98     Recent Labs   Lab Test  07/12/18   2358  07/12/18   2341  07/11/18   1356  02/26/18   0600  10/10/17   0750   01/23/13   1628   ALBUMIN   --   3.6   --   3.4  3.7   < >   --    BILITOTAL   --   0.4   --   0.8  1.2   < >   --    ALT   --   27   --   38  47   < >   --    AST   --   17   --   22  30   < >   --    ALKPHOS   --   94   --   76  101   < >   --    PROTEIN  Negative   --   Negative   --    --    --   Negative   LIPASE   --   110   --   55*   --    --    --     < > = values in this interval not displayed.             .    CONSULTATION ASSESSMENT AND PLAN:    Active Problems:    Abdominal pain    Assessment: CT scan with thickened stomach, lymphadenopathy, and thickened omentum that is concerning for malignancy.  History of H.Pylori eradicated per EGD biopsy results in April 2018, but egd at that time still showed nodularity of fundus and body.    Plan: EUS as outpatient on Tuesday/Wednesday.  I have asked the patient to stay NPO after midnight on Monday in anticipation for EUS on Tuesday.  My office will call him Tuesday for coordination of that procedure.  In the meantime patient will discharge to home with pain medications (no NSAIDs or ASA), and I have asked the patient to drink at least 3 ensures per day to help maintain his nutrition and he can eat whatever else he is able to tolerate.    Signing off, but please call with any questions or concerns.    Ricky Musa MD  [facility]

## 2018-09-01 NOTE — PHARMACY-ADMISSION MEDICATION HISTORY
Admission medication history interview status for the 8/31/2018  admission is complete. See EPIC admission navigator for prior to admission medications     Medication history source reliability:Good    Actions taken by pharmacist (provider contacted, etc):None     Additional medication history information not noted on PTA med list : Patient states that he was instructed to take OTC prilosec as needed on top of his pantoprazole.  He also states that for the last week he recently started using [employer] nutritional supplements.     Medication reconciliation/reorder completed by provider prior to medication history? No    Time spent in this activity: 15    Prior to Admission medications    Medication Sig Last Dose Taking? Auth Provider   omeprazole (PRILOSEC OTC) 20 MG tablet Take 20 mg by mouth daily as needed Take in addition to pantoprazole as needed 8/31/2018 at Unknown time Yes Unknown, Entered By History   pantoprazole (PROTONIX) 40 MG EC tablet Take 1 tablet (40 mg) by mouth daily For 8 weeks then once a day.  Take 30-60 minutes before a meal. 8/31/2018 at Unknown time Yes Kang Salinas MD   valACYclovir (VALTREX) 500 MG tablet TAKE 1 TABLET (500 MG) BY MOUTH DAILY 8/31/2018 at Unknown time Yes Gay Winslow APRN CNP

## 2018-09-01 NOTE — PLAN OF CARE
Problem: Patient Care Overview  Goal: Plan of Care/Patient Progress Review  Outcome: Adequate for Discharge Date Met: 09/01/18  Discharge    Patient discharged to home via WC with friend  Care plan note  Discharge instructions reviewed. Questions answered. Iron sucrose infused. Prescription given and will fill at his pharmacy. Belongings accounted for. Will follow up on Tuesday with [facility].     Listed belongings gathered and returned to patient. yes  Care Plan and Patient education resolved: yes  Prescriptions if needed, hard copies sent with patient  yes  Home and hospital acquired medications returned to patient: yes  Medication Bin checked and emptied on discharge NA  Follow up appointment made for patient: no

## 2018-09-01 NOTE — CONSULTS
[facility] Consultation/H&P    Ernie Song MRN#: [id number]   Age: 38 year old YOB: 1980     Date of Admission:          8/31/2018  Reason for consult/H&P: Gastric disease   Surgeon:      Joe Mann MD                  Chief Complaint:   Abdominal pain, epigastric         History of Present Illness:   This patient is a 38 year old  male who presented to the [facility] ER with epigastric pain for the last 5 days. He has been treated here earlier this year for an upper GI bleed.  Gastritis was noted.  H. pylori was found.  He has lost 5 pounds over the last month.  He says he is trying to eat in a more healthy fashion.  He does have a family history of gastric cancer.  He says his abdomen is about the same profile as it normally is.  He used to but does not any longer drink alcohol to excess.  Denies fever, chills, nausea, vomiting, change in BM or urination.   Denies having any previous episodes or abdominal surgery. History is obtained from the patient and chart.         Past Medical History:    has a past medical history of Genital herpes and Ulcer, gastric, acute.          Past Surgical History:     Past Surgical History:   Procedure Laterality Date     ESOPHAGOSCOPY, GASTROSCOPY, DUODENOSCOPY (EGD), COMBINED N/A 2/26/2018    Procedure: COMBINED ESOPHAGOSCOPY, GASTROSCOPY, DUODENOSCOPY (EGD), BIOPSY SINGLE OR MULTIPLE;  gastroscopy;  Surgeon: Lalo Ibrahim MD;  Location: [facility]     NO HISTORY OF SURGERY              Medications:     Prior to Admission medications    Medication Sig Start Date End Date Taking? Authorizing Provider   Omeprazole Magnesium (PRILOSEC OTC PO)     Reported, Patient   pantoprazole (PROTONIX) 40 MG EC tablet Take 1 tablet (40 mg) by mouth daily For 8 weeks then once a day.  Take 30-60 minutes before a meal. 7/13/18   Kang Salinas MD   valACYclovir (VALTREX) 500 MG tablet TAKE 1 TABLET (500 MG) BY MOUTH DAILY 10/2/17    "WnislowJohannyGaybrady Glez, APRN CNP            Allergies:   No Known Allergies         Social History:     Social History   Substance Use Topics     Smoking status: Former Smoker     Packs/day: 1.00     Years: 12.00     Types: Cigarettes     Quit date: 10/15/2004     Smokeless tobacco: Never Used     Alcohol use 0.0 oz/week     0 Standard drinks or equivalent per week      Comment: 2 drinks a day             Family History:    This patient has no significant relevant family history.  Family history is reviewed in detail.          Review of Systems:   Complete ROS is negative other than noted in the HPI.  C: NEGATIVE for fever, chills, change in weight  R: NEGATIVE for significant cough or SOB  CV: NEGATIVE for chest pain, palpitations or peripheral edema  GI:  NEGATIVE for dysuria, heartburn, or change in bowel habits  H: NEGATIVE for bleeding problems         Physical Exam:   Blood pressure 118/69, pulse 78, temperature 98.4  F (36.9  C), temperature source Oral, resp. rate 16, height 1.626 m (5' 4\"), weight 75.8 kg (167 lb 1.7 oz), SpO2 97 %.  I/O last 3 completed shifts:  In: 406 [I.V.:406]  Out: -     General - This is a well developed, well nourished male .  HEENT - Normocephalic. Atraumatic. Moist mucous membranes. Pupils equal.  No scleral icterus. Nose normal.  Neck - Supple without masses. No cervical adenopathy or thyromegaly  Lungs - Breathing not labored  Chest - Not tender. CVA's nontender  Heart - Regular rate & rhythm   Abdomen - Soft, tender in upper abdomen, nondistended with +bowel sounds, no organomegaly.  Extremities - Moves all extremities. Warm without edema. Pulses noted  Neurologic - Nonfocal. Alert and oriented          Data:   Labs:  Recent Labs   Lab Test  07/12/18   2341  02/27/18   0551  02/27/18   0055   02/26/18   0600   WBC  6.1  4.7   --    --   7.0   HGB  10.4*  9.7*  9.8*   < >  11.5*   HCT  32.8*  29.3*   --    --   34.5*   PLT  286  199   --    --   236    < > = values in this " interval not displayed.     Recent Labs   Lab Test  07/12/18   2341  02/27/18   0551  02/26/18   0600   POTASSIUM  3.7  3.7  3.7   CHLORIDE  107  105  103   CO2  26  28  26   BUN  11  16  25   CR  1.05  0.82  0.69     Recent Labs   Lab Test  07/12/18   2341  02/26/18   0600  10/10/17   0750   BILITOTAL  0.4  0.8  1.2   ALT  27  38  47   AST  17  22  30   ALKPHOS  94  76  101   LIPASE  110  55*   --      Recent Labs   Lab Test  02/26/18   0600   INR  1.04     Recent Labs   Lab Test  07/12/18   2341  02/27/18   0551  02/26/18   0600   AROLDO  8.6  8.1*  8.1*     Recent Labs   Lab Test  07/12/18   2358  07/12/18   2341  07/11/18   1356  02/27/18   0551  02/26/18   0600  10/10/17   0750   01/23/13   1628   ANIONGAP   --   6   --   5  7  6   < >   --    PROTEIN  Negative   --   Negative   --    --    --    --   Negative   ALBUMIN   --   3.6   --    --   3.4  3.7   < >   --     < > = values in this interval not displayed.       CT scan of the abdomen: Study from last month reviewed.  This does show definite gastric thickening and some nodules outside the stomach that are worrisome.  Another CT was done last night at [place] that I am not been able to look at you.    Ultrasound of the abdomen: Not done                Assessment:   Gastric thickening and possible lymphadenopathy by the stomach.  Small amount of weight loss.         Plan:   Upper endoscopy is clearly indicated.  I discussed with the patient in detail.  I think he can have ice chips unless he is having  his endoscopy today.       I have discussed the history, physical, and plan with the patient.   Joe Mann M.D.

## 2018-09-01 NOTE — PROGRESS NOTES
[facility]    Hospitalist Progress Note    Date of Service (when I saw the patient): 09/01/2018    Assessment & Plan    Mr. Ernie Song is a 38-yo male patient with h/o including gastric ulcers and chronic gastritis due to H. pylori, who presented to an outside hospital with 1 week of worsening abdominal pain and found with abnormal CT imaging.      Abdominal pain with abnormal CT imaging including thickening of the wall of the stomach, small ascites, lymphadenopathy and thickening of the omentum.  Unclear etiology, report noted concerns that this could be a malignant process.    - Protonix IV twice daily.    - For pain: PRN acetaminophen and p.r.n. IV morphine. Reviewed with minimal use in the last 24 hours, wants to avoid. Will add low dose oxycodone to replace IV morphine when able to take PO if needed.   - Keep NPO as we await GI consultation and anticipate EGD today. Appreciate Surgery consultation as well.   - NS @ 75 ml/h, with sodium 136, K 4.2 on 09/01/18.      H/o gastric ulcers & chronic gastritis associated with H. Pylori.  The patient has h/o gastric ulcers with GI bleeding in 02/2018, for which he was hospitalized here.  At that time, he did have EGD which showed gastric ulcers as well as some nodularity, and biopsies were positive for  H. pylori and active chronic gastritis without evidence of malignancy.  He was treated and in 04/2018 had a repeat EGD which showed moderate nonspecific chronic gastritis consistent with treated Helicobacter pylori, with no Helicobacter identified and no signs of atrophic gastritis; I do not have pathology results available from that study, which was done apparently as an outpatient by [facility].   - Plan IV Protonix and GI consult as above.     Microcytic Anemia, Iron Deficiency. Denies black or bloody stools. MCV 72. Iron studies co/w iron deficiency with ferritin of 10, Iron of 19, Iron saturation index of 5. Normal TIBC.   - Start IV  venofer on 09/01/18 x 5 days.     Recent Labs  Lab 09/01/18  0940   HGB 10.8*        # Pain Assessment:  Current Pain Score 9/1/2018   Patient currently in pain? denies   Pain score (0-10) 3   Pain location Abdomen   Pain descriptors -   - Ernie is experiencing pain due to above. Pain management was discussed and the plan was created in a collaborative fashion.  Ernie's response to the current recommendations: engaged  - Please see the plan for pain management as documented above    DVT Prophylaxis: Pneumatic Compression Devices, while in bed and AMBULATE.  Code Status: Full   Disposition: Expected discharge to be determined after EGD and determination of plan   Communication: Discussed with patient and RN on 09/01/18    Anette Quintana  [phone] (p)  Text Page (7AM - 6PM)     Interval History   Pain is less now that he is not eating. States when he eats he would get full, so stopped eating. Pain was worse when he tried stopping PPI for a short time. No black or bloody stools.     -Data reviewed today: I reviewed all new labs and imaging results over the last 24 hours. I personally reviewed no images or EKG's today.    Physical Exam   Temp: 98.4  F (36.9  C)   BP: 113/69 Pulse: 77   Resp: 16 SpO2: 97 % O2 Device: None (Room air)    Vitals:    08/31/18 2253   Weight: 75.8 kg (167 lb 1.7 oz)     Vital Signs with Ranges  Temp:  [98.4  F (36.9  C)-99  F (37.2  C)] 98.4  F (36.9  C)  Pulse:  [77] 77  Resp:  [16] 16  BP: (113-126)/(69-74) 113/69  SpO2:  [97 %-98 %] 97 %  I/O last 3 completed shifts:  In: 406 [I.V.:406]  Out: -     Constitutional:  NAD,   Neuropsyche:  alert and oriented, answers questions appropriately.   Respiratory:  Breathing comfortably, good air exchange, no wheezes, no crackles.   Cardiovascular:  Regular rate and rhythm, no edema.  GI:  soft, TTP localized around the epigastrium /ND, BS normal. No masses palpated.  Skin/Integumen:  No acute rash or sign of bleeding.     Medications   All  medications reviewed on 09/01/18.      sodium chloride 75 mL/hr at 09/01/18 0112       pantoprazole  40 mg Intravenous BID       Data   No lab results found in last 7 days.    No results found for this or any previous visit (from the past 24 hour(s)).

## 2018-09-01 NOTE — DISCHARGE SUMMARY
[facility]    Discharge Summary  Hospitalist    Date of Admission:  8/31/2018  Date of Discharge:  9/1/2018  Discharging Provider: Anette Quintana  Date of Service (when I saw the patient): 09/01/18    History of Present Illness   Mr. Ernie Song is a 38-yo male patient with h/o including gastric ulcers and chronic gastritis due to H. pylori, who presented to an outside hospital with 1 week of worsening abdominal pain and found with abnormal CT imaging.     Hospital Course      Organized by Discharge Diagnosis  Ernie Song was admitted on 8/31/2018.  The following problems were addressed during his hospitalization:    Abdominal pain with abnormal CT imaging including thickening of the wall of the stomach, small ascites, lymphadenopathy and thickening of the omentum.  Unclear etiology, report noted concerns that this could be a malignant process. Seen by gastroenterology, Dr. Musa, and recommended EUS as outpatient on Tuesday which they will arrange. Patient agreed with this plan and wanted to be discharge. GI recommended pain medications on discharge, avoiding NSAIDs on discharge. Patient took minimal morphine while in the hospital. Diet advanced and discussed using ensure to supplement nutrition until due to decreased appetite and fullness with eating.   - Continue protonix 40 mg BID at discharge  - EUS on Tuesday through [facility].   - Oxycodone prescribed at discharge to supplement tylenol.   - Patient tearful re: concern for malignancy and supportive counseling given, and discussed that this is not a certainty just a possibility. Recommended taking it one day at a time, and he was thankful to go home to be with his family this weekend.       H/o gastric ulcers & chronic gastritis associated with H. Pylori.  The patient has h/o gastric ulcers with GI bleeding in 02/2018, for which he was hospitalized here.  At that time, he did have EGD which showed gastric ulcers as well as some  nodularity, and biopsies were positive for  H. pylori and active chronic gastritis without evidence of malignancy.  He was treated and in 04/2018 had a repeat EGD which showed moderate nonspecific chronic gastritis consistent with treated Helicobacter pylori, with no Helicobacter identified and no signs of atrophic gastritis; I do not have pathology results available from that study, which was done apparently as an outpatient by [facility].   - Plan IV Protonix and GI consult as above.      Microcytic Anemia, Iron Deficiency. Denies black or bloody stools. MCV 72. Iron studies co/w iron deficiency with ferritin of 10, Iron of 19, Iron saturation index of 5. Normal TIBC.   - Give dose of IV venofer on 09/01/18   - Discharge on Iron PO once every other day (better absorption with every other day dosing.)     Recent Labs  Lab 09/01/18  0940   HGB 10.8*        # Discharge Pain Plan:   - During his hospitalization, Ernie experienced pain due to above.  The pain plan for discharge was discussed with Ernie and the plan was created in a collaborative fashion.    - Opioids prescribed on discharge: oxycodone 5 mg every 4 hours PRN # 20  - Duration of opioids after discharge: Given just over 3 days worth of Rx as f/u arranged in 3 days and not expected to resolve cause of pain within that time.   - Bowel regimen: senna  - Discussed risks and benefits of pain meds and to avoid driving and operating heavy machinery. Patient is trying to avoid narcotics, so will only take for severe pain.     Anette Quintana    Significant Results and Procedures   No procedures.   Imaging as outlined below.     Pending Results   None   Unresulted Labs Ordered in the Past 30 Days of this Admission     No orders found for last 61 day(s).          Code Status   Full Code       Primary Care Physician   Gay Winslow    Physical Exam   Temp: 98.3  F (36.8  C) Temp src: Oral BP: 127/86 Pulse: 79   Resp: 16 SpO2: 98 % O2 Device: None (Room  air)    Vitals:    08/31/18 2253   Weight: 75.8 kg (167 lb 1.7 oz)     Vital Signs with Ranges  Temp:  [98.3  F (36.8  C)-99  F (37.2  C)] 98.3  F (36.8  C)  Pulse:  [77-79] 79  Resp:  [16] 16  BP: (113-127)/(69-86) 127/86  SpO2:  [97 %-98 %] 98 %  I/O last 3 completed shifts:  In: 406 [I.V.:406]  Out: -     Constitutional:  NAD,   Neuropsyche:  alert and oriented, answers questions appropriately.   Respiratory:  Breathing comfortably, good air exchange, no wheezes, no crackles.   Cardiovascular:  Regular rate and rhythm, no edema.  GI:  soft, TTP localized around epigastrium, no rebound or guarding / ND, BS normal  Skin/Integumen:  No acute rash, bruising or sign of bleeding.       Discharge Disposition   Discharged to home  Condition at discharge: Good    Consultations This Hospital Stay   GASTROENTEROLOGY IP CONSULT  SURGERY GENERAL IP CONSULT    Time Spent on this Encounter   IAnette, personally saw the patient today and spent greater than 30 minutes discharging this patient.    Discharge Orders     Reason for your hospital stay   You were admitted with abdominal pain.     Follow-up and recommended labs and tests    Follow up with [facility] next week, Tuesday,  for EUS (endoscopic ultrasound).     Activity   Your activity upon discharge: activity as tolerated. No driving or operating machinery while on oxycodone.     Diet   Follow this diet upon discharge: Orders Placed This Encounter     Snacks/Supplements Pediatric: Ensure Plus; Between Meals     Regular Diet Adult       Discharge Medications   Current Discharge Medication List      START taking these medications    Details   oxyCODONE IR (ROXICODONE) 5 MG tablet Take 1 tablet (5 mg) by mouth every 4 hours as needed for moderate to severe pain  Qty: 20 tablet, Refills: 0    Associated Diagnoses: Epigastric pain         CONTINUE these medications which have CHANGED    Details   pantoprazole (PROTONIX) 40 MG EC tablet Take 1 tablet (40 mg) by mouth 2  times daily Take 30-60 minutes before a meal.  Qty: 30 tablet, Refills: 1    Associated Diagnoses: Peptic ulcer         CONTINUE these medications which have NOT CHANGED    Details   ferrous sulfate (IRON) 325 (65 Fe) MG tablet Take 1 tablet (325 mg) by mouth every other day  Qty: 90 tablet, Refills: 2    Associated Diagnoses: Epigastric pain      valACYclovir (VALTREX) 500 MG tablet TAKE 1 TABLET (500 MG) BY MOUTH DAILY  Qty: 90 tablet, Refills: 3    Associated Diagnoses: Genital herpes simplex, unspecified site         STOP taking these medications       omeprazole (PRILOSEC OTC) 20 MG tablet Comments:   Reason for Stopping:             Allergies   No Known Allergies  Data     IMAGING RESULTS FROM THIS HOSPITAL STAY:  Results for orders placed or performed during the hospital encounter of 07/12/18   CT Abdomen Pelvis without Contrast (stone protocol)    Narrative    CT ABDOMEN PELVIS W/O CONTRAST  7/13/2018 12:36 AM     HISTORY: Abdominal pain.    TECHNIQUE: Noncontrast CT abdomen and pelvis was performed. Radiation  dose for this scan was reduced using automated exposure control,  adjustment of the mA and/or kV according to patient size, or iterative  reconstruction technique.    COMPARISON: None.    FINDINGS:  Abdomen: There is dependent atelectasis at the lung bases. Calcified  granuloma in the left lower lobe posteriorly. Evaluation of the solid  abdominal organs is limited by the lack of intravenous contrast. The  liver, spleen, gallbladder, pancreas, adrenal glands and right kidney  are normal in appearance. There is mild dilatation of the left renal  collecting system and ureter into the pelvis. No cause of obstruction  is seen. There are multiple soft tissue nodules and fluid stranding in  the fat of the upper abdomen anteriorly as well as in both upper  quadrants. There is inflammation about the second portion of the  duodenum. Inflammatory changes posterior to the stomach with probable  soft tissue  nodule. The wall of the stomach may be thickened.    Pelvis: Small and large bowel appear normal. The appendix is normal.  There is trace free fluid in the pelvis. No free intraperitoneal gas.      Impression    IMPRESSION:  1. There are multiple soft tissue nodules as well as soft tissue  stranding in the fat of the upper abdomen and upper quadrants as well  as along the lesser curve of the stomach and adjacent to the duodenum.  Peritoneal metastatic disease could have this appearance.  2. Mild left hydronephrosis and dilated ureter into the pelvis. No  cause of obstruction is seen.  3. Possible wall thickening of the stomach, though the stomach is not  well distended.    LAURE HENDRICKS MD       MOST RECENT LAB RESULTS:  Most Recent 3 CBC's:  Recent Labs   Lab Test  09/01/18   0940  07/12/18   2341 02/27/18   0551   WBC  5.4  6.1  4.7   HGB  10.8*  10.4*  9.7*   MCV  72*  70*  82   PLT  314  286  199      Most Recent 3 BMP's:  Recent Labs   Lab Test  09/01/18   0940  07/12/18   2341  02/27/18   0551   NA  136  139  138   POTASSIUM  4.2  3.7  3.7   CHLORIDE  102  107  105   CO2  28  26  28   BUN  9  11  16   CR  0.87  1.05  0.82   ANIONGAP  6  6  5   AROLDO  8.2*  8.6  8.1*   GLC  91  94  98       Most Recent 2 LFT's:  Recent Labs   Lab Test  07/12/18   2341  02/26/18   0600   AST  17  22   ALT  27  38   ALKPHOS  94  76   BILITOTAL  0.4  0.8     Results for ROMELIA MARK (MRN [id number]) as of 9/1/2018 14:28   Ref. Range 9/1/2018 09:40   Ferritin Latest Ref Range: 26 - 388 ng/mL 10 (L)   Iron Latest Ref Range: 35 - 180 ug/dL 19 (L)   Iron Binding Cap Latest Ref Range: 240 - 430 ug/dL 390   Iron Saturation Index Latest Ref Range: 15 - 46 % 5 (L)

## 2018-09-01 NOTE — H&P
Admitted:     08/31/2018      DATE OF ADMISSION: 08/31/2018      PRIMARY CARE PROVIDER: Gay Winslow NP      CHIEF COMPLAINT:  Abdominal pain; transfer from outside hospital.      HISTORY OF PRESENT ILLNESS:  Mr. Ernie Song is a 38-year-old gentleman with history noted below, including gastric ulcers and chronic gastritis associated with Helicobacter pylori, who presented to [facility] earlier today with abdominal pain.  The patient has history of gastric ulcers with GI bleeding in 02/2018, for which he was hospitalized here.  At that time, he did have EGD which did show gastric ulcers as well as some nodularity, and biopsies did reveal H. pylori and active chronic gastritis without evidence of malignancy.  He was treated and in 04/2018 had a repeat EGD which showed moderate nonspecific chronic gastritis consistent with treated Helicobacter pylori, with no Helicobacter identified and no signs of atrophic gastritis; I do not have pathology results available from that study, which was done apparently as an outpatient by [facility].      In the above context, over the past week, the patient has had worsening symptomatology.  He says he has lost about 5 pounds.  He says that he has been hungry, but he has had pain and early satiety. Of note, apparently he was scheduled for followup EGD in 07/2018, which he did not follow through with.  Overall, given his symptoms, he presented to clinic and he was sent to [facility] for further evaluation.      There he had vitals recorded and showed  temperature 98.8, heart rate 103, blood pressure 140/83, respiration 20, O2 saturations 100%. He did have laboratory evaluation which showed normal white count.  Hemoglobin was low at 11.6. LFTs were okay.  BMP was remarkable for a sodium slightly low at 134.  He went on to have imaging including CT scan of the abdomen and pelvis which showed thickening of the wall of the stomach, small amount of ascites,  lymphadenopathy along the gastrohepatic ligament and anterior to the distal body of the stomach, as well as thickening of the omentum, and collectively noted that these were suspicious for malignancy, but differential considerations include gastric carcinoma or lymphoma; there were also small bilateral pleural effusions with bibasilar atelectasis, right greater than left. Given these findings, discussion was made with  Surgery and Internal Medicine and it was decided to transfer the patient here for further evaluation.        The patient denies any chest pain.  No fevers or chills.  No bloody stools or diarrhea.      PAST MEDICAL HISTORY:   1.  Gastric ulcers and chronic gastritis associated with Helicobacter pylori.   2.  History of alcohol use disorder.  He has quit drinking.     3.  History of genital herpes.   4.  History of Blastocystis hominis infection.      PAST SURGICAL HISTORY:  None.      ALLERGIES:  NO KNOWN DRUG ALLERGIES.      HOME MEDICATIONS:   1.  Omeprazole.    2.  PRN valacyclovir.      SOCIAL HISTORY:  The patient does not smoke.  He does not drink alcohol anymore.  He is , has 2 children.  Works in [employer].      FAMILY HISTORY:  Uncle has history of stomach cancer.  Grandmother had malignancy of uncertain type.      REVIEW OF SYSTEMS:  As noted in HPI, otherwise 10-point review of systems negative.      PHYSICAL EXAMINATION:   VITAL SIGNS:  Temperature 98.8, heart rate 77, blood pressure 113/69, respiratory rate 16, O2 saturation 97% on room air.   GENERAL:  Alert and oriented, pleasant 38-year-old male patient lying in bed.  Conversant and friendly.   HEENT:  Pupils are equal, round, reactive.  No scleral icterus or conjunctival injection. Oropharynx with some erythema, no exudate.   NECK:  No bruits, JVD or adenopathy.   HEART:  Regular rate and rhythm without murmurs, rubs, gallops.   LUNGS:  Grossly clear, without crackles or wheeze.   ABDOMEN:  Mildly distended.  Tenderness  diffusely, but no rebound or guarding.  Positive bowel sounds.  No obvious adenopathy.   EXTREMITIES:  No edema, without knee joint swelling or skin rash.   NEUROLOGIC:  No gross focal motor or sensory deficits.      LABORATORY: From outside hospital showed hemoglobin 11.6, white count 6.7, platelets 365.  LFTs were normal.  BMP was unremarkable.  Lipase normal.      ASSESSMENT AND PLAN:  Mr. Ernie Song is a 38-year-old male patient with history including gastric ulcers and chronic gastritis due to Helicobacter pylori, who presented to an outside hospital with 1 week of worsening abdominal pain and found with abnormal CT imaging.    1.  Abdominal pain with abnormal CT imaging including thickening of the wall of the stomach, small ascites, lymphadenopathy and thickening of the omentum.  Unclear etiology, report noted concerns that this could be a malignant process.  At this time, will order Protonix IV twice daily.  Will order p.r.n. acetaminophen and p.r.n. IV morphine.  Keep the patient n.p.o.  Will order IV fluids.  We will ask for Gastroenterology and Surgery consultations.  Surgery was already contacted by the provider at [facility].    2.  History of gastric ulcers and chronic gastritis associated with H. Pylori.  The patient has history of gastric ulcers with GI bleeding in 02/2018, for which he was hospitalized here.  At that time, he did have EGD which did show gastric ulcers as well as some nodularity, and biopsies did reveal H. pylori and active chronic gastritis without evidence of malignancy.  He was treated and in 04/2018 had a repeat EGD which showed moderate nonspecific chronic gastritis consistent with treated Helicobacter pylori, with no Helicobacter identified and no signs of atrophic gastritis; I do not have pathology results available from that study, which was done apparently as an outpatient by [facility].  Plan IV Protonix and GI consult as above.    3.  Anemia.  Check iron  studies. Monitor CBC.  Does not appear to have any signs of any major bleeding at this time.       4.  Prophylaxis.  Pneumoboots and ambulation.      CODE STATUS:  FULL CODE.         KAREN POLLARD JR., MD             D: 2018   T: 2018   MT:       Name:     ROMELIA MARK   MRN:      [id number]        Account:      [id number]   :      1980        Admitted:     2018                   Document: [id number]

## 2018-09-05 PROBLEM — K25.9 GASTRIC ULCER: Status: ACTIVE | Noted: 2018-01-01

## 2018-09-05 PROBLEM — K25.7 GASTRIC ULCER, CHRONIC: Status: ACTIVE | Noted: 2018-01-01

## 2018-09-05 PROBLEM — R18.8 ASCITES: Status: ACTIVE | Noted: 2018-01-01

## 2018-09-05 PROBLEM — K25.7 GASTRIC ULCER, CHRONIC: Status: RESOLVED | Noted: 2018-01-01 | Resolved: 2018-01-01

## 2018-09-05 NOTE — IP AVS SNAPSHOT
[facility] [address] [facility]    [address] [place] [address]    Phone:  [phone]                                       After Visit Summary   9/5/2018    Ernie Song    MRN: [id number]           After Visit Summary Signature Page     I have received my discharge instructions, and my questions have been answered. I have discussed any challenges I see with this plan with the nurse or doctor.    ..........................................................................................................................................  Patient/Patient Representative Signature      ..........................................................................................................................................  Patient Representative Print Name and Relationship to Patient    ..................................................               ................................................  Date                                            Time    ..........................................................................................................................................  Reviewed by Signature/Title    ...................................................              ..............................................  Date                                                            Time          22EPIC Rev 08/18

## 2018-09-05 NOTE — IP AVS SNAPSHOT
MRN:[id number]                      After Visit Summary   9/5/2018    Ernie Song    MRN: [id number]           Thank you!     Thank you for choosing [facility] for your care. Our goal is always to provide you with excellent care. Hearing back from our patients is one way we can continue to improve our services. Please take a few minutes to complete the written survey that you may receive in the mail after you visit with us. Thank you!        Patient Information     Date Of Birth          1980        Designated Caregiver       Most Recent Value    Caregiver    Will someone help with your care after discharge? yes    Name of designated caregiver Cielo (wife)    Phone number of caregiver [phone]    Caregiver address [address]      About your hospital stay     You were admitted on:  September 5, 2018 You last received care in the:  [facility] [address] [facility]    You were discharged on:  September 9, 2018        Reason for your hospital stay       You had abdominal pain                  Who to Call     For medical emergencies, please call 911.  For non-urgent questions about your medical care, please call your primary care provider or clinic, [phone]  For questions related to your surgery, please call your surgery clinic        Attending Provider     Provider Specialty    Ever Owusu MD Gastroenterology    [facility], Nicholas Toledo MD Internal Medicine       Primary Care Provider Office Phone # Fax #    Gay MARILIN Garcia [facility] [phone]       When to contact your care team       Call your primary doctor if you have any of the following: increased pain of abdomen.                  After Care Instructions     Activity       Your activity upon discharge: activity as tolerated            Diet       Follow this diet upon discharge: Orders Placed This Encounter      Advance Diet as Tolerated: Regular diet                   Follow-up Appointments     Follow-up and recommended labs and tests        Follow up with primary care provider, Gay Winslow, within 7 days for hospital follow- up in regards to gastric ulcer (assess continued tolerance of diet) and new diagnosis of gastric adenocarcinoma.  The following labs/tests are recommended: None. ALso should have following with Oncology as planned to discuss treatment strategies                  Further instructions from your care team       PT Belongings (PO Iron tabs) in [employer]ocker #3. Other pt belongings in room.    Pending Results     Date and Time Order Name Status Description    9/7/2018 1057 Her 2 adrian FISH In process     9/5/2018 1433 Surgical pathology exam In process     9/5/2018 1418 Fine needle aspiration In process             Statement of Approval     Ordered          09/09/18 1409  I have reviewed and agree with all the recommendations and orders detailed in this document.  EFFECTIVE NOW     Approved and electronically signed by:  Simón King MD             Admission Information     Date & Time Provider Department Dept. Phone    9/5/2018 Nicholas Santos MD [facility] [address] [facility] [phone]      Your Vitals Were     Blood Pressure Pulse Temperature Respirations Weight Pulse Oximetry    125/83 (BP Location: Left arm) 90 98.2  F (36.8  C) (Oral) 18 71.2 kg (157 lb) 95%    BMI (Body Mass Index)                   26.95 kg/m2           [facility] Information     [facility] gives you secure access to your electronic health record. If you see a primary care provider, you can also send messages to your care team and make appointments. If you have questions, please call your primary care clinic.  If you do not have a primary care provider, please call [phone] and they will assist you.        Care EveryWhere ID     This is your Care EveryWhere ID. This could be used by other organizations to access your [facility]  records  [id number]        Equal Access to Services     [facility] Hospital[facility]INALDO : Hadii panda gardiner arnulfomarissa Flor, waaxda luparrisadaha, qaybta dicksontonyhannah oquendo, neymar payanlinaole morgan. So [facility] [phone].    ATENCIÓN: Si habla español, tiene a valente disposición servicios gratuitos de asistencia lingüística. Llame al [phone].    We comply with applicable federal civil rights laws and [place] laws. We do not discriminate on the basis of race, color, national origin, age, disability, sex, sexual orientation, or gender identity.               Review of your medicines      START taking        Dose / Directions    * oxyCODONE 20 MG 12 hr tablet   Commonly known as:  OxyCONTIN   Used for:  Gastric adenocarcinoma (H)   Replaces:  oxyCODONE IR 5 MG tablet        Dose:  20 mg   Take 1 tablet (20 mg) by mouth every 12 hours   Quantity:  30 tablet   Refills:  0       * oxyCODONE IR 5 MG tablet   Commonly known as:  ROXICODONE   Used for:  Gastric adenocarcinoma (H)        Dose:  5-10 mg   Take 1-2 tablets (5-10 mg) by mouth every 4 hours as needed for moderate to severe pain   Quantity:  30 tablet   Refills:  0       * Notice:  This list has 2 medication(s) that are the same as other medications prescribed for you. Read the directions carefully, and ask your doctor or other care provider to review them with you.      CONTINUE these medicines which have NOT CHANGED        Dose / Directions    acetaminophen 325 MG tablet   Commonly known as:  TYLENOL   Used for:  Epigastric pain        Dose:  650 mg   Take 2 tablets (650 mg) by mouth every 4 hours as needed for mild pain   Quantity:  100 tablet   Refills:  0       ferrous sulfate 325 (65 Fe) MG tablet   Commonly known as:  IRON   Used for:  Epigastric pain        Dose:  325 mg   Take 1 tablet (325 mg) by mouth every other day   Quantity:  90 tablet   Refills:  2       pantoprazole 40 MG EC tablet   Commonly known as:  PROTONIX   Used for:  Peptic ulcer        Dose:   40 mg   Take 1 tablet (40 mg) by mouth 2 times daily Take 30-60 minutes before a meal.   Quantity:  30 tablet   Refills:  1       valACYclovir 500 MG tablet   Commonly known as:  VALTREX   Used for:  Genital herpes simplex, unspecified site        TAKE 1 TABLET (500 MG) BY MOUTH DAILY   Quantity:  90 tablet   Refills:  3         STOP taking     oxyCODONE IR 5 MG tablet   Commonly known as:  ROXICODONE   Replaced by:  oxyCODONE 20 MG 12 hr tablet                Where to get your medicines      Some of these will need a paper prescription and others can be bought over the counter. Ask your nurse if you have questions.     Bring a paper prescription for each of these medications     oxyCODONE 20 MG 12 hr tablet    oxyCODONE IR 5 MG tablet                Protect others around you: Learn how to safely use, store and throw away your medicines at www.disposemymeds.org.        Information about OPIOIDS     PRESCRIPTION OPIOIDS: WHAT YOU NEED TO KNOW   We gave you an opioid (narcotic) pain medicine. It is important to manage your pain, but opioids are not always the best choice. You should first try all the other options your care team gave you. Take this medicine for as short a time (and as few doses) as possible.    Some activities can increase your pain, such as bandage changes or therapy sessions. It may help to take your pain medicine 30 to 60 minutes before these activities. Reduce your stress by getting enough sleep, working on hobbies you enjoy and practicing relaxation or meditation. Talk to your care team about ways to manage your pain beyond prescription opioids.    These medicines have risks:    DO NOT drive when on new or higher doses of pain medicine. These medicines can affect your alertness and reaction times, and you could be arrested for driving under the influence (DUI). If you need to use opioids long-term, talk to your care team about driving.    DO NOT operate heavy machinery    DO NOT do any other  dangerous activities while taking these medicines.    DO NOT drink any alcohol while taking these medicines.     If the opioid prescribed includes acetaminophen, DO NOT take with any other medicines that contain acetaminophen. Read all labels carefully. Look for the word  acetaminophen  or  Tylenol.  Ask your pharmacist if you have questions or are unsure.    You can get addicted to pain medicines, especially if you have a history of addiction (chemical, alcohol or substance dependence). Talk to your care team about ways to reduce this risk.    All opioids tend to cause constipation. Drink plenty of water and eat foods that have a lot of fiber, such as fruits, vegetables, prune juice, apple juice and high-fiber cereal. Take a laxative (Miralax, milk of magnesia, Colace, Senna) if you don t move your bowels at least every other day. Other side effects include upset stomach, sleepiness, dizziness, throwing up, tolerance (needing more of the medicine to have the same effect), physical dependence and slowed breathing.    Store your pills in a secure place, locked if possible. We will not replace any lost or stolen medicine. If you don t finish your medicine, please throw away (dispose) as directed by your pharmacist. The [place] [employer]ution [employer] has more information about safe disposal: https://www.pca.[facility].[place].us/living-green/managing-unwanted-medications             Medication List: This is a list of all your medications and when to take them. Check marks below indicate your daily home schedule. Keep this list as a reference.      Medications           Morning Afternoon Evening Bedtime As Needed    acetaminophen 325 MG tablet   Commonly known as:  TYLENOL   Take 2 tablets (650 mg) by mouth every 4 hours as needed for mild pain                                   ferrous sulfate 325 (65 Fe) MG tablet   Commonly known as:  IRON   Take 1 tablet (325 mg) by mouth every other day                                *  oxyCODONE 20 MG 12 hr tablet   Commonly known as:  OxyCONTIN   Take 1 tablet (20 mg) by mouth every 12 hours   Last time this was given:  20 mg on 9/9/2018 10:07 AM                       9-9-2018               * oxyCODONE IR 5 MG tablet   Commonly known as:  ROXICODONE   Take 1-2 tablets (5-10 mg) by mouth every 4 hours as needed for moderate to severe pain   Last time this was given:  10 mg on 9/7/2018 11:15 AM                                   pantoprazole 40 MG EC tablet   Commonly known as:  PROTONIX   Take 1 tablet (40 mg) by mouth 2 times daily Take 30-60 minutes before a meal.   Last time this was given:  40 mg on 9/9/2018  6:57 AM                       9-9-2018               valACYclovir 500 MG tablet   Commonly known as:  VALTREX   TAKE 1 TABLET (500 MG) BY MOUTH DAILY   Last time this was given:  500 mg on 9/9/2018 10:07 AM            9-                       * Notice:  This list has 2 medication(s) that are the same as other medications prescribed for you. Read the directions carefully, and ask your doctor or other care provider to review them with you.

## 2018-09-05 NOTE — ANESTHESIA POSTPROCEDURE EVALUATION
Patient: Ernie Song    Procedure(s):  COMBINED ENDOSCOPIC ULTRASOUND, ESOPHAGOSCOPY, GASTROSCOPY, DUODENOSCOPY (EGD), FINE NEEDLE ASPIRATE - Wound Class: II-Clean Contaminated    Diagnosis:ON GOING WEIGHT LOSS, CT SCAN WITH THICKENED STOMACH, LYMPHADENOPATHY,   Diagnosis Additional Information: No value filed.    Anesthesia Type:  MAC    Note:  Anesthesia Post Evaluation    Patient location during evaluation: PACU  Patient participation: Able to fully participate in evaluation  Level of consciousness: awake and alert  Pain management: adequate  Airway patency: patent  Cardiovascular status: acceptable  Respiratory status: acceptable and unassisted  Hydration status: acceptable  PONV: none             Last vitals:  Vitals:    09/05/18 1310   BP: 124/76   Resp: 16   SpO2: 94%         Electronically Signed By: Grazyna Best MD  September 5, 2018  2:54 PM

## 2018-09-05 NOTE — ANESTHESIA CARE TRANSFER NOTE
Patient: Ernie oSng    Procedure(s):  COMBINED ENDOSCOPIC ULTRASOUND, ESOPHAGOSCOPY, GASTROSCOPY, DUODENOSCOPY (EGD), FINE NEEDLE ASPIRATE - Wound Class: II-Clean Contaminated    Diagnosis: ON GOING WEIGHT LOSS, CT SCAN WITH THICKENED STOMACH, LYMPHADENOPATHY,   Diagnosis Additional Information: No value filed.    Anesthesia Type:   MAC     Note:  Airway :Nasal Cannula  Patient transferred to:PACU  Comments: At end of procedure, spontaneous respirations, patient alert to voice, able to follow commands. Oxygen via nasal cannula at 4 liters per minute to PACU. Oxygen tubing connected to wall O2 in PACU, SpO2, NiBP, and EKG monitors and alarms on and functioning, report on patient's clinical status given to PACU RN, RN questions answered.Handoff Report: Identifed the Patient, Identified the Reponsible Provider, Reviewed the pertinent medical history, Discussed the surgical course, Reviewed Intra-OP anesthesia mangement and issues during anesthesia, Set expectations for post-procedure period and Allowed opportunity for questions and acknowledgement of understanding      Vitals: (Last set prior to Anesthesia Care Transfer)    CRNA VITALS  9/5/2018 1414 - 9/5/2018 1447      9/5/2018             Pulse: 95    Ht Rate: 89    SpO2: 97 %    Resp Rate (set): 10                Electronically Signed By: MARILIN Rios CRNA  September 5, 2018  2:47 PM

## 2018-09-05 NOTE — ANESTHESIA PREPROCEDURE EVALUATION
Anesthesia Evaluation     . Pt has had prior anesthetic.     No history of anesthetic complications          ROS/MED HX    ENT/Pulmonary:      (-) sleep apnea   Neurologic:  - neg neurologic ROS     Cardiovascular:         METS/Exercise Tolerance:     Hematologic:         Musculoskeletal:         GI/Hepatic:     (+) GERD Other GI/Hepatic abdominal pain      Renal/Genitourinary:  - ROS Renal section negative       Endo:  - neg endo ROS       Psychiatric:         Infectious Disease:         Malignancy:         Other:                     Physical Exam  Normal systems: cardiovascular, pulmonary and dental    Airway   Mallampati: II  TM distance: >3 FB  Neck ROM: full    Dental     Cardiovascular       Pulmonary                     Anesthesia Plan      History & Physical Review  History and physical reviewed and following examination; no interval change.    ASA Status:  2 .    NPO Status:  > 8 hours    Plan for MAC Reason for MAC:  Deep or markedly invasive procedure (G8)  PONV prophylaxis:  Ondansetron (or other 5HT-3)       Postoperative Care  Postoperative pain management:  IV analgesics.      Consents  Anesthetic plan, risks, benefits and alternatives discussed with:  Patient..                          .

## 2018-09-05 NOTE — H&P
[facility]    History and Physical  Hospitalist       Date of Admission:  9/5/2018    Assessment & Plan   Ernie Song is a 38 year old male with large gastric ulcer and ascites, admitted after EUS today and admitting for diagnosis and pain control    Summary:    Principal Problem:    Gastric ulcer  Active Problems:    Abdominal pain    Ascites       Plan: Admit to medicine floor, abdominal CT to rule out perforation and evaluate for possible malignancy or carcinomatosis.  Pain meds (PO and IV), continue Protonix 40 mg bid, and will keep NPO until CT results back, and cover with antibiotics (Cipro, Flagyl).  CBC and CMP ordered.     DVT Prophylaxis: Pneumatic Compression Devices  Code Status: Full Code    Disposition: Expected discharge in 2-3 days once abdominal pain better and diagnosis made.    Nicholas Elias MD  Pager: [phone]  Cell Phone:  [phone]      Primary Care Physician   Gay Winslow    Chief Complaint   Abdominal pain, EUS today    History is obtained from Patient    History of Present Illness   38 year old male who had a gastric ulcer on EGD Feb 2018, now with increased epigastric burning and pain the last 10 days presents with having an EUS today by [facility], Dr. Navarro, which showed:  - Large gastric ulcer, transmural involvement.                              - Multiple perigastric and gastroduodenal lymph                             nodes. FNA obtained.            - Ascites, FNA as above.            - Pleural effusion.                                Does report decreased appetite and about 5 lbs wt loss in the last 2 weeks.  No fever or chills.      Past Medical History    I have reviewed this patient's medical history and updated it with pertinent information if needed.   Past Medical History:   Diagnosis Date     Genital herpes      Gynecomastia, male 2016    Chest CT benign     Ulcer, gastric, acute        Past Surgical History   I have  reviewed this patient's surgical history and updated it with pertinent information if needed.  Past Surgical History:   Procedure Laterality Date     ESOPHAGOSCOPY, GASTROSCOPY, DUODENOSCOPY (EGD), COMBINED N/A 2/26/2018    Procedure: COMBINED ESOPHAGOSCOPY, GASTROSCOPY, DUODENOSCOPY (EGD), BIOPSY SINGLE OR MULTIPLE;  gastroscopy;  Surgeon: Lalo Ibrahim MD;  Location:  GI     NO HISTORY OF SURGERY         Prior to Admission Medications   Prior to Admission Medications   Prescriptions Last Dose Informant Patient Reported? Taking?   acetaminophen (TYLENOL) 325 MG tablet More than a month  No No   Sig: Take 2 tablets (650 mg) by mouth every 4 hours as needed for mild pain   ferrous sulfate (IRON) 325 (65 Fe) MG tablet Past Week  Yes Yes   Sig: Take 1 tablet (325 mg) by mouth every other day   oxyCODONE IR (ROXICODONE) 5 MG tablet 9/5/2018  No Yes   Sig: Take 1 tablet (5 mg) by mouth every 4 hours as needed for moderate to severe pain   pantoprazole (PROTONIX) 40 MG EC tablet 9/5/2018  No Yes   Sig: Take 1 tablet (40 mg) by mouth 2 times daily Take 30-60 minutes before a meal.   valACYclovir (VALTREX) 500 MG tablet More than a month Self No No   Sig: TAKE 1 TABLET (500 MG) BY MOUTH DAILY      Facility-Administered Medications: None     Allergies   No Known Allergies    Social History   I have reviewed this patient's social history and updated it with pertinent information if needed. Ernie Song  reports that he quit smoking about 13 years ago. His smoking use included Cigarettes. He has a 12.00 pack-year smoking history. He has never used smokeless tobacco. He reports that he does not drink alcohol or use illicit drugs.    Family History   I have reviewed this patient's family history and updated it with pertinent information if needed.   Family History   Problem Relation Age of Onset     Prostate Cancer Father      Other Cancer Maternal Grandmother      Other Cancer Other      Asthma Mother       Stomach Cancer Maternal Uncle        Review of Systems   The 10 point Review of Systems is negative other than noted in the HPI or here.     # Pain Assessment:  Current Pain Score 9/1/2018   Patient currently in pain? -   Pain score (0-10) 5   Pain location Abdomen   Pain descriptors -   - Ernie is experiencing pain due to gastric ulcer. Pain management was discussed and the plan was created in a collaborative fashion.  Ernie's response to the current recommendations: engaged  - Please see the plan for pain management as documented above          Physical Exam   Temp: 97.3  F (36.3  C) Temp src: Oral BP: 123/80   Heart Rate: 67 Resp: 16 SpO2: 98 % O2 Device: None (Room air)    Vital Signs with Ranges  Temp:  [97.3  F (36.3  C)] 97.3  F (36.3  C)  Heart Rate:  [64-92] 67  Resp:  [15-25] 16  BP: ()/(57-80) 123/80  SpO2:  [93 %-98 %] 98 %  0 lbs 0 oz    Constitutional: Awake, alert, cooperative, no apparent distress.  Eyes: Conjunctiva and pupils examined and normal.  HEENT: Moist mucous membranes, normal dentition.  Respiratory: Clear to auscultation bilaterally, no crackles or wheezing.  Cardiovascular: Regular rate and rhythm, normal S1 and S2, and no murmur noted,   No carotid bruits.  No ankle edema.   GI: Soft, moderate epigastric pain (but did just receive fentanyl), bowels sounds present  Lymph/Hematologic: No anterior cervical, supraclavicular or axillary adenopathy.  Skin: No rashes, no cyanosis.   Musculoskeletal: No joint swelling, erythema or tenderness.  Neurologic: Cranial nerves 2-12 intact, no focal weakness or numbness  Psychiatric: Alert, Ox3, no obvious anxiety or depression.    Data   Data reviewed today:  I personally reviewed no images or EKG's today.    Recent Labs  Lab 09/01/18  0940   WBC 5.4   HGB 10.8*   MCV 72*         POTASSIUM 4.2   CHLORIDE 102   CO2 28   BUN 9   CR 0.87   ANIONGAP 6   AROLDO 8.2*   GLC 91       Imaging:  No results found for this or any previous  visit (from the past 24 hour(s)).

## 2018-09-05 NOTE — PHARMACY-ADMISSION MEDICATION HISTORY
Admission Medication History    Admission medication history interview status for the 9/5/2018 admission is complete. See EPIC admission navigator for prior to admission medications     Medication history source reliability:Good    Actions taken by pharmacist (provider contacted, etc):None     Additional medication history information not noted on PTA med list :None    Medication reconciliation/reorder completed by provider prior to medication history? Yes    Time spent in this activity: 10 minutes    Prior to Admission medications    Medication Sig Last Dose Taking? Auth Provider   ferrous sulfate (IRON) 325 (65 Fe) MG tablet Take 1 tablet (325 mg) by mouth every other day Past Week Yes Anette Quintana MD   oxyCODONE IR (ROXICODONE) 5 MG tablet Take 1 tablet (5 mg) by mouth every 4 hours as needed for moderate to severe pain 9/5/2018 Yes Anette Quintana MD   pantoprazole (PROTONIX) 40 MG EC tablet Take 1 tablet (40 mg) by mouth 2 times daily Take 30-60 minutes before a meal. 9/5/2018 Yes Anette Quintana MD   acetaminophen (TYLENOL) 325 MG tablet Take 2 tablets (650 mg) by mouth every 4 hours as needed for mild pain More than a month  Anette Quintana MD   valACYclovir (VALTREX) 500 MG tablet TAKE 1 TABLET (500 MG) BY MOUTH DAILY More than a month  Gay Winslow APRN CNP [facility], [place]

## 2018-09-06 PROBLEM — D50.9 MICROCYTIC ANEMIA: Status: ACTIVE | Noted: 2018-01-01

## 2018-09-06 NOTE — PLAN OF CARE
Problem: Patient Care Overview  Goal: Plan of Care/Patient Progress Review  Outcome: No Change  A&Ox4. VSS on RA. PRN zofran x1, reported relief. Abd tender and soft. Clear liquid diet. Up independently. Denied abdominal pain. Discharge pending. Will continue to monitor.

## 2018-09-06 NOTE — PROGRESS NOTES
Admission    Patient arrives to room 618-1 via cart from Endoscopy.  Care plan note: VSS, RA.     Inpatient nursing criteria listed below were met:    PCD's Documented: Yes  Skin issues/needs documented :NA  Isolation education started/completed NA  Patient allergies verified with patient: Yes  Verified completion of Boundary Risk Assessment Tool:  Yes  Verified completion of Guardianship screening tool: Yes  Fall Prevention: Care plan updated, Education given and documented NA  Care Plan initiated: Yes  Home medications documented in belongings flowsheet: Yes  Patient belongings documented in belongings flowsheet: Yes  Reminder note (belongings/ medications) placed in discharge instructions:Yes  Admission profile/ required documentation complete: Yes

## 2018-09-06 NOTE — PROGRESS NOTES
[facility]  [facility]  Gastroenterology Progress note    Interval History:      Pt currently without pain.  Some nausea but no vomiting.  Ordering clear liquids for breakfast.  Biopsies/FNA pending.  No evidence of perforation on CT.      Vital Signs:      /70 (BP Location: Left arm)  Pulse 65  Temp 98.2  F (36.8  C) (Oral)  Resp 16  Wt 75.5 kg (166 lb 7.2 oz)  SpO2 96%  BMI 28.57 kg/m2  Temp (24hrs), Av.2  F (36.8  C), Min:97.3  F (36.3  C), Max:99  F (37.2  C)    Patient Vitals for the past 72 hrs:   Weight   18 0641 75.5 kg (166 lb 7.2 oz)       Intake/Output Summary (Last 24 hours) at 18 0843  Last data filed at 18 1438   Gross per 24 hour   Intake              700 ml   Output                0 ml   Net              700 ml         Constitutional: NAD, comfortable  Cardiovascular: RRR, normal S1, S2   Respiratory: CTAB  Abdomen: soft, + epigastric tenderness, nondistended.    Additional Comments:  ROS, FH, SH: See initial GI consult for details.    Laboratory Data:  Recent Labs   Lab Test  18   1800  18   0940  18   0600   WBC  4.5  5.4  6.1   < >  7.0   HGB  10.1*  10.8*  10.4*   < >  11.5*   MCV  72*  72*  70*   < >  81   PLT  367  314  286   < >  236   INR   --    --    --    --   1.04    < > = values in this interval not displayed.     Recent Labs   Lab Test  18   1800  18   0940  18   2341   NA  140  136  139   POTASSIUM  3.8  4.2  3.7   CHLORIDE  104  102  107   CO2  30  28  26   BUN  9  9  11   CR  0.84  0.87  1.05   ANIONGAP  6  6  6   AROLDO  8.7  8.2*  8.6     Recent Labs   Lab Test  18   1800  18   2358  18   2341  18   1356  18   0600   13   1628   ALBUMIN  2.9*   --   3.6   --   3.4   < >   --    BILITOTAL  0.4   --   0.4   --   0.8   < >   --    ALT  18   --   27   --   38   < >   --    AST  15   --   17   --   22   < >   --    ALKPHOS  74    --   94   --   76   < >   --    PROTEIN   --   Negative   --   Negative   --    --   Negative   LIPASE   --    --   110   --   55*   --    --     < > = values in this interval not displayed.         Assessment:    Large gastric ulcer with transmural involvement found on EUS yesterday.  Multiple perigastric and gastroduodenal lymph nodes.  Ascites noted.  Patient with dramatically more pain, radiating to the right shoulder.  Findings concerning for malignancy and possible perforation.  Biopsies and FNA are pending.  CT of A/P with wall thickening of the gastric body suspicious for malignancy.  Mildly enlarged lymph nodes anterior to the stomach noted.  Increased soft tissue stranding and nodularity in the fat of the abdomen suggestive of peritoneal mets.  Small amount of ascites.  Bilateral pleural effusions and associated atelectasis.  No perforation seen.  WBC 4.5.  Hemoglobin stable.                                               Plan:    -  Pain, nausea control.  -  Clear liquid diet.  -  Awaiting biopsies/FNA.                ALICIA Nowak  [facility]  Office:  [phone] call if needed after 5PM  Cell:  [phone], not available after 5PM at this number

## 2018-09-06 NOTE — PROGRESS NOTES
[facility]  Hospitalist Progress Note    09/06/2018         Assessment and Plan:       Ernie Song is a 38 year old male who was admitted on 9/5/2018  Following EUS that showed showed large gastric ulcer and ascites, admitted after EUS today and admitting for diagnosis of possible GI malignancy and pain control.      Gastric ulcer    Abdominal pain    Ascites    Assessment:  EUS showed Large gastric ulcer with transmural involvement. There is also multiple perigastric and gastroduodenal lymph nodes with small amount of ascited noted. Findings overall concerning for malignancy and possible perforation.  Biopsies and FNA are pending.  CT of A/P with wall thickening of the gastric body suspicious for malignancy and increased soft tissue stranding and nodularity in the fat of the abdomen suggestive of peritoneal mets. There was no perforation seen.           Plan:   - Follow biopsy  - Clear liquid diet for now  - pain control as needed  - Follow vitals/temp    Iron Deficiency Anemia  Microcytic Anemia  Assessment: iron studies on 09/01/2018 consistent with MARIE in setting of peptic ulcer disease, likely has superimposed anemia of chronic disease as well.   Plan:  - Follow for evidence of blood loss      Active Diet Order      Clear Liquid Diet    DVT Prophylaxis: Pneumatic Compression Devices  Code Status: Full Code    Disposition: Expected discharge in 1-2 days once diagnosis and plan arranged.    Attestation:   I have reviewed today's relevant vital signs, notes, medications, labs and imaging.I personally reviewed the abdominal CT image(s) showing see below.  IMPRESSION:  1. Wall thickening of the gastric body suspicious for malignancy.  There are several mildly enlarged lymph nodes anterior to the stomach.  2. Increased soft tissue stranding and nodularity in the fat of the  abdomen suggesting progression of peritoneal metastatic disease.  3. New small amount of ascites.   4. Bilateral  pleural effusions and associated atelectasis.      Simón King MD  [facility]  Text Page  (7am - 6pm)        Interval History:        - Abdominal pain controlled  - Mild nausea with liquid diet, no vomiting  - No fevers  - Anxious about results of biopsy  - Uncle had gastric cancer.         Physical Exam:        Physical Exam   Temp: 98.2  F (36.8  C) Temp src: Oral BP: 120/70 Pulse: 65 Heart Rate: 75 Resp: 16 SpO2: 96 % O2 Device: None (Room air)    Vitals:    09/06/18 0641   Weight: 75.5 kg (166 lb 7.2 oz)     Vital Signs with Ranges  Temp:  [97.3  F (36.3  C)-99  F (37.2  C)] 98.2  F (36.8  C)  Pulse:  [65] 65  Heart Rate:  [64-92] 75  Resp:  [15-25] 16  BP: ()/(57-80) 120/70  SpO2:  [93 %-98 %] 96 %  I/O last 3 completed shifts:  In: 700 [I.V.:700]  Out: -      Constitutional: Awake, alert, cooperative, no apparent distress  Respiratory: Clear to auscultation bilaterally, no crackles or wheezing  Cardiovascular: Regular rate and rhythm, normal S1 and S2, and no murmur noted  GI: Normal bowel sounds, soft, non-distended, mild epigatric tenderness  Skin/Integumen: No rashes, no cyanosis, no edema  Neuro: moving all extremities. A/O x3. No gross neuro deficits.     # Pain Assessment:  Current Pain Score 9/6/2018   Patient currently in pain? denies   Pain score (0-10) -   Pain location -   Pain descriptors -   - Ernie is experiencing pain due to gastric ulcer. Pain management was discussed and the plan was created in a collaborative fashion.  Ernie's response to the current recommendations: engaged  - Please see the plan for pain management as documented above         Data:     CBC RESULTS:     Recent Labs  Lab 09/05/18  1800 09/01/18  0940   WBC 4.5 5.4   RBC 4.49 4.73   HGB 10.1* 10.8*   HCT 32.4* 34.0*    314       BASIC METABOLIC PANEL:    Recent Labs  Lab 09/05/18  1800 09/01/18  0940    136   POTASSIUM 3.8 4.2   CHLORIDE 104 102   CO2 30 28   BUN 9 9   CR 0.84 0.87    GLC 77 91   AROLDO 8.7 8.2*       HEPATIC FUNCTION PANEL    Recent Labs  Lab 09/05/18  1800   AST 15   ALT 18   ALKPHOS 74   BILITOTAL 0.4     INR  No results for input(s): INR in the last 168 hours.    OTHER LABS    None    Medications       ciprofloxacin  400 mg Intravenous Q12H     docusate sodium  100 mg Oral BID     metroNIDAZOLE  500 mg Intravenous Q12H     pantoprazole  40 mg Oral BID AC     valACYclovir  500 mg Oral Daily         Recent Results (from the past 24 hour(s))   CT Abdomen Pelvis w Contrast    Narrative    CT ABDOMEN AND PELVIS WITH CONTRAST   9/5/2018 8:16 PM     HISTORY: Large gastric ulcer, significant pain, question perforation,  question carcinomatosis.     TECHNIQUE:  CT abdomen and pelvis with 84 mL Isovue-370 IV. Radiation  dose for this scan was reduced using automated exposure control,  adjustment of the mA and/or kV according to patient size, or iterative  reconstruction technique.    COMPARISON: 7/13/2018.    FINDINGS:  Abdomen: There is a moderate size right pleural effusion and a small  left pleural effusion. There is associated atelectasis in the lower  lobes. The heart size is normal. The liver, spleen, gallbladder,  pancreas, adrenal glands and kidneys are normal in appearance. There  is wall thickening of the gastric body. Several enlarged lymph nodes  anterior to the gastric body similar to the previous exam.    Pelvis: There is a new small amount of ascites. No free  intraperitoneal gas. Small and large bowel are within normal limits  without evidence of obstruction. The appendix is normal. There is  again prominent soft tissue stranding and nodularity in the fat of the  abdomen, increased from the previous exam.      Impression    IMPRESSION:  1. Wall thickening of the gastric body suspicious for malignancy.  There are several mildly enlarged lymph nodes anterior to the stomach.  2. Increased soft tissue stranding and nodularity in the fat of the  abdomen suggesting progression  of peritoneal metastatic disease.  3. New small amount of ascites.   4. Bilateral pleural effusions and associated atelectasis.    LAURE HENDRICKS MD

## 2018-09-06 NOTE — PLAN OF CARE
Problem: Patient Care Overview  Goal: Plan of Care/Patient Progress Review  Outcome: No Change  A+Ox4 up independently in room. NPO ex ice chips. C/o abd pain, percocet x1/heat packs given. Abd CT tonight. Denies nausea. IV anx. VSS, on RA, denies SOB. Nursing will continue to monitor.

## 2018-09-06 NOTE — PLAN OF CARE
Problem: Patient Care Overview  Goal: Plan of Care/Patient Progress Review  A&Ox4.  VSS, on RA.  C/o abd pain 4-8/10, PRN Percocet given x1.  C/o nausea, declined intervetions.  Abd rounded, tender, BS active.  NPO ex meds and ice chips.  Up independently.  D/C pending, nursing will continue to monitor.

## 2018-09-06 NOTE — PROVIDER NOTIFICATION
MD Notification    Notified Person: MD    Notified Person Name: Dr. King    Notification Date/Time: 9/6/18 0356    Notification Interaction: Paged    Purpose of Notification: Order clarification on continuous cardiac monitoring.    Orders Received: PACU orders discontinued.    Comments:

## 2018-09-07 NOTE — PROGRESS NOTES
[facility]  Hospitalist Progress Note    09/07/2018         Assessment and Plan:       Ernie Song is a 38 year old male who was admitted on 9/5/2018  Following EUS that showed showed large gastric ulcer and ascites, admitted after EUS today and admitting for diagnosis of possible GI malignancy and pain control.    Gastric Adenocarcinoma  Gastric ulcer  Abdominal pain  Ascites  Assessment: EUS showed Large gastric ulcer with transmural involvement. There is also multiple perigastric and gastroduodenal lymph nodes with small amount of ascited noted. Findings overall concerning for malignancy and possible perforation. Biopsies and FNA are pending. CT of A/P with wall thickening of the gastric body suspicious for malignancy and increased soft tissue stranding and nodularity in the fat of the abdomen suggestive of peritoneal mets. There was no perforation seen. Pathology so far shows Poorly differentiated gastric adenocarcinoma with evidence of metastatic disease in lymph nodes.    Plan:    - Follow FNA results.  - GI now signed off  - Oncology consult placed  - Clear liquid diet for now, ADAT tomorrow  - pain control as needed  - Protonix 40 mg BID  - Follow vitals/temp  - No perforation, so will stop antibiotics    Iron Deficiency Anemia  Microcytic Anemia  Assessment: iron studies on 09/01/2018 consistent with MARIE in setting of peptic ulcer disease, likely has superimposed anemia of chronic disease as well.   Plan:  - Follow for evidence of blood loss    Active Diet Order      Clear Liquid Diet    DVT Prophylaxis: Pneumatic Compression Devices  Code Status: Full Code    Disposition: Expected discharge in 1-2 days once oncology treatment and plan arranged.    Attestation:   I have reviewed today's relevant vital signs, notes, medications, labs and imaging.I personally reviewed no images or EKG's today.  Discussed with RN, wife, Gastroenterology PA.  Patient, family, interdisciplinary team  involved in care and agrees with plan.  Total time - Greater than 35 min. More than 50% of time spent in direct patient care, care coordination, patient/caregiver counseling in regards to new cancer diagnosis, and formalizing plan of care.     Simón King MD  [facility]  Text Page  (7am - 6pm)        Interval History:        - Abdominal pain   - No new physical complaints today  - Saddened by new cancer diagnosis  - no fevers  - Tolerating clears          Physical Exam:        Physical Exam   Temp: 98  F (36.7  C) Temp src: Oral BP: 123/73   Heart Rate: 55 Resp: 16 SpO2: 95 % O2 Device: None (Room air)    Vitals:    09/06/18 0641 09/07/18 0656   Weight: 75.5 kg (166 lb 7.2 oz) 76.6 kg (168 lb 14 oz)     Vital Signs with Ranges  Temp:  [98  F (36.7  C)-98.6  F (37  C)] 98  F (36.7  C)  Heart Rate:  [55-77] 55  Resp:  [16] 16  BP: (120-123)/(72-76) 123/73  SpO2:  [95 %-96 %] 95 %  I/O last 3 completed shifts:  In: 930 [P.O.:930]  Out: -      Constitutional: Awake, alert, cooperative, no apparent distress  Respiratory: Clear to auscultation bilaterally, no crackles or wheezing  Cardiovascular: Regular rate and rhythm, normal S1 and S2, and no murmur noted  GI: Normal bowel sounds, soft, non-distended, mild epigatric/RUQ tenderness  Skin/Integumen: No rashes, no cyanosis, no edema  Neuro: moving all extremities. A/O x3. No gross neuro deficits.     # Pain Assessment:  Current Pain Score 9/7/2018   Patient currently in pain? yes   Pain score (0-10) 7   Pain location Abdomen   Pain descriptors -   - Ernie is experiencing pain due to gastric ulcer. Pain management was discussed and the plan was created in a collaborative fashion.  Ernie's response to the current recommendations: engaged  - Please see the plan for pain management as documented above         Data:     CBC RESULTS:     Recent Labs  Lab 09/05/18  1800 09/01/18  0940   WBC 4.5 5.4   RBC 4.49 4.73   HGB 10.1* 10.8*   HCT 32.4* 34.0*   PLT  367 314       BASIC METABOLIC PANEL:    Recent Labs  Lab 09/05/18  1800 09/01/18  0940    136   POTASSIUM 3.8 4.2   CHLORIDE 104 102   CO2 30 28   BUN 9 9   CR 0.84 0.87   GLC 77 91   AROLDO 8.7 8.2*       HEPATIC FUNCTION PANEL    Recent Labs  Lab 09/05/18  1800   AST 15   ALT 18   ALKPHOS 74   BILITOTAL 0.4     INR  No results for input(s): INR in the last 168 hours.    OTHER LABS    None    Medications       ciprofloxacin  400 mg Intravenous Q12H     docusate sodium  100 mg Oral BID     metroNIDAZOLE  500 mg Intravenous Q12H     pantoprazole  40 mg Oral BID AC     sodium chloride (PF)  3 mL Intracatheter Q8H     valACYclovir  500 mg Oral Daily         No results found for this or any previous visit (from the past 24 hour(s)).

## 2018-09-07 NOTE — PLAN OF CARE
Problem: Patient Care Overview  Goal: Plan of Care/Patient Progress Review  Outcome: No Change  A&Ox4.  Independent.  Tolerating clear liquids.  VSS on RA.  PRN Percocet x1 for RUQ pain, effective.  PRN Tums for upset stomach somewhat effective.  LS clear, BS active.  Refuses PCD's.  Ambulation encouraged for DVT prevention as well as to open airways.  Reports pain when taking deep breaths due to the RUQ pain.  New IV, receiving abx.

## 2018-09-07 NOTE — PROGRESS NOTES
[facility]  [facility]  Gastroenterology Progress note    Interval History:      Patient without physical complaints this am.  Biopsy results positive for poorly differentially adenocarcinoma of the stomach.        Vital Signs:      /73 (BP Location: Left arm)  Pulse 65  Temp 98  F (36.7  C) (Oral)  Resp 16  Wt 76.6 kg (168 lb 14 oz)  SpO2 95%  BMI 28.99 kg/m2  Temp (24hrs), Av.3  F (36.8  C), Min:98  F (36.7  C), Max:98.6  F (37  C)    Patient Vitals for the past 72 hrs:   Weight   18 0656 76.6 kg (168 lb 14 oz)   18 0641 75.5 kg (166 lb 7.2 oz)       Intake/Output Summary (Last 24 hours) at 18 0930  Last data filed at 18 1900   Gross per 24 hour   Intake              930 ml   Output                0 ml   Net              930 ml         Constitutional: NAD, comfortable  Cardiovascular: RRR, normal S1, S2   Respiratory: CTAB  Abdomen: soft, non-tender, nondistended.    Additional Comments:  ROS, FH, SH: See initial GI consult for details.    Laboratory Data:  Recent Labs   Lab Test  18   0940  18   0600   WBC  4.5  5.4  6.1   < >  7.0   HGB  10.1*  10.8*  10.4*   < >  11.5*   MCV  72*  72*  70*   < >  81   PLT  367  314  286   < >  236   INR   --    --    --    --   1.04    < > = values in this interval not displayed.     Recent Labs   Lab Test  18   1800  18   0940  18   2341   NA  140  136  139   POTASSIUM  3.8  4.2  3.7   CHLORIDE  104  102  107   CO2  30  28  26   BUN  9  9  11   CR  0.84  0.87  1.05   ANIONGAP  6  6  6   AROLDO  8.7  8.2*  8.6     Recent Labs   Lab Test  18   1800  18   2358  18   2341  18   1356  18   0600   13   1628   ALBUMIN  2.9*   --   3.6   --   3.4   < >   --    BILITOTAL  0.4   --   0.4   --   0.8   < >   --    ALT  18   --   27   --   38   < >   --    AST  15   --   17   --   22   < >   --    ALKPHOS  74   --    94   --   76   < >   --    PROTEIN   --   Negative   --   Negative   --    --   Negative   LIPASE   --    --   110   --   55*   --    --     < > = values in this interval not displayed.         Assessment:    Gastric ulcer, biopsy results consistent with poorly differentiated adenocarcinoma.  FNA results pending.  Delivered news to patient.  Called wife, she is on the way.    Plan:    -  Oncology consult placed.  -  Please let us know if the patient/wife would like to discuss further with us.  We are here until 4 today.  -  Otherwise, will sign off.  Please call with questions.                ALICIA Nwoak  [facility]  Office:  [phone] call if needed after 5PM  Cell:  [phone], not available after 5PM at this number

## 2018-09-07 NOTE — PLAN OF CARE
Problem: Patient Care Overview  Goal: Plan of Care/Patient Progress Review  Outcome: No Change  A&OX4. Full code. VSS on RA except bradycardic. GI now signed off, waiting on hem/onc consult to come. Clear liquid diet. IV saline locked, Abx discontinued. Oxycodone given X1 for abdominal pain. Reported nausea but declined medication. Sad mood d/t dx. Up independently. Will cont to monitor.

## 2018-09-07 NOTE — CONSULTS
[facility] Consultation      Ernie Song MRN# [id number]   YOB: 1980 Age: 38 year old   Date of Admission: 9/5/2018  Requesting physician: Dr. King  Reason for consult: New diagnosis gastric adenocarcinoma            Assessment and Plan:   1. Metastatic gastric cancer  -upper EUS on 9/5/2018 showed a 5.3cms non-bleeding gastric ulcer on the lesser curvature of the stomach with sonographic evidence of invasion into the serosa (T4a); gastric wall thickened in the body as well worrisome for linitus, malignant appearing LNs in the gastrohepatic ligament and perigastric region   -biopsy of the gastric ulcer consistent with poorly differentiated adenocarcinoma with signet ring cell pattern.  Biopsy of perigastric LN and ascitic fluid cytology also consistent with metastatic malignancy.  -ascitic fluid positive for malignancy making this a stage IV unresectable cancer  -would complete staging work up with CT chest with IV contrast   -reviewed diagnosis, stage and treatment options  -unfortunately treatment is unlikely to afford him a cure and is palliative in nature  -Her2/adrian results pending   -ordered IHC for MMR and PD-L1 testing  -will arrange for outpatient follow up to discuss treatment options (chemotherapy versus chemoradiation if bleeding etc)    2. Pain  -reports pain at 3/10 intensity   -oxycontin 10mg po bid for long acting pain control and oxycodone as needed for breakthrough pain    3. Microcytic anemia  -likely related to blood loss from known gastric malignancy   -H&H appears stable   -ferritin 10ng/ml and transferrin saturation of 5% consistent with iron deficiency   -will arrange for parenteral iron therapy as outpatient     4. Nutrition  -currently on clears   -able to meet nutritional needs orally; weight preserved and not obstructed  -depending on course needs reassessment of nutritional status on follow up/need for feeding tube placement     Advance diet as tolerated.   Can be discharged home when ready.  WE will schedule him a follow up visit in clinic for further cares.    Judson Silva MD               Chief Complaint:   No chief complaint on file.         History of Present Illness:   Ernie Song is a 38 year old gentleman referred to me given his new diagnosis of poorly differentiated gastric adenocarcinoma.    He reported a history of abdominal pain since at least a few years now.  He had an episode of nausea followed by hematemesis end of February 2018 and underwent EGD under the care of Dr. Ibrahim on 2/26/2018.  The study demonstrated nodular mucosa in the gastric body and the gastric antrum with oozing gastric ulcers in the lesser curvature of the stomach.  Biopsies from the gastric body and the antrum showed no evidence of dysplasia or malignancy at that time.  He was started on proton pump inhibitor therapy.    He continued to have abdominal pain and as such underwent a noncontrast CT abdomen and pelvis on 7/13/2018 and this was reported as showing multiple soft tissue nodules as well as soft tissue stranding in the fat of the upper abdomen and along the lesser curvature of the stomach with findings suspicious for peritoneal metastatic disease.    I repeat CT abdomen and pelvis on 9/5/2018 demonstrated wall thickening of the gastric body suspicious for malignancy, enlarged lymph nodes anterior to the stomach, soft tissue stranding and nodularity in the fat of the abdomen concerning for progressive peritoneal metastatic disease and a small amount of ascites and bilateral pleural effusions with associated atelectasis.  He underwent upper EUS on 9/5/2018 that demonstrated a 5.3 cm gastric ulcer with sonographic evidence of invasion into the serosa, regional gastric lymphadenopathy and ascites.  Biopsies of the gastric ulcer, gastric lymph node and cytology from the ascitic fluid were all consistent with gastric adenocarcinoma.    He reported smoking in the past  but quit smoking more than 12 years ago.  He did report drinking 1-3 cans of beer on a daily basis over the last 4-5 years.  Denied chronic NSAID use.  Denied family history of gastric malignancies.  He has 2 kids aged 7 and 8.  Work primarily involves lawn-mowing and snow removal.           Physical Exam:   Vitals were reviewed  Blood pressure 123/73, pulse 65, temperature 98  F (36.7  C), temperature source Oral, resp. rate 16, weight 76.6 kg (168 lb 14 oz), SpO2 95 %.  Temperatures:  Current - Temp: 98  F (36.7  C); Max - Temp  Av.3  F (36.8  C)  Min: 98  F (36.7  C)  Max: 98.6  F (37  C)  Respiration range: Resp  Av  Min: 16  Max: 16  Pulse range: No Data Recorded  Blood pressure range: Systolic (24hrs), Av , Min:122 , Max:123   ; Diastolic (24hrs), Av, Min:73, Max:76    Pulse oximetry range: SpO2  Av %  Min: 95 %  Max: 95 %    Intake/Output Summary (Last 24 hours) at 18 1526  Last data filed at 18 1047   Gross per 24 hour   Intake              720 ml   Output                0 ml   Net              720 ml       GENERAL: avoid eye contact, low mood  SKIN: No rashes or jaundice.  HEENT: Normocephalic, atraumatic. Eyes anicteric. Oropharynx is clear.  LYMPH: No palpable lymphadenopathy in the cervical, supraclavicular, axillary, or inguinal regions.  HEART: Regular rate and rhythm with no murmurs.  LUNGS: Clear bilaterally.  ABDOMEN: epigastric tenderness, no hepatosplenomegaly, bowel sounds well heard   EXTREMITIES: No clubbing, cyanosis, or edema.  MUSCULOSKELETAL: No pain to percussion over entire spine.  MENTAL: Alert and oriented to person, place, and time.  NEURO: Cranial nerves II through XII grossly intact with no focal motor or sensory deficits.            Past Medical History:   I have reviewed this patient's past medical history  Past Medical History:   Diagnosis Date     Genital herpes      Gynecomastia, male 2016    Chest CT benign     Ulcer, gastric, acute               Past Surgical History:   I have reviewed this patient's past surgical history  Past Surgical History:   Procedure Laterality Date     ESOPHAGOSCOPY, GASTROSCOPY, DUODENOSCOPY (EGD), COMBINED N/A 2/26/2018    Procedure: COMBINED ESOPHAGOSCOPY, GASTROSCOPY, DUODENOSCOPY (EGD), BIOPSY SINGLE OR MULTIPLE;  gastroscopy;  Surgeon: Lalo Ibrahim MD;  Location: [facility]     ESOPHAGOSCOPY, GASTROSCOPY, DUODENOSCOPY (EGD), COMBINED N/A 9/5/2018    Procedure: COMBINED ENDOSCOPIC ULTRASOUND, ESOPHAGOSCOPY, GASTROSCOPY, DUODENOSCOPY (EGD), FINE NEEDLE ASPIRATE/BIOPSY;  COMBINED ENDOSCOPIC ULTRASOUND, ESOPHAGOSCOPY, GASTROSCOPY, DUODENOSCOPY (EGD), FINE NEEDLE ASPIRATE;  Surgeon: Ever Owusu MD;  Location: [facility]     ESOPHAGOSCOPY, GASTROSCOPY, DUODENOSCOPY (EGD), COMBINED N/A 9/5/2018    Procedure: COMBINED ESOPHAGOSCOPY, GASTROSCOPY, DUODENOSCOPY (EGD), BIOPSY SINGLE OR MULTIPLE;;  Surgeon: Ever Owusu MD;  Location: [facility]     NO HISTORY OF SURGERY                 Social History:   I have reviewed this patient's social history  Social History   Substance Use Topics     Smoking status: Former Smoker     Packs/day: 1.00     Years: 12.00     Types: Cigarettes     Quit date: 10/15/2004     Smokeless tobacco: Never Used     Alcohol use No      Comment: none currently             Family History:   I have reviewed this patient's family history  Family History   Problem Relation Age of Onset     Prostate Cancer Father      Other Cancer Maternal Grandmother      Other Cancer Other      Asthma Mother      Stomach Cancer Maternal Uncle              Allergies:   No Known Allergies          Medications:   I have reviewed this patient's current medications  Prescriptions Prior to Admission   Medication Sig Dispense Refill Last Dose     ferrous sulfate (IRON) 325 (65 Fe) MG tablet Take 1 tablet (325 mg) by mouth every other day 90 tablet 2 Past Week     oxyCODONE IR (ROXICODONE) 5 MG tablet Take 1 tablet  (5 mg) by mouth every 4 hours as needed for moderate to severe pain 20 tablet 0 9/5/2018     pantoprazole (PROTONIX) 40 MG EC tablet Take 1 tablet (40 mg) by mouth 2 times daily Take 30-60 minutes before a meal. 30 tablet 1 9/5/2018     acetaminophen (TYLENOL) 325 MG tablet Take 2 tablets (650 mg) by mouth every 4 hours as needed for mild pain 100 tablet  More than a month     valACYclovir (VALTREX) 500 MG tablet TAKE 1 TABLET (500 MG) BY MOUTH DAILY 90 tablet 3 More than a month     Current Facility-Administered Medications Ordered in Epic   Medication Dose Route Frequency Last Rate Last Dose     acetaminophen (TYLENOL) tablet 650 mg  650 mg Oral Q4H PRN         calcium carbonate (TUMS) chewable tablet 1,000 mg  1,000 mg Oral Q2H PRN   1,000 mg at 09/06/18 2206     docusate sodium (COLACE) capsule 100 mg  100 mg Oral BID   100 mg at 09/07/18 1029     melatonin tablet 1 mg  1 mg Oral At Bedtime PRN         morphine (PF) injection 2-4 mg  2-4 mg Intravenous Q2H PRN         naloxone (NARCAN) injection 0.1-0.4 mg  0.1-0.4 mg Intravenous Q2 Min PRN         ondansetron (ZOFRAN-ODT) ODT tab 4 mg  4 mg Oral Q6H PRN   4 mg at 09/06/18 1131    Or     ondansetron (ZOFRAN) injection 4 mg  4 mg Intravenous Q6H PRN         oxyCODONE IR (ROXICODONE) tablet 5-10 mg  5-10 mg Oral Q4H PRN   10 mg at 09/07/18 1115     pantoprazole (PROTONIX) EC tablet 40 mg  40 mg Oral BID AC   40 mg at 09/07/18 0646     polyethylene glycol (MIRALAX/GLYCOLAX) Packet 17 g  17 g Oral Daily PRN         sodium chloride (PF) 0.9% PF flush 3 mL  3 mL Intracatheter Q1H PRN         sodium chloride (PF) 0.9% PF flush 3 mL  3 mL Intracatheter Q8H   3 mL at 09/07/18 0646     valACYclovir (VALTREX) tablet 500 mg  500 mg Oral Daily   500 mg at 09/07/18 1029     No current [facility]-ordered outpatient prescriptions on file.             Review of Systems:   The 10 point Review of Systems is negative other than noted in the HPI.            Data:   All laboratory data  reviewed  Results for orders placed or performed during the hospital encounter of 09/05/18 (from the past 24 hour(s))   Mismatch Repair by Immunohistochemistry   Result Value Ref Range    Mismatch Repair by Immunohistochemistry Specimen Received - See Separate Pathology Report    PD-L1 by Immunohistochemistry   Result Value Ref Range    PD-L1 by Immunohistochemistry Specimen Received - See Separate Pathology Report

## 2018-09-08 NOTE — PROGRESS NOTES
[facility]  Hospitalist Progress Note    09/08/2018         Assessment and Plan:       Ernie Song is a 38 year old male who was admitted on 9/5/2018  Following EUS that showed showed large gastric ulcer and ascites, admitted after EUS today and admitting for diagnosis of possible GI malignancy and pain control.    Gastric Adenocarcinoma  Gastric ulcer  Abdominal pain  Ascites  Assessment: EUS showed Large gastric ulcer with transmural involvement. There is also multiple perigastric and gastroduodenal lymph nodes with small amount of ascited noted. Findings overall concerning for malignancy and possible perforation. Biopsies and FNA are pending. CT of A/P with wall thickening of the gastric body suspicious for malignancy and increased soft tissue stranding and nodularity in the fat of the abdomen suggestive of peritoneal mets. There was no perforation seen. Pathology so far shows Poorly differentiated gastric adenocarcinoma with evidence of metastatic disease in lymph nodes, unresectable.    Plan:    - Oncology consult completed, plan outpatient follow up with oncologist DERICK Silva next week when additional path stains available to finalize treatment plan  - ADAT slowly  - pain control as needed, oxycontin every 12 hours and oxycodone as needed  - Protonix 40 mg BID  - Follow vitals/temp    Iron Deficiency Anemia  Microcytic Anemia  Assessment: iron studies on 09/01/2018 consistent with MARIE in setting of peptic ulcer disease, likely has superimposed anemia of chronic disease as well.   Plan:  - Follow for evidence of blood loss    Active Diet Order      Advance Diet as Tolerated: Full Liquid Diet    DVT Prophylaxis: Pneumatic Compression Devices  Code Status: Full Code    Disposition: Expected discharge in 1-2 days once diet is able to be advanced    Attestation:   I have reviewed today's relevant vital signs, notes, medications, labs and imaging.I personally reviewed no images or EKG's  today.    Simón King MD  [facility]  Text Page  (7am - 6pm)        Interval History:        - Abdominal pain with advancing diet  - NO fever  - No other complaints  - no CP/SOB  - No nausea/vomiting.         Physical Exam:        Physical Exam   Temp: 98.4  F (36.9  C) Temp src: Oral BP: 123/75   Heart Rate: 71 Resp: 18 SpO2: 94 % O2 Device: None (Room air)    Vitals:    09/06/18 0641 09/07/18 0656 09/08/18 0659   Weight: 75.5 kg (166 lb 7.2 oz) 76.6 kg (168 lb 14 oz) 71.2 kg (157 lb)     Vital Signs with Ranges  Temp:  [98.2  F (36.8  C)-98.4  F (36.9  C)] 98.4  F (36.9  C)  Heart Rate:  [71-76] 71  Resp:  [16-18] 18  BP: (123-130)/(62-79) 123/75  SpO2:  [94 %-97 %] 94 %        Constitutional: Awake, alert, cooperative, no apparent distress  Respiratory: Clear to auscultation bilaterally, no crackles or wheezing  Cardiovascular: Regular rate and rhythm, normal S1 and S2, and no murmur noted  GI: Normal bowel sounds, soft, non-distended, epigatric/RUQ tenderness  Skin/Integumen: No rashes, no cyanosis, no edema  Neuro: moving all extremities. A/O x3. No gross neuro deficits.     # Pain Assessment:  Current Pain Score 9/8/2018   Patient currently in pain? yes   Pain score (0-10) 7   Pain location Abdomen   Pain descriptors Burning;Aching   - Ernie is experiencing pain due to gastric ulcer. Pain management was discussed and the plan was created in a collaborative fashion.  Ernie's response to the current recommendations: engaged  - Please see the plan for pain management as documented above         Data:     CBC RESULTS:     Recent Labs  Lab 09/05/18  1800   WBC 4.5   RBC 4.49   HGB 10.1*   HCT 32.4*          BASIC METABOLIC PANEL:    Recent Labs  Lab 09/05/18  1800      POTASSIUM 3.8   CHLORIDE 104   CO2 30   BUN 9   CR 0.84   GLC 77   AROLDO 8.7       HEPATIC FUNCTION PANEL    Recent Labs  Lab 09/05/18  1800   AST 15   ALT 18   ALKPHOS 74   BILITOTAL 0.4     INR  No results for  input(s): INR in the last 168 hours.    OTHER LABS    None    Medications       docusate sodium  100 mg Oral BID     oxyCODONE  20 mg Oral Q12H     pantoprazole  40 mg Oral BID AC     sodium chloride (PF)  3 mL Intracatheter Q8H     valACYclovir  500 mg Oral Daily         Recent Results (from the past 24 hour(s))   CT Chest w Contrast    Narrative    CT CHEST WITH CONTRAST 9/7/2018 4:59 PM    HISTORY: Metastatic gastric cancer - look for evidence of  intrathoracic metastatic disease.     TECHNIQUE: Axial images from thoracic inlet to diaphragm. 75mL  Isovue-370 Radiation dose for this scan was reduced using automated  exposure control, adjustment of the mA and/or kV according to patient  size, or iterative reconstruction technique.    COMPARISON: 9/5/2018 CT abdomen and pelvis    FINDINGS:   CHEST: Large right pleural effusion appears slightly larger than on  9/5/2018. Small left pleural effusion. There is associated lower lobe  atelectasis adjacent to the effusions right greater than left. No  mediastinal or hilar adenopathy.    Diffuse gastric wall thickening and ascites is identified in the upper  abdomen adjacent to the spleen, there is enlarged lymph nodes in the  cardiophrenic fat, gastrohepatic fat and nodes anterior to the gastric  body as well as upper abdominal fat stranding and nodularity  consistent with patient's gastric malignancy with metastasis.    There is a small calcified nodule left lower lobe. 0.2 cm  indeterminate nodule right mid lung series 6 image 142. The lungs are  otherwise clear.      Impression    IMPRESSION:  1. No convincing evidence for pulmonary metastasis. Tiny indeterminate  0.2 cm nodule right mid lung.  2. Bilateral pleural effusions right greater than left with associated  lower lobe atelectasis.  3. Ascites, gastric wall thickening consistent with malignancy and  adenopathy in the upper abdomen consistent with metastasis as well as  peripheral nodularity left upper quadrant  consistent with  carcinomatosis.    EYAL REED MD

## 2018-09-08 NOTE — PLAN OF CARE
Problem: Patient Care Overview  Goal: Plan of Care/Patient Progress Review  Outcome: No Change  Pt is A/O x 4. Independent. VSS on RA. Reports RUQ abdominal pain that radiated to R arm and shoulder, controlled with oxycodone. LS clear. Denies chest pain. Diet advanced to full liquids, tolerated breakfast but reports that pain did increase with eating and he had to eat really slow. No BM this shift. Continue to monitor.

## 2018-09-08 NOTE — PROGRESS NOTES
[facility]    Oncology Progress Note    Date of Service (when I saw the patient): 09/08/2018     Assessment & Plan   Ernie Song is a 38 year old male who was admitted on 9/5/2018.       Metastatic Gastric Cancer  -EGD/EUS 9/5 /18 confirmed biopsy proven adenocarcinoma  -T4N1M1 with pathology of lymph node and ascitic fluid post ward for adenocarcinoma  -chest CT negative for pulmonary metastases  -await Her 2, MMR, and PDL1 testing  -reviewed with patient that primary treatment will be chemotherapy  -specifics of chemo depend on above pending tests  -plan outpatient follow up with DERICK Silva next week when additional path stains available to finalize treatment plan    Pain  -due to malignancy  -continue protonix  -continue oxycontin 20 mg bid, prn oxycodone, tylenol    Iron Deficiency Anemia  -plan out patient parenteral iron  -no evidence of active blood loss at present    Disposition  -OK for discharge when tolerating PO well and pain adequately controlled with plans for follow up with Dr Silva next week after additional pathology results available    DVT Prophylaxis: PCDs  Code Status: Full Code    Valdemar Roca    Interval History   No new concerns, pain controlled, denies nausea    Physical Exam   Temp: 98.4  F (36.9  C) Temp src: Oral BP: 123/75   Heart Rate: 71 Resp: 16 SpO2: 94 % O2 Device: None (Room air)    Vitals:    09/06/18 0641 09/07/18 0656 09/08/18 0659   Weight: 75.5 kg (166 lb 7.2 oz) 76.6 kg (168 lb 14 oz) 71.2 kg (157 lb)     Vital Signs with Ranges  Temp:  [98.2  F (36.8  C)-98.4  F (36.9  C)] 98.4  F (36.9  C)  Heart Rate:  [71-76] 71  Resp:  [16-17] 16  BP: (123-130)/(62-79) 123/75  SpO2:  [94 %-97 %] 94 %  I/O last 3 completed shifts:  In: 240 [P.O.:240]  Out: -     Constitutional: awake, cooperative, no acute distress  Hematologic / Lymphatic: no cervical lymphadenopathy  GI: soft, distended, non-tender, no masses palpated  Skin: no bruising or  bleeding  Musculoskeletal: no pedal edema    Medications       docusate sodium  100 mg Oral BID     oxyCODONE  20 mg Oral Q12H     pantoprazole  40 mg Oral BID AC     sodium chloride (PF)  3 mL Intracatheter Q8H     valACYclovir  500 mg Oral Daily       Data   Results for orders placed or performed during the hospital encounter of 09/05/18 (from the past 24 hour(s))   Mismatch Repair by Immunohistochemistry   Result Value Ref Range    Mismatch Repair by Immunohistochemistry Specimen Received - See Separate Pathology Report    PD-L1 by Immunohistochemistry   Result Value Ref Range    PD-L1 by Immunohistochemistry Specimen Received - See Separate Pathology Report    CT Chest w Contrast    Narrative    CT CHEST WITH CONTRAST 9/7/2018 4:59 PM    HISTORY: Metastatic gastric cancer - look for evidence of  intrathoracic metastatic disease.     TECHNIQUE: Axial images from thoracic inlet to diaphragm. 75mL  Isovue-370 Radiation dose for this scan was reduced using automated  exposure control, adjustment of the mA and/or kV according to patient  size, or iterative reconstruction technique.    COMPARISON: 9/5/2018 CT abdomen and pelvis    FINDINGS:   CHEST: Large right pleural effusion appears slightly larger than on  9/5/2018. Small left pleural effusion. There is associated lower lobe  atelectasis adjacent to the effusions right greater than left. No  mediastinal or hilar adenopathy.    Diffuse gastric wall thickening and ascites is identified in the upper  abdomen adjacent to the spleen, there is enlarged lymph nodes in the  cardiophrenic fat, gastrohepatic fat and nodes anterior to the gastric  body as well as upper abdominal fat stranding and nodularity  consistent with patient's gastric malignancy with metastasis.    There is a small calcified nodule left lower lobe. 0.2 cm  indeterminate nodule right mid lung series 6 image 142. The lungs are  otherwise clear.      Impression    IMPRESSION:  1. No convincing evidence  for pulmonary metastasis. Tiny indeterminate  0.2 cm nodule right mid lung.  2. Bilateral pleural effusions right greater than left with associated  lower lobe atelectasis.  3. Ascites, gastric wall thickening consistent with malignancy and  adenopathy in the upper abdomen consistent with metastasis as well as  peripheral nodularity left upper quadrant consistent with  carcinomatosis.    EYAL REED MD

## 2018-09-08 NOTE — PROGRESS NOTES
On Call    Call about constant epigastric burning, has large adenoCA of stomach.    Will give antacids q2h prn -- but doubt they will help much  Is on protonix 40 mg bid    Will increase Oxycontin to 20 mg bid   And increase Oxycodone to 5-15 mg q3h prn.    Nicholas Elias  Pager: [phone]  Cell Phone:  [phone]

## 2018-09-08 NOTE — PLAN OF CARE
Problem: Patient Care Overview  Goal: Plan of Care/Patient Progress Review  Outcome: No Change  Pt is A&Ox4, up independently, VSS on RA, c/o burning epigastric pain, MD notified. Schedule oxycontin increased to 20mg BID and PRN oxycodone increased to 5-15mg q3h. Tolerating clear liquids, ADAT tomorrow per MD note. Heme/onc following. Discharge pending progress.

## 2018-09-08 NOTE — PROVIDER NOTIFICATION
MD Notification    Notified Person: MD    Notified Person Name: Dr. Santos    Notification Date/Time: 2020    Notification Interaction: page    Purpose of Notification: pt experiencing constant epigastric burning sensation, PO protonix not effective.     Orders Received: Oxycontin increased to 20mg BID, PRN oxycodone increased to 5-15mg q3h and PRN PO Maalox ordered.     Comments:

## 2018-09-08 NOTE — PLAN OF CARE
Problem: Patient Care Overview  Goal: Plan of Care/Patient Progress Review  Outcome: No Change  AO4, VSS on r/a.  Pain medicated with PRN medications.  CL diet.  Hemonc following.  Independent in room.  Patient slept well overnight.

## 2018-09-09 NOTE — PLAN OF CARE
Problem: Patient Care Overview  Goal: Plan of Care/Patient Progress Review  Outcome: No Change  VSS on RA. A&Ox4. Up independently. IV SL. Denies pain. Abdomen distended, BS active x4. Slept through the night. Tolerating Low Fiber diet. Advance as tolerated. DC possible tomorrow.

## 2018-09-09 NOTE — PROGRESS NOTES
[facility]    Oncology Progress Note    Date of Service (when I saw the patient): 09/09/2018     Assessment & Plan   Ernie Song is a 38 year old male who was admitted on 9/5/2018.       Metastatic Gastric Cancer  -EGD/EUS 9/5 /18 confirmed biopsy proven adenocarcinoma  -T4N1M1 with pathology of lymph node and ascitic fluid post ward for adenocarcinoma  -chest CT negative for pulmonary metastases  -await Her 2, MMR, and PDL1 testing  -reviewed with patient that primary treatment will be chemotherapy  -specifics of chemo depend on above pending tests  -plan outpatient follow up with DERICK Silva next week when additional path stains available to finalize treatment plan    Pain  -due to malignancy  -continue protonix  -continue oxycontin 20 mg bid, prn oxycodone, tylenol    Iron Deficiency Anemia  -plan out patient parenteral iron  -no evidence of active blood loss at present    Disposition  -OK for discharge when tolerating PO well and pain adequately controlled with plans for follow up with Dr Silva next week after additional pathology results available    No new suggestions today    DVT Prophylaxis: PCDs  Code Status: Full Code    Valdemar Roca    Interval History   No new concerns, some burping and hiccups, pain controlled, denies nausea    Physical Exam   Temp: 98.2  F (36.8  C) Temp src: Oral BP: 125/83 Pulse: 90 Heart Rate: 74 Resp: 18 SpO2: 95 % O2 Device: None (Room air)    Vitals:    09/06/18 0641 09/07/18 0656 09/08/18 0659   Weight: 75.5 kg (166 lb 7.2 oz) 76.6 kg (168 lb 14 oz) 71.2 kg (157 lb)     Vital Signs with Ranges  Temp:  [98.2  F (36.8  C)-98.4  F (36.9  C)] 98.2  F (36.8  C)  Pulse:  [90] 90  Heart Rate:  [74-88] 74  Resp:  [16-18] 18  BP: (124-127)/(83-85) 125/83  SpO2:  [95 %-96 %] 95 %  I/O last 3 completed shifts:  In: 200 [P.O.:200]  Out: -     Constitutional: awake, cooperative, no acute distress  Hematologic / Lymphatic: no cervical lymphadenopathy  GI: soft,  distended, non-tender, no masses palpated  Skin: no bruising or bleeding  Musculoskeletal: no pedal edema    Medications       docusate sodium  100 mg Oral BID     oxyCODONE  20 mg Oral Q12H     pantoprazole  40 mg Oral BID AC     sodium chloride (PF)  3 mL Intracatheter Q8H     valACYclovir  500 mg Oral Daily       Data   Results for orders placed or performed during the hospital encounter of 09/05/18 (from the past 24 hour(s))   CEA   Result Value Ref Range    CEA <0.5 0 - 2.5 ug/L

## 2018-09-09 NOTE — DISCHARGE SUMMARY
Discharge Summary  Hospitalist    Date of Admission:  9/5/2018  Date of Discharge:  9/9/2018  Discharging Provider: Simón King MD  Date of Service (when I saw the patient): 09/09/18    Discharge Diagnoses      Gastric Adenocarcinoma with metastasis  Gastric Ulcer    History of Present Illness     Ernie Song is a 38 year old male who was admitted on 9/5/2018  Following EUS that showed showed large gastric ulcer and ascites, admitted after EUS today and admitting for diagnosis of possible GI malignancy and pain control.    Hospital Course   Ernie Song was admitted on 9/5/2018.  The following problems were addressed during his hospitalization:    Gastric Adenocarcinoma  Gastric ulcer  Abdominal pain  Ascites  Assessment: EUS showed Large gastric ulcer with transmural involvement. There is also multiple perigastric and gastroduodenal lymph nodes with small amount of ascited noted. Findings overall concerning for malignancy and possible perforation. Biopsies and FNA are pending. CT of A/P with wall thickening of the gastric body suspicious for malignancy and increased soft tissue stranding and nodularity in the fat of the abdomen suggestive of peritoneal mets. There was no perforation seen. Pathology so far shows Poorly differentiated gastric adenocarcinoma with evidence of metastatic disease in lymph nodes, unresectable.    Plan:    - Oncology consult completed, plan outpatient follow up with oncologist DERICK Silva next week when additional path stains available to finalize treatment plan  - ADAT slowly  - pain control as needed, oxycontin every 12 hours and oxycodone as needed  - Protonix 40 mg BID  - Follow vitals/temp     Iron Deficiency Anemia  Microcytic Anemia  Assessment: iron studies on 09/01/2018 consistent with MARIE in setting of peptic ulcer disease, likely has superimposed anemia of chronic disease as well.   Plan:  - Continue PTA iron       # Discharge Pain Plan:   - During his  hospitalization, Ernie experienced pain due to malignancy.  The pain plan for discharge was discussed with Ernie and the plan was created in a collaborative fashion.    - Opioids prescribed on discharge: Oxyocontin and Oxycodone  - Duration of opioids after discharge: 15 day supply  - Bowel regimen: not needed    Simón King MD    Significant Results and Procedures     None    Pending Results     Unresulted Labs Ordered in the Past 30 Days of this Admission     Date and Time Order Name Status Description    9/7/2018 1057 Her 2 adrian FISH In process     9/5/2018 1433 Surgical pathology exam In process     9/5/2018 1418 Fine needle aspiration In process           Code Status   Full Code       Primary Care Physician   Gay Winslow    Physical Exam   Temp: 98.2  F (36.8  C) Temp src: Oral BP: 125/83 Pulse: 90 Heart Rate: 74 Resp: 18 SpO2: 95 % O2 Device: None (Room air)    Vitals:    09/06/18 0641 09/07/18 0656 09/08/18 0659   Weight: 75.5 kg (166 lb 7.2 oz) 76.6 kg (168 lb 14 oz) 71.2 kg (157 lb)     Vital Signs with Ranges  Temp:  [98.2  F (36.8  C)-98.4  F (36.9  C)] 98.2  F (36.8  C)  Pulse:  [90] 90  Heart Rate:  [74-88] 74  Resp:  [16-18] 18  BP: (124-127)/(83-85) 125/83  SpO2:  [95 %-96 %] 95 %  I/O last 3 completed shifts:  In: 200 [P.O.:200]  Out: -     Constitutional: Awake, alert, cooperative, no apparent distress  Respiratory: Clear to auscultation bilaterally, no crackles or wheezing  Cardiovascular: Regular rate and rhythm, normal S1 and S2, and no murmur noted  GI: Normal bowel sounds, soft, non-distended, epigatric/RUQ tenderness  Skin/Integumen: No rashes, no cyanosis, no edema  Neuro: moving all extremities. A/O x3. No gross neuro deficits.     Discharge Disposition   Discharged to home  Condition at discharge: Stable    Consultations This Hospital Stay   HEMATOLOGY & ONCOLOGY IP CONSULT    Time Spent on this Encounter   I, Simón King, personally saw the patient today and spent greater than  30 minutes discharging this patient.    Discharge Orders     Reason for your hospital stay   You had abdominal pain     Follow-up and recommended labs and tests    Follow up with primary care provider, Gay Winslow, within 7 days for hospital follow- up in regards to gastric ulcer (assess continued tolerance of diet) and new diagnosis of gastric adenocarcinoma.  The following labs/tests are recommended: None. ALso should have following with Oncology as planned to discuss treatment strategies     Activity   Your activity upon discharge: activity as tolerated     When to contact your care team   Call your primary doctor if you have any of the following: increased pain of abdomen.     Full Code     Diet   Follow this diet upon discharge: Orders Placed This Encounter     Advance Diet as Tolerated: Regular diet       Discharge Medications   Current Discharge Medication List      START taking these medications    Details   oxyCODONE (OXYCONTIN) 20 MG 12 hr tablet Take 1 tablet (20 mg) by mouth every 12 hours  Qty: 30 tablet, Refills: 0    Associated Diagnoses: Gastric adenocarcinoma (H)         CONTINUE these medications which have CHANGED    Details   oxyCODONE IR (ROXICODONE) 5 MG tablet Take 1-2 tablets (5-10 mg) by mouth every 4 hours as needed for moderate to severe pain  Qty: 30 tablet, Refills: 0    Associated Diagnoses: Gastric adenocarcinoma (H)         CONTINUE these medications which have NOT CHANGED    Details   ferrous sulfate (IRON) 325 (65 Fe) MG tablet Take 1 tablet (325 mg) by mouth every other day  Qty: 90 tablet, Refills: 2    Associated Diagnoses: Epigastric pain      pantoprazole (PROTONIX) 40 MG EC tablet Take 1 tablet (40 mg) by mouth 2 times daily Take 30-60 minutes before a meal.  Qty: 30 tablet, Refills: 1    Associated Diagnoses: Peptic ulcer      acetaminophen (TYLENOL) 325 MG tablet Take 2 tablets (650 mg) by mouth every 4 hours as needed for mild pain  Qty: 100 tablet    Associated  Diagnoses: Epigastric pain      valACYclovir (VALTREX) 500 MG tablet TAKE 1 TABLET (500 MG) BY MOUTH DAILY  Qty: 90 tablet, Refills: 3    Associated Diagnoses: Genital herpes simplex, unspecified site           Allergies   No Known Allergies  Data   Most Recent 3 CBC's:  Recent Labs   Lab Test  09/05/18   1800  09/01/18   0940  07/12/18   2341   WBC  4.5  5.4  6.1   HGB  10.1*  10.8*  10.4*   MCV  72*  72*  70*   PLT  367  314  286      Most Recent 3 BMP's:  Recent Labs   Lab Test  09/05/18   1800  09/01/18   0940  07/12/18   2341   NA  140  136  139   POTASSIUM  3.8  4.2  3.7   CHLORIDE  104  102  107   CO2  30  28  26   BUN  9  9  11   CR  0.84  0.87  1.05   ANIONGAP  6  6  6   AROLDO  8.7  8.2*  8.6   GLC  77  91  94     Most Recent 2 LFT's:  Recent Labs   Lab Test  09/05/18   1800  07/12/18   2341   AST  15  17   ALT  18  27   ALKPHOS  74  94   BILITOTAL  0.4  0.4     Most Recent INR's and Anticoagulation Dosing History:  Anticoagulation Dose History     Recent Dosing and Labs Latest Ref Rng & Units 2/26/2018    INR 0.86 - 1.14 1.04        Most Recent 3 Troponin's:No lab results found.  Most Recent Cholesterol Panel:  Recent Labs   Lab Test  10/10/17   0750   CHOL  264*   LDL  169*   HDL  53   TRIG  210*     Most Recent 6 Bacteria Isolates From Any Culture (See EPIC Reports for Culture Details):  Recent Labs   Lab Test  07/11/18   1356  02/06/13   1639  01/30/13   0600   CULT  No growth  No Beta Streptococcus isolated  No Salmonella, Shigella, Campylobacter, E. coli O157, Aeromonas, or Plesiomonas  isolated.     Most Recent TSH, T4 and A1c Labs:  Recent Labs   Lab Test  10/10/17   0750   07/01/16   0816   TSH  3.49   < >  3.57   T4  0.90   < >   --    A1C   --    --   5.6    < > = values in this interval not displayed.     Results for orders placed or performed during the hospital encounter of 09/05/18   CT Abdomen Pelvis w Contrast    Narrative    CT ABDOMEN AND PELVIS WITH CONTRAST   9/5/2018 8:16 PM     HISTORY:  Large gastric ulcer, significant pain, question perforation,  question carcinomatosis.     TECHNIQUE:  CT abdomen and pelvis with 84 mL Isovue-370 IV. Radiation  dose for this scan was reduced using automated exposure control,  adjustment of the mA and/or kV according to patient size, or iterative  reconstruction technique.    COMPARISON: 7/13/2018.    FINDINGS:  Abdomen: There is a moderate size right pleural effusion and a small  left pleural effusion. There is associated atelectasis in the lower  lobes. The heart size is normal. The liver, spleen, gallbladder,  pancreas, adrenal glands and kidneys are normal in appearance. There  is wall thickening of the gastric body. Several enlarged lymph nodes  anterior to the gastric body similar to the previous exam.    Pelvis: There is a new small amount of ascites. No free  intraperitoneal gas. Small and large bowel are within normal limits  without evidence of obstruction. The appendix is normal. There is  again prominent soft tissue stranding and nodularity in the fat of the  abdomen, increased from the previous exam.      Impression    IMPRESSION:  1. Wall thickening of the gastric body suspicious for malignancy.  There are several mildly enlarged lymph nodes anterior to the stomach.  2. Increased soft tissue stranding and nodularity in the fat of the  abdomen suggesting progression of peritoneal metastatic disease.  3. New small amount of ascites.   4. Bilateral pleural effusions and associated atelectasis.    LAURE HENDRICKS MD   CT Chest w Contrast    Narrative    CT CHEST WITH CONTRAST 9/7/2018 4:59 PM    HISTORY: Metastatic gastric cancer - look for evidence of  intrathoracic metastatic disease.     TECHNIQUE: Axial images from thoracic inlet to diaphragm. 75mL  Isovue-370 Radiation dose for this scan was reduced using automated  exposure control, adjustment of the mA and/or kV according to patient  size, or iterative reconstruction technique.    COMPARISON:  9/5/2018 CT abdomen and pelvis    FINDINGS:   CHEST: Large right pleural effusion appears slightly larger than on  9/5/2018. Small left pleural effusion. There is associated lower lobe  atelectasis adjacent to the effusions right greater than left. No  mediastinal or hilar adenopathy.    Diffuse gastric wall thickening and ascites is identified in the upper  abdomen adjacent to the spleen, there is enlarged lymph nodes in the  cardiophrenic fat, gastrohepatic fat and nodes anterior to the gastric  body as well as upper abdominal fat stranding and nodularity  consistent with patient's gastric malignancy with metastasis.    There is a small calcified nodule left lower lobe. 0.2 cm  indeterminate nodule right mid lung series 6 image 142. The lungs are  otherwise clear.      Impression    IMPRESSION:  1. No convincing evidence for pulmonary metastasis. Tiny indeterminate  0.2 cm nodule right mid lung.  2. Bilateral pleural effusions right greater than left with associated  lower lobe atelectasis.  3. Ascites, gastric wall thickening consistent with malignancy and  adenopathy in the upper abdomen consistent with metastasis as well as  peripheral nodularity left upper quadrant consistent with  carcinomatosis.    EYAL REED MD

## 2018-09-09 NOTE — PLAN OF CARE
Problem: Patient Care Overview  Goal: Plan of Care/Patient Progress Review  Outcome: No Change  Pt is A&Ox4, up independently, VSS on RA, Diet advanced to low fiber diet, pt ate scrambled eggs and fruit without issues, tolerating well. Pain controlled with Schedule Oxycontin. Discharge possibly tomorrow.

## 2018-09-09 NOTE — PROGRESS NOTES
Discharge    Patient discharged home with wife and 2 kids.    Care plan note     Pt is A/O x 4. Independent. VSS on RA. Reports RUQ abdominal pain is more controlled with oxycodone today. LS clear. Denies chest pain. Tolerates low fiber diet. No BM this shift.              Listed belongings gathered and returned to patient. Yes  Care Plan and Patient education resolved: Yes  Prescriptions if needed, hard copies sent with patient  Yes  Home and hospital acquired medications returned to patient: Yes  Medication Bin checked and emptied on discharge Yes  Follow up appointment made for patient: No

## 2018-09-10 PROBLEM — C16.9 GASTRIC ADENOCARCINOMA (H): Status: ACTIVE | Noted: 2018-01-01

## 2018-09-10 NOTE — Clinical Note
I sent a message to prior auth related to his oxycodone and oxycontin He has gastric cancer  Can we call PA and see if they can expedite this  Gay Winslow, PAWAN, APRN CNP

## 2018-09-10 NOTE — TELEPHONE ENCOUNTER
This is for gastric cancer.  Maria Eugenia Winslow NP asks if this can be high priority to see if this can be approved.    Katie Fernandez RN, [facility].b

## 2018-09-10 NOTE — MR AVS SNAPSHOT
After Visit Summary   9/10/2018    Ernie Song    MRN: [id number]           Patient Information     Date Of Birth          1980        Visit Information        Provider Department      9/10/2018 2:50 PM Gay Winslow APRN CNP [facility]        Today's Diagnoses     Gastric ulcer, unspecified chronicity, unspecified whether gastric ulcer hemorrhage or perforation present    -  1    Gastric adenocarcinoma (H)        Peptic ulcer          Care Instructions    PLAN:   1.   Symptomatic therapy suggested: Continue current medications.  2.  Orders Placed This Encounter   Medications     pantoprazole (PROTONIX) 40 MG EC tablet     Sig: Take 1 tablet (40 mg) by mouth 2 times daily Take 30-60 minutes before a meal.     Dispense:  180 tablet     Refill:  1     Orders Placed This Encounter   Procedures     CBC with platelets     NUTRITION REFERRAL       3. Patient needs to follow up in if no improvement,or sooner if worsening of symptoms or other symptoms develop.  CONSULTATION/REFERRAL to dietician   FOLLOW UP WITH SPECIALIST :Oncology  Will follow up and/or notify patient of  results via My Chart to determine further need for followup    It was a pleasure seeing you today at the [facility]. Thank you for allowing us to care for you today. We truly hope we provided you with the excellent service you deserve. Please let us know if there is anything else we can do for you so we can be sure you are leaving completley satisfied with your care experience.       General information about your clinic   Clinic Hours Lab Hours (Appointments are required)   Mon-Thurs: 7:30 AM - 7 PM Mon-Thurs: 7:30 AM - 7 PM   Fri: 7:30 AM - 5 PM Fri: 7:30 AM - 5 PM        After Hours Nurse Advise & Appts:  [facility] Nurse Advisors: [phone]  [facility] On Call: to make appointments anytime: [phone] On Call Physician: call [phone] and answering service  will page the on call physician.        For urgent appointments, please call [phone] and ask for the triage nurse or your care team clinic nurse.  How to contact my care team:  [facility]: [email or web address]   Phone: [phone]   Fax: [phone]       [facility]:   Phone: [phone]  Hours: 8:00 AM - 6:00 PM  Medication Refills:  Call your pharmacy and they will forward the refill to us. Please allow 3 business days for your refills to be completed.       Normal or non-critical lab and imaging results will be communicated to you by MyChart, letter or phone within 7 days.  If you do not hear from us within 10 days, please call the clinic. If you have a critical or abnormal lab result, we will notify you by phone as soon as possible.       We now have PWIC (Pediatric Walk in Care)  Monday-Friday from 7:30-4. Simply walk in and be seen for your urgent needs like cough, fever, rash, diarrhea or vomiting, pink eye, UTI. No appointments needed. Ask one of the team for more information      -Your Care Team:    Dr. Jaky Young - Internal Medicine/Pediatrics   Dr. Nini Marcial - Family Medicine  Dr. Lissy Thomas - Pediatrics  Dr. Concha Rosado - Pediatrics  Gay Winslow CNP - Family Practice Nurse Practitioner                         Follow-ups after your visit        Additional Services     NUTRITION REFERRAL       Your provider has referred you to: FM: [facility] [phone]   [email or web address]    Please be aware that coverage of these services is subject to the terms and limitations of your health insurance plan.  Call member services at your health plan with any benefit or coverage questions.      Please bring the following with you to your appointment:    (1) This referral request  (2) Any documents given to you regarding this referral  (3) Any specific questions you have about diet and/or food choices                  Who to  contact     If you have questions or need follow up information about today's clinic visit or your schedule please contact [facility] directly at [phone].  Normal or non-critical lab and imaging results will be communicated to you by [employer], letter or phone within 4 business days after the clinic has received the results. If you do not hear from us within 7 days, please contact the clinic through [employer] or phone. If you have a critical or abnormal lab result, we will notify you by phone as soon as possible.  Submit refill requests through [employer] or call your pharmacy and they will forward the refill request to us. Please allow 3 business days for your refill to be completed.          Additional Information About Your Visit        [employer] Information     [employer] gives you secure access to your electronic health record. If you see a primary care provider, you can also send messages to your care team and make appointments. If you have questions, please call your primary care clinic.  If you do not have a primary care provider, please call [phone] and they will assist you.      [employer] is an electronic gateway that provides easy, online access to your medical records. With [employer], you can request a clinic appointment, read your test results, renew a prescription or communicate with your care team.     To access your existing account, please contact your [facility] or call [phone] for assistance.        Care EveryWhere ID     This is your Care EveryWhere ID. This could be used by other organizations to access your [facility] medical records  [id number]        Your Vitals Were     Pulse Temperature Pulse Oximetry BMI (Body Mass Index)          95 98.7  F (37.1  C) (Oral) 96% 28.12 kg/m2         Blood Pressure from Last 3 Encounters:   09/10/18 121/80   09/09/18 125/83   09/01/18 127/86    Weight from Last 3 Encounters:   09/10/18 163 lb 12.8 oz (74.3 kg)    09/08/18 157 lb (71.2 kg)   08/31/18 167 lb 1.7 oz (75.8 kg)              We Performed the Following     CBC with platelets     NUTRITION REFERRAL          Where to get your medicines      These medications were sent to [facility] [place] - [place], [place] - [address], [address]  [address], [address], [facility] [address]     Phone:  [phone]     pantoprazole 40 MG EC tablet          Primary Care Provider Office Phone # Fax #    Gay Winslow, APRN [phone]       [address]  [place] [address]        Equal Access to Services     NADINE ESCALANTE : Hadii panda arredondoo Somagnolia, waaxda luparrisadaha, qaybta kaalmada adeegyada, neymar sutton . So [facility] [phone].    ATENCIÓN: Si habla español, tiene a valente disposición servicios gratuitos de asistencia lingüística. Llame al [phone].    We comply with applicable federal civil rights laws and [place] laws. We do not discriminate on the basis of race, color, national origin, age, disability, sex, sexual orientation, or gender identity.            Thank you!     Thank you for choosing [facility]  for your care. Our goal is always to provide you with excellent care. Hearing back from our patients is one way we can continue to improve our services. Please take a few minutes to complete the written survey that you may receive in the mail after your visit with us. Thank you!             Your Updated Medication List - Protect others around you: Learn how to safely use, store and throw away your medicines at www.disposemymeds.org.          This list is accurate as of 9/10/18  3:54 PM.  Always use your most recent med list.                   Brand Name Dispense Instructions for use Diagnosis    acetaminophen 325 MG tablet    TYLENOL    100 tablet    Take 2 tablets (650 mg) by mouth every 4 hours as needed for mild pain    Epigastric pain       ferrous sulfate  325 (65 Fe) MG tablet    IRON    90 tablet    Take 1 tablet (325 mg) by mouth every other day    Epigastric pain       * oxyCODONE 20 MG 12 hr tablet    OxyCONTIN    30 tablet    Take 1 tablet (20 mg) by mouth every 12 hours    Gastric adenocarcinoma (H)       * oxyCODONE IR 5 MG tablet    ROXICODONE    30 tablet    Take 1-2 tablets (5-10 mg) by mouth every 4 hours as needed for moderate to severe pain    Gastric adenocarcinoma (H)       pantoprazole 40 MG EC tablet    PROTONIX    180 tablet    Take 1 tablet (40 mg) by mouth 2 times daily Take 30-60 minutes before a meal.    Peptic ulcer       valACYclovir 500 MG tablet    VALTREX    90 tablet    TAKE 1 TABLET (500 MG) BY MOUTH DAILY    Genital herpes simplex, unspecified site       * Notice:  This list has 2 medication(s) that are the same as other medications prescribed for you. Read the directions carefully, and ask your doctor or other care provider to review them with you.

## 2018-09-10 NOTE — NURSING NOTE
"Chief Complaint   Patient presents with     Hospital F/U     Follow-up from [facility]     Medication Question     discuss medications       Initial /80 (BP Location: Right arm, Patient Position: Sitting, Cuff Size: Adult Regular)  Pulse 95  Temp 98.7  F (37.1  C) (Oral)  Wt 163 lb 12.8 oz (74.3 kg)  SpO2 96%  BMI 28.12 kg/m2 Estimated body mass index is 28.12 kg/(m^2) as calculated from the following:    Height as of 8/31/18: 5' 4\" (1.626 m).    Weight as of this encounter: 163 lb 12.8 oz (74.3 kg).  Medication Reconciliation: complete      FABIOLA Mays      "

## 2018-09-10 NOTE — PROGRESS NOTES
SUBJECTIVE:   Ernie Song is a 38 year old male who presents to clinic today for the following health issues:    1. Would like to discuss medications with provider. Was told by pharmacist to have pcp's name listed on medications. Pantoprazole might require PA, patient only has 1 pill left.    Hospital Follow-up Visit:    Hospital/Nursing Home/IP Rehab Facility: [facility]  Date of Admission: 9/5/18  Date of Discharge: 9/9/18  Reason(s) for Admission: abdominal pain            Problems taking medications regularly:  None       Medication changes since discharge: None       Problems adhering to non-medication therapy:  None    Summary of hospitalization:  [facility] discharge summary reviewed    Ernie Song was admitted on 9/5/2018.  The following problems were addressed during his hospitalization:     Gastric Adenocarcinoma  Gastric ulcer  Abdominal pain  Ascites  Assessment: EUS showed Large gastric ulcer with transmural involvement. There is also multiple perigastric and gastroduodenal lymph nodes with small amount of ascited noted. Findings overall concerning for malignancy and possible perforation. Biopsies and FNA are pending. CT of A/P with wall thickening of the gastric body suspicious for malignancy and increased soft tissue stranding and nodularity in the fat of the abdomen suggestive of peritoneal mets. There was no perforation seen. Pathology so far shows Poorly differentiated gastric adenocarcinoma with evidence of metastatic disease in lymph nodes, unresectable.    Plan:    - Oncology consult completed, plan outpatient follow up with oncologist DERICK Silva next week when additional path stains available to finalize treatment plan  - ADAT slowly  - pain control as needed, oxycontin every 12 hours and oxycodone as needed  - Protonix 40 mg BID  - Follow vitals/temp      Iron Deficiency Anemia  Microcytic Anemia  Assessment: iron studies on 09/01/2018 consistent with  MARIE in setting of peptic ulcer disease, likely has superimposed anemia of chronic disease as well.   Plan:  - Continue PTA iron        # Discharge Pain Plan:   - During his hospitalization, Ernie experienced pain due to malignancy.  The pain plan for discharge was discussed with Ernie and the plan was created in a collaborative fashion.    - Opioids prescribed on discharge: Oxyocontin and Oxycodone  - Duration of opioids after discharge: 15 day supply  - Bowel regimen: not needed     Simón King MD         Diagnostic Tests/Treatments reviewed.  Follow up needed: has appointment with oncology   Other Healthcare Providers Involved in Patient s Care:         Specialist appointment - oncology on Thursday  Update since discharge: improved.     Post Discharge Medication Reconciliation: discharge medications reconciled and changed, per note/orders (see AVS).  Plan of care communicated with patient     Coding guidelines for this visit:  Type of Medical   Decision Making Face-to-Face Visit       within 7 Days of discharge Face-to-Face Visit        within 14 days of discharge   Moderate Complexity 44497 06149   High Complexity 68059 08519            Problem list and histories reviewed & adjusted, as indicated.  Additional history: as documented    Patient Active Problem List   Diagnosis     CARDIOVASCULAR SCREENING; LDL GOAL LESS THAN 160     Genital herpes     Tension headache     Blastocystis hominis infection     Palpitations     Elevated TSH     Melena     Abdominal pain     Gastric ulcer     Ascites     Microcytic anemia     Past Surgical History:   Procedure Laterality Date     ESOPHAGOSCOPY, GASTROSCOPY, DUODENOSCOPY (EGD), COMBINED N/A 2/26/2018    Procedure: COMBINED ESOPHAGOSCOPY, GASTROSCOPY, DUODENOSCOPY (EGD), BIOPSY SINGLE OR MULTIPLE;  gastroscopy;  Surgeon: Lalo Ibrahim MD;  Location: [facility]     ESOPHAGOSCOPY, GASTROSCOPY, DUODENOSCOPY (EGD), COMBINED N/A 9/5/2018    Procedure: COMBINED ENDOSCOPIC  ULTRASOUND, ESOPHAGOSCOPY, GASTROSCOPY, DUODENOSCOPY (EGD), FINE NEEDLE ASPIRATE/BIOPSY;  COMBINED ENDOSCOPIC ULTRASOUND, ESOPHAGOSCOPY, GASTROSCOPY, DUODENOSCOPY (EGD), FINE NEEDLE ASPIRATE;  Surgeon: Ever Owusu MD;  Location:  GI     ESOPHAGOSCOPY, GASTROSCOPY, DUODENOSCOPY (EGD), COMBINED N/A 9/5/2018    Procedure: COMBINED ESOPHAGOSCOPY, GASTROSCOPY, DUODENOSCOPY (EGD), BIOPSY SINGLE OR MULTIPLE;;  Surgeon: Ever Owusu MD;  Location:  GI     NO HISTORY OF SURGERY         Social History   Substance Use Topics     Smoking status: Former Smoker     Packs/day: 1.00     Years: 12.00     Types: Cigarettes     Quit date: 10/15/2004     Smokeless tobacco: Never Used     Alcohol use No      Comment: none currently     Family History   Problem Relation Age of Onset     Prostate Cancer Father      Other Cancer Maternal Grandmother      Other Cancer Other      Asthma Mother      Stomach Cancer Maternal Uncle          Current Outpatient Prescriptions   Medication Sig Dispense Refill     acetaminophen (TYLENOL) 325 MG tablet Take 2 tablets (650 mg) by mouth every 4 hours as needed for mild pain 100 tablet      ferrous sulfate (IRON) 325 (65 Fe) MG tablet Take 1 tablet (325 mg) by mouth every other day 90 tablet 2     oxyCODONE (OXYCONTIN) 20 MG 12 hr tablet Take 1 tablet (20 mg) by mouth every 12 hours 30 tablet 0     oxyCODONE IR (ROXICODONE) 5 MG tablet Take 1-2 tablets (5-10 mg) by mouth every 4 hours as needed for moderate to severe pain 30 tablet 0     pantoprazole (PROTONIX) 40 MG EC tablet Take 1 tablet (40 mg) by mouth 2 times daily Take 30-60 minutes before a meal. 30 tablet 1     valACYclovir (VALTREX) 500 MG tablet TAKE 1 TABLET (500 MG) BY MOUTH DAILY 90 tablet 3     No Known Allergies  BP Readings from Last 3 Encounters:   09/10/18 121/80   09/09/18 125/83   09/01/18 127/86    Wt Readings from Last 3 Encounters:   09/10/18 163 lb 12.8 oz (74.3 kg)   09/08/18 157 lb (71.2 kg)    08/31/18 167 lb 1.7 oz (75.8 kg)                  Labs reviewed in EPIC    Reviewed and updated as needed this visit by clinical staff  Tobacco  Allergies  Meds  Med Hx  Surg Hx  Fam Hx  Soc Hx      Reviewed and updated as needed this visit by Provider         ROS:  CONSTITUTIONAL:NEGATIVE for fever, chills, change in weight  ENT/MOUTH: NEGATIVE for ear, mouth and throat problems  RESP:NEGATIVE for significant cough or SOB  CV: NEGATIVE for chest pain, palpitations or peripheral edema  GI: POSITIVE for abdominal pain epigastric, heartburn or reflux, Hx GERD, nausea and poor appetite and NEGATIVE for hematemesis, jaundice and vomiting  MUSCULOSKELETAL: NEGATIVE for significant arthralgias or myalgia  NEURO: NEGATIVE for weakness, dizziness or paresthesias  PSYCHIATRIC: NEGATIVE for changes in mood or affect    OBJECTIVE:     /80 (BP Location: Right arm, Patient Position: Sitting, Cuff Size: Adult Regular)  Pulse 95  Temp 98.7  F (37.1  C) (Oral)  Wt 163 lb 12.8 oz (74.3 kg)  SpO2 96%  BMI 28.12 kg/m2  Body mass index is 28.12 kg/(m^2).   Wt Readings from Last 4 Encounters:   09/10/18 163 lb 12.8 oz (74.3 kg)   09/08/18 157 lb (71.2 kg)   08/31/18 167 lb 1.7 oz (75.8 kg)   08/31/18 163 lb 11.2 oz (74.3 kg)     GENERAL APPEARANCE: alert, active and no distress  RESP: lungs clear to auscultation - no rales, rhonchi or wheezes  CV: regular rates and rhythm, no murmur, click or rub and no irregular beats  ABDOMEN: mild tenderness in the entire abdomen area, no rebound tenderness, no guarding or rigidity  MS: extremities normal- no gross deformities noted  SKIN: no suspicious lesions or rashes  NEURO: Normal strength and tone, mentation intact and speech normal  PSYCH: mentation appears normal and affect normal/bright    Diagnostic Test Results:  No results found for this or any previous visit (from the past 24 hour(s)).    ASSESSMENT/PLAN:     Ernie was seen today for hospital f/u and medication  question.    Diagnoses and all orders for this visit:    Gastric ulcer, unspecified chronicity, unspecified whether gastric ulcer hemorrhage or perforation present  -     pantoprazole (PROTONIX) 40 MG EC tablet; Take 1 tablet (40 mg) by mouth 2 times daily Take 30-60 minutes before a meal.  -     CBC with platelets  -     NUTRITION REFERRAL    Gastric adenocarcinoma (H)  -     NUTRITION REFERRAL      Patient needs to follow up in if no improvement,or sooner if worsening of symptoms or other symptoms develop.  CONSULTATION/REFERRAL to dietician   FOLLOW UP WITH SPECIALIST :Oncology  Will follow up and/or notify patient of  results via My Chart to determine further need for followup    See Patient Instructions    MARILIN Lehman CNP  [facility]

## 2018-09-10 NOTE — PROGRESS NOTES
Ben Song,    Attached are your test results.  -Hemoglobin is stable   -White blood cell and platelet counts are normal.   Please contact us if you have any questions.    Gay Winslow, CNP

## 2018-09-10 NOTE — TELEPHONE ENCOUNTER
Prior Authorization Retail Medication Request    Medication/Dose: pantoprazole (PROTONIX) 40 MG EC tablet  ICD code (if different than what is on RX):  Peptic ulcer [K27.9]   Previously Tried and Failed:    Rationale:      Insurance Name:  [employer]  Insurance ID:  [id number]  BIN: [id number]  PCN: [id number]      Pharmacy Information (if different than what is on RX)  Name:  [facility]  Phone:  [phone]

## 2018-09-10 NOTE — PATIENT INSTRUCTIONS
PLAN:   1.   Symptomatic therapy suggested: Continue current medications.  2.  Orders Placed This Encounter   Medications     pantoprazole (PROTONIX) 40 MG EC tablet     Sig: Take 1 tablet (40 mg) by mouth 2 times daily Take 30-60 minutes before a meal.     Dispense:  180 tablet     Refill:  1     Orders Placed This Encounter   Procedures     CBC with platelets     NUTRITION REFERRAL       3. Patient needs to follow up in if no improvement,or sooner if worsening of symptoms or other symptoms develop.  CONSULTATION/REFERRAL to dietician   FOLLOW UP WITH SPECIALIST :Oncology  Will follow up and/or notify patient of  results via My Chart to determine further need for followup    It was a pleasure seeing you today at the [facility]. Thank you for allowing us to care for you today. We truly hope we provided you with the excellent service you deserve. Please let us know if there is anything else we can do for you so we can be sure you are leaving completley satisfied with your care experience.       General information about your clinic   Clinic Hours Lab Hours (Appointments are required)   Mon-Thurs: 7:30 AM - 7 PM Mon-Thurs: 7:30 AM - 7 PM   Fri: 7:30 AM - 5 PM Fri: 7:30 AM - 5 PM        After Hours Nurse Advise & Appts:  [place] Nurse Advisors: [phone]  [place] On Call: to make appointments anytime: [phone] On Call Physician: call [phone] and answering service will page the on call physician.        For urgent appointments, please call [phone] and ask for the triage nurse or your care team clinic nurse.  How to contact my care team:  MyChart: [email or web address]   Phone: [phone]   Fax: [phone]       [facility]:   Phone: [phone]  Hours: 8:00 AM - 6:00 PM  Medication Refills:  Call your pharmacy and they will forward the refill to us. Please allow 3 business days for your refills to be completed.       Normal or non-critical lab and imaging  results will be communicated to you by MyChart, letter or phone within 7 days.  If you do not hear from us within 10 days, please call the clinic. If you have a critical or abnormal lab result, we will notify you by phone as soon as possible.       We now have PWIC (Pediatric Walk in Care)  Monday-Friday from 7:30-4. Simply walk in and be seen for your urgent needs like cough, fever, rash, diarrhea or vomiting, pink eye, UTI. No appointments needed. Ask one of the team for more information      -Your Care Team:    Dr. Jaky Young - Internal Medicine/Pediatrics   Dr. Nini Marcial - Family Medicine  Dr. Lissy Thomas - Pediatrics  Dr. Concha Rosado - Pediatrics  Gay Winslow CNP - Family Practice Nurse Practitioner

## 2018-09-10 NOTE — Clinical Note
Can we expedite prior auth as insurance will not cover his pain medication which is sitting at [facility] in [place]  And his protonix as well  Thank You  Gay Winslow, NP, APRN CNP

## 2018-09-11 NOTE — TELEPHONE ENCOUNTER
Called [facility]. Both of the pain medications are going through insurance, no P/A needed on those. Just the Pantoprazole needs a Prior Auth. There is a current script at both [facility] and [facility]. Since Pain Meds are filled at [facility], will call [facility] with the Prior Auth approval if necessary.

## 2018-09-11 NOTE — TELEPHONE ENCOUNTER
Central Prior Authorization Team   Phone: [phone]        PA Initiation     Medication: pantoprazole (PROTONIX) 40 MG EC tablet  Insurance Company: [employer] - Phone [phone] Fax [phone]  Pharmacy Filling the Rx: [facility]  Filling Pharmacy Phone: [phone]  Filling Pharmacy Fax:    Start Date: 9/11/2018

## 2018-09-11 NOTE — TELEPHONE ENCOUNTER
Hello,  PA is required for protonix QTY LIMIT please, contact [employer] at [phone], id # [id number]    Please let us know when approved    Thank You,  Halie Mast  Pharmacy Technician  [facility]  [phone]

## 2018-09-11 NOTE — TELEPHONE ENCOUNTER
Can PA for these meds be expedited due to patient having Gastric Cancer and Needs these meds ASAP. Pantoprazole,  oxycodone and oxycontin   Gena ADAIRA

## 2018-09-12 NOTE — TELEPHONE ENCOUNTER
Anette from [facility] called stating if Gay Winslow is doing the PA then they also need an Rx sent to them in Gay's name as well. You can Call fax or escribe. Thank you

## 2018-09-12 NOTE — TELEPHONE ENCOUNTER
Will write a new script for oxycodone an oxycontin so we can get a PA  I believe he has an appointment with oncology on Thursday   Will need patein to  script

## 2018-09-13 NOTE — TELEPHONE ENCOUNTER
Hello,    PA needed for Oxycontin, please change this to something different (morphine) or please start PA, Insurance is [employer], phone # [phone], id # [id number]      Thank You,  Halie Mast  Pharmacy Technician  [facility]  [phone]

## 2018-09-13 NOTE — TELEPHONE ENCOUNTER
Central Prior Authorization Team   Phone: [phone]      PA Initiation        Medication: Oxycontin 20mg (Brand per pharmacy)  Insurance Company: [employer] - Phone [phone] Fax [phone]  Pharmacy Filling the Rx: [facility] [place], [place] - [address] [address]  Filling Pharmacy Phone: [phone]  Filling Pharmacy Fax:    Start Date: 9/13/2018

## 2018-09-13 NOTE — TELEPHONE ENCOUNTER
Prior Authorization Approval      Authorization Effective Date: 9/13/2018  Authorization Expiration Date: 9/13/2019  Medication: pantoprazole (PROTONIX) 40 MG EC tablet  Approved Dose/Quantity: 60  Reference #: Tracking Number is [id number]   Insurance Company: [employer] - Phone [phone] Fax [phone]  Expected CoPay:       CoPay Card Available:      Foundation Assistance Needed:    Which Pharmacy is filling the prescription (Not needed for infusion/clinic administered): [facility] and [facility]  Pharmacy Notified: Yes   Patient Notified:

## 2018-09-13 NOTE — TELEPHONE ENCOUNTER
Called and spoke to patient and his wife, noted from the last previous office visit that they would like the pain medications to go to our pharmacy onsite and everything else to go to [facility]. Verified with patient and would like pain scripts to pharmacy onsite. Did inform them that it was sent to our PA department to get PA started and will update them once we here back.    Divya Sr, A

## 2018-09-14 NOTE — IP AVS SNAPSHOT
MRN:[id number]                      After Visit Summary   9/14/2018    Ernie Song    MRN: [id number]           Visit Information        Department      9/14/2018 11:32 AM [facility]          Review of your medicines      UNREVIEWED medicines. Ask your doctor about these medicines        Dose / Directions    acetaminophen 325 MG tablet   Commonly known as:  TYLENOL   Used for:  Epigastric pain        Dose:  650 mg   Take 2 tablets (650 mg) by mouth every 4 hours as needed for mild pain   Quantity:  100 tablet   Refills:  0       COLACE PO        Dose:  100 mg   Take 100 mg by mouth daily   Refills:  0       ferrous sulfate 325 (65 Fe) MG tablet   Commonly known as:  IRON   Used for:  Epigastric pain        Dose:  325 mg   Take 1 tablet (325 mg) by mouth every other day   Quantity:  90 tablet   Refills:  2       ONDANSETRON PO        Dose:  8 mg   Take 8 mg by mouth every 8 hours as needed for nausea   Refills:  0       * oxyCODONE 20 MG 12 hr tablet   Commonly known as:  OxyCONTIN   Used for:  Gastric adenocarcinoma (H)        Dose:  20 mg   Take 1 tablet (20 mg) by mouth every 12 hours   Quantity:  30 tablet   Refills:  0       * oxyCODONE IR 5 MG tablet   Commonly known as:  ROXICODONE   Used for:  Gastric adenocarcinoma (H)        Dose:  5-10 mg   Take 1-2 tablets (5-10 mg) by mouth every 4 hours as needed for moderate to severe pain   Quantity:  30 tablet   Refills:  0       pantoprazole 40 MG EC tablet   Commonly known as:  PROTONIX   Used for:  Gastric ulcer, unspecified chronicity, unspecified whether gastric ulcer hemorrhage or perforation present        Dose:  40 mg   Take 1 tablet (40 mg) by mouth 2 times daily Take 30-60 minutes before a meal.   Quantity:  180 tablet   Refills:  1       PRILOSEC OTC PO        Dose:  20 mg   Take 20 mg by mouth 2 times daily   Refills:  0       valACYclovir 500 MG tablet   Commonly known as:  VALTREX   Used for:  Genital  herpes simplex, unspecified site        TAKE 1 TABLET (500 MG) BY MOUTH DAILY   Quantity:  90 tablet   Refills:  3       * Notice:  This list has 2 medication(s) that are the same as other medications prescribed for you. Read the directions carefully, and ask your doctor or other care provider to review them with you.             Protect others around you: Learn how to safely use, store and throw away your medicines at www.disposemymeds.org.         Follow-ups after your visit        Your next 10 appointments already scheduled     Sep 14, 2018  1:00 PM CDT   US THORACENTESIS with CHANDAUS1, CHANDA IMAGING NURSE, CHANDA BODY RAD, CHANDA BODY RAD   [facility] Ultrasound ([facility])    [address] [address] [phone]           How do I prepare for my exam? (Food and drink instructions) No Food and Drink Restrictions.  How do I prepare for my exam? (Other instructions) IF YOUR DOCTOR ALSO PRESCRIBED SEDATION DURING THE EXAM (medicine to help you relax): You will receive separate instructions about driving, eating, and additional tests that may be necessary prior to your exam day.  What should I wear: Wear comfortable clothes.  How long does the exam take: Aspiration (no sedation treatment takes about an hour.  For paracentesis, thoracentesis or sedation plan to spend at least three hours at the hospital.  What should I bring: Bring a list of your medicines, including vitamins, minerals and over-the-counter drugs. It is safest to leave personal items at home.  Do I need a :  No  is needed.  What do I need to tell my doctor: Tell your doctor in advance: * If you are or may be pregnant. * If you are taking Coumadin (or any other blood thinners) 5 days prior to the exam for any special instructions. * If you are diabetic to determine if your insulin needs have to be adjusted for the exam.  What should I do after the exam: Take it easy the rest of the day. You can return to  normal activities the next day.  What is this test: This test uses a long, thin needle or tube to remove tissue, fluid, or other cells from your body. Pictures from an ultrasound will guide the needle to the right place. (Ultrasound uses sound waves to create pictures of the body on a video screen. You will not feel the sound waves.)  * A biopsy removes tissue or other cells from the body; we send the tissue or cells to a lab for testing. * Paracentesis removes fluid from the belly (abdomen) to relieve pressure, to test the fluid or both. * Thoracentesis removes fluid from the sac around the lungs to relieve pressure, to test the fluid or both. * Aspiration removes fluid from any part of the body, then the fluid is tested for disease or infection.  Who should I call with questions: If you have any questions, please call the Imaging Department where you will have your exam. Directions, parking instructions, and other information is available on our website, [email or web address].            Sep 17, 2018  2:00 PM CDT   US PARACENTESIS with CHANDAUSDelicia,  IMAGING NURSE,  BODY RAD,  BODY RAD   [facility] Ultrasound ([facility])    [address] [place] [address] [phone]           How do I prepare for my exam? (Food and drink instructions) No Food and Drink Restrictions.  How do I prepare for my exam? (Other instructions) IF YOUR DOCTOR ALSO PRESCRIBED SEDATION DURING THE EXAM (medicine to help you relax): You will receive separate instructions about driving, eating, and additional tests that may be necessary prior to your exam day.  What should I wear: Wear comfortable clothes.  How long does the exam take: Aspiration (no sedation treatment takes about an hour.  For paracentesis, thoracentesis or sedation plan to spend at least three hours at the hospital.  What should I bring: Bring a list of your medicines, including vitamins, minerals and over-the-counter drugs. It is  safest to leave personal items at home.  Do I need a :  No  is needed.  What do I need to tell my doctor: Tell your doctor in advance: * If you are or may be pregnant. * If you are taking Coumadin (or any other blood thinners) 5 days prior to the exam for any special instructions. * If you are diabetic to determine if your insulin needs have to be adjusted for the exam.  What should I do after the exam: Take it easy the rest of the day. You can return to normal activities the next day.  What is this test: This test uses a long, thin needle or tube to remove tissue, fluid, or other cells from your body. Pictures from an ultrasound will guide the needle to the right place. (Ultrasound uses sound waves to create pictures of the body on a video screen. You will not feel the sound waves.)  * A biopsy removes tissue or other cells from the body; we send the tissue or cells to a lab for testing. * Paracentesis removes fluid from the belly (abdomen) to relieve pressure, to test the fluid or both. * Thoracentesis removes fluid from the sac around the lungs to relieve pressure, to test the fluid or both. * Aspiration removes fluid from any part of the body, then the fluid is tested for disease or infection.  Who should I call with questions: If you have any questions, please call the Imaging Department where you will have your exam. Directions, parking instructions, and other information is available on our website, [email or web address].            Sep 20, 2018   Procedure with Jamel Salazar MD   [facility] (--)    [address]  [facility] [address] [phone]               Care Instructions        Further instructions from your care team       Thoracentesis Discharge Instructions     After you go home:      You may resume your normal diet    Care of Puncture Site:      For the first 48 hrs, check your puncture site every couple hours while you are awake     If  there is a bandaid - you may remove it tomorrow morning    You may shower tomorrow    No tub baths, whirlpools or swimming until your puncture site has fully healed     Activity:      You may go back to normal activity in 24 hours    Wait 48 hours before lifting, straining, exercise or other strenuous activity    Medicines:      You may resume all your medications    For minor pain, you may take Acetaminophen (Tylenol) or Ibuprofen (Advil)            Call the provider who ordered this procedure if:      The site is red, swollen, hot or tender    Blood or fluid is draining from the site    You have chills or a fever greater than 101 F (38 C)    Shortness of breath    Pain that is getting worse    Any questions or concerns      Additional Information:      During this test, a needle will sometimes enter the lung. This can cause the lung to collapse - called a pneumothorax. Symptoms include severe chest pain, breathing problems or increased blood in your sputum (phlegm).     Call  911 or go to the Emergency Room if:      Severe chest pain or trouble breathing    Increased blood in your sputum (phlegm)    Bleeding that you cannot control      If you have questions call:        [facility] Radiology Dept @ [phone]                 Additional Information About Your Visit        [employer] Information     [employer] gives you secure access to your electronic health record. If you see a primary care provider, you can also send messages to your care team and make appointments. If you have questions, please call your primary care clinic.  If you do not have a primary care provider, please call [phone] and they will assist you.        Care EveryWhere ID     This is your Care EveryWhere ID. This could be used by other organizations to access your [facility] medical records  [id number]        Your Vitals Were     Blood Pressure Pulse Temperature Respirations Height Weight    134/74 (BP Location: Left arm) 104 99  F  "(37.2  C) (Oral) 16 1.626 m (5' 4\") 73.9 kg (163 lb)    Pulse Oximetry BMI (Body Mass Index)                95% 27.98 kg/m2           Primary Care Provider Office Phone # Fax #    MARILIN Lehman [facility] [phone]      Equal Access to Services     [facility]: Hadii aad ku hadasho Soomaali, waaxda luqadaha, qaybta kaalmada adeegyada, waxay aleain hayaan adechristina kyleole laaime . So [facility] [phone].    ATENCIÓN: Si habla esplakeshia, tiene a valente disposición servicios gratuitos de asistencia lingüística. Satishame al [phone].    We comply with applicable federal civil rights laws and [place] laws. We do not discriminate on the basis of race, color, national origin, age, disability, sex, sexual orientation, or gender identity.            Thank you!     Thank you for choosing [facility] for your care. Our goal is always to provide you with excellent care. Hearing back from our patients is one way we can continue to improve our services. Please take a few minutes to complete the written survey that you may receive in the mail after you visit with us. Thank you!             Medication List: This is a list of all your medications and when to take them. Check marks below indicate your daily home schedule. Keep this list as a reference.      Medications           Morning Afternoon Evening Bedtime As Needed    acetaminophen 325 MG tablet   Commonly known as:  TYLENOL   Take 2 tablets (650 mg) by mouth every 4 hours as needed for mild pain                                COLACE PO   Take 100 mg by mouth daily                                ferrous sulfate 325 (65 Fe) MG tablet   Commonly known as:  IRON   Take 1 tablet (325 mg) by mouth every other day                                ONDANSETRON PO   Take 8 mg by mouth every 8 hours as needed for nausea                                * oxyCODONE 20 MG 12 hr tablet   Commonly known as:  OxyCONTIN   Take 1 tablet (20 mg) by mouth every 12 hours                  "               * oxyCODONE IR 5 MG tablet   Commonly known as:  ROXICODONE   Take 1-2 tablets (5-10 mg) by mouth every 4 hours as needed for moderate to severe pain                                pantoprazole 40 MG EC tablet   Commonly known as:  PROTONIX   Take 1 tablet (40 mg) by mouth 2 times daily Take 30-60 minutes before a meal.                                PRILOSEC OTC PO   Take 20 mg by mouth 2 times daily                                valACYclovir 500 MG tablet   Commonly known as:  VALTREX   TAKE 1 TABLET (500 MG) BY MOUTH DAILY                                * Notice:  This list has 2 medication(s) that are the same as other medications prescribed for you. Read the directions carefully, and ask your doctor or other care provider to review them with you.

## 2018-09-14 NOTE — IP AVS SNAPSHOT
[place] [address]    [place] [address]    Phone:  [phone]                                       After Visit Summary   9/14/2018    Ernie Song    MRN: [id number]           After Visit Summary Signature Page     I have received my discharge instructions, and my questions have been answered. I have discussed any challenges I see with this plan with the nurse or doctor.    ..........................................................................................................................................  Patient/Patient Representative Signature      ..........................................................................................................................................  Patient Representative Print Name and Relationship to Patient    ..................................................               ................................................  Date                                   Time    ..........................................................................................................................................  Reviewed by Signature/Title    ...................................................              ..............................................  Date                                               Time          22EPIC Rev 08/18

## 2018-09-14 NOTE — DISCHARGE INSTRUCTIONS
Thoracentesis Discharge Instructions     After you go home:      You may resume your normal diet    Care of Puncture Site:      For the first 48 hrs, check your puncture site every couple hours while you are awake     If there is a bandaid - you may remove it tomorrow morning    You may shower tomorrow    No tub baths, whirlpools or swimming until your puncture site has fully healed     Activity:      You may go back to normal activity in 24 hours    Wait 48 hours before lifting, straining, exercise or other strenuous activity    Medicines:      You may resume all your medications    For minor pain, you may take Acetaminophen (Tylenol) or Ibuprofen (Advil)            Call the provider who ordered this procedure if:      The site is red, swollen, hot or tender    Blood or fluid is draining from the site    You have chills or a fever greater than 101 F (38 C)    Shortness of breath    Pain that is getting worse    Any questions or concerns      Additional Information:      During this test, a needle will sometimes enter the lung. This can cause the lung to collapse - called a pneumothorax. Symptoms include severe chest pain, breathing problems or increased blood in your sputum (phlegm).     Call  911 or go to the Emergency Room if:      Severe chest pain or trouble breathing    Increased blood in your sputum (phlegm)    Bleeding that you cannot control      If you have questions call:        [facility] Radiology Dept @ [phone]

## 2018-09-14 NOTE — PROGRESS NOTES
Stable post procedure, VS stable, pain remains unchanged prior to procedure.  Reinforce discharge instruction.  Pt is ready to be discharged with wife Cielo.

## 2018-09-14 NOTE — PROGRESS NOTES
Assessment completed for thoracentesis. Pt stated he took his own zofran for nausea.  Discharge instruction is given to pt and to wife. All questions are answered.

## 2018-09-14 NOTE — PROGRESS NOTES
RADIOLOGY PROCEDURE NOTE  Patient name: Ernie Song  MRN: [id number]  : 1980    Pre-procedure diagnosis: Pleural effusion  Post-procedure diagnosis: Same    Procedure Date/Time: 2018  1:50 PM  Procedure: Right thoracentesis  Estimated blood loss: None  Specimen(s) collected with description: pleural fluid  The patient tolerated the procedure well with no immediate complications.  Significant findings:none    See imaging dictation for procedural details.    Provider name: Reggie Ruiz  Assistant(s):None

## 2018-09-14 NOTE — PROGRESS NOTES
US guided thoracentesis done  1000 ml of kitty fluid taken off of Rt side  VSS thru out  Post CXR done  Pt tolerated procedure well   Pt back to Care suite RM 12

## 2018-09-14 NOTE — TELEPHONE ENCOUNTER
Prior Authorization Approval          Authorization Effective Date: 9/14/2018  Authorization Expiration Date: 9/14/2019  Medication: Oxycontin 20mg (Brand per pharmacy)- P/A APPROVED  Approved Dose/Quantity: 30  Reference #: Tracking Number is [id number]   Insurance Company: [employer] - Phone [phone] Fax [phone]  Expected CoPay:       CoPay Card Available:      Foundation Assistance Needed:    Which Pharmacy is filling the prescription (Not needed for infusion/clinic administered): [facility] - [place], [place] - [address], [address]  Pharmacy Notified: Yes  Patient Notified:

## 2018-09-17 NOTE — PROGRESS NOTES
1409 consent and time out done per Matt BECKHAM at 1400. Pt had pain with insertion for para. Now pian at 4 in abdomen which is what it was at start. Draining fluid from right lower abdomen now.  1412 pt drianed total of 1000 ml dark kitty fluid from right lower abdomen. VSS. Pain in abdomen down to 2. Site CDI with bandaid.  1425 back to care suites. Pain remains at 2. VSS. Given juice and cracker. Wife with him.   1450 VSS. Sat up on edge of bed, pain at 3 in abdomen. No dizziness. Iv d/c'd. Paracentesis site is CDI with bandaid. Pt has discharge instructions from jo sapp. Discharged via wheel chair to wife.

## 2018-09-17 NOTE — DISCHARGE INSTRUCTIONS

## 2018-09-17 NOTE — H&P (VIEW-ONLY)
[facility]    History and Physical  Hospitalist       Date of Admission:  9/5/2018    Assessment & Plan   Ernie Song is a 38 year old male with large gastric ulcer and ascites, admitted after EUS today and admitting for diagnosis and pain control    Summary:    Principal Problem:    Gastric ulcer  Active Problems:    Abdominal pain    Ascites       Plan: Admit to medicine floor, abdominal CT to rule out perforation and evaluate for possible malignancy or carcinomatosis.  Pain meds (PO and IV), continue Protonix 40 mg bid, and will keep NPO until CT results back, and cover with antibiotics (Cipro, Flagyl).  CBC and CMP ordered.     DVT Prophylaxis: Pneumatic Compression Devices  Code Status: Full Code    Disposition: Expected discharge in 2-3 days once abdominal pain better and diagnosis made.    Nicholas Elias MD  Pager: [phone]  Cell Phone:  [phone]      Primary Care Physician   Gay Winslow    Chief Complaint   Abdominal pain, EUS today    History is obtained from Patient    History of Present Illness   38 year old male who had a gastric ulcer on EGD Feb 2018, now with increased epigastric burning and pain the last 10 days presents with having an EUS today by [facility], Dr. Navarro, which showed:  - Large gastric ulcer, transmural involvement.                              - Multiple perigastric and gastroduodenal lymph                             nodes. FNA obtained.            - Ascites, FNA as above.            - Pleural effusion.                                Does report decreased appetite and about 5 lbs wt loss in the last 2 weeks.  No fever or chills.      Past Medical History    I have reviewed this patient's medical history and updated it with pertinent information if needed.   Past Medical History:   Diagnosis Date     Genital herpes      Gynecomastia, male 2016    Chest CT benign     Ulcer, gastric, acute        Past Surgical History   I have  reviewed this patient's surgical history and updated it with pertinent information if needed.  Past Surgical History:   Procedure Laterality Date     ESOPHAGOSCOPY, GASTROSCOPY, DUODENOSCOPY (EGD), COMBINED N/A 2/26/2018    Procedure: COMBINED ESOPHAGOSCOPY, GASTROSCOPY, DUODENOSCOPY (EGD), BIOPSY SINGLE OR MULTIPLE;  gastroscopy;  Surgeon: Lalo Ibrahim MD;  Location:  GI     NO HISTORY OF SURGERY         Prior to Admission Medications   Prior to Admission Medications   Prescriptions Last Dose Informant Patient Reported? Taking?   acetaminophen (TYLENOL) 325 MG tablet More than a month  No No   Sig: Take 2 tablets (650 mg) by mouth every 4 hours as needed for mild pain   ferrous sulfate (IRON) 325 (65 Fe) MG tablet Past Week  Yes Yes   Sig: Take 1 tablet (325 mg) by mouth every other day   oxyCODONE IR (ROXICODONE) 5 MG tablet 9/5/2018  No Yes   Sig: Take 1 tablet (5 mg) by mouth every 4 hours as needed for moderate to severe pain   pantoprazole (PROTONIX) 40 MG EC tablet 9/5/2018  No Yes   Sig: Take 1 tablet (40 mg) by mouth 2 times daily Take 30-60 minutes before a meal.   valACYclovir (VALTREX) 500 MG tablet More than a month Self No No   Sig: TAKE 1 TABLET (500 MG) BY MOUTH DAILY      Facility-Administered Medications: None     Allergies   No Known Allergies    Social History   I have reviewed this patient's social history and updated it with pertinent information if needed. Ernie Song  reports that he quit smoking about 13 years ago. His smoking use included Cigarettes. He has a 12.00 pack-year smoking history. He has never used smokeless tobacco. He reports that he does not drink alcohol or use illicit drugs.    Family History   I have reviewed this patient's family history and updated it with pertinent information if needed.   Family History   Problem Relation Age of Onset     Prostate Cancer Father      Other Cancer Maternal Grandmother      Other Cancer Other      Asthma Mother       Stomach Cancer Maternal Uncle        Review of Systems   The 10 point Review of Systems is negative other than noted in the HPI or here.     # Pain Assessment:  Current Pain Score 9/1/2018   Patient currently in pain? -   Pain score (0-10) 5   Pain location Abdomen   Pain descriptors -   - Ernie is experiencing pain due to gastric ulcer. Pain management was discussed and the plan was created in a collaborative fashion.  Ernie's response to the current recommendations: engaged  - Please see the plan for pain management as documented above          Physical Exam   Temp: 97.3  F (36.3  C) Temp src: Oral BP: 123/80   Heart Rate: 67 Resp: 16 SpO2: 98 % O2 Device: None (Room air)    Vital Signs with Ranges  Temp:  [97.3  F (36.3  C)] 97.3  F (36.3  C)  Heart Rate:  [64-92] 67  Resp:  [15-25] 16  BP: ()/(57-80) 123/80  SpO2:  [93 %-98 %] 98 %  0 lbs 0 oz    Constitutional: Awake, alert, cooperative, no apparent distress.  Eyes: Conjunctiva and pupils examined and normal.  HEENT: Moist mucous membranes, normal dentition.  Respiratory: Clear to auscultation bilaterally, no crackles or wheezing.  Cardiovascular: Regular rate and rhythm, normal S1 and S2, and no murmur noted,   No carotid bruits.  No ankle edema.   GI: Soft, moderate epigastric pain (but did just receive fentanyl), bowels sounds present  Lymph/Hematologic: No anterior cervical, supraclavicular or axillary adenopathy.  Skin: No rashes, no cyanosis.   Musculoskeletal: No joint swelling, erythema or tenderness.  Neurologic: Cranial nerves 2-12 intact, no focal weakness or numbness  Psychiatric: Alert, Ox3, no obvious anxiety or depression.    Data   Data reviewed today:  I personally reviewed no images or EKG's today.    Recent Labs  Lab 09/01/18  0940   WBC 5.4   HGB 10.8*   MCV 72*         POTASSIUM 4.2   CHLORIDE 102   CO2 28   BUN 9   CR 0.87   ANIONGAP 6   AROLDO 8.2*   GLC 91       Imaging:  No results found for this or any previous  visit (from the past 24 hour(s)).

## 2018-09-17 NOTE — IP AVS SNAPSHOT
[place] [address]    [place] [address]    Phone:  [phone]                                       After Visit Summary   9/17/2018    Ernie Song    MRN: [id number]           After Visit Summary Signature Page     I have received my discharge instructions, and my questions have been answered. I have discussed any challenges I see with this plan with the nurse or doctor.    ..........................................................................................................................................  Patient/Patient Representative Signature      ..........................................................................................................................................  Patient Representative Print Name and Relationship to Patient    ..................................................               ................................................  Date                                   Time    ..........................................................................................................................................  Reviewed by Signature/Title    ...................................................              ..............................................  Date                                               Time          22EPIC Rev 08/18

## 2018-09-17 NOTE — PROGRESS NOTES
RADIOLOGY PROCEDURE NOTE  Patient name: Ernie Song  MRN: [id number]  : 1980    Pre-procedure diagnosis: Ascites  Post-procedure diagnosis: Same    Procedure Date/Time: 2018  2:14 PM  Procedure: Paracentesis  Estimated blood loss: None  Specimen(s) collected with description: Yellow fluid  The patient tolerated the procedure well with no immediate complications.  Significant findings:none    See imaging dictation for procedural details.    Provider name: Matt Delgado  Assistant(s):None

## 2018-09-17 NOTE — PROGRESS NOTES
Here for paracentesis.  Assessment complete.  Labs current and WDL  Procedure explained, dc instructions given pre procedure.  Wife at bedside, call light in reach.

## 2018-09-17 NOTE — IP AVS SNAPSHOT
MRN:[id number]                      After Visit Summary   9/17/2018    Ernie Song    MRN: [id number]           Visit Information        Department      9/17/2018 12:51 PM [facility]          Review of your medicines      UNREVIEWED medicines. Ask your doctor about these medicines        Dose / Directions    acetaminophen 325 MG tablet   Commonly known as:  TYLENOL   Used for:  Epigastric pain        Dose:  650 mg   Take 2 tablets (650 mg) by mouth every 4 hours as needed for mild pain   Quantity:  100 tablet   Refills:  0       COLACE PO        Dose:  100 mg   Take 100 mg by mouth daily   Refills:  0       ferrous sulfate 325 (65 Fe) MG tablet   Commonly known as:  IRON   Used for:  Epigastric pain        Dose:  325 mg   Take 1 tablet (325 mg) by mouth every other day   Quantity:  90 tablet   Refills:  2       ONDANSETRON PO        Dose:  8 mg   Take 8 mg by mouth every 8 hours as needed for nausea   Refills:  0       * oxyCODONE 20 MG 12 hr tablet   Commonly known as:  OxyCONTIN   Used for:  Gastric adenocarcinoma (H)        Dose:  20 mg   Take 1 tablet (20 mg) by mouth every 12 hours   Quantity:  30 tablet   Refills:  0       * oxyCODONE IR 5 MG tablet   Commonly known as:  ROXICODONE   Used for:  Gastric adenocarcinoma (H)        Dose:  5-10 mg   Take 1-2 tablets (5-10 mg) by mouth every 4 hours as needed for moderate to severe pain   Quantity:  30 tablet   Refills:  0       pantoprazole 40 MG EC tablet   Commonly known as:  PROTONIX   Used for:  Gastric ulcer, unspecified chronicity, unspecified whether gastric ulcer hemorrhage or perforation present        Dose:  40 mg   Take 1 tablet (40 mg) by mouth 2 times daily Take 30-60 minutes before a meal.   Quantity:  180 tablet   Refills:  1       PRILOSEC OTC PO        Dose:  20 mg   Take 20 mg by mouth 2 times daily   Refills:  0       valACYclovir 500 MG tablet   Commonly known as:  VALTREX   Used for:  Genital  herpes simplex, unspecified site        TAKE 1 TABLET (500 MG) BY MOUTH DAILY   Quantity:  90 tablet   Refills:  3       * Notice:  This list has 2 medication(s) that are the same as other medications prescribed for you. Read the directions carefully, and ask your doctor or other care provider to review them with you.             Protect others around you: Learn how to safely use, store and throw away your medicines at www.disposemymeds.org.         Follow-ups after your visit        Your next 10 appointments already scheduled     Sep 17, 2018  2:00 PM CDT   US PARACENTESIS with CHANDAUS4, CHANDA IMAGING NURSE, CHANDA BODY RAD,  BODY RAD   [facility] ([facility])    [address] [facility] [address] [phone]           How do I prepare for my exam? (Food and drink instructions) No Food and Drink Restrictions.  How do I prepare for my exam? (Other instructions) IF YOUR DOCTOR ALSO PRESCRIBED SEDATION DURING THE EXAM (medicine to help you relax): You will receive separate instructions about driving, eating, and additional tests that may be necessary prior to your exam day.  What should I wear: Wear comfortable clothes.  How long does the exam take: Aspiration (no sedation treatment takes about an hour.  For paracentesis, thoracentesis or sedation plan to spend at least three hours at the hospital.  What should I bring: Bring a list of your medicines, including vitamins, minerals and over-the-counter drugs. It is safest to leave personal items at home.  Do I need a :  No  is needed.  What do I need to tell my doctor: Tell your doctor in advance: * If you are or may be pregnant. * If you are taking Coumadin (or any other blood thinners) 5 days prior to the exam for any special instructions. * If you are diabetic to determine if your insulin needs have to be adjusted for the exam.  What should I do after the exam: Take it easy the rest of the day. You can return to  normal activities the next day.  What is this test: This test uses a long, thin needle or tube to remove tissue, fluid, or other cells from your body. Pictures from an ultrasound will guide the needle to the right place. (Ultrasound uses sound waves to create pictures of the body on a video screen. You will not feel the sound waves.)  * A biopsy removes tissue or other cells from the body; we send the tissue or cells to a lab for testing. * Paracentesis removes fluid from the belly (abdomen) to relieve pressure, to test the fluid or both. * Thoracentesis removes fluid from the sac around the lungs to relieve pressure, to test the fluid or both. * Aspiration removes fluid from any part of the body, then the fluid is tested for disease or infection.  Who should I call with questions: If you have any questions, please call the Imaging Department where you will have your exam. Directions, parking instructions, and other information is available on our website, [email or web address].            Sep 20, 2018   Procedure with Jamel Salazar MD   [facility] (--)    [address]  [facility] [id number] [phone]               Care Instructions        Further instructions from your care team       Paracentesis Discharge Instructions     After you go home:      You may resume your normal diet.    Care of Puncture Site:      For the first 48 hrs, check your puncture site every couple hours while you are awake     If there is a bandaid - you may remove it tomorrow morning    You may shower tomorrow    No tub baths, whirlpools or swimming until your puncture site has fully healed    Fluid may leak from the site. Change the bandaid as needed - keep the site dry    If the fluid leaks for more than 48 hours, call your ordering provider     Activity:      You may go back to normal activity in 24 hours     Wait 48 hours before lifting, straining, exercise or other strenuous  activity    Medicines:      You may resume all your medications    For minor pain, you may take Acetaminophen (Tylenol) or Ibuprofen (Advil)            Call the provider who ordered this procedure if:      The site is red, swollen, hot or tender    Blood or fluid is draining from the site    Chills or a fever greater than 101 F (38 C)    Pain that is getting worse    Leaking from the site that does not stop    Any questions or concerns      If you have questions call:        [facility] Radiology Dept @ [phone]      The provider who performed your procedure was _________________.     Additional Information About Your Visit        MyChart Information     [employer] gives you secure access to your electronic health record. If you see a primary care provider, you can also send messages to your care team and make appointments. If you have questions, please call your primary care clinic.  If you do not have a primary care provider, please call [phone] and they will assist you.        Care EveryWhere ID     This is your Care EveryWhere ID. This could be used by other organizations to access your [facility] medical records  [id number]        Your Vitals Were     Blood Pressure Pulse Temperature Respirations Pulse Oximetry       127/77 (BP Location: Right arm) 95 99.7  F (37.6  C) (Oral) 18 96%        Primary Care Provider Office Phone # Fax #    Gay MARILIN Garcia [facility] [phone]      Equal Access to Services     NADINE ESCALANTE : Hadii panda arredondoo Somagnolia, waaxda luqadaha, qaybta kaalmada neymar oquendo . So [facility] [phone].    ATENCIÓN: Si habla español, tiene a valente disposición servicios gratuitos de asistencia lingüística. Anju al [phone].    We comply with applicable federal civil rights laws and [place] laws. We do not discriminate on the basis of race, color, national origin, age, disability, sex, sexual orientation, or gender  identity.            Thank you!     Thank you for choosing [facility] for your care. Our goal is always to provide you with excellent care. Hearing back from our patients is one way we can continue to improve our services. Please take a few minutes to complete the written survey that you may receive in the mail after you visit with us. Thank you!             Medication List: This is a list of all your medications and when to take them. Check marks below indicate your daily home schedule. Keep this list as a reference.      Medications           Morning Afternoon Evening Bedtime As Needed    acetaminophen 325 MG tablet   Commonly known as:  TYLENOL   Take 2 tablets (650 mg) by mouth every 4 hours as needed for mild pain                                COLACE PO   Take 100 mg by mouth daily                                ferrous sulfate 325 (65 Fe) MG tablet   Commonly known as:  IRON   Take 1 tablet (325 mg) by mouth every other day                                ONDANSETRON PO   Take 8 mg by mouth every 8 hours as needed for nausea                                * oxyCODONE 20 MG 12 hr tablet   Commonly known as:  OxyCONTIN   Take 1 tablet (20 mg) by mouth every 12 hours                                * oxyCODONE IR 5 MG tablet   Commonly known as:  ROXICODONE   Take 1-2 tablets (5-10 mg) by mouth every 4 hours as needed for moderate to severe pain                                pantoprazole 40 MG EC tablet   Commonly known as:  PROTONIX   Take 1 tablet (40 mg) by mouth 2 times daily Take 30-60 minutes before a meal.                                PRILOSEC OTC PO   Take 20 mg by mouth 2 times daily                                valACYclovir 500 MG tablet   Commonly known as:  VALTREX   TAKE 1 TABLET (500 MG) BY MOUTH DAILY                                * Notice:  This list has 2 medication(s) that are the same as other medications prescribed for you. Read the directions carefully, and ask your doctor or other care  provider to review them with you.

## 2018-09-20 NOTE — OR NURSING
Post Power Port placement Chest X-ray done.  Results reviewed per Dr. Salazar.  Proceed with discharge when criteria met.

## 2018-09-20 NOTE — IP AVS SNAPSHOT
MRN:[id number]                      After Visit Summary   9/20/2018    Ernie Song    MRN: [id number]           Thank you!     Thank you for choosing [facility] for your care. Our goal is always to provide you with excellent care. Hearing back from our patients is one way we can continue to improve our services. Please take a few minutes to complete the written survey that you may receive in the mail after you visit with us. Thank you!        Patient Information     Date Of Birth          1980        About your hospital stay     You were admitted on:  September 20, 2018 You last received care in the:  [facility] Same Day Surgery    You were discharged on:  September 20, 2018       Who to Call     For medical emergencies, please call 911.  For non-urgent questions about your medical care, please call your primary care provider or clinic, [phone]  For questions related to your surgery, please call your surgery clinic        Attending Provider     Provider Specialty    Jamel Salazar MD Thoracic Diseases       Primary Care Provider Office Phone # Fax #    Gay MARILIN Garcia [facility] [phone]      After Care Instructions     Diet Instructions       Resume pre-procedure diet            Discharge Instructions       Follow up appointment as instructed by Surgeon and or RN            Do not - immerse incision in water until sutures removed       Do not immerse incision in water/swim for one week.            Dressing       Keep clear Dermabond glue in place.  It will peel off on its own after about 7-10 days.            Shower       No shower for 24 hours post procedure. May shower Postoperative Day (POD)  one                  Further instructions from your care team       Same Day Surgery Discharge Instructions for  Sedation and General Anesthesia       It's not unusual to feel dizzy, light-headed or faint for up to 24 hours after surgery or while  taking pain medication.  If you have these symptoms: sit for a few minutes before standing and have someone assist you when you get up to walk or use the bathroom.      You should rest and relax for the next 24 hours. We recommend you make arrangements to have an adult stay with you for at least 24 hours after your discharge.  Avoid hazardous and strenuous activity.      DO NOT DRIVE any vehicle or operate mechanical equipment for 24 hours following the end of your surgery.  Even though you may feel normal, your reactions may be affected by the medication you have received.      Do not drink alcoholic beverages for 24 hours following surgery.       Slowly progress to your regular diet as you feel able. It's not unusual to feel nauseated and/or vomit after receiving anesthesia.  If you develop these symptoms, drink clear liquids (apple juice, ginger ale, broth, 7-up, etc. ) until you feel better.  If your nausea and vomiting persists for 24 hours, please notify your surgeon.        All narcotic pain medications, along with inactivity and anesthesia, can cause constipation. Drinking plenty of liquids and increasing fiber intake will help.      For any questions of a medical nature, call your surgeon.      Do not make important decisions for 24 hours.      If you had general anesthesia, you may have a sore throat for a couple of days related to the breathing tube used during surgery.  You may use Cepacol lozenges to help with this discomfort.  If it worsens or if you develop a fever, contact your surgeon.       If you feel your pain is not well managed with the pain medications prescribed by your surgeon, please contact your surgeon's office to let them know so they can address your concerns.             **If you have questions or concerns about your procedure,  call Dr. Salazar at [phone]**        Discharge Instructions for Power Port Placement      General Instructions:    You will be given a card with  "information about your port.  You should carry this with you in your wallet/billfold. It provides information to clinicians about your port.  You should also carry the card in case your port triggers any security devices.     Activity:    Limit your activity for the first few days after your port is placed    Incision Care:     Unless otherwise directed by your surgeon, the dressing may removed tomorrow.      If a topical skin adhesive was used to close incision, you may shower tomorrow. Do not use soaps, lotions, or ointments on the wound area. Do not scrub the wound. After bathing, pat the wound dry with a soft towel.  Do not scratch, rub, or pick at the strips or film. Do not place tape directly over the strips or film.  Do not apply liquids (such as peroxide), ointments, or creams to the wound while the strips or film are in place.    Call your surgeon if you have:     Redness, swelling or drainage from incision     A fever of 101 F or greater.      Nausea or vomiting.     Pain that is not controlled by medications and/or rest.     Questions or concerns.        **If you have questions or concerns about your procedure,  call Dr. Salazar at [phone]**    Pending Results     Date and Time Order Name Status Description    9/20/2018 1422 XR Chest Port 1 View In process             Admission Information     Date & Time Provider Department Dept. Phone    9/20/2018 Jamel Salazar MD [facility] Same Day Surgery [phone]      Your Vitals Were     Blood Pressure Temperature Respirations Height Weight Pulse Oximetry    132/82 98  F (36.7  C) (Temporal) 19 1.626 m (5' 4\") 69.9 kg (154 lb) 94%    BMI (Body Mass Index)                   26.43 kg/m2           MyChart Information     Drive gives you secure access to your electronic health record. If you see a primary care provider, you can also send messages to your care team and make appointments. If you have questions, please call your primary care " clinic.  If you do not have a primary care provider, please call [phone] and they will assist you.        Care EveryWhere ID     This is your Care EveryWhere ID. This could be used by other organizations to access your [facility] medical records  [id number]        Equal Access to Services     NADINE ESCALANTE : Hadii aad ku hadstepanmarissa Flor, waaxda luqadaha, qaybta kaalmahannah oquendo, neymar payanlinaole morgan. So [facility] [phone].    ATENCIÓN: Si habla español, tiene a valente disposición servicios gratuitos de asistencia lingüística. Llame al [phone].    We comply with applicable federal civil rights laws and [place] laws. We do not discriminate on the basis of race, color, national origin, age, disability, sex, sexual orientation, or gender identity.               Review of your medicines      CONTINUE these medicines which have NOT CHANGED        Dose / Directions    acetaminophen 325 MG tablet   Commonly known as:  TYLENOL   Used for:  Epigastric pain        Dose:  650 mg   Take 2 tablets (650 mg) by mouth every 4 hours as needed for mild pain   Quantity:  100 tablet   Refills:  0       COLACE PO        Dose:  100 mg   Take 100 mg by mouth daily   Refills:  0       ferrous sulfate 325 (65 Fe) MG tablet   Commonly known as:  IRON   Used for:  Epigastric pain        Dose:  325 mg   Take 1 tablet (325 mg) by mouth every other day   Quantity:  90 tablet   Refills:  2       ONDANSETRON PO        Dose:  8 mg   Take 8 mg by mouth every 8 hours as needed for nausea   Refills:  0       * oxyCODONE 20 MG 12 hr tablet   Commonly known as:  OxyCONTIN   Used for:  Gastric adenocarcinoma (H)        Dose:  20 mg   Take 1 tablet (20 mg) by mouth every 12 hours   Quantity:  30 tablet   Refills:  0       * oxyCODONE IR 5 MG tablet   Commonly known as:  ROXICODONE   Used for:  Gastric adenocarcinoma (H)   Notes to Patient:  One tab (5mg) at 3:00pm        Dose:  5-10 mg   Take 1-2 tablets (5-10 mg) by mouth  every 4 hours as needed for moderate to severe pain   Quantity:  30 tablet   Refills:  0       pantoprazole 40 MG EC tablet   Commonly known as:  PROTONIX   Used for:  Gastric ulcer, unspecified chronicity, unspecified whether gastric ulcer hemorrhage or perforation present        Dose:  40 mg   Take 1 tablet (40 mg) by mouth 2 times daily Take 30-60 minutes before a meal.   Quantity:  180 tablet   Refills:  1       PRILOSEC OTC PO        Dose:  20 mg   Take 20 mg by mouth 2 times daily   Refills:  0       PROCHLORPERAZINE MALEATE PO        Dose:  10 mg   Take 10 mg by mouth every 6 hours as needed for nausea or vomiting   Refills:  0       valACYclovir 500 MG tablet   Commonly known as:  VALTREX   Used for:  Genital herpes simplex, unspecified site        TAKE 1 TABLET (500 MG) BY MOUTH DAILY   Quantity:  90 tablet   Refills:  3       * Notice:  This list has 2 medication(s) that are the same as other medications prescribed for you. Read the directions carefully, and ask your doctor or other care provider to review them with you.             Protect others around you: Learn how to safely use, store and throw away your medicines at www.disposemymeds.org.             Medication List: This is a list of all your medications and when to take them. Check marks below indicate your daily home schedule. Keep this list as a reference.      Medications           Morning Afternoon Evening Bedtime As Needed    acetaminophen 325 MG tablet   Commonly known as:  TYLENOL   Take 2 tablets (650 mg) by mouth every 4 hours as needed for mild pain                                COLACE PO   Take 100 mg by mouth daily                                ferrous sulfate 325 (65 Fe) MG tablet   Commonly known as:  IRON   Take 1 tablet (325 mg) by mouth every other day                                ONDANSETRON PO   Take 8 mg by mouth every 8 hours as needed for nausea                                * oxyCODONE 20 MG 12 hr tablet   Commonly known  as:  OxyCONTIN   Take 1 tablet (20 mg) by mouth every 12 hours                                * oxyCODONE IR 5 MG tablet   Commonly known as:  ROXICODONE   Take 1-2 tablets (5-10 mg) by mouth every 4 hours as needed for moderate to severe pain   Last time this was given:  5 mg on 9/20/2018  3:01 PM   Notes to Patient:  One tab (5mg) at 3:00pm                                pantoprazole 40 MG EC tablet   Commonly known as:  PROTONIX   Take 1 tablet (40 mg) by mouth 2 times daily Take 30-60 minutes before a meal.                                PRILOSEC OTC PO   Take 20 mg by mouth 2 times daily                                PROCHLORPERAZINE MALEATE PO   Take 10 mg by mouth every 6 hours as needed for nausea or vomiting                                valACYclovir 500 MG tablet   Commonly known as:  VALTREX   TAKE 1 TABLET (500 MG) BY MOUTH DAILY                                * Notice:  This list has 2 medication(s) that are the same as other medications prescribed for you. Read the directions carefully, and ask your doctor or other care provider to review them with you.

## 2018-09-20 NOTE — ANESTHESIA PREPROCEDURE EVALUATION
Anesthesia Evaluation     . Pt has had prior anesthetic.     No history of anesthetic complications          ROS/MED HX    ENT/Pulmonary:      (-) sleep apnea   Neurologic:  - neg neurologic ROS     Cardiovascular:        (-) hypertension   METS/Exercise Tolerance:     Hematologic:         Musculoskeletal:         GI/Hepatic:     (+) GERD Other GI/Hepatic abdominal pain, nausea      Renal/Genitourinary:  - ROS Renal section negative       Endo:  - neg endo ROS       Psychiatric:         Infectious Disease:         Malignancy:   (+) Malignancy History of Other          Other:                     Physical Exam  Normal systems: cardiovascular, pulmonary and dental    Airway   Mallampati: II  TM distance: >3 FB  Neck ROM: full    Dental     Cardiovascular       Pulmonary                     Anesthesia Plan      History & Physical Review  History and physical reviewed and following examination; no interval change.    ASA Status:  2 .    NPO Status:  > 8 hours    Plan for MAC Reason for MAC:  Deep or markedly invasive procedure (G8)  PONV prophylaxis:  Ondansetron (or other 5HT-3) and Dexamethasone or Solumedrol       Postoperative Care  Postoperative pain management:  IV analgesics.      Consents  Anesthetic plan, risks, benefits and alternatives discussed with:  Patient..                          .

## 2018-09-20 NOTE — ANESTHESIA POSTPROCEDURE EVALUATION
Patient: Ernie Song    Procedure(s):  PORT PLACEMENT  - Wound Class: I-Clean    Diagnosis:gastric cancer   Diagnosis Additional Information: No value filed.    Anesthesia Type:  MAC    Note:  Anesthesia Post Evaluation    Patient location during evaluation: PACU  Patient participation: Able to fully participate in evaluation  Level of consciousness: awake and alert  Pain management: adequate  Airway patency: patent  Cardiovascular status: acceptable  Respiratory status: acceptable and unassisted  Hydration status: acceptable  PONV: none             Last vitals:  Vitals:    09/20/18 1225 09/20/18 1320 09/20/18 1414   BP: 130/83  139/70   Resp: 16  16   Temp: 36.7  C (98  F)     SpO2: 94% 94% 99%         Electronically Signed By: Grazyna Best MD  September 20, 2018  2:31 PM

## 2018-09-20 NOTE — OP NOTE
DATE OF PROCEDURE:  September 20, 2018      SURGEON:  Jamel Salazar MD   FIRST ASSIST: Connie Tamayo PA-C      PREOPERATIVE DIAGNOSIS:  gastric cancer      POSTOPERATIVE DIAGNOSIS:  Same       PROCEDURE:  Placement of [place] PowerPort implantable port, left subclavian vein.       ANESTHESIA:  Local with lidocaine 1% without epinephrine and sedation.       INDICATIONS:  Patient will undergo chemotherapy and a PowerPort is indicated for venous access.       DESCRIPTION OF PROCEDURE:  The patient was brought to the OR and placed in supine position.  The patient was placed into Trendelenburg.  IV sedation was given.  The neck and upper chest were prepared and draped in the usual fashion using ChloraPrep.  Local anesthesia was performed with lidocaine 1% without epinephrine. The left subclavian vein was punctured easily with a 16-gauge needle.  There was excellent blood return.  A guidewire was advanced through the needle without any resistance.  The needle was removed.  A transverse incision was made along the guidewire.  Subcutaneous pocket was made inferior to the incision.  Hemostasis was verified and was excellent.  An introducer with a peel-away sheath was introduced into the vein over the guidewire.  Guidewire and introducer were then removed.  The catheter was advanced through the peel-away sheath without resistance. The peel-away sheath was removed.  The catheter was placed at 20 cm at the skin level.  The catheter was cut and connected to the port.  The port was placed in the subcutaneous pocket.  The port was accessed with a non-coring needle.  There was excellent blood return, PowerPort was finally flushed with 10 mL of solution of heparin flush.  The incision was closed in the usual fashion.  A chest x-ray performed in the operating room and showed the catheter was in excellent position and the PowerPort is ready to be used.           JAMEL SALAZAR MD

## 2018-09-20 NOTE — ANESTHESIA CARE TRANSFER NOTE
Patient: Ernie Song    Procedure(s):  PORT PLACEMENT  - Wound Class: I-Clean    Diagnosis: gastric cancer   Diagnosis Additional Information: No value filed.    Anesthesia Type:   MAC     Note:  Airway :Face Mask  Patient transferred to:PACU  Comments: At end of procedure, spontaneous respirations, patient alert to voice, able to follow commands. Oxygen via facemask at 6 liters per minute to PACU. Oxygen tubing connected to wall O2 in PACU, SpO2, NiBP, and EKG monitors and alarms on and functioning, Nader Hugger warmer connected to patient gown, report on patient's clinical status given to PACU RN, RN questions answered.Handoff Report: Identifed the Patient, Identified the Reponsible Provider, Reviewed the pertinent medical history, Discussed the surgical course, Reviewed Intra-OP anesthesia mangement and issues during anesthesia, Set expectations for post-procedure period and Allowed opportunity for questions and acknowledgement of understanding      Vitals: (Last set prior to Anesthesia Care Transfer)    CRNA VITALS  9/20/2018 1343 - 9/20/2018 1417      9/20/2018             NIBP: 126/69    Pulse: 104    NIBP Mean: 84    SpO2: 91 %    Resp Rate (set): 10                Electronically Signed By: MARILIN Guadalupe CRNA  September 20, 2018  2:17 PM

## 2018-09-20 NOTE — OR NURSING
"1200 patient arrived with wife from [facility]. Feeling nausea - no emesis.  Heart rate 103, sats 94%. After being settled, became more nauseated with pain of a \"6\" with movement. Falling asleep very easily and sats would drop to 89%. O2 placed at 2 LPNC with sats improving to 96 - 97 %. Wife relates patient has only 2 pieces of fruit in the last 4 days, and very little water.  "

## 2018-09-23 PROBLEM — R18.0 MALIGNANT ASCITES (H): Status: ACTIVE | Noted: 2018-01-01

## 2018-09-23 PROBLEM — I26.99 PULMONARY EMBOLI (H): Status: ACTIVE | Noted: 2018-01-01

## 2018-09-23 PROBLEM — J90 PLEURAL EFFUSION: Status: ACTIVE | Noted: 2018-01-01

## 2018-09-23 PROBLEM — I26.99 PULMONARY EMBOLISM (H): Status: ACTIVE | Noted: 2018-01-01

## 2018-09-23 PROBLEM — K25.9 GASTRIC ULCER: Status: RESOLVED | Noted: 2018-01-01 | Resolved: 2018-01-01

## 2018-09-23 PROBLEM — E43 SEVERE MALNUTRITION (H): Status: ACTIVE | Noted: 2018-01-01

## 2018-09-23 NOTE — PROGRESS NOTES
[facility]  ED Nurse Handoff Report    ED Chief complaint: Shortness of Breath (SOB since 0200. )      ED Diagnosis:   Final diagnoses:   SOB (shortness of breath)   History of gastric cancer   Pulmonary embolism, bilateral (H)   Malignant ascites   Abdominal pain, generalized   Pleural effusion, left   Pleural effusion, right       Code Status: to be determined by hospitalist    Allergies: No Known Allergies    Activity level - Baseline/Home:  Stand with Assist    Activity Level - Current:   Stand with Assist     Needed?: No    Isolation: No  Infection: Not Applicable  Bariatric?: No    Vital Signs:   Vitals:    09/23/18 0915 09/23/18 0930 09/23/18 0945 09/23/18 1000   BP: 123/69 121/66 117/65    Resp: 23 23 22 21   Temp:       TempSrc:       SpO2: 96% 96% 97% 97%   Weight:       Height:           Cardiac Rhythm: ,        Pain level: 0-10 Pain Scale: 6    Is this patient confused?: No   [place] - Suicide Severity Rating Scale Completed?  Yes  If yes, what color did the patient score?  White    Patient Report: Initial Complaint: SOB -abd pain  Focused Assessment: see flow sheet  Tests Performed: CT  Abnormal Results: small bilat pul edema  Treatments provided: heparin gtt started    Family Comments: wife at home with 2 kids    OBS brochure/video discussed/provided to patient/family:               Name of person given brochure if not patient: na                Relationship to patient:na    ED Medications:   Medications   heparin infusion 25,000 units in 0.45% NaCl 250 mL (1,150 Units/hr Intravenous New Bag 9/23/18 0959)   lactated ringers BOLUS 1,000 mL (1,000 mLs Intravenous New Bag 9/23/18 0920)   iopamidol (ISOVUE-370) solution 78 mL (78 mLs Intravenous Given 9/23/18 0801)   Saline flush (87 mLs Intravenous Given 9/23/18 0801)   HYDROmorphone (PF) (DILAUDID) injection 0.5 mg (0.5 mg Intravenous Given 9/23/18 0907)   cefTRIAXone (ROCEPHIN) 2 g vial to attach to  ml bag for ADULTS  or NS 50 ml bag for PEDS (0 g Intravenous Stopped 9/23/18 0950)   heparin Loading Dose bolus dose from infusion pump 5,000 Units (5,000 Units Intravenous Given 9/23/18 0959)   ondansetron (ZOFRAN) injection 4 mg (4 mg Intravenous Given 9/23/18 0950)       Drips infusing?: yes - heparin gtt started    For the majority of the shift this patient was Green.   Interventions performed were none.    Severe Sepsis OR Septic Shock Diagnosis Present: No    To be done/followed up on inpatient unit:  see notes    ED NURSE PHONE NUMBER: [phone]

## 2018-09-23 NOTE — LETTER
Transition Communication Hand-off for Care Transitions to Next Level of Care Provider    Name: Ernie Song   : 1980  MRN #: [id number]  Primary Care Provider: Gay Winslow   Primary Clinic: [address]  [facility] [address]     Reason for Hospitalization:  Abdominal pain, generalized [R10.84]  SOB (shortness of breath) [R06.02]  Malignant ascites [R18.0]  Pleural effusion, right [J90]  Pleural effusion, left [J90]  Pulmonary embolism, bilateral (H) [I26.99]  History of gastric cancer [Z85.028]    Admit Date/Time: 2018  6:55 AM    Discharge Date: 18  Payor Source: Payor: PREFERREDONE / Plan: PREFERREDONE [employer] ADMIN SERV OPEN ACCESS / Product Type: PPO /     Readmission Assessment Measure (LO) Risk Score/category: Elevated    Plan of Care Goals/Milestone Events:  Gastric cancer with malignant pleural effusion.          Reason for Communication Hand-off Referral: Elevated readmission risk per LO score.     Discharge Plan: Follow up with Hem/Onc within 1 week of discharge .      Concern for non-adherence with plan of care: No    Discharge Needs Assessment:  Continue with plan of care as below.     Follow-up specialty is recommended: Yes    Follow-up plan:  future appointments:  Scheduled to have a labs done on  at 1:40 PM and then will see a Nurse Practitioner at 2:00 PM at [facility].   10/2 at 1:40 for labs and 2:00 appointment with Gm GASTON [facility]- hospital follow up     Scheduled to have labs done on  at 12:00 PM, you will see the Nurse Practitioner at 12:30 PM and then you will have a 4 hour chemo treatment at 1:00 PM at [facility].   10/10 at 12:00 for labs and 12:30 appointment followed by chemotherapy (about 4 hours)     Any outstanding tests or procedures:   Wants to hold on thoracentesis on the left for now; could consider next week. Would require holding LMWH.      Referrals     Future  Labs/Procedures    Home care nursing referral     Comments:    RN extended hours visit. RN to provide tube site care and management.    Your provider has ordered home care nursing services. If you have not been contacted within 2 days of your discharge please call the inpatient department phone number at [phone] .          Key Recommendations:  Please be aware of your patient's recent hospitalization.  We hope this is a helpful communication tool--further details in EPIC.   Lorraine Harris RN, BSN  [facility] Care Coordinator   Mobile Phone: [phone]    AVS/Discharge Summary is the source of truth; this is a helpful guide for improved communication of patient story

## 2018-09-23 NOTE — H&P
[facility]    History and Physical  Hospitalist       Date of Admission:  9/23/2018    Assessment & Plan   Ernie Song is a 38 year old male with gastric cancer, stage IV awaiting to start Chemo with Dr. Soto on 9/26, who presents with increased SOB and just had right thoracentesis on 9/14 with 1 liter removed, and already back with large right and moderate left pleural effusion, and new PE with negative leg ultrasound    Summary:    Principal Problem:    Pulmonary embolism -- suspect related to hypercoagulable related to gastric cancer   -- IV heparin, then subcutaneous Lovenox on discharge  Active Problems:    Genital herpes    Malignant ascites    Gastric adenocarcinoma (   -- chemo planned 9/26, start it sooner   -- discussed other options (palliotive care, hospice -- wife wants to know how effective chemo to be?)    Bilateral Pleural effusion    Severe malnutrition (H)       Plan: Will stop IV heparin at 4 AM, and consult to IR to consider plaement of right Pleurx Catheter on Monday.   Left thoracentesis and ?paracentesis if needed.  Progress poor for this very aggressive stage IV gastric cancer     DVT Prophylaxis: IV heparin  Code Status: Full Code    Disposition: Expected discharge in 2-3 days    Nicholas Elias MD  Pager: [phone]  Cell Phone:  [phone]     Primary Care Physician   Gay Winslow    Chief Complaint   SOB    History is obtained from Patient    History of Present Illness    38 year old male with Stage IV gastric cancer and abdominal Carcinomatosis, just had right thoracenteis on 9/14 and abd paracentesis on 9/17 then discharged home and now returns with increased SOB and weakness and has large right, moderate left pleural effusions, and moderate ascites.  No fever or chills. Was to start chemo this Wed 9/26 with Dr. Soto.     Past Medical History    I have reviewed this patient's medical history and updated it with pertinent information if  needed.   Past Medical History:   Diagnosis Date     Gastric adenocarcinoma (H) 9/10/2018     Genital herpes      Gynecomastia, male 2016    Chest CT benign     Stage IV adenocarcinoma of stomach (H)      Ulcer, gastric, acute        Past Surgical History   I have reviewed this patient's surgical history and updated it with pertinent information if needed.  Past Surgical History:   Procedure Laterality Date     ESOPHAGOSCOPY, GASTROSCOPY, DUODENOSCOPY (EGD), COMBINED N/A 2/26/2018    Procedure: COMBINED ESOPHAGOSCOPY, GASTROSCOPY, DUODENOSCOPY (EGD), BIOPSY SINGLE OR MULTIPLE;  gastroscopy;  Surgeon: Lalo Ibrahim MD;  Location: [facility]     ESOPHAGOSCOPY, GASTROSCOPY, DUODENOSCOPY (EGD), COMBINED N/A 9/5/2018    Procedure: COMBINED ENDOSCOPIC ULTRASOUND, ESOPHAGOSCOPY, GASTROSCOPY, DUODENOSCOPY (EGD), FINE NEEDLE ASPIRATE/BIOPSY;  COMBINED ENDOSCOPIC ULTRASOUND, ESOPHAGOSCOPY, GASTROSCOPY, DUODENOSCOPY (EGD), FINE NEEDLE ASPIRATE;  Surgeon: Ever Owusu MD;  Location: [facility]     ESOPHAGOSCOPY, GASTROSCOPY, DUODENOSCOPY (EGD), COMBINED N/A 9/5/2018    Procedure: COMBINED ESOPHAGOSCOPY, GASTROSCOPY, DUODENOSCOPY (EGD), BIOPSY SINGLE OR MULTIPLE;;  Surgeon: Ever Owusu MD;  Location: [facility]     NO HISTORY OF SURGERY         Prior to Admission Medications   Prior to Admission Medications   Prescriptions Last Dose Informant Patient Reported? Taking?   Docusate Sodium (COLACE PO) 9/22/2018 at am Pharmacy Yes Yes   Sig: Take 100 mg by mouth daily   ONDANSETRON PO  at prn Pharmacy Yes Yes   Sig: Take 8 mg by mouth every 8 hours as needed for nausea   PROCHLORPERAZINE MALEATE PO  at prn Pharmacy Yes Yes   Sig: Take 10 mg by mouth every 6 hours as needed for nausea or vomiting   acetaminophen (TYLENOL) 325 MG tablet  at prn Pharmacy No Yes   Sig: Take 2 tablets (650 mg) by mouth every 4 hours as needed for mild pain   ferrous sulfate (IRON) 325 (65 Fe) MG tablet 9/22/2018 at am Pharmacy Yes  Yes   Sig: Take 1 tablet (325 mg) by mouth every other day   oxyCODONE (OXYCONTIN) 20 MG 12 hr tablet 9/22/2018 at pm Pharmacy No Yes   Sig: Take 1 tablet (20 mg) by mouth every 12 hours   oxyCODONE IR (ROXICODONE) 5 MG tablet  at prn Pharmacy No Yes   Sig: Take 1-2 tablets (5-10 mg) by mouth every 4 hours as needed for moderate to severe pain   pantoprazole (PROTONIX) 40 MG EC tablet 9/22/2018 at pm Pharmacy No Yes   Sig: Take 1 tablet (40 mg) by mouth 2 times daily Take 30-60 minutes before a meal.   valACYclovir (VALTREX) 500 MG tablet 9/22/2018 at am Pharmacy No Yes   Sig: TAKE 1 TABLET (500 MG) BY MOUTH DAILY      Facility-Administered Medications: None     Allergies   No Known Allergies    Social History   I have reviewed this patient's social history and updated it with pertinent information if needed. Ernie Song  reports that he quit smoking about 13 years ago. His smoking use included Cigarettes. He has a 12.00 pack-year smoking history. He has never used smokeless tobacco. He reports that he does not drink alcohol or use illicit drugs.    Family History   I have reviewed this patient's family history and updated it with pertinent information if needed.   Family History   Problem Relation Age of Onset     Prostate Cancer Father      Other Cancer Maternal Grandmother      Other Cancer Other      Asthma Mother      Stomach Cancer Maternal Uncle        Review of Systems   The 10 point Review of Systems is negative other than noted in the HPI or here.     # Pain Assessment:  Current Pain Score 9/23/2018   Patient currently in pain? -   Pain score (0-10) 4   Pain location Abdomen   Pain descriptors -   - Ernie is experiencing pain due to ascites. Pain management was discussed and the plan was created in a collaborative fashion.  Ernie's response to the current recommendations: mixed response  - see orders      Physical Exam   Temp: 98.3  F (36.8  C) Temp src: Oral BP: 114/71   Heart Rate: 91 Resp:  21 SpO2: 97 % O2 Device: Nasal cannula Oxygen Delivery: 2 LPM  Vital Signs with Ranges  Temp:  [98.3  F (36.8  C)-99.4  F (37.4  C)] 98.3  F (36.8  C)  Heart Rate:  [] 91  Resp:  [16-23] 21  BP: (114-135)/(65-87) 114/71  SpO2:  [93 %-97 %] 97 %  155 lbs 0 oz    Constitutional: Sleepy, cooperative, no apparent distress.  Eyes: Conjunctiva and pupils examined and normal.  HEENT: Moist mucous membranes, normal dentition.  Respiratory: decreased breath sounds at base bilaterally  Cardiovascular: Regular rate and rhythm, normal S1 and S2, and no murmur noted,   No carotid bruits.  Trace bilateral ankle edema.   GI: Soft, moderate distension, mildly tender, normal bowel sounds.  Lymph/Hematologic: No anterior cervical, supraclavicular or axillary adenopathy.  Skin: No rashes, no cyanosis.   Musculoskeletal: No joint swelling, erythema or tenderness.  Neurologic: Cranial nerves 2-12 intact, no focal weakness or numbness  Psychiatric: Drowsy, no obvious anxiety or depression.    Data   Recent Labs  Lab 09/23/18  0721   WBC 9.8   HGB 11.8*   MCV 73*         POTASSIUM 4.1   CHLORIDE 97   CO2 30   BUN 7   CR 0.76   ANIONGAP 8   AROLDO 8.2*   *   ALBUMIN 2.4*   PROTTOTAL 6.4*   BILITOTAL 0.5   ALKPHOS 87   ALT 20   AST 21   TROPI <0.015       Imaging:  Recent Results (from the past 24 hour(s))   CT Chest/Abdomen/Pelvis w Contrast   Result Value    Radiologist flags PE. (AA)    Narrative    CT CHEST, ABDOMEN, AND PELVIS WITH CONTRAST   9/23/2018 8:11 AM     HISTORY: Shortness of breath, sats 92-94, increasing abdominal pain.   Recent gastric cancer diagnosis.  History of port a cath placed  earlier this week.  Largest concern for PE, although, worsening  abdominal pain, concern for intra-abdominal processes secondarily.     TECHNIQUE:   78 mL Isovue-370. Radiation dose for this scan was  reduced using automated exposure control, adjustment of the mA and/or  kV according to patient size, or iterative  reconstruction technique.    COMPARISON: Chest 9/7/2018, abdomen 9/5/2018.    FINDINGS:   CHEST:  Central line. No evidence of thoracic aortic dissection. There  is a small suspected left pulmonary embolism on series 6 images 71-72  and coronal image 63. There appears to be a small right-sided  pulmonary embolism on series 6 images 72-73 and coronal images 53-54.  There is a small right pulmonary embolism on axial images 67-68  (coronal images 42-43. Large right and mild/moderate left pleural  effusions. There is adjacent lung consolidation-collapse.    ABDOMEN, PELVIS: There is abdominal and pelvic free fluid, consistent  with moderate-prominent ascites. There is extensive peritoneal  metastasis (carcinomatosis), significantly increased. Nothing else  acute with respect to the remaining upper abdominal organs. There is  mild perigastric adenopathy again seen.      Impression    IMPRESSION:  1. Moderate/prominent ascites. Extensive peritoneal metastasis,  significantly increased.  2. Large right and mild/moderate left pleural effusions.  3. Small bilateral pulmonary emboli.    [Critical Result: PE].    Finding was identified on 9/23/2018 8:13 AM.     Dr. Stallworth was contacted by me on 9/23/2018 8:27 AM and verbalized  understanding of the critical result.     PEPPER ESCAMILLA MD   POC US ABDOMEN LIMITED    Narrative    PROCEDURE: Point of Care US of Abdomen, Limited  PERFORMED BY: Dr. John Stallworth  INDICATIONS/SYMPTOM:  Procedural guidance, paracentesis  PROBE: Low Frequency Convex probe  BODY LOCATION: The ultrasound was performed in the abdominal region.  FINDINGS: Intraperitoneal fluid consistent with ascites.  US was utilized to guide needle placement during paracentesis procedure.  INTERPRETATION: The limited  exam was abnormal, detected ascites.    IMAGE DOCUMENTATION: Images were archived to the PACS system     US Lower Extremity Venous Duplex Bilateral    Narrative    US LOWER EXTREMITY VENOUS DUPLEX  BILATERAL9/23/2018 10:28 AM    HISTORY: Rule out DVT, pulm embolus;     TECHNIQUE:  Venous Doppler US.? Color flow and spectral Doppler with  waveform analysis performed.      Impression    IMPRESSION:  No evidence of lower extremity deep venous thrombosis.    PEPPER ESCAMILLA MD

## 2018-09-23 NOTE — PHARMACY-ADMISSION MEDICATION HISTORY
Admission medication history interview status for the 9/23/2018  admission is complete. See EPIC admission navigator for prior to admission medications     Medication history source reliability:Good    Actions taken by pharmacist (provider contacted, etc): spoke with pt for med list, pt was not sure of his medications but brought his clinic med list with him. Used clinic med list for med rec.     Additional medication history information not noted on PTA med list : States he takes what is listed in the clinic med list. He was able to provide me last doses.     Medication reconciliation/reorder completed by provider prior to medication history? No    Time spent in this activity: 25 min     Prior to Admission medications    Medication Sig Last Dose Taking? Auth Provider   acetaminophen (TYLENOL) 325 MG tablet Take 2 tablets (650 mg) by mouth every 4 hours as needed for mild pain  at prn Yes Anette Quintana MD   Docusate Sodium (COLACE PO) Take 100 mg by mouth daily 9/22/2018 at am Yes Reported, Patient   ferrous sulfate (IRON) 325 (65 Fe) MG tablet Take 1 tablet (325 mg) by mouth every other day 9/22/2018 at am Yes Anette Quintana MD   ONDANSETRON PO Take 8 mg by mouth every 8 hours as needed for nausea  at prn Yes Reported, Patient   oxyCODONE (OXYCONTIN) 20 MG 12 hr tablet Take 1 tablet (20 mg) by mouth every 12 hours 9/22/2018 at pm Yes Gay Winslow APRN CNP   oxyCODONE IR (ROXICODONE) 5 MG tablet Take 1-2 tablets (5-10 mg) by mouth every 4 hours as needed for moderate to severe pain  at prn Yes Gay Winslow APRN CNP   pantoprazole (PROTONIX) 40 MG EC tablet Take 1 tablet (40 mg) by mouth 2 times daily Take 30-60 minutes before a meal. 9/22/2018 at pm Yes Gay Winsolw APRN CNP   PROCHLORPERAZINE MALEATE PO Take 10 mg by mouth every 6 hours as needed for nausea or vomiting  at prn Yes Reported, Patient   valACYclovir (VALTREX) 500 MG tablet TAKE 1 TABLET (500 MG) BY MOUTH DAILY  9/22/2018 at am Yes Gay Winslow APRN CNP

## 2018-09-23 NOTE — PLAN OF CARE
Problem: Patient Care Overview  Goal: Plan of Care/Patient Progress Review  Outcome: No Change  C/o abdominal pain and fullness. Oxycodone/contin given to pt with relief. Bowel regimen maintained.   Heparin @11.5. L port accessed. BC P, need UA/UC.

## 2018-09-23 NOTE — IP AVS SNAPSHOT
[address], [address]    [facility] [address]    Phone:  [phone]                                       After Visit Summary   9/23/2018    Ernie Song    MRN: [id number]           After Visit Summary Signature Page     I have received my discharge instructions, and my questions have been answered. I have discussed any challenges I see with this plan with the nurse or doctor.    ..........................................................................................................................................  Patient/Patient Representative Signature      ..........................................................................................................................................  Patient Representative Print Name and Relationship to Patient    ..................................................               ................................................  Date                                   Time    ..........................................................................................................................................  Reviewed by Signature/Title    ...................................................              ..............................................  Date                                               Time          22EPIC Rev 08/18

## 2018-09-23 NOTE — IP AVS SNAPSHOT
MRN:[id number]                      After Visit Summary   9/23/2018    Ernie Song    MRN: [id number]           Thank you!     Thank you for choosing [facility] for your care. Our goal is always to provide you with excellent care. Hearing back from our patients is one way we can continue to improve our services. Please take a few minutes to complete the written survey that you may receive in the mail after you visit with us. Thank you!        Patient Information     Date Of Birth          1980        Designated Caregiver       Most Recent Value    Caregiver    Will someone help with your care after discharge? 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                  Who to Call     For medical emergencies, please call 911.  For non-urgent questions about your medical care, please call your primary care provider or clinic, [phone]          Attending Provider     Provider Specialty    John Stallworth MD Emergency Medicine    Tom, Nicholas Toledo MD Internal Medicine       Primary Care Provider Office Phone # Fax #    Gay MARILIN Garcia [facility] [phone]      After Care Instructions     Activity       Your activity upon discharge: activity as tolerated            Diet       Follow this diet upon discharge: Orders Placed This Encounter      Room Service      Snacks/Supplements Adult: Other; PLUS2 shake at 10am and 2pm  (vanilla or strawberry); Between Meals      Combination Diet Regular Diet Adult            Discharge Instructions       Call Dr. Santos at Pager [phone] if questions, or  Cell Phone [phone].                  Follow-up Appointments     Follow-up and recommended labs and tests        You are scheduled to have a labs done on Tuesday, October 2nd at 1:40 PM and then will see a Nurse Practitioner at 2:00 PM at [facility].    You are scheduled to have labs done on Wednesday, October 10th at 12:00 PM, you will see the Nurse Practitioner at 12:30 PM and then you will have a 4 hour chemo treatment at 1:00 PM at [facility].            Follow-up and recommended labs and tests        10/2 at 1:40 for labs and 2:00 appointment with Gm GASTON CNP- hospital follow up  10/10 at 12:00 for labs and 12:30 appointment followed by chemotherapy (about 4 hours)                  Additional Services     Home care nursing referral       RN extended hours visit. RN to provide tube site care and management.    Your provider has ordered home care nursing services. If you have not been contacted within 2 days of your discharge please call the inpatient department phone number at [phone] .                  Further instructions from your care team       A follow-up appointment was scheduled for you [see above].  It is very important to go to it--bring these papers and your insurance card with you.  At the visit, talk about your hospital stay.  Tell your doctor how you feel.  Show your new list of medications.  Your doctor will update records, make sure you are still doing OK, and decide if any tests or medication changes are needed.      Your doctor has ordered home care to help you after your hospital stay.  They will contact you regarding your first visit.  This service will be provided by [facility].  If you have not received a call within 48 hours of discharge, please call them at [phone].                                                                                                                                                                                    "                                                                                                          Pending Results     Date and Time Order Name Status Description    9/28/2018 0007 XR Chest Port 1 View Preliminary             Statement of Approval     Ordered          09/30/18 1707  I have reviewed and agree with all the recommendations and orders detailed in this document.  EFFECTIVE NOW     Approved and electronically signed by:  Nicholas Santos MD             Admission Information     Date & Time Provider Department Dept. Phone    9/23/2018 Nicholas Santos MD [place] [address] [facility] [phone]      Your Vitals Were     Blood Pressure Pulse Temperature Respirations Height Weight    135/79 (BP Location: Left arm) 73 97.2  F (36.2  C) (Oral) 18 1.626 m (5' 4.02\") 72.1 kg (159 lb)    Pulse Oximetry BMI (Body Mass Index)                96% 27.28 kg/m2          MyChart Information     [employer] gives you secure access to your electronic health record. If you see a primary care provider, you can also send messages to your care team and make appointments. If you have questions, please call your primary care clinic.  If you do not have a primary care provider, please call [phone] and they will assist you.        Care EveryWhere ID     This is your Care EveryWhere ID. This could be used by other organizations to access your [place] medical records  [id number]        Equal Access to Services     NADINE ESCALANTE : Aida arredondoo Somagnolia, waaxda luqadaha, qaybta kaalmada adeegyada, neymar morgan. So [facility] [phone].    ATENCIÓN: Si habla español, tiene a valente disposición servicios gratuitos de asistencia lingüística. Llame al [phone].    We comply with applicable federal civil rights laws and [place] laws. We do not discriminate on the basis of race, color, national origin, age, disability, sex, sexual orientation, or gender identity.             "   Review of your medicines      START taking        Dose / Directions    enoxaparin 80 MG/0.8ML injection   Commonly known as:  LOVENOX   Used for:  Pulmonary embolism, bilateral (H)        Dose:  80 mg   Inject 0.8 mLs (80 mg) Subcutaneous every 12 hours for 14 days   Quantity:  28 Syringe   Refills:  0       LORazepam 0.5 MG tablet   Commonly known as:  ATIVAN   Used for:  Gastric adenocarcinoma (H)        Dose:  0.5-1 mg   Take 1-2 tablets (0.5-1 mg) by mouth every 6 hours as needed (Breakthrough Nausea / Vomiting)   Quantity:  60 tablet   Refills:  0         CONTINUE these medicines which may have CHANGED, or have new prescriptions. If we are uncertain of the size of tablets/capsules you have at home, strength may be listed as something that might have changed.        Dose / Directions    docusate sodium 100 MG capsule   Commonly known as:  COLACE   This may have changed:  medication strength   Used for:  Drug-induced constipation        Dose:  100 mg   Take 1 capsule (100 mg) by mouth daily   Quantity:  60 capsule   Refills:  3       ondansetron 8 MG ODT tab   Commonly known as:  ZOFRAN-ODT   This may have changed:  medication strength   Used for:  Nausea        Dose:  8 mg   Take 1 tablet (8 mg) by mouth every 8 hours as needed for nausea   Quantity:  40 tablet   Refills:  3       prochlorperazine 10 MG tablet   Commonly known as:  COMPAZINE   This may have changed:  medication strength   Used for:  Nausea        Dose:  10 mg   Take 1 tablet (10 mg) by mouth every 6 hours as needed for nausea or vomiting   Quantity:  60 tablet   Refills:  3         CONTINUE these medicines which have NOT CHANGED        Dose / Directions    acetaminophen 325 MG tablet   Commonly known as:  TYLENOL   Used for:  Epigastric pain        Dose:  650 mg   Take 2 tablets (650 mg) by mouth every 4 hours as needed for mild pain   Quantity:  100 tablet   Refills:  0       ferrous sulfate 325 (65 Fe) MG tablet   Commonly known as:  IRON    Used for:  Epigastric pain        Dose:  325 mg   Take 1 tablet (325 mg) by mouth every other day   Quantity:  90 tablet   Refills:  2       * oxyCODONE 20 MG 12 hr tablet   Commonly known as:  OxyCONTIN   Used for:  Gastric adenocarcinoma (H)        Dose:  20 mg   Take 1 tablet (20 mg) by mouth every 12 hours   Quantity:  30 tablet   Refills:  0       * oxyCODONE IR 5 MG tablet   Commonly known as:  ROXICODONE   Used for:  Gastric adenocarcinoma (H)        Dose:  5-10 mg   Take 1-2 tablets (5-10 mg) by mouth every 4 hours as needed for moderate to severe pain   Quantity:  40 tablet   Refills:  0       pantoprazole 40 MG EC tablet   Commonly known as:  PROTONIX   Used for:  Gastric ulcer, unspecified chronicity, unspecified whether gastric ulcer hemorrhage or perforation present        Dose:  40 mg   Take 1 tablet (40 mg) by mouth 2 times daily Take 30-60 minutes before a meal.   Quantity:  180 tablet   Refills:  1       valACYclovir 500 MG tablet   Commonly known as:  VALTREX   Used for:  Genital herpes simplex, unspecified site        TAKE 1 TABLET (500 MG) BY MOUTH DAILY   Quantity:  90 tablet   Refills:  3       * Notice:  This list has 2 medication(s) that are the same as other medications prescribed for you. Read the directions carefully, and ask your doctor or other care provider to review them with you.         Where to get your medicines      These medications were sent to [facility] [address] [place] [address], [place] [address], [facility] [address]     Phone:  [phone]     docusate sodium 100 MG capsule    enoxaparin 80 MG/0.8ML injection         Some of these will need a paper prescription and others can be bought over the counter. Ask your nurse if you have questions.     Bring a paper prescription for each of these medications     LORazepam 0.5 MG tablet    ondansetron 8 MG ODT tab    oxyCODONE IR 5 MG tablet    prochlorperazine 10 MG tablet                Protect  others around you: Learn how to safely use, store and throw away your medicines at www.disposemymeds.org.        Information about OPIOIDS     PRESCRIPTION OPIOIDS: WHAT YOU NEED TO KNOW   We gave you an opioid (narcotic) pain medicine. It is important to manage your pain, but opioids are not always the best choice. You should first try all the other options your care team gave you. Take this medicine for as short a time (and as few doses) as possible.    Some activities can increase your pain, such as bandage changes or therapy sessions. It may help to take your pain medicine 30 to 60 minutes before these activities. Reduce your stress by getting enough sleep, working on hobbies you enjoy and practicing relaxation or meditation. Talk to your care team about ways to manage your pain beyond prescription opioids.    These medicines have risks:    DO NOT drive when on new or higher doses of pain medicine. These medicines can affect your alertness and reaction times, and you could be arrested for driving under the influence (DUI). If you need to use opioids long-term, talk to your care team about driving.    DO NOT operate heavy machinery    DO NOT do any other dangerous activities while taking these medicines.    DO NOT drink any alcohol while taking these medicines.     If the opioid prescribed includes acetaminophen, DO NOT take with any other medicines that contain acetaminophen. Read all labels carefully. Look for the word  acetaminophen  or  Tylenol.  Ask your pharmacist if you have questions or are unsure.    You can get addicted to pain medicines, especially if you have a history of addiction (chemical, alcohol or substance dependence). Talk to your care team about ways to reduce this risk.    All opioids tend to cause constipation. Drink plenty of water and eat foods that have a lot of fiber, such as fruits, vegetables, prune juice, apple juice and high-fiber cereal. Take a laxative (Miralax, milk of magnesia,  Colace, Senna) if you don t move your bowels at least every other day. Other side effects include upset stomach, sleepiness, dizziness, throwing up, tolerance (needing more of the medicine to have the same effect), physical dependence and slowed breathing.    Store your pills in a secure place, locked if possible. We will not replace any lost or stolen medicine. If you don t finish your medicine, please throw away (dispose) as directed by your pharmacist. The [place] [employer]ution [employer] has more information about safe disposal: https://www.[employer].[facility].[place].us/living-green/managing-unwanted-medications             Medication List: This is a list of all your medications and when to take them. Check marks below indicate your daily home schedule. Keep this list as a reference.      Medications           Morning Afternoon Evening Bedtime As Needed    acetaminophen 325 MG tablet   Commonly known as:  TYLENOL   Take 2 tablets (650 mg) by mouth every 4 hours as needed for mild pain                                   docusate sodium 100 MG capsule   Commonly known as:  COLACE   Take 1 capsule (100 mg) by mouth daily   Last time this was given:  100 mg on 9/30/2018  8:30 AM                                   enoxaparin 80 MG/0.8ML injection   Commonly known as:  LOVENOX   Inject 0.8 mLs (80 mg) Subcutaneous every 12 hours for 14 days   Last time this was given:  80 mg on 9/30/2018  8:30 AM                                      ferrous sulfate 325 (65 Fe) MG tablet   Commonly known as:  IRON   Take 1 tablet (325 mg) by mouth every other day                                LORazepam 0.5 MG tablet   Commonly known as:  ATIVAN   Take 1-2 tablets (0.5-1 mg) by mouth every 6 hours as needed (Breakthrough Nausea / Vomiting)   Last time this was given:  0.5 mg on 9/30/2018  6:12 AM                                ondansetron 8 MG ODT tab   Commonly known as:  ZOFRAN-ODT   Take 1 tablet (8 mg) by mouth every 8 hours as needed for nausea                                 * oxyCODONE 20 MG 12 hr tablet   Commonly known as:  OxyCONTIN   Take 1 tablet (20 mg) by mouth every 12 hours   Last time this was given:  20 mg on 9/30/2018 12:15 PM                                      * oxyCODONE IR 5 MG tablet   Commonly known as:  ROXICODONE   Take 1-2 tablets (5-10 mg) by mouth every 4 hours as needed for moderate to severe pain   Last time this was given:  5 mg on 9/30/2018  6:12 AM                                   pantoprazole 40 MG EC tablet   Commonly known as:  PROTONIX   Take 1 tablet (40 mg) by mouth 2 times daily Take 30-60 minutes before a meal.   Last time this was given:  40 mg on 9/30/2018  8:30 AM                                      prochlorperazine 10 MG tablet   Commonly known as:  COMPAZINE   Take 1 tablet (10 mg) by mouth every 6 hours as needed for nausea or vomiting   Last time this was given:  10 mg on 9/28/2018  9:47 AM                                valACYclovir 500 MG tablet   Commonly known as:  VALTREX   TAKE 1 TABLET (500 MG) BY MOUTH DAILY   Last time this was given:  500 mg on 9/30/2018  8:30 AM                                   * Notice:  This list has 2 medication(s) that are the same as other medications prescribed for you. Read the directions carefully, and ask your doctor or other care provider to review them with you.

## 2018-09-23 NOTE — ED NOTES
Bed: ED02  Expected date:   Expected time:   Means of arrival:   Comments:  [place] [address]12  38M  Dyspnea

## 2018-09-23 NOTE — PROGRESS NOTES
RECEIVING UNIT ED HANDOFF REVIEW    ED Nurse Handoff Report was reviewed by: Mallory Rivera on September 23, 2018 at 10:33 AM

## 2018-09-23 NOTE — ED PROVIDER NOTES
"  History     Chief Complaint:  Shortness of Breath    HPI   Ernie Song is a 38 year old male with a history of stomach cancer, who presents to the ED for the evaluation of shortness of breath. The patient reports that yesterday he started to experience general weakness and that at 0200 this morning he started to experience shortness of breath, prompting him to the ED. The patient was recently diagnosed with stomach cancer and will be started chemotherapy next week. The patient also notes that he is experiencing increased abdominal pain this morning. The patient denies dysuria and fever. Has not used pain medications at home although has them available.  Denies headache and vision changes.      Allergies:  No known drug allergies     Medications:    Colace  Prilosec  Ondansetron  Roxicodone  Protonix  Prochlorperazine  Valtrex    Past Medical History:    Gastric adenocarcinoma  Genital herpes  Gynecomastia  Stave IV adenocarcinoma of stomach  Ulcer, gastric, acute  Melena  Ascites  Microcytic anemia    Past Surgical History:    History reviewed. No pertinent surgical history.    Family History:    Prostate cancer  Asthma  Stomach cancer    Social History:  Smoking status: Former Smoker, Quite Date: 10/15/2004  Alcohol use: No  Marital Status:   [2]     Review of Systems   Constitutional: Negative for fever.   Respiratory: Positive for shortness of breath.    Gastrointestinal: Positive for abdominal pain.   Genitourinary: Negative for dysuria.   All other systems reviewed and are negative.    Physical Exam   Patient Vitals for the past 24 hrs:   BP Temp Temp src Heart Rate Resp SpO2 Height Weight   09/23/18 0658 127/87 99.4  F (37.4  C) Oral 111 16 93 % 1.626 m (5' 4\") 70.3 kg (155 lb)       Physical Exam  General: Alert, appears weak and in discomfort. Cooperative.     In moderate distress  HEENT:  Head:  Atraumatic  Ears:  External ears are normal  Mouth/Throat:  Oropharynx is without erythema or " exudate and mucous membranes are dry.   Eyes:   Conjunctivae normal and EOM are normal. No scleral icterus.    Pupils are equal, round, and reactive to light.   Neck:   Normal range of motion. Neck supple.  CV:  Tachycardic rate, regular rhythm, normal heart sounds and radial pulses are 2+ and symmetric.  No murmur.  Resp:  Breath sounds are clear bilaterally    Non-labored, no retractions or accessory muscle use  GI:  Abdomen is firm, distended.  Tender to gentle palpation with guarding.  No rebound.  No CVA tenderness bilaterally  MS:  Normal range of motion. No edema.    Back atraumatic.    No midline cervical, thoracic, or lumbar tenderness  Skin:  Jaundiced. Warm and dry.  No rash or lesions noted.  Neuro: Alert. Globally weak.  GCS: 15  Psych:  Depressed mood and flat affect.    Emergency Department Course   ECG (06:59:04):  Rate 110 bpm. NH interval 144. QRS duration 72. QT/QTc 316/427. P-R-T axes 31 20 32. Sinus tachycardia. Otherwise normal ECG. Interpreted at 0710 by John Stallworth MD.    Imaging:  Radiographic findings were communicated with the patient who voiced understanding of the findings.  CT Chest/Abdomen/Pelvis w Contrast  IMPRESSION:  1. Moderate/prominent ascites. Extensive peritoneal metastasis,  significantly increased.  2. Large right and mild/moderate left pleural effusions.  3. Small bilateral pulmonary emboli.  As read by Radiology.    POC US Abdomen Limited  Intraperitoneal fluid consistent with ascites.  US was utilized to guide needle placement during paracentesis procedure.  John Stallworth MD    Laboratory:  CBC: HGB 11.8 (L), WNL (WBC 9.8, )  CMP: Glucose 107 (H), Calcium 8.2 (L), Albumin 2.4 (L), Protein Total 6.4 (L), WNL (Creatinine 0.76)  Lactic acid whole blood (0743): 1.1  Blood culture: (In process)  Blood culture: (In process)  Troponin (0721): <0.015    UA: (In process)  Urine Culture Aerobic Bacterial: (In process)  Heparin Xa (10a) Level: (In process)  Cell count with  "differential fluid: (In process)  Albumin fluid: 2.2  Protein fluid: 4.4  Gram stain: Negative  Fluid Culture Aerobic Bacterial: (In process)  Glucose fluid: 100    Procedures:   Procedure: Diagnostic Paracentesis       INDICATION: Ascites, abdominal pain, concern for SBP     PROCEDURE : Dr. John Stallworth     CONSENT: Informed consent was obtained after risks and benefits were explained at length.     PROCEDURE SUMMARY:  A time-out was performed. The area of the right abdomen was prepped and draped in a sterile fashion using chlorhexidine scrub. 1% lidocaine with epinephrine was used to numb the region. The skin was entered using 3.5\" spinal needle on a 30cc syringe.  The needle was inserted and advanced with negative pressure under ultrasound guidance. Ultrasound images were permanently documented. No blood was aspirated. Clear yellow fluid was retrieved and collected. Approximately 25 mL of ascitic fluid was collected and sent for laboratory analysis. The needle was removed and no leaking was noted. The patient tolerated the procedure well without any immediate complications.      ESTIMATED BLOOD LOSS:0 cc     COMPLICATIONS: none    Interventions:  0801, isovue-370, 78 mLs, IV  0907, dilaudid, 0.5 mg, IV  0920, NS 1L IV Bolus  0950, rocephin, 2 g, IV  0950, zofran, 4 mg, IV  0959, heparin loading dose bolus, 5,000 Units, IV  0959, heparin infusion, 1,150 Units/hr, IV    Emergency Department Course:  Past medical records, nursing notes, and vitals reviewed.  0700: I performed an exam of the patient and obtained history, as documented above.    IV inserted and blood drawn.    The patient was sent for an abdomen CT while in the emergency department, findings above.    0831: I rechecked the patient. Explained findings to patient.    0900: I spoke with Dr. Soto of Oncology.     1003: Discussed the patient with Dr. Santos, who will admit the patient to an inpatient bed for further monitoring, evaluation, and " treatment. Findings and plan explained to the Patient who consents to admission.     1003: I spoke to Dr. Santos of the hospitalist service who accepts the patient for admission.     Impression & Plan    Medical Decision Making:  Patient is a 38-year-old male with a complex past medical history pertinent for recent diagnosis of malignant gastric cancer who presents with increasing shortness of breath.  Patient has significant abdominal distention concerning for worsening ascites today.  He also has shortness of breath with oxygen saturations in the low 90s concerning for potential pulmonary embolism versus pneumonia versus pneumothorax.  Patient ultimately was sent for a CT scan of the chest abdomen and pelvis to evaluate for potential intra-abdominal catastrophe versus central pulmonary embolism or worsening malignancy.  There is a significant effusion both to the right and left sides of his chest as well as small bilateral pulmonary emboli.  There is also extensive peritoneal metastases and prominent ascites intra-abdominally.  Patient has a mild temperature here of 99.4 by oral temperature.  Vital signs otherwise show sinus tachycardia.  EKG shows no concerning ischemic changes and troponin is negative low concern for ACS as the cause of his presentation.  Patient was ultimately consented for paracentesis at a concern for spontaneous bacterial peritonitis in the setting of malignant gastric carcinoma.  I suspect this is likely secondary to malignancy although with the recent paracentesis and sparing presentation I do have concern for potential SBP.  Patient had a diagnostic paracentesis performed per procedure note and ultimately 2 g of ceftriaxone was administered out of concern for SBP.  Patient was also started on heparin after conversation with Dr. Soto of oncology.  We discussed IV heparin would be the most amenable to the situation with bilateral pulmonary emboli as he may require future procedures due  to significant pleural effusions bilaterally.  I spoke with Dr. Santos of the hospitalist service who ultimately accepted the patient for admission.  While awaiting admission Gram stain results returned showing no organisms and a preliminary negative Gram stain.  Lower concern for spontaneous bacterial peritonitis after gram stain results returned negative. Admitted after heparin drip started for bilateral PE.    Critical Care Time: 70 minutes    Diagnosis:    ICD-10-CM    1. SOB (shortness of breath)     2. History of gastric cancer Z85.028    3. Pulmonary embolism, bilateral (H) I26.99    4. Malignant ascites R18.0    5. Abdominal pain, generalized R10.84    6. Pleural effusion, left J90    7. Pleural effusion, right J90      Disposition:  Admitted to hospital.    Alonzo Carrington  9/23/2018    EMERGENCY DEPARTMENT  Scribe Disclosure:  I, Alonzo Carrington, am serving as a scribe at 7:00 AM on 9/23/2018 to document services personally performed by John Stallworth MD based on my observations and the provider's statements to me.        John Stallworth MD  09/23/18 1289

## 2018-09-24 PROBLEM — J91.0 MALIGNANT PLEURAL EFFUSION (H): Status: ACTIVE | Noted: 2018-01-01

## 2018-09-24 NOTE — PROGRESS NOTES
Interventional Radiology Pre-Procedure Sedation Assessment   Time of Assessment: 9:53 AM    Expected Level: Moderate Sedation    Indication: Sedation is required for the following type of Procedure: Right lung pleurX placement    Sedation and procedural consent: Risks, benefits and alternatives were discussed with Patient    PO Intake: Appropriately NPO for procedure    ASA Class: Class 3 - SEVERE SYSTEMIC DISEASE, DEFINITE FUNCTIONAL LIMITATIONS.    Mallampati: Grade 2:  Soft palate, base of uvula, tonsillar pillars, and portion of posterior pharyngeal wall visible    Lungs: Lung sounds decreased bilaterally, right base absent    Heart: Normal heart sounds with tachycardia    History and physical reviewed and no updates needed. I have reviewed the lab findings, diagnostic data, medications, and the plan for sedation. I have determined this patient to be an appropriate candidate for the planned sedation and procedure and have reassessed the patient IMMEDIATELY PRIOR to sedation and procedure.    Regina Barber, MARILIN CNP

## 2018-09-24 NOTE — PROGRESS NOTES
MD Notification    Notified Person: MD    Notified Person Name: Dr. King    Notification Date/Time: 9/24/18 1047    Notification Interaction: Telephone w/ readback    Purpose of Notification: Need orders for frequent and amount to drain from R pleurex drain.     Orders Received: Drain every 24-48 hours, do not remove more than 1L     Comments:

## 2018-09-24 NOTE — PROGRESS NOTES
Chart check for Dr. Soto (aware of admission)  Pt was in IR for thoracentesis and plurex cath placement  Dr Soto will discuss with pt treatment as planned.

## 2018-09-24 NOTE — PLAN OF CARE
Problem: Patient Care Overview  Goal: Plan of Care/Patient Progress Review  Outcome: No Change  3658-1784: A&Ox4. VSS. Patient c/o abdominal pain, PRN oxycodone given x1 with relief. Patient c/o SOB and LANDERS at times. Patient c/o nausea, PRN zofran given with relief.Trace edema in LLE. Up with SBA. Heparin infusing @ 13 ml/hr. Hep10a draw at 0030. Stop Heparin at 0400 per patient care order. IR and hem/onc consult tomorrow. Will continue to monitor.

## 2018-09-24 NOTE — PROGRESS NOTES
MD Notification    Notified Person: MD    Notified Person Name:  Dr. King    Notification Date/Time: 9/24/18 3122    Notification Interaction: web    Purpose of Notification: Restart Hep gtt    Orders Received: Restart hep gtt    Comments: Writer called lab, per lab ok to draw hep 10a from L chest port

## 2018-09-24 NOTE — CONSULTS
"CLINICAL NUTRITION SERVICES  -  ASSESSMENT NOTE      Recommendations Ordered by Registered Dietitian (RD):     Will send a PLUS2 shake BID between meals when diet resumed (625 elsie, 19 gm pro each)     Malnutrition: (9/24)  % Weight Loss:  > 5% in 1 month (severe malnutrition)  % Intake:  </= 75% for >/= 1 month (severe malnutrition)  Subcutaneous Fat Loss:  None observed  Muscle Loss:  None observed  Fluid Retention:  None noted    Malnutrition Diagnosis: Severe malnutrition  In Context of:  Acute illness or injury        REASON FOR ASSESSMENT  Ernie Song is a 38 year old male seen by Registered Dietitian for RN Consult - poor po, new dx CA      NUTRITION HISTORY  Visited briefly with pt this morning - very sleepy, tells me that he has having a procedure later today    Pt notes that he has been able to eat, but has abd pain after eating  States that he is eating small amounts    Unable to obtain more detailed diet history, as pt kept dozing off    Does state that he tolerates dairy and is agreeable to trying a high elsie shake once diet resumed      CURRENT NUTRITION ORDERS  Diet Order:     NPO    Pt scheduled for possible Pleurex Cath today      PHYSICAL FINDINGS  Observed  No nutrition-related physical findings observed  Obtained from Chart/Interdisciplinary Team  Increased abd pain  Abd firm, distended  No edema  Ascites  Significant effusion both to the right and left sides of his chest    9/5/18 - dx gastric CA        ANTHROPOMETRICS  Height: 5' 4\"  Weight:(9/23) 70.3 kg / 155 lbs 0 oz  Body mass index is 26.61 kg/(m^2).  Weight Status:  Overweight BMI 25-29.9  IBW: 59.1 kg  % IBW: 119%  Weight History:  Wt is down ~10# (6%) in the past month.  Wt Readings from Last 10 Encounters:   09/23/18 70.3 kg (155 lb)   09/20/18 69.9 kg (154 lb)   09/14/18 73.9 kg (163 lb)   09/10/18 74.3 kg (163 lb 12.8 oz)   09/08/18 71.2 kg (157 lb)   08/31/18 75.8 kg (167 lb 1.7 oz)   08/31/18 74.3 kg (163 lb 11.2 oz) "   07/12/18 75.3 kg (166 lb)   07/11/18 74.8 kg (165 lb)   02/27/18 77.2 kg (170 lb 3.2 oz)         LABS  Labs reviewed    MEDICATIONS  Medications reviewed      ASSESSED NUTRITION NEEDS PER APPROVED PRACTICE GUIDELINES:    Dosing Weight (9/23) 70.3 kg  Estimated Energy Needs: 7220-4768 kcals (25-30 Kcal/Kg)  Justification: maintenance  Estimated Protein Needs:  grams protein (1.2-1.5 g pro/Kg)  Justification: preservation of lean body mass      MALNUTRITION:  % Weight Loss:  > 5% in 1 month (severe malnutrition)  % Intake:  </= 75% for >/= 1 month (severe malnutrition)  Subcutaneous Fat Loss:  None observed  Muscle Loss:  None observed  Fluid Retention:  None noted    Malnutrition Diagnosis: Severe malnutrition  In Context of:  Acute illness or injury    NUTRITION DIAGNOSIS:  Inadequate oral intake related to abd pain with new dx gastric CA as evidenced by pt with 10# wt loss over the past month      NUTRITION INTERVENTIONS  Recommendations / Nutrition Prescription  NPO (for procedure)    Nutrition supplement  .      Implementation  Nutrition education ---> Reviewed current diet order  Will send a PLUS2 shake BID between meals when diet resumed (625 elsie, 19 gm pro each)  .      Nutrition Goals  Pt to consume 50% meals and 2 supplements per day  .      MONITORING AND EVALUATION:  Progress towards goals will be monitored and evaluated per protocol and Practice Guidelines

## 2018-09-24 NOTE — PROCEDURES
RADIOLOGY POST PROCEDURE NOTE    Patient name: Ernie Song  MRN: [id number]  : 1980    Pre-procedure diagnosis: Pleural effusion  Post-procedure diagnosis: Same    Procedure Date/Time: 2018  10:47 AM  Procedure: Tunneled pleural cathter placement  Estimated blood loss: None  Specimen(s) collected with description: none    The patient tolerated the procedure well with no immediate complications.  Significant findings:none    See imaging dictation for procedural details.    Provider name: Kristopher Hdz  Assistant(s):None

## 2018-09-24 NOTE — PLAN OF CARE
Problem: Pain, Acute (Adult)  Goal: Identify Related Risk Factors and Signs and Symptoms  Related risk factors and signs and symptoms are identified upon initiation of Human Response Clinical Practice Guideline (CPG).   Outcome: No Change  A/O, VSS on 3L NC. C/O continued abdominal pain 7-8/10, Scheduled Oxy and PRN Oxy utilized, pain control down to 5-6/10. LS coarse and diminished at the bases, denies cough but endorses LANDERS. Hep Gtts stopped 0420 per orders for IR consult/ drain placement. Abdomen distended and semi firm, U/A collected and sent. Up with SBA and calls appropriately. Discharge pending. Lt Port dressing CDI.

## 2018-09-24 NOTE — PLAN OF CARE
Problem: Patient Care Overview  Goal: Plan of Care/Patient Progress Review  Outcome: No Change  A/O, VSS on 3L NC. C/O continued abdominal pain 7-8/10, Scheduled Oxy and PRN Oxy utilized, pain control down to 5-6/10. LS coarse and diminished at the bases, denies cough but endorses LANDERS. Hep Gtts stopped 0420 per orders for IR consult/ drain placement. Abdomen distended and semi firm, U/A collected and sent. Up with SBA and calls appropriately. Discharge pending. Lt Port dressing CDI.

## 2018-09-24 NOTE — PLAN OF CARE
Problem: Patient Care Overview  Goal: Plan of Care/Patient Progress Review  Outcome: No Change  A/ox4, anxious at start of shift. VSS except tachypnea (20-24) at times. Currently on 3L O2 via NC and stating in mid 90's. LS diminished. LANDERS. Frequent, dry, nonproductive cough following drain placement this morning, improving throughout the day. R pleurex drain placed this morning, 1L removed, dressing to site CDI. C/o of abdominal pain, scheduled OxyContin effective for pain control.  Abdomen distended and firm. BS hypoactive. Regular diet following procedure, poor appetite. Heparin gtt infusing through L chest port, gtt restarted at 1350, currently running at 14.5mL/hr, recheck at 1950 this evening.  Up with SBA, wife currently at bedside. Discharge pending. Continue to monitor.

## 2018-09-24 NOTE — IR NOTE
Interventional Radiology Intra-procedural Nursing Note    Patient Name: Ernie Song  Medical Record Number: [id number]  Today's Date: September 24, 2018    Start Time: 0954  End of procedure time: 1034  Procedure: Pleurx catheter drain placement right side  Report given to: UNA Ward  Time pt departs:  1048  : kaye    Other Notes: site dressed with sponge and tegaderm. 1L of fluid removed. No c/o pain. Pt remains on 4L NC. BORA.     LINDSEY NEAL

## 2018-09-24 NOTE — PROGRESS NOTES
Hospitalist Progress Note    09/24/2018           Assessment and Plan:       Ernie Song is a 38 year old male with gastric cancer, stage IV awaiting to start Chemo with Dr. Soto on 9/26, who presents with increased SOB and just had right thoracentesis on 9/14 with 1 liter removed, and already back with large right and moderate left pleural effusion, and new PE with negative leg ultrasound      Pulmonary embolism   Assessment: suspect related to hypercoagulable related to gastric cancer                       Plan:  IV heparin for now, then subcutaneous Lovenox on discharge    Malignant ascites  SBP  Assessment: ascitic fluid analysis with >5000 WBC, 56% neutrophils, consistent with SBP, though could be secondary to malignancy as well. Would treat empirically for time being  Plan:  - Ceftriaxone 2g daily for now  - Follow cultures      Gastric adenocarcinoma (  Assessment:  chemo planned 9/26, start it sooner. Discussed other options (palliotive care, hospice -- wife wants to know how effective chemo to be?)  Plan:  - Oncology consult  - Pain control as needed with oxycodone and oxycontin    Bilateral Pleural effusion  Assessment: pleurx catheter placed  Plan:  - Follow clinically      Severe malnutrition (H)  Assessment: nutrition consulted  Plan:  - Regular diet, encouraged PO intake      Genital herpes  Assessment/Plan: continue PTA Valtrex      Active Diet Order      Regular Diet Adult    DVT Prophylaxis: therapeutic heparin  Code Status: Full Code    Disposition: Expected discharge pending CP    Attestation:   I have reviewed today's relevant vital signs, notes, medications, labs and imaging.I personally reviewed the CT CHEST ABDOMEN PELVIS image(s) showing See below.  IMPRESSION:  1. Moderate/prominent ascites. Extensive peritoneal metastasis,  significantly increased.  2. Large right and mild/moderate left pleural effusions.  3. Small bilateral pulmonary emboli.    Simón King MD  [facility]  Text Page  (7am - 6pm)        Interval History:        No acute events overnight  No CP/SOB following pleurx catheter placement  Abdomen distended,   No nausea/vomiting  No other complaints         Physical Exam:        Physical Exam   Temp: 96.8  F (36  C) Temp src: Oral BP: (!) 137/96   Heart Rate: 108 Resp: 22 SpO2: 97 % O2 Device: Nasal cannula Oxygen Delivery: 4 LPM  Vitals:    09/23/18 0658   Weight: 70.3 kg (155 lb)     Vital Signs with Ranges  Temp:  [96.4  F (35.8  C)-98.4  F (36.9  C)] 96.8  F (36  C)  Heart Rate:  [] 108  Resp:  [18-28] 22  BP: (108-144)/(73-96) 137/96  SpO2:  [91 %-98 %] 97 %  I/O last 3 completed shifts:  In: 120 [P.O.:120]  Out: 100 [Urine:100]    Constitutional: Sleepy, cooperative, no apparent distress.  Eyes: Conjunctiva and pupils examined and normal.  HEENT: Moist mucous membranes, normal dentition.  Respiratory: decreased breath sounds at base bilaterally. Right tunneled pleural catheter present   Cardiovascular: Regular rate and rhythm, normal S1 and S2, and no murmur noted,  Trace bilateral ankle edema.   GI: Soft, moderate distension, mildly tender, normal bowel sounds.  Lymph/Hematologic: No anterior cervical, supraclavicular or axillary adenopathy.  Skin: No rashes, no cyanosis.   Musculoskeletal: No joint swelling, erythema or tenderness.  Neurologic: Cranial nerves 2-12 intact, no focal weakness or numbness  Psychiatric: Drowsy, no obvious anxiety or depression.    # Pain Assessment:  Current Pain Score 9/24/2018   Patient currently in pain? sleeping: patient not able to self report   Pain score (0-10) -   Pain location -   Pain descriptors -   - Ernie is experiencing pain due to malignancy. Pain management was discussed and the plan was created in a collaborative fashion.  Ernie's response to the current recommendations: engaged  - Please see the plan for pain management as documented above         Data:     CBC RESULTS:    Recent  Labs  Lab 09/24/18  0845 09/23/18  0721   WBC  --  9.8   RBC  --  5.10   HGB  --  11.8*   HCT  --  37.0*    375       BASIC METABOLIC PANEL:    Recent Labs  Lab 09/23/18  0721      POTASSIUM 4.1   CHLORIDE 97   CO2 30   BUN 7   CR 0.76   *   AROLDO 8.2*       HEPATIC FUNCTION PANEL    Recent Labs  Lab 09/23/18  0721   AST 21   ALT 20   ALKPHOS 87   BILITOTAL 0.5     INR    Recent Labs  Lab 09/24/18  0845   INR 1.28*       OTHER LABS    None    Medications     HEParin 1,450 Units/hr (09/24/18 1350)       docusate sodium (COLACE) capsule 100 mg  100 mg Oral Daily     heparin  5 mL Intracatheter Q28 Days     heparin lock flush  5-10 mL Intracatheter Q24H     influenza vaccine adult (product based on age)  0.5 mL Intramuscular Prior to discharge     iron sucrose (VENOFER) intermittent infusion  300 mg Intravenous Once     oxyCODONE  20 mg Oral Q12H     pantoprazole  40 mg Oral BID AC     valACYclovir  500 mg Oral Daily         Recent Results (from the past 24 hour(s))   IR Chest Tube Drain Tunneled Right    Narrative    PROCEDURE(S):   Placement of a right sided tunneled pleural drainage catheter    DATE OF PROCEDURE:   9/24/2018 10:38 AM    OPERATORS:    Kristopher Hdz MD    Medications: 1% Lidocaine SQ, Ancef 2 g IV    CONTRAST:   None    REFERENCED AIR KERMA: 0 mGy  FLUOROSCOPY TIME: 0 minutes    ESTIMATED BLOOD LOSS:  Minimal    COMPLICATIONS:  None    PRE-PROCEDURE DIAGNOSIS: Recurrent pleural effusion  POST-PROCEDURE DIAGNOSIS: Same    CLINICAL HISTORY/INDICATION:  38-year-old male with recurrent left pleural effusion and gastric  cancer.    PROCEDURES AND FINDINGS:  Following a discussion of the risks, benefits, indications and  alternatives to treatment, appropriate informed consent was obtained.   The patient was brought to the interventional radiology suite and  placed on the table. The right hemithorax was prepped and draped in  usual sterile fashion.  A time out was performed per  "hospitals  universal protocol policy to ensure correct patient, site and  procedure to be performed.     Preliminary ultrasound evaluation of the right chest was performed and  demonstrates a large pleural effusion.  An ultrasound image was  archived. Local anesthesia was obtained with 1% Lidocaine.  Under  direct ultrasound guidance, a micropuncture needle was advanced into  pleural effusion with return of straw colored fluid. A 0.018\" wire was  advanced into the pleural space and exchange was then made for a  micropuncture sheath. A 0.035\" guidewire was then advanced through the  micropuncture sheath and the tract was serially dilated, and a peel  away sheath was placed. Attention was then turned to creation of a  subcutaneous tunnel. Local anesthesia was obtained along the tract  with 1% Lidocaine. A suitable puncture for the tunnel was made and the  Pleurex catheter was then tunneled through the skin and advanced  through the peel away sheath into the pleural space. Approximately 1  liters of pleural fluid was removed. The catheter was sutured to the  skin with 0 silk and a sterile dressing was applied. The puncture site  was closed uneventfully.     Throughout the procedure, the patient was monitored by a radiology  nurse for cardiac rhythm, blood pressure and oxygen saturation which  remained stable. Total sedation time was 44 minutes. The patient  tolerated the procedure well and left interventional radiology in  stable condition.      Impression    IMPRESSION:  Placement of a tunneled pleural Pleurex drainage catheter, as detailed  above.    ELLEN DIAS MD       "

## 2018-09-25 NOTE — PROGRESS NOTES
Hospitalist Progress Note    09/25/2018           Assessment and Plan:       Ernie Song is a 38 year old male with gastric cancer, stage IV awaiting to start Chemo with Dr. Soto on 9/26, who presents with increased SOB and just had right thoracentesis on 9/14 with 1 liter removed, and already back with large right and moderate left pleural effusion, and new PE with negative leg ultrasound    Pulmonary embolism   Assessment: suspect related to hypercoagulable related to gastric cancer                       Plan:  - IV heparin for now, then subcutaneous Lovenox on discharge    Malignant ascites  SBP  Assessment: ascitic fluid analysis with >5000 WBC, 56% neutrophils, consistent with SBP, though could be secondary to malignancy as well. Would treat empirically for time being.   Plan:  - Ceftriaxone 2g daily for now  - Follow cultures, NGTD  - Therapeutic Paracentesis tomorrow  - NPO at midnight      Gastric adenocarcinoma (  Assessment:  chemo planned 9/26, start it sooner. Discussed other options (palliotive care, hospice -- wife wants to know how effective chemo to be?)  Plan:  - Oncology consult appreciated  - Pain control as needed with oxycodone and oxycontin    Bilateral Pleural effusion  Assessment: pleurx catheter placed  Plan:  - Follow clinically      Severe malnutrition (H)  Assessment: nutrition consulted  Plan:  - Regular diet, encouraged PO intake      Genital herpes  Assessment/Plan: continue PTA Valtrex      Active Diet Order      Regular Diet Adult      NPO for Medical/Clinical Reasons Except for: Meds, Ice Chips    DVT Prophylaxis: therapeutic heparin  Code Status: Full Code    Disposition: Expected discharge pending CP    Attestation:   I have reviewed today's relevant vital signs, notes, medications, labs and imaging.I personally reviewed no images or EKG's today.    Simón King MD  [facility] Hospitalist  Text Page  (7am - 6pm)        Interval History:        No acute  events overnight  Mild cough   No new CP/SOB  Abdomen distended, very painful with cough  No nausea/vomiting  No other complaints         Physical Exam:        Physical Exam   Temp: 97.7  F (36.5  C) Temp src: Oral BP: 143/79   Heart Rate: 110 Resp: 18 SpO2: 93 % O2 Device: None (Room air) Oxygen Delivery: 3 LPM  Vitals:    09/23/18 0658   Weight: 70.3 kg (155 lb)     Vital Signs with Ranges  Temp:  [96.8  F (36  C)-98.6  F (37  C)] 97.7  F (36.5  C)  Heart Rate:  [101-110] 110  Resp:  [18-20] 18  BP: (123-143)/(79-87) 143/79  SpO2:  [93 %-99 %] 93 %  I/O last 3 completed shifts:  In: 50 [P.O.:50]  Out: 1325 [Urine:325; Drains:1000]    Constitutional: Sleepy, cooperative, no apparent distress.  Eyes: Conjunctiva and pupils examined and normal.  HEENT: Moist mucous membranes, normal dentition.  Respiratory: decreased breath sounds at base bilaterally. Right tunneled pleural catheter present   Cardiovascular: Regular rate and rhythm, normal S1 and S2, and no murmur noted,  Trace bilateral ankle edema.   GI: Soft, moderate distension, mildly tender throughout, normal bowel sounds.  Lymph/Hematologic: No anterior cervical, supraclavicular or axillary adenopathy.  Skin: No rashes, no cyanosis.   Musculoskeletal: No joint swelling, erythema or tenderness.  Neurologic: Cranial nerves 2-12 intact, no focal weakness or numbness  Psychiatric: Drowsy, no obvious anxiety or depression.    # Pain Assessment:  Current Pain Score 9/25/2018   Patient currently in pain? yes   Pain score (0-10) 6   Pain location Abdomen   Pain descriptors -   - Ernie is experiencing pain due to malignancy. Pain management was discussed and the plan was created in a collaborative fashion.  Ernie's response to the current recommendations: engaged  - Please see the plan for pain management as documented above         Data:     CBC RESULTS:    Recent Labs  Lab 09/24/18  0845 09/23/18  0721   WBC  --  9.8   RBC  --  5.10   HGB  --  11.8*   HCT  --   37.0*    375       BASIC METABOLIC PANEL:    Recent Labs  Lab 09/23/18  0721      POTASSIUM 4.1   CHLORIDE 97   CO2 30   BUN 7   CR 0.76   *   AROLDO 8.2*       HEPATIC FUNCTION PANEL    Recent Labs  Lab 09/23/18  0721   AST 21   ALT 20   ALKPHOS 87   BILITOTAL 0.5     INR    Recent Labs  Lab 09/24/18  0845   INR 1.28*       OTHER LABS    None    Medications     HEParin 1,600 Units/hr (09/24/18 2339)       cefTRIAXone  2 g Intravenous Q24H     docusate sodium (COLACE) capsule 100 mg  100 mg Oral Daily     heparin  5 mL Intracatheter Q28 Days     heparin lock flush  5-10 mL Intracatheter Q24H     influenza vaccine adult (product based on age)  0.5 mL Intramuscular Prior to discharge     oxyCODONE  20 mg Oral Q12H     pantoprazole  40 mg Oral BID AC     valACYclovir  500 mg Oral Daily         No results found for this or any previous visit (from the past 24 hour(s)).

## 2018-09-25 NOTE — PLAN OF CARE
Problem: Patient Care Overview  Goal: Plan of Care/Patient Progress Review  Outcome: No Change    A&Ox4. Lethargic at times. VSS on 3L O2 ex tachy.  Abdominal pain/fullness PRN oxycodone given x1 with relief. PleurX drain placed today, 1L removed, dressing CDI. Hep 10a recheck at 0400. Dose/rate changed to 16 ml/hr, with bolus of 1900 units. SOB at times. L chest port infusing heparin. L PIV SL. IV iron given. Will continue to monitor.

## 2018-09-25 NOTE — CONSULTS
Patent seen and examined. Full note dictated. Briefly, patient is a 38 year ols man with a recent diagnosis of gastric adenocarcinoma with LN and peritoneal metastases. He was seen recently in the outpatient clinic by my colleague, Dr. Silva and was scheduled to begin first line chemotherapy with the FOLFOX regimen. He is admitted now with increasing abdominal discomfort and dyspnea. CT showed small bilateral pulmonary emboli and increasing peritoneal involvement. He has been started on IV heparin and will have a therapeutic thoracentesis tomorrow. He has also been started on ceftriaxone for possible SBP based on ascites fluid. We discussed different options and I recommended that he begin treatment for his cancer while he is hospitalized given rapid progression of symptoms. Continue treatment for pulmonary emboli with plan to switch to enoxaparin at discharge.

## 2018-09-25 NOTE — PLAN OF CARE
Problem: Patient Care Overview  Goal: Plan of Care/Patient Progress Review  A/ox4. VSS except tachycardic at times on 3L O2 via NC. C/o of abdominal pain, scheduled Oxycontin and PRN Oxycodone given x1 for pain control. Denies LANDERS or shortness of breath at rest. Infrequent, productive cough, patient coughing up thick white/yellow sputum. Abdomen - RUQ and RLQ hypoactive and LUQ and LLQ faint/absent. Abdomen distended and firm. Dressing to R pleurex drain - CDI, patient did not want pleurex drained today, denied LANDERS/SOB. Urine kitty/orange, using urinal at bedside. L Port infusing heparin gtt at 16mL/hr, recheck tomorrow morning. Plan for paracentetics at 0930 tomorrow morning. Must be NPO at midnight. Heparin gtt to be stopped at 0530 tomorrow prior to paracentesis. Discharge pending. Continue to monitor.

## 2018-09-26 NOTE — PLAN OF CARE
Problem: Patient Care Overview  Goal: Plan of Care/Patient Progress Review  Pt alert and oriented.  Tachycardic in the 110's.  Heparin gtt running @ 16ml/hr, redraw @ 1800.  Chemo precautions initiated.  Pain in belly improved with heat packs and oxycodone x1 and paracentesis (2100 ml taken off).  Dyspnea improved with paracentesis, still on 2L O2 for comfort (satting well). Blisters noted underneath the tegaderm dressing for the pleurex catheter, will ask next shift to redress.  Plan to start fluorouracil this evening.

## 2018-09-26 NOTE — PROGRESS NOTES
Hospitalist Progress Note    09/26/2018           Assessment and Plan:       Ernie Song is a 38 year old male with gastric cancer, stage IV awaiting to start Chemo with Dr. Soto on 9/26, who presents with increased SOB and just had right thoracentesis on 9/14 with 1 liter removed, and already back with large right and moderate left pleural effusion, and new PE with negative leg ultrasound    Pulmonary embolism   Assessment: suspect related to hypercoagulable related to gastric cancer                       Plan:  - IV heparin for now, then subcutaneous Lovenox on discharge  - Monitor for bleeding    Malignant ascites  SBP  Assessment: ascitic fluid analysis with >5000 WBC, 56% neutrophils, consistent with SBP, though could be secondary to malignancy as well. Would treat empirically for time being. S/p paracentesis 2.1L removed. Afebrile, no leukocytosis, would treat for total 5 days.   Plan:  - Ceftriaxone 2g daily for now  - Follow cultures      Gastric adenocarcinoma  Assessment:  chemo planned 9/26, start it sooner. Discussed other options (palliotive care, hospice -- wife wants to know how effective chemo to be?)  Plan:  - Oncology consult appreciated  - Chemo started per Oncology: Oxaliplatin, bolus 5FU and leucovorin today. Start 5FU infusion over 48 hours. Discontinue pump on Friday  - IV antiemetics per protocol  - Pain control as needed with oxycodone and oxycontin    Bilateral Pleural effusion  Assessment: pleurx catheter placed. Having mild SOB, but sat well on RA.  Plan:  - Follow clinically  - Obtain CXR      Severe malnutrition (H)  Assessment: nutrition consulted  Plan:  - Regular diet, encouraged PO intake      Genital herpes  Assessment/Plan: continue PTA Valtrex    Active Diet Order      Combination Diet Regular Diet Adult    DVT Prophylaxis: therapeutic heparin  Code Status: Full Code    Disposition: Expected discharge pending CP    Attestation:   I have reviewed today's relevant  vital signs, notes, medications, labs and imaging.I personally reviewed no images or EKG's today.    Simón King MD  [facility]  Text Page  (7am - 6pm)        Interval History:        No acute events overnight  Feels much improved after paracentesis  Chemo started  Mild left sided chest discomfort  Overall no other physical complaints         Physical Exam:        Physical Exam   Temp: 97.4  F (36.3  C) Temp src: Oral BP: 126/79   Heart Rate: 106 Resp: 16 SpO2: 97 % O2 Device: Nasal cannula Oxygen Delivery: 2 LPM  Vitals:    09/23/18 0658 09/26/18 0804   Weight: 70.3 kg (155 lb) 72.1 kg (159 lb)     Vital Signs with Ranges  Temp:  [97  F (36.1  C)-98.6  F (37  C)] 97.4  F (36.3  C)  Heart Rate:  [103-122] 106  Resp:  [16-18] 16  BP: (122-142)/(75-98) 126/79  SpO2:  [96 %-99 %] 97 %  I/O last 3 completed shifts:  In: -   Out: 475 [Urine:100; Drains:375]    Constitutional: Sleepy, cooperative, no apparent distress.  Eyes: Conjunctiva and pupils examined and normal.  HEENT: Moist mucous membranes, normal dentition.  Respiratory: decreased breath sounds at base bilaterally. Right tunneled pleural catheter present   Cardiovascular: Regular rate and rhythm, normal S1 and S2, and no murmur noted,  Trace bilateral ankle edema.   GI: Soft, mild distension, mildly tender throughout around paracentesis site, normal bowel sounds.  Lymph/Hematologic: No anterior cervical, supraclavicular or axillary adenopathy.  Skin: No rashes, no cyanosis.   Musculoskeletal: No joint swelling, erythema or tenderness.  Neurologic: Cranial nerves 2-12 intact, no focal weakness or numbness  Psychiatric: Drowsy, no obvious anxiety or depression.    # Pain Assessment:  Current Pain Score 9/26/2018   Patient currently in pain? -   Pain score (0-10) 2   Pain location Abdomen   Pain descriptors Pressure   - Ernie is experiencing pain due to malignancy. Pain management was discussed and the plan was created in a collaborative  esdras Klein's response to the current recommendations: engaged  - Please see the plan for pain management as documented above         Data:     CBC RESULTS:    Recent Labs  Lab 09/26/18  0543 09/24/18  0845 09/23/18  0721   WBC 10.2  --  9.8   RBC 5.05  --  5.10   HGB 11.8*  --  11.8*   HCT 36.9*  --  37.0*    362 375       BASIC METABOLIC PANEL:    Recent Labs  Lab 09/23/18  0721      POTASSIUM 4.1   CHLORIDE 97   CO2 30   BUN 7   CR 0.76   *   AROLDO 8.2*       HEPATIC FUNCTION PANEL    Recent Labs  Lab 09/23/18  0721   AST 21   ALT 20   ALKPHOS 87   BILITOTAL 0.5     INR    Recent Labs  Lab 09/24/18  0845   INR 1.28*       OTHER LABS    None    Medications     HEParin 1,600 Units/hr (09/26/18 1144)     - MEDICATION INSTRUCTIONS -       - MEDICATION INSTRUCTIONS -       sodium chloride         albumin human  12.5 g Intravenous Once     cefTRIAXone  2 g Intravenous Q24H     docusate sodium (COLACE) capsule 100 mg  100 mg Oral Daily     fluorouracil (ADRUCIL) chemo infusion  2,400 mg/m2 Intravenous Once     fluorouracil  400 mg/m2 (Treatment Plan Recorded) Intravenous Once     heparin  5 mL Intracatheter Q28 Days     heparin lock flush  5-10 mL Intracatheter Q24H     influenza vaccine adult (product based on age)  0.5 mL Intramuscular Prior to discharge     leucovorin (WELLCOVORIN) infusion  350 mg/m2 (Treatment Plan Recorded) Intravenous Once     oxaliplatin (ELOXATIN) chemo infusion  85 mg/m2 (Treatment Plan Recorded) Intravenous Once     oxyCODONE  20 mg Oral Q12H     pantoprazole  40 mg Oral BID AC     sodium chloride (PF)  3 mL Intracatheter Q8H     valACYclovir  500 mg Oral Daily         Recent Results (from the past 24 hour(s))   US Paracentesis    Narrative    ULTRASOUND PARACENTESIS 9/26/2018 11:16 AM     HISTORY: Symptomatic ascites. Malignant ascites, needs drainage.     FINDINGS: Ultrasound was used to evaluate for the presence and best  approach for paracentesis. Written and oral  informed consent was  obtained. A pause for the cause procedure to verify the correct  patient and correct procedure. The skin overlying the left lower  quadrant was prepped and draped in the usual sterile fashion. The  subcutaneous tissues were anesthetized with 10 mL 1% lidocaine. A  catheter was advanced into the peritoneal space and 2.1 L of straw  colored fluid was drained. There were no immediate complications.  Ultrasound images were permanently stored. Patient left the ultrasound  suite in satisfactory condition.      Impression    IMPRESSION: Technically successful paracentesis without immediate  complications.    NIELS TADEO MD

## 2018-09-26 NOTE — PROGRESS NOTES
RADIOLOGY PROCEDURE NOTE  Patient name: Ernie Song  MRN: [id number]  : 1980    Pre-procedure diagnosis: Ascites  Post-procedure diagnosis: Same    Procedure Date/Time: 2018  10:49 AM  Procedure: Paracentesis  Estimated blood loss: None  Specimen(s) collected with description: Ascites.  The patient tolerated the procedure well with no immediate complications.    See imaging dictation for procedural details and findings.    Provider name: Zackery Osullivan  Assistant(s):None

## 2018-09-26 NOTE — PROVIDER NOTIFICATION
MD Notification    Person notified:Dr. Coats    Person Name:On call hospitalist    Date/Time:9/25/18 2200    Interaction:phone call    Purpose of Notification:compazine does did not work, also asked about prn stool softener.    Orders Received:Give IV zofran 4 mg once, put in prn stool softeners.    Comments:

## 2018-09-26 NOTE — PROGRESS NOTES
[facility] Progress Note     Primary Oncologist/Hematologist:  Dr. Silva          Assessment and Plan:     Ketan is a 38 year old man admitted 9/23 with shortness of breath    1.  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    - He is clearly symptomatic from the metastatic disease and it would therefore be reasonable to start him on treatment while he is an inpatient.    - Reviewed FOLFOX chemotherapy side effects in detail. He has had outpatient chemotherapy education. Most concerning would be cold induced dysesthesias in the next couple of days.   - Oxaliplatin, bolus 5FU and leucovorin today. Start 5FU infusion over 48 hours. Discontinue pump on Friday  - IV antiemetics per protocol    2.  Thrombophilia related to underlying malignancy.    - He is currently on IV heparin.    - Plan to switch to enoxaparin prior to discharge  - Monitor for bleeding  - In the future would need to hold anticoagulation for platelets <50,000    3. Shortness of breath  - Related to PE, pleural fluid and ascites  - He has a PleurX in the right chest  - Plan to reassess symptoms after paracentesis today  - Consider CXR  - Guaifenesin for cough PRN    4. SBP  - On ceftriaxone  - Cultures pending    Gm Jones APRN, CNP  [facility]  [phone]        Interval History:   Abdominal discomfort. Mild cough. Ready to start chemotherapy.              Review of Systems:   The 5 point Review of Systems is negative other than noted in the HPI             Medications:   Scheduled Medications    cefTRIAXone  2 g Intravenous Q24H     docusate sodium (COLACE) capsule 100 mg  100 mg Oral Daily     heparin  5 mL Intracatheter Q28 Days     heparin lock flush  5-10 mL Intracatheter Q24H     influenza vaccine adult (product based on age)  0.5 mL Intramuscular Prior to discharge      "oxyCODONE  20 mg Oral Q12H     pantoprazole  40 mg Oral BID AC     sodium chloride (PF)  3 mL Intracatheter Q8H     valACYclovir  500 mg Oral Daily     PRN Medications  acetaminophen, docusate sodium, guaiFENesin, heparin lock flush, HOLD MEDICATION, - MEDICATION INSTRUCTIONS -, melatonin, naloxone, ondansetron **OR** ondansetron, ondansetron, oxyCODONE IR, polyethylene glycol, prochlorperazine (COMPAZINE) tablet 10 mg, sodium chloride (PF), sodium chloride (PF), sodium chloride (PF)               Physical Exam:   Vitals were reviewed  Blood pressure (!) 142/98, temperature 97  F (36.1  C), temperature source Oral, resp. rate 16, height 1.626 m (5' 4.02\"), weight 72.1 kg (159 lb), SpO2 97 %.  Wt Readings from Last 4 Encounters:   09/26/18 72.1 kg (159 lb)   09/20/18 69.9 kg (154 lb)   09/14/18 73.9 kg (163 lb)   09/10/18 74.3 kg (163 lb 12.8 oz)       I/O last 3 completed shifts:  In: 120 [P.O.:120]  Out: 575 [Urine:200; Drains:375]      Constitutional: Awake, alert, cooperative. He is uncomfortable   Lungs: PleurX on the right. Right lung sounds clear. Left is markedly diminished.   Cardiovascular: Regular rate and rhythm, normal S1 and S2, and no murmur noted   Abdomen: Firm and distended. Active bowel sounds.   Skin: No rashes, no cyanosis, no edema   Other: Port in the left anterior chest.              Data:   All laboratory data and imaging studies reviewed.    CMP  Recent Labs  Lab 09/23/18  0721      POTASSIUM 4.1   CHLORIDE 97   CO2 30   ANIONGAP 8   *   BUN 7   CR 0.76   GFRESTIMATED >90   GFRESTBLACK >90   AROLDO 8.2*   PROTTOTAL 6.4*   ALBUMIN 2.4*   BILITOTAL 0.5   ALKPHOS 87   AST 21   ALT 20     CBC  Recent Labs  Lab 09/26/18  0543 09/24/18  0845 09/23/18  0721   WBC 10.2  --  9.8   RBC 5.05  --  5.10   HGB 11.8*  --  11.8*   HCT 36.9*  --  37.0*   MCV 73*  --  73*   MCH 23.4*  --  23.1*   MCHC 32.0  --  31.9   RDW 18.1*  --  17.7*    362 375     INR  Recent Labs  Lab 09/24/18  0845 "   INR 1.28*     Data   Results for orders placed or performed during the hospital encounter of 09/23/18 (from the past 24 hour(s))   Urine Culture Aerobic Bacterial   Result Value Ref Range    Specimen Description Midstream Urine     Special Requests Specimen received in preservative     Culture Micro PENDING    CBC with platelets   Result Value Ref Range    WBC 10.2 4.0 - 11.0 10e9/L    RBC Count 5.05 4.4 - 5.9 10e12/L    Hemoglobin 11.8 (L) 13.3 - 17.7 g/dL    Hematocrit 36.9 (L) 40.0 - 53.0 %    MCV 73 (L) 78 - 100 fl    MCH 23.4 (L) 26.5 - 33.0 pg    MCHC 32.0 31.5 - 36.5 g/dL    RDW 18.1 (H) 10.0 - 15.0 %    Platelet Count 387 150 - 450 10e9/L

## 2018-09-26 NOTE — PLAN OF CARE
"Problem: Breathing Pattern Ineffective (Adult)  Goal: Identify Related Risk Factors and Signs and Symptoms  Related risk factors and signs and symptoms are identified upon initiation of Human Response Clinical Practice Guideline (CPG).   Outcome: No Change  Patient A&Ox4, anxious/worried @ times. Paracentesis scheduled this AM, 9/26. VSS on 2 L O2 sating in the mid 90's, tachycardic in the 110's. OVSS. Left abdominal pain/discomfort rated at a \"3\" on shift that was \"burning\". Refused the need for any PRN pain medications. Did take 10 mg Oxycodone later on shift for pain at a \"8\" in left ABD that was \"burning.\" LANDERS/SOB. No SOB reported @ rest. 96% on RA, O2 2 L for comfort. Tachycardia. Abd distended, firm. NPO ex ice chips and meds since 0000 for Para. Pleurex drain in place on right back side. No c/o nausea or any emesis on shift. L port, pink. Hep gtt running at 16 mL/hr. Turned off Hep at 0530 9/26 per order. Left PIV SL. UC collected on shift, 9/26. SBA. Discharge pending. Continue to monitor.       "

## 2018-09-26 NOTE — PLAN OF CARE
Problem: Pain, Acute (Adult)  Goal: Identify Related Risk Factors and Signs and Symptoms  Related risk factors and signs and symptoms are identified upon initiation of Human Response Clinical Practice Guideline (CPG).   Outcome: No Change  Pt is alert and oriented, VSS, complains of pain in abdomen, distended, oxy given x1. Oxygen sats 95% on RA, 2L O2 for comfort. Pleurex drained today, 375 output, did not tolerate well, very anxious and SOB. Nausea from abdominal discomfort, compazine and zofran given, effective. L port infusing heparin gtt at 16 ml/hr. Pt is SBA and calls appropriately. Starting Chemo tomorrow, discharge pending.

## 2018-09-26 NOTE — PROGRESS NOTES
US guided paracentesis. Consent obtained, time out taken. Pt drained of 2100 ml Luzmaria colored fluid drained from left side of lower abdomen. Pt tolerated well. Had a decreased in his pain from 4/10 to 2/10. Will transport back to room and call report.

## 2018-09-26 NOTE — CONSULTS
Consult Date:  09/25/2018      MEDICAL ONCOLOGY CONSULTATION       REASON FOR CONSULTATION:  I am asked by Dr. Nicholas Santos to see Mr. Ernie Song for a medical oncology consultation regarding recent findings of gastric cancer with peritoneal involvement.      HISTORY OF PRESENT ILLNESS:  Mr. Ernie Song is a 38-year-old man who has a past medical history which is largely unremarkable until the last 1-2 years.  During this time, he has had problems with recurring stomach discomfort.  The symptoms were initially attributed to stomach ulcers.  He was treated with medical therapy.  He then had a recurrence of symptoms and in 02/2018 presented with upper GI bleeding.  An upper GI endoscopy performed at that time showed no definite evidence of malignancy.  Biopsy on 02/26/2018 showed active chronic gastritis with Helicobacter pylori present.  There was no evidence of dysplasia or malignancy in the biopsy samples.  He was treated with antibiotic therapy for the H. pylori infection.  He reports having another endoscopy in 04/2018 which showed mildly nodular appearing mucosa in the body and fundus of the stomach.  Antrum appeared normal and   there was no residual ulceration.  H. pylori biopsies were obtained.  No evidence of residual helicobacter organisms were identified.  Further, there was no evidence for atrophic gastritis.  No malignancy was noted in the specimen.  He made some alterations in diet over time.        In 07/2018, he again had an episode of abdominal pain and was admitted on 08/31/2018.  At that time, he had a number of imaging studies performed including a CT scan which demonstrated possible lymphadenopathy.  He then had endoscopy with biopsy along with EUS.  The study demonstrated a large gastric ulcer with transmural involvement.  Multiple perigastric and gastroduodenal lymph nodes were noted.  A pleural effusion was also noted.  The biopsy on 09/05/2018 showed poorly  differentiated adenocarcinoma involving the stomach.  In addition, biopsy of a perigastric lymph node was also positive for metastatic disease.  Additional studies performed on the tumor showed all 4 mismatch repair proteins to be present.  PD-L1 assay by immunohistochemistry is still pending.        He was seen in consultation by my colleague, Dr. Silva, with tentative plans to begin first line palliative chemotherapy using the FOLFOX regimen.  He is admitted now after developing rapid increase in abdominal discomfort along with some dyspnea.  He presented to the emergency room with these symptoms and had a CT scan on 09/23/2018.  This scan demonstrated moderate to prominent ascites along with extensive peritoneal metastases increased compared with prior studies.  A large right mild to moderate left pleural effusion was noted.  In addition, small bilateral pulmonary emboli were noted.  He has been started on IV heparin.  An ultrasound of his lower extremities showed no evidence of deep vein thrombosis.  A medical oncology consultation is requested for additional recommendations.  At the time of the visit he is seen in the company of his wife.  He relates ongoing abdominal pain.      PAST MEDICAL HISTORY:   1.  Poorly differentiated adenocarcinoma of the stomach with presentation and workup as summarized above.   2.  Helicobacter pylori infection with treatment as outlined.   3.  Recent findings of pulmonary emboli.      PAST SURGICAL HISTORY:  Negative apart from the history of previous endoscopic procedures.      ALLERGIES:  HE HAS NO KNOWN MEDICATION ALLERGIES.      SOCIAL HISTORY:  He is  and lives locally with his family.  He works as a .  He has previously used tobacco products, but discontinued tobacco.  He does not use alcohol on a regular basis.      REVIEW OF SYSTEMS:  Positive for the problems already mentioned.  He relates a 25-pound weight loss over the last several weeks along with  increasing weakness, dyspnea, cough.  He relates constipation, but no diarrhea.  He has had no urinary symptoms.  The remainder of a 10-point review of systems is otherwise negative.     HOME MEDICATIONS:  Include acetaminophen, docusate, ferrous sulfate, ondansetron, oxycodone sustained release and also oxycodone immediate release, pantoprazole, prochlorperazine and also Valtrex.      PHYSICAL EXAMINATION:   VITAL SIGNS:  From the time of the consultation show a temperature of 98.0, blood pressure 137/85, respiratory rate 16, oxygen saturation 98% and heart rate 110.   GENERAL:  Exam shows skin warm and dry.  Scalp hair is normal.   HEENT:  Shows ears and nose unremarkable and oropharynx is clear.     NECK:  Thyroid gland not palpably enlarged.   LYMPHATIC NODE:  Exam shows no palpable adenopathy in the cervical, axillary or epitrochlear regions.   CHEST:  Examination shows decreased breath sounds in the left hemithorax.  No rales or wheezes are noted.   CARDIOVASCULAR:  Exam shows a rapid heart rate, but regular rhythm.   ABDOMEN:  Soft and is mildly tender throughout the abdomen.  A fluid wave is not evident on exam.   GENITOURINARY AND RECTAL:  Exam not performed.   EXTREMITIES:  Exam shows no significant upper or lower extremity edema.  Muscle groups are symmetric.  Gait not assessed.   NEUROLOGIC:  Exam shows mood and affect as well as judgment and insight normal.      LABORATORY DATA:  The imaging and pathology reports are as already outlined.  Glucose level from admission 104 with a platelet count of 362.  INR is 1.28.  Heparin levels have ranged between 0.49 and 0.41.  A fluid sample obtained at the time of a diagnostic paracentesis showed increased white blood cells of 5164.  Gram-stain and culture of the fluid are pending.  A lactate was 0.6 (normal range 0.7-2.0).      IMPRESSION:   1.  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  He was due to start treatment with the FOLFOX regimen as an outpatient.  He is clearly symptomatic from the metastatic disease and it would therefore be reasonable to start him on treatment while he is an inpatient.  I reviewed the different treatment options with him and specifically discussed the use of chemotherapy as well as checkpoint inhibitor therapy.  At present he would appear not to be a good candidate for this latter form type of therapy.  Treatment goals in this setting are palliative.  Given his young age and otherwise good health; however, it makes sense to initiate treatment, in particular given his strong desire for active therapy.   2.  Thrombophilia related to underlying malignancy.  He is currently on IV heparin.  He understands that switching to enoxaparin on an outpatient basis will be recommended.  He is not a good candidate for warfarin or for use of novel oral anticoagulants.   3.  Elevated white blood cells in the ascites.  While this could represent spontaneous bacterial peritonitis, he has no other evidence to support this diagnosis.  I agree with empiric ceftriaxone for now pending culture results.  I would not at this point defer the start of chemotherapy given the marked symptoms he has related to metastatic disease.      RECOMMENDATIONS:   1.  Agree with plans for therapeutic thoracentesis tomorrow.   2.  Continue IV heparin with plans to switch to low molecular weight heparin after the thoracentesis,   3.  Begin first line palliative chemotherapy with the modified FOLFOX-6 regimen.  This will be instituted tomorrow.   4.  I recommended he remain in the hospital for management for now.      I appreciate the chance to meet Mr. Donis Song and to help in his care.         IOANA KRISHNAN MD             D: 2018   T: 2018   MT: SHEYLA      Name:     ROMELIA MARK   MRN:      [id number]        Account:       [id number]   :       1980           Consult Date:  09/25/2018      Document: [id number]

## 2018-09-27 NOTE — PROGRESS NOTES
"CLINICAL NUTRITION SERVICES - REASSESSMENT NOTE      Recommendations Ordered by Registered Dietitian (RD):     Will modify supplement order ---> PLUS2 milkshake at 10am and 2pm (strawberry and vanilla - these will provide 1150 cals and 38 gm pro total)     Malnutrition: (9/24)  % Weight Loss:  > 5% in 1 month (severe malnutrition)  % Intake:  </= 75% for >/= 1 month (severe malnutrition)  Subcutaneous Fat Loss:  None observed  Muscle Loss:  None observed  Fluid Retention:  None noted     Malnutrition Diagnosis: Severe malnutrition  In Context of:  Acute illness or injury       EVALUATION OF PROGRESS TOWARD GOALS   Diet:  Regular           PLUS2 milkshake at 10am and 2pm    Visited with pt this morning  Getting ready to eat breakfast - oatmeal, milk, fruit  Notes that since having the paracentesis he has felt better and has been able to eat more  Had soup and fruit last night for dinner  He is taking the shakes, but prefers strawberry or vanilla flavors  Pt asking to take a shower - \"I think it will make me feel better\"      ASSESSED NUTRITION NEEDS PER APPROVED PRACTICE GUIDELINES:     Dosing Weight (9/23) 70.3 kg  Estimated Energy Needs: 5728-1964 kcals (25-30 Kcal/Kg)  Justification: maintenance  Estimated Protein Needs:  grams protein (1.2-1.5 g pro/Kg)  Justification: preservation of lean body mass       NEW FINDINGS:   9/24: pleurex cath placed right chest  9/26: paracentesis - 2100 mL           Start chemo      Previous Goals (9/24):   Pt to consume 50% meals and 2 supplements per day  Evaluation: Met    Previous Nutrition Diagnosis (9/24):   Inadequate oral intake related to abd pain with new dx gastric CA as evidenced by pt with 10# wt loss over the past month  Evaluation: Improving        CURRENT NUTRITION DIAGNOSIS  Inadequate oral intake related to fair appetite as evidenced by pt still eating small amounts    INTERVENTIONS  Recommendations / Nutrition Prescription  Regular diet  Nutrition " supplements    Implementation  Will modify supplement order ---> PLUS2 milkshake at 10am and 2pm (strawberry and vanilla - these will provide 1150 cals and 38 gm pro total)    Goals  Pt to consume 50% meals and 100% supplements      MONITORING AND EVALUATION:  Progress towards goals will be monitored and evaluated per protocol and Practice Guidelines

## 2018-09-27 NOTE — PROGRESS NOTES
Hospitalist Progress Note    09/27/2018           Assessment and Plan:       Ernie Song is a 38 year old male with gastric cancer, stage IV awaiting to start Chemo with Dr. Soto on 9/26, who presents with increased SOB and just had right thoracentesis on 9/14 with 1 liter removed, and already back with large right and moderate left pleural effusion, and new PE with negative leg ultrasound    Pulmonary embolism   Assessment: suspect related to hypercoagulable related to gastric cancer                       Plan:  - IV heparin for now, then subcutaneous Lovenox on discharge  - Monitor for bleeding    Malignant ascites  SBP  Assessment: ascitic fluid analysis with >5000 WBC, 56% neutrophils, consistent with SBP, though could be secondary to malignancy as well. Would treat empirically for time being. S/p paracentesis 2.1L removed. Afebrile, no leukocytosis, would treat for total 5 days.   Plan:  - Ceftriaxone 2g daily for now  - Follow cultures      Gastric adenocarcinoma  Assessment:  chemo planned 9/26, start it sooner. Discussed other options (palliotive care, hospice -- wife wants to know how effective chemo to be. Chemo started 09/26 until 09/28, second session 10/10/2018.   Plan:  - Oncology consult appreciated  - Chemo started per Oncology: Oxaliplatin, bolus 5FU and leucovorin. Started 5FU infusion over 48 hours. Discontinue pump on Friday  - IV antiemetics per protocol  - Pain control as needed with oxycodone and oxycontin    Bilateral Pleural effusion  Assessment: pleurx catheter placed. Having mild SOB, but sat well on RA.CXR 09/27 showed Bilateral pleural effusions and basilar infiltrates, increasing on the left. Still doing well on RA  Plan:  - Follow clinically  - Repeat CR in AM      Severe malnutrition (H)  Assessment: nutrition consulted  Plan:  - Regular diet, encouraged PO intake      Genital herpes  Assessment/Plan: continue PTA Valtrex    Active Diet Order      Combination Diet  Regular Diet Adult    DVT Prophylaxis: therapeutic heparin  Code Status: Full Code    Disposition: Expected discharge pending CP    Attestation:   I have reviewed today's relevant vital signs, notes, medications, labs and imaging.I personally reviewed no images or EKG's today.    Simón King MD  [facility] Center[facility]  Text Page  (7am - 6pm)        Interval History:        No acute events overnight  Eating improved, abdominal pain much improced  Still with left sided chest SOB  No fever, no bleeding         Physical Exam:        Physical Exam   Temp: 96.5  F (35.8  C) Temp src: Oral BP: 124/78 Pulse: 91 Heart Rate: 79 Resp: 16 SpO2: 94 % O2 Device: None (Room air) Oxygen Delivery: 2 LPM  Vitals:    09/23/18 0658 09/26/18 0804   Weight: 70.3 kg (155 lb) 72.1 kg (159 lb)     Vital Signs with Ranges  Temp:  [95.6  F (35.3  C)-97.6  F (36.4  C)] 96.5  F (35.8  C)  Pulse:  [91] 91  Heart Rate:  [] 79  Resp:  [16-18] 16  BP: (124-134)/(73-81) 124/78  SpO2:  [94 %-97 %] 94 %  I/O last 3 completed shifts:  In: 1367 [I.V.:439; IV Piggyback:928]  Out: 1875 [Urine:1875]    Constitutional: Sleepy, cooperative, no apparent distress.  Eyes: Conjunctiva and pupils examined and normal.  HEENT: Moist mucous membranes, normal dentition.  Respiratory: decreased breath sounds at base bilaterally. Right tunneled pleural catheter present   Cardiovascular: Regular rate and rhythm, normal S1 and S2, and no murmur noted,  Trace bilateral ankle edema.   GI: Soft, mild distension, mildly tender throughout around paracentesis site, normal bowel sounds.  Lymph/Hematologic: No anterior cervical, supraclavicular or axillary adenopathy.  Skin: No rashes, no cyanosis.   Musculoskeletal: No joint swelling, erythema or tenderness.  Neurologic: Cranial nerves 2-12 intact, no focal weakness or numbness  Psychiatric: Drowsy, no obvious anxiety or depression.    # Pain Assessment:  Current Pain Score 9/27/2018   Patient currently in pain?  yes   Pain score (0-10) 8   Pain location Abdomen   Pain descriptors -   - Ernie is experiencing pain due to malignancy. Pain management was discussed and the plan was created in a collaborative fashion.  Ernie's response to the current recommendations: engaged  - Please see the plan for pain management as documented above         Data:     CBC RESULTS:    Recent Labs  Lab 09/27/18  0750 09/26/18  0543 09/24/18  0845 09/23/18  0721   WBC 12.5* 10.2  --  9.8   RBC 4.88 5.05  --  5.10   HGB 11.3* 11.8*  --  11.8*   HCT 35.7* 36.9*  --  37.0*    387 362 375       BASIC METABOLIC PANEL:    Recent Labs  Lab 09/23/18 0721      POTASSIUM 4.1   CHLORIDE 97   CO2 30   BUN 7   CR 0.76   *   AROLDO 8.2*       HEPATIC FUNCTION PANEL    Recent Labs  Lab 09/23/18  0721   AST 21   ALT 20   ALKPHOS 87   BILITOTAL 0.5     INR    Recent Labs  Lab 09/24/18  0845   INR 1.28*       OTHER LABS    None    Medications     HEParin 1,600 Units/hr (09/27/18 0225)     - MEDICATION INSTRUCTIONS -       - MEDICATION INSTRUCTIONS -       sodium chloride         albumin human  12.5 g Intravenous Once     cefTRIAXone  2 g Intravenous Q24H     Chemotherapy Infusing-Continuous Infusion   Does not apply Q8H     docusate sodium (COLACE) capsule 100 mg  100 mg Oral Daily     fluorouracil (ADRUCIL) chemo infusion  2,400 mg/m2 Intravenous Once     heparin  5 mL Intracatheter Q28 Days     heparin lock flush  5-10 mL Intracatheter Q24H     influenza vaccine adult (product based on age)  0.5 mL Intramuscular Prior to discharge     oxyCODONE  20 mg Oral Q12H     pantoprazole  40 mg Oral BID AC     sodium chloride (PF)  3 mL Intracatheter Q8H     valACYclovir  500 mg Oral Daily         Recent Results (from the past 24 hour(s))   XR Chest Port 1 View    Narrative    CHEST ONE VIEW PORTABLE    9/26/2018 4:50 PM     HISTORY: Chest pain     COMPARISON: 9/20/2018.    FINDINGS: Upright portable chest. Left chest wall port. No  pneumothorax.  There are bilateral pleural effusions. The left pleural  effusion appears to be increasing. There are associated bibasilar  infiltrates. The upper lungs are clear.      Impression    IMPRESSION: Bilateral pleural effusions and basilar infiltrates,  increasing on the left.    LAURE HENDRICKS MD

## 2018-09-27 NOTE — PROGRESS NOTES
SPIRITUAL HEALTH SERVICES Progress Note  [facility]    Introductory visit with pt and his wife, Cielo, per pt's extended length of stay.  Pt was on Facetime with his mother in [place] when I arrived.  Pt's wife said they were both raised [employer]ism, but now attend [employer] (which is non-Jew).  I offered the availability of our hospital  if they would like the sacrament of the sick at any time.  Provided emotional/spiritual support and prayer, per their request.  SH team is available per additional need or request.                                                                                                                                               Sierra Sneed M.A.  Staff   Pager [phone]  Phone [phone]

## 2018-09-27 NOTE — PROGRESS NOTES
[facility] Progress Note     Primary Oncologist/Hematologist:  Dr. Silva          Assessment and Plan:     Ketan is a 38 year old man admitted 9/23 with shortness of breath     1.  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    - He is clearly symptomatic from the metastatic disease and it would therefore be reasonable to start him on treatment while he is an inpatient.    - Reviewed FOLFOX chemotherapy side effects in detail. He has had outpatient chemotherapy education. Most concerning would be cold induced dysesthesias in the next couple of days.   - Oxaliplatin, bolus 5FU and leucovorin 9/26. Start 5FU infusion over 48 hours. Discontinue pump on Friday  - IV antiemetics per protocol  - Had cold induced dysesthesias last night, no nausea. Reviewed concerns for diarrhea and mouth sores.  - Continue 5FU infusion  - Cycle 2 will be due 10/10/2018 in clinic. I will arrange for that appointment prior to discharge.     2.  Thrombophilia related to underlying malignancy.    - He is currently on IV heparin.    - Plan to switch to enoxaparin prior to discharge  - Monitor for bleeding  - In the future would need to hold anticoagulation for platelets <50,000     3. Shortness of breath  - Related to PE, pleural fluid and ascites  - He has a PleurX in the right chest, minimal fluid off on Tuesday  - Paracentesis 2.1L 9/26 improved symptoms slightly  - CXR 9/26 shows increasing effusion on the left, ongoing effusion on the right and bibasilar atelectasis  - Guaifenesin for cough PRN     4. SBP  - On ceftriaxone  - Cultures negative to date    5. Anemia  - From malignancy with iron deficiency  - He got 1 dose of IV iron as an outpatient.   - Will give another dose this hospital stay  - Chemotherapy will start to exacerbate  - No bleeding reported    6. Leukocytosis  -  "Likely from steroids  - No new symptoms of infection  - He is on antibiotics for SBP    Gm Jones APRN, CNP  [facility]  [phone]        Interval History:   Noticed sharp pain with cold liquids yesterday. Mild chest tightness, but that resolved. Feels better after paracentesis.              Review of Systems:   The 5 point Review of Systems is negative other than noted in the HPI             Medications:   Scheduled Medications    albumin human  12.5 g Intravenous Once     cefTRIAXone  2 g Intravenous Q24H     docusate sodium (COLACE) capsule 100 mg  100 mg Oral Daily     fluorouracil (ADRUCIL) chemo infusion  2,400 mg/m2 Intravenous Once     heparin  5 mL Intracatheter Q28 Days     heparin lock flush  5-10 mL Intracatheter Q24H     influenza vaccine adult (product based on age)  0.5 mL Intramuscular Prior to discharge     oxyCODONE  20 mg Oral Q12H     pantoprazole  40 mg Oral BID AC     sodium chloride (PF)  3 mL Intracatheter Q8H     valACYclovir  500 mg Oral Daily     PRN Medications  acetaminophen, albuterol, albuterol, diphenhydrAMINE, docusate sodium, EPINEPHrine, EPINEPHrine, famotidine, guaiFENesin, heparin lock flush, HOLD MEDICATION, LORazepam, LORazepam, LORazepam, - MEDICATION INSTRUCTIONS -, - MEDICATION INSTRUCTIONS -, melatonin, meperidine, methylPREDNISolone, naloxone, ondansetron **OR** ondansetron, ondansetron, oxyCODONE IR, polyethylene glycol, prochlorperazine, prochlorperazine, prochlorperazine (COMPAZINE) tablet 10 mg, sodium chloride (PF), sodium chloride (PF), sodium chloride (PF), sodium chloride               Physical Exam:   Vitals were reviewed  Blood pressure 134/81, temperature 95.8  F (35.4  C), temperature source Oral, resp. rate 16, height 1.626 m (5' 4.02\"), weight 72.1 kg (159 lb), SpO2 97 %.  Wt Readings from Last 4 Encounters:   09/26/18 72.1 kg (159 lb)   09/20/18 69.9 kg (154 lb)   09/14/18 73.9 kg (163 lb)   09/10/18 74.3 kg (163 lb 12.8 oz)       I/O last 3 " completed shifts:  In: 1367 [I.V.:439; IV Piggyback:928]  Out: 1675 [Urine:1675]      Constitutional: Awake, alert, cooperative, no apparent distress   Lungs: NC oxygen. Right sided PleurX. Mildly labored. Diminished breath sounds. No coughing   Cardiovascular: Regular rate and rhythm, normal S1 and S2, and no murmur noted   Abdomen: Abdomen softer today. Nontender. Active bowel sounds.   Skin: No rashes, no cyanosis, no edema   Other: Port in the left anterior chest.              Data:   All laboratory data and imaging studies reviewed.    CMP  Recent Labs  Lab 09/23/18  0721      POTASSIUM 4.1   CHLORIDE 97   CO2 30   ANIONGAP 8   *   BUN 7   CR 0.76   GFRESTIMATED >90   GFRESTBLACK >90   AROLDO 8.2*   PROTTOTAL 6.4*   ALBUMIN 2.4*   BILITOTAL 0.5   ALKPHOS 87   AST 21   ALT 20     CBC  Recent Labs  Lab 09/27/18  0750 09/26/18  0543 09/24/18  0845 09/23/18  0721   WBC 12.5* 10.2  --  9.8   RBC 4.88 5.05  --  5.10   HGB 11.3* 11.8*  --  11.8*   HCT 35.7* 36.9*  --  37.0*   MCV 73* 73*  --  73*   MCH 23.2* 23.4*  --  23.1*   MCHC 31.7 32.0  --  31.9   RDW 18.1* 18.1*  --  17.7*    387 362 375     INR  Recent Labs  Lab 09/24/18  0845   INR 1.28*     Data   Results for orders placed or performed during the hospital encounter of 09/23/18 (from the past 24 hour(s))   US Paracentesis    Narrative    ULTRASOUND PARACENTESIS 9/26/2018 11:16 AM     HISTORY: Symptomatic ascites. Malignant ascites, needs drainage.     FINDINGS: Ultrasound was used to evaluate for the presence and best  approach for paracentesis. Written and oral informed consent was  obtained. A pause for the cause procedure to verify the correct  patient and correct procedure. The skin overlying the left lower  quadrant was prepped and draped in the usual sterile fashion. The  subcutaneous tissues were anesthetized with 10 mL 1% lidocaine. A  catheter was advanced into the peritoneal space and 2.1 L of straw  colored fluid was drained. There  were no immediate complications.  Ultrasound images were permanently stored. Patient left the ultrasound  suite in satisfactory condition.      Impression    IMPRESSION: Technically successful paracentesis without immediate  complications.    NIELS TADEO MD   XR Chest Port 1 View    Narrative    CHEST ONE VIEW PORTABLE    9/26/2018 4:50 PM     HISTORY: Chest pain     COMPARISON: 9/20/2018.    FINDINGS: Upright portable chest. Left chest wall port. No  pneumothorax. There are bilateral pleural effusions. The left pleural  effusion appears to be increasing. There are associated bibasilar  infiltrates. The upper lungs are clear.      Impression    IMPRESSION: Bilateral pleural effusions and basilar infiltrates,  increasing on the left.

## 2018-09-27 NOTE — PLAN OF CARE
Problem: Breathing Pattern Ineffective (Adult)  Goal: Identify Related Risk Factors and Signs and Symptoms  Related risk factors and signs and symptoms are identified upon initiation of Human Response Clinical Practice Guideline (CPG).   Outcome: Improving  Oriented x4.  VSS.  Expiratory wheezes noted.  CO pain with cough.  Using 2 liters oxygen via nasal cannula as needed.  Barrel chested and using accessory muscles with breathing.  Slightly distended abdomen.  Pleurx drain dressing intact.  Heparin drip infusing at 16 ml/hr via peripheral IV.  Chemotherapy fluororacil infusing at 23.9 ml/hr via port in left chest.  Positive blood return in port.  Poor appetite.  Shower this shift.  Wife at bedside and supportive of pt.  Nursing will continue to monitor.

## 2018-09-27 NOTE — PLAN OF CARE
Problem: Pain, Acute (Adult)  Goal: Identify Related Risk Factors and Signs and Symptoms  Related risk factors and signs and symptoms are identified upon initiation of Human Response Clinical Practice Guideline (CPG).   Outcome: No Change  A&Ox4, anxious/worried at times. Moderate abd pain, worse w/ coughing; given scheduled Oxycontin & PRN Oxycodone. VSS. O2 WDL on RA, uses 2L NC at times for comfort. LANDERS. Incessant cough, declined Robitussin. R Pleurex drain, dressing CDI. Abd distention. C/o nausea, given Zofran. L port infusing chemo, good blood return. PIV infusing heparin gtt at 16 mL/hr. SBA, calls appropriately.

## 2018-09-27 NOTE — PLAN OF CARE
Problem: Patient Care Overview  Goal: Plan of Care/Patient Progress Review  Outcome: No Change  A&O, very anxious about chemo infusion.  C/O rigidity in hands which resolved spontaneously.  VSS except tachycardia.  5FU infusing into port, blood return noted.  Heparin drip infusing at 1600units/hr, next hep 10A check due in AM.  Blistering and excoriated skin noted under pleuex dressing.  Area cleansed with chloraprep and prepped with skin prep wand then OpSite was applied .  Paracentesis sites bandages CDI.  C/O abdominal fullness and concerned fluid is re-accumulating.  See results of CXR.  Denies nausea.   Reports productive cough.  Poor appetite.  Adequate urine output.  Chemo precautions maintained.  Up with SBA.

## 2018-09-28 NOTE — PROGRESS NOTES
Hospitalist Progress Note    09/28/2018         Assessment and Plan:       Ernie Song is a 38 year old male with gastric cancer, stage IV awaiting to start Chemo with Dr. Soto on 9/26, who presents with increased SOB and just had right thoracentesis on 9/14 with 1 liter removed, and already back with large right and moderate left pleural effusion, and new PE with negative leg ultrasound    Pulmonary embolism   Assessment: CT chest showed small bilateral pulmonary emboli, suspect related to hypercoagulable related to gastric cancer.                     Plan:  - IV heparin for now, then subcutaneous Lovenox BID starting tonight  - Follow plts, hgb   - Monitor for bleeding    Malignant ascites  SBP  Assessment: ascitic fluid analysis with >5000 WBC, 56% neutrophils, consistent with SBP, though could be secondary to malignancy as well. Would treat empirically for time being. S/p paracentesis 2.1L removed. Afebrile, no leukocytosis, treated for total 5 days. Cultures have been negative  Plan:  - Ceftriaxone 2g daily for now, total 5 day course (today is day 5/5)    Gastric adenocarcinoma  Assessment:  chemo planned 9/26, start it sooner. Discussed other options (palliotive care, hospice -- wife wants to know how effective chemo to be. Chemo started 09/26 until 09/28, second session 10/10/2018.   Plan:  - Oncology consult appreciated  - Chemo started per Oncology: Oxaliplatin, bolus 5FU and leucovorin. Started 5FU infusion over 48 hours. Discontinue pump on 09/282018  - IV antiemetics per protocol  - Pain control as needed with oxycodone and oxycontin    Bilateral Pleural effusion  Assessment: pleurx catheter placed. Having mild SOB, but sat well on RA.CXR 09/27 showed Bilateral pleural effusions and basilar infiltrates, increasing on the left. Still doing well on RA. Discussed thoracentesis of left pleural effusion, last 2 CXRs have been stable. Patient would like to be discharged and have thoracentesis  at follow up with Oncology.   Plan:  - Follow clinically  - Thoracentesis at follow up with Oncology      Severe malnutrition (H)  Assessment: nutrition consulted  Plan:  - Regular diet, encouraged PO intake      Genital herpes  Assessment/Plan: continue PTA Valtrex    Active Diet Order      Combination Diet Regular Diet Adult    DVT Prophylaxis: therapeutic heparin  Code Status: Full Code    Disposition: Expected discharge pending CP    Attestation:   I have reviewed today's relevant vital signs, notes, medications, labs and imaging.I personally reviewed no images or EKG's today.    Simón King MD  [facility]  Text Page  (7am - 6pm)        Interval History:        No acute events overnight  Nausea, minimal appetite  Tingling on fingers  No new physical complaints  No SOB         Physical Exam:        Physical Exam   Temp: 96.7  F (35.9  C) Temp src: Oral BP: 117/84 Pulse: 74 Heart Rate: 84 Resp: 16 SpO2: 97 % O2 Device: Nasal cannula Oxygen Delivery: 2 LPM  Vitals:    09/23/18 0658 09/26/18 0804   Weight: 70.3 kg (155 lb) 72.1 kg (159 lb)     Vital Signs with Ranges  Temp:  [96.5  F (35.8  C)-96.7  F (35.9  C)] 96.7  F (35.9  C)  Pulse:  [74-91] 74  Heart Rate:  [82-84] 84  Resp:  [16-18] 16  BP: (117-132)/(78-84) 117/84  SpO2:  [93 %-98 %] 97 %  I/O last 3 completed shifts:  In: 768 [I.V.:189; IV Piggyback:579]  Out: 1975 [Urine:1975]    Constitutional: alert , cooperative, no apparent distress.  Respiratory: decreased breath sounds at base bilaterally. Right tunneled pleural catheter present   Cardiovascular: Regular rate and rhythm, normal S1 and S2, and no murmur noted,  Trace bilateral ankle edema.   GI: Soft, mild distension, mildly tender throughout around paracentesis site, normal bowel sounds.  Lymph/Hematologic: No anterior cervical, supraclavicular or axillary adenopathy.  Skin: No rashes, no cyanosis.   Musculoskeletal: No joint swelling, erythema or tenderness.  Neurologic: Cranial  nerves 2-12 intact, no focal weakness or numbness  Psychiatric: normal mood and affect    # Pain Assessment:  Current Pain Score 9/28/2018   Patient currently in pain? -   Pain score (0-10) 5   Pain location -   Pain descriptors -   - Ernie is experiencing pain due to malignancy. Pain management was discussed and the plan was created in a collaborative fashion.  Ernie's response to the current recommendations: engaged  - Please see the plan for pain management as documented above         Data:     CBC RESULTS:    Recent Labs  Lab 09/27/18  0750 09/26/18  0543 09/24/18  0845 09/23/18  0721   WBC 12.5* 10.2  --  9.8   RBC 4.88 5.05  --  5.10   HGB 11.3* 11.8*  --  11.8*   HCT 35.7* 36.9*  --  37.0*    387 362 375       BASIC METABOLIC PANEL:    Recent Labs  Lab 09/23/18  0721      POTASSIUM 4.1   CHLORIDE 97   CO2 30   BUN 7   CR 0.76   *   AROLDO 8.2*       HEPATIC FUNCTION PANEL    Recent Labs  Lab 09/23/18  0721   AST 21   ALT 20   ALKPHOS 87   BILITOTAL 0.5     INR    Recent Labs  Lab 09/24/18  0845   INR 1.28*       OTHER LABS    None    Medications     HEParin 1,550 Units/hr (09/28/18 1109)     - MEDICATION INSTRUCTIONS -       - MEDICATION INSTRUCTIONS -       sodium chloride         albumin human  12.5 g Intravenous Once     cefTRIAXone  2 g Intravenous Q24H     docusate sodium (COLACE) capsule 100 mg  100 mg Oral Daily     enoxaparin  1 mg/kg Subcutaneous Q12H     fluorouracil (ADRUCIL) chemo infusion  2,400 mg/m2 Intravenous Once     heparin  5 mL Intracatheter Q28 Days     heparin lock flush  5-10 mL Intracatheter Q24H     influenza vaccine adult (product based on age)  0.5 mL Intramuscular Prior to discharge     OLANZapine zydis  5 mg Oral Daily     oxyCODONE  20 mg Oral Q12H     pantoprazole  40 mg Oral BID AC     sodium chloride (PF)  3 mL Intracatheter Q8H     valACYclovir  500 mg Oral Daily         Recent Results (from the past 24 hour(s))   XR Chest Port 1 View    Narrative     CHEST PORTABLE ONE VIEW   9/28/2018 7:26 AM     HISTORY: Pleural effusion, shortness of breath.     COMPARISON: 9/26/2018.      Impression    IMPRESSION: Left subclavian port is unchanged in position. Right-sided  chest tube unchanged in position. Again noted are moderate to large  bilateral pleural effusions with adjacent atelectasis. There also  appear to be airspace opacities within the mid to lower lung fields  bilaterally. Overall no significant change.

## 2018-09-28 NOTE — PROGRESS NOTES
CRISTINA Note:    D/I:  CRISTINA met with patient and spouse regarding [employer] and [employer] applications.  SW faxed application for [employer] and Care Coordinator faxed in [employer] application.     P:  CRISTINA will continue to assist with resources as needed or requested.    RASHAWN Warner, LGSW

## 2018-09-28 NOTE — PHARMACY
Medication coverage check for Enoxaparin. $0 copay.  Ignacia Tao CphT  [facility] Discharge Pharmacy Liaison  Liaison Cell: [phone]

## 2018-09-28 NOTE — PLAN OF CARE
Problem: Patient Care Overview  Goal: Plan of Care/Patient Progress Review  VSS on RA.  Fluorouracil infusing into port, good blood return.  Denies nausea.  Pain controlled with scheduled oxycontin.  Heparin gtt at 1600 units/hr into PIV, Hep 10a recheck in AM.  Dressing on Pleurex CDI.  Pt seemed to rest comfortably overnight.  Plan to complete chemo today, discharge pending clinical improvement.

## 2018-09-28 NOTE — PROVIDER NOTIFICATION
MD Notification    Notified Person: MD    Notified Person Name: Hernán King    Notification Date/Time: 09/28/18 at 1050     Notification Interaction: Talked with physician over the phone     Purpose of Notification: Per your verbal orders you stated that you wanted pt to start Lovenox this evening at 2000. However orders in MAR indicate a start time of 1030. Also, you've DC the heparin infusion in the MAR and the pharmacy consult. Since pt needs heparin infusion until he starts lovenox would you restart the heparin infusion and re-consult pharmacy.   Orders Received: Heparin infusion restarted and Lovenox start time switched to today at 2000.

## 2018-09-28 NOTE — PROGRESS NOTES
[facility] Progress Note     Primary Oncologist/Hematologist:  Dr. Silva          Assessment and Plan:     Ketan is a 38 year old man admitted 9/23 with shortness of breath      1.  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    - He is clearly symptomatic from the metastatic disease and it would therefore be reasonable to start him on treatment while he is an inpatient.    - Reviewed FOLFOX chemotherapy side effects in detail. He has had outpatient chemotherapy education. Most concerning would be cold induced dysesthesias in the next couple of days.   - Oxaliplatin, bolus 5FU and leucovorin 9/26. Start 5FU infusion over 48 hours. Discontinue pump on Friday  - IV antiemetics per protocol  - Had cold induced dysesthesias last night, no nausea. Reviewed concerns for diarrhea and mouth sores.  - Finish 5FU infusion  - Cycle 2 will be due 10/10/2018 in clinic. I will arrange for that appointment prior to discharge.  - I will also see him next week for with labs for an interim toxicity visit post chemotherapy      2.  Thrombophilia related to underlying malignancy.    - He is currently on IV heparin.    - Plan to switch to enoxaparin today  - Monitor for bleeding  - In the future would need to hold anticoagulation for platelets <50,000      3. Shortness of breath  - Related to PE, pleural fluid and ascites  - He has a PleurX in the right chest, minimal fluid off on Tuesday  - Paracentesis 2.1L 9/26 improved symptoms slightly  - CXR 9/26 shows increasing effusion on the left, ongoing effusion on the right and bibasilar atelectasis  - Wants to hold on thoracentesis on the left for now; could consider next week. Would require holding LMWH.  - Guaifenesin for cough PRN      4. SBP  - On ceftriaxone  - Cultures negative to date     5. Anemia  - From malignancy with  iron deficiency  - He got 1 dose of IV iron as an outpatient.   - Will give another dose this hospital stay  - Chemotherapy will start to exacerbate  - No bleeding reported     6. Leukocytosis  - Likely from steroids  - No new symptoms of infection  - He is on antibiotics for SBP    7. Nausea  - Add olanzapine 5 mg daily, first dose now  - Has prescription for olanzapine at home already  - Continue with ondansetron and prochlorperazine PRN    Gm GASTON, CNP  [facility]  [phone]        Interval History:   Nauseated. Hiccups last night. Breathing is fair.               Review of Systems:   The 5 point Review of Systems is negative other than noted in the HPI             Medications:   Scheduled Medications    albumin human  12.5 g Intravenous Once     cefTRIAXone  2 g Intravenous Q24H     docusate sodium (COLACE) capsule 100 mg  100 mg Oral Daily     enoxaparin  1 mg/kg Subcutaneous Q12H     fluorouracil (ADRUCIL) chemo infusion  2,400 mg/m2 Intravenous Once     heparin  5 mL Intracatheter Q28 Days     heparin lock flush  5-10 mL Intracatheter Q24H     influenza vaccine adult (product based on age)  0.5 mL Intramuscular Prior to discharge     OLANZapine zydis  5 mg Oral Daily     oxyCODONE  20 mg Oral Q12H     pantoprazole  40 mg Oral BID AC     sodium chloride (PF)  3 mL Intracatheter Q8H     valACYclovir  500 mg Oral Daily     PRN Medications  acetaminophen, albuterol, albuterol, diphenhydrAMINE, docusate sodium, EPINEPHrine, famotidine, guaiFENesin, heparin lock flush, HOLD MEDICATION, loperamide, LORazepam, LORazepam, LORazepam, - MEDICATION INSTRUCTIONS -, - MEDICATION INSTRUCTIONS -, melatonin, meperidine, methylPREDNISolone, naloxone, ondansetron **OR** ondansetron, oxyCODONE IR, polyethylene glycol, prochlorperazine, prochlorperazine, sodium chloride (PF), sodium chloride (PF), sodium chloride (PF), sodium chloride               Physical Exam:   Vitals were reviewed  Blood pressure  "117/84, pulse 74, temperature 96.7  F (35.9  C), temperature source Oral, resp. rate 16, height 1.626 m (5' 4.02\"), weight 72.1 kg (159 lb), SpO2 97 %.  Wt Readings from Last 4 Encounters:   09/26/18 72.1 kg (159 lb)   09/20/18 69.9 kg (154 lb)   09/14/18 73.9 kg (163 lb)   09/10/18 74.3 kg (163 lb 12.8 oz)       I/O last 3 completed shifts:  In: 768 [I.V.:189; IV Piggyback:579]  Out: 1975 [Urine:1975]      Constitutional: Awake, alert, cooperative, no apparent distress   Lungs: NC oxygen. Right PleurX. Diminished breath sounds.   Cardiovascular: Regular rate and rhythm, normal S1 and S2, and no murmur noted   Abdomen: Distended. Still soft. Active bowel sounds.   Skin: No rashes, no cyanosis, no edema   Other: Port in the left anterior chest.              Data:   All laboratory data and imaging studies reviewed.    CMP  Recent Labs  Lab 09/23/18  0721      POTASSIUM 4.1   CHLORIDE 97   CO2 30   ANIONGAP 8   *   BUN 7   CR 0.76   GFRESTIMATED >90   GFRESTBLACK >90   AROLDO 8.2*   PROTTOTAL 6.4*   ALBUMIN 2.4*   BILITOTAL 0.5   ALKPHOS 87   AST 21   ALT 20     CBC  Recent Labs  Lab 09/27/18  0750 09/26/18  0543 09/24/18  0845 09/23/18  0721   WBC 12.5* 10.2  --  9.8   RBC 4.88 5.05  --  5.10   HGB 11.3* 11.8*  --  11.8*   HCT 35.7* 36.9*  --  37.0*   MCV 73* 73*  --  73*   MCH 23.2* 23.4*  --  23.1*   MCHC 31.7 32.0  --  31.9   RDW 18.1* 18.1*  --  17.7*    387 362 375     INR  Recent Labs  Lab 09/24/18  0845   INR 1.28*     Data   Results for orders placed or performed during the hospital encounter of 09/23/18 (from the past 24 hour(s))   XR Chest Port 1 View    Narrative    CHEST PORTABLE ONE VIEW   9/28/2018 7:26 AM     HISTORY: Pleural effusion, shortness of breath.     COMPARISON: 9/26/2018.      Impression    IMPRESSION: Left subclavian port is unchanged in position. Right-sided  chest tube unchanged in position. Again noted are moderate to large  bilateral pleural effusions with adjacent " atelectasis. There also  appear to be airspace opacities within the mid to lower lung fields  bilaterally. Overall no significant change.   Heparin Xa (10a) Level   Result Value Ref Range    Heparin 10A Level 0.72 IU/mL

## 2018-09-28 NOTE — PROGRESS NOTES
Called by nursing with report of intractable hiccups, advised to attempt physical maneuvers, as able, to interrupt normal respiratory function, such as breath holding or a Valsalva maneuver, or to stimulate the nasopharyngeal/uvular area, such as sipping cold water or gargling. However, if not effective, one time dose of baclofen submitted.

## 2018-09-28 NOTE — PLAN OF CARE
Problem: Patient Care Overview  Goal: Plan of Care/Patient Progress Review  Outcome: No Change  Pt A&Ox4, VSS on 2L O2 at times. Moderate abd pain, PRN oxycodone given x1. Pt c/o hiccups this evening with no relief, baclofen given. Chemo continues to infuse @ 23.9 ml/hr through L port, blood return noted. Heprin xa 0.65, heprin drip running @ 16 mL/hr via PIV. Pleurex drain dressed, CDI, no draining needed this shift. Pt appetite continues to be poor, PRN zofran given x1. Nursing continue to monitor and support.

## 2018-09-29 NOTE — PLAN OF CARE
Problem: Patient Care Overview  Goal: Plan of Care/Patient Progress Review  VSS.  Pt intermittently using 2L NC for comfort.  Scheduled Oxycontin and PRN oxycodone effectively managing abdominal pain.  Ativan given x1 for nausea with relief.  Up independently in room.  Possible discharge today.

## 2018-09-29 NOTE — PROGRESS NOTES
[facility] Progress Note     Primary Oncologist/Hematologist:  Dr. Silva          Assessment and Plan:     Ketan is a 38 year old man admitted 9/23 with shortness of breath      1.  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    - He is clearly symptomatic from the metastatic disease and it would therefore be reasonable to start him on treatment while he is an inpatient.    - FOLFOX chemotherapy started in the hospital, Oxaliplatin, bolus 5FU and leucovorin 9/26. Start 5FU infusion over 48 hours. Discontinued pump on 9/28  - IV antiemetics per protocol  - Had cold induced dysesthesias last night, no nausea. Reviewed concerns for diarrhea and mouth sores.  - Cycle 2 will be due 10/10/2018 in clinic. I will arrange for that appointment prior to discharge.  - NP will also see him next week for with labs for an interim toxicity visit post chemotherapy      2.  Thrombophilia related to underlying malignancy.    - He is currently on IV heparin.    - Plan to switch to enoxaparin at discharge  - Monitor for bleeding  - In the future would need to hold anticoagulation for platelets <50,000      3. Shortness of breath  - Related to PE, pleural fluid and ascites  - He has a PleurX in the right chest, minimal fluid off on Tuesday  - Paracentesis 2.1L 9/26 improved symptoms slightly  - CXR 9/26 shows increasing effusion on the left, ongoing effusion on the right and bibasilar atelectasis  - Wants to hold on thoracentesis on the left for now; could consider next week. Would require holding LMWH.  - Guaifenesin for cough PRN      4. SBP  - On ceftriaxone  - Cultures negative to date     5. Anemia  - From malignancy with iron deficiency  - He got 1 dose of IV iron as an outpatient.   - No bleeding reported     6. Leukocytosis, resolved    7. Nausea  - olanzapine 5 mg  "daily added  - Has prescription for olanzapine at home already  - Continue with ondansetron and prochlorperazine PRN        Interval History:   Nauseated without vomiting, some abd pain from cough, Breathing is fair. BM 2 a day              Review of Systems:   The 5 point Review of Systems is negative other than noted in the HPI             Medications:   Scheduled Medications    albumin human  12.5 g Intravenous Once     cefTRIAXone  2 g Intravenous Q24H     docusate sodium (COLACE) capsule 100 mg  100 mg Oral Daily     enoxaparin  1 mg/kg Subcutaneous Q12H     heparin  5 mL Intracatheter Q28 Days     heparin lock flush  5-10 mL Intracatheter Q24H     influenza vaccine adult (product based on age)  0.5 mL Intramuscular Prior to discharge     OLANZapine zydis  5 mg Oral Daily     oxyCODONE  20 mg Oral Q12H     pantoprazole  40 mg Oral BID AC     sodium chloride (PF)  3 mL Intracatheter Q8H     valACYclovir  500 mg Oral Daily     PRN Medications  acetaminophen, albuterol, albuterol, diphenhydrAMINE, docusate sodium, EPINEPHrine, famotidine, guaiFENesin, heparin lock flush, HOLD MEDICATION, loperamide, LORazepam, LORazepam, LORazepam, - MEDICATION INSTRUCTIONS -, - MEDICATION INSTRUCTIONS -, melatonin, meperidine, methylPREDNISolone, naloxone, ondansetron **OR** ondansetron, oxyCODONE IR, polyethylene glycol, prochlorperazine, prochlorperazine, sodium chloride (PF), sodium chloride (PF), sodium chloride (PF), sodium chloride               Physical Exam:   Vitals were reviewed  Blood pressure 134/74, pulse 73, temperature 97.9  F (36.6  C), temperature source Oral, resp. rate 18, height 1.626 m (5' 4.02\"), weight 72.1 kg (159 lb), SpO2 97 %.  Wt Readings from Last 4 Encounters:   09/26/18 72.1 kg (159 lb)   09/20/18 69.9 kg (154 lb)   09/14/18 73.9 kg (163 lb)   09/10/18 74.3 kg (163 lb 12.8 oz)       I/O last 3 completed shifts:  In: 3 [I.V.:3]  Out: 1025 [Urine:1025]      Constitutional: Awake, alert, cooperative, " no apparent distress   Lungs: NC oxygen. Right PleurX. Diminished breath sounds.   Cardiovascular: Regular rate and rhythm, normal S1 and S2, and no murmur noted   Abdomen: Distended. Still soft. Active bowel sounds.   Skin: No rashes, no cyanosis, no edema   Other: Port in the left anterior chest.              Data:   All laboratory data and imaging studies reviewed.    CMP    Recent Labs  Lab 09/29/18  0600 09/23/18  0721   NA  --  135   POTASSIUM  --  4.1   CHLORIDE  --  97   CO2  --  30   ANIONGAP  --  8   GLC  --  107*   BUN  --  7   CR 0.58* 0.76   GFRESTIMATED >90 >90   GFRESTBLACK >90 >90   AROLDO  --  8.2*   PROTTOTAL  --  6.4*   ALBUMIN  --  2.4*   BILITOTAL  --  0.5   ALKPHOS  --  87   AST  --  21   ALT  --  20     CBC    Recent Labs  Lab 09/29/18  0600 09/27/18  0750 09/26/18  0543 09/24/18  0845 09/23/18  0721   WBC 7.6 12.5* 10.2  --  9.8   RBC 4.88 4.88 5.05  --  5.10   HGB 11.5* 11.3* 11.8*  --  11.8*   HCT 36.0* 35.7* 36.9*  --  37.0*   MCV 74* 73* 73*  --  73*   MCH 23.6* 23.2* 23.4*  --  23.1*   MCHC 31.9 31.7 32.0  --  31.9   RDW 18.7* 18.1* 18.1*  --  17.7*    [phone] 375     INR    Recent Labs  Lab 09/24/18  0845   INR 1.28*     Data   Results for orders placed or performed during the hospital encounter of 09/23/18 (from the past 24 hour(s))   Care Transition RN/SW IP Consult    Narrative    Lisa Corona RN     9/28/2018  2:43 PM  Care Transition Initial Assessment - RN        Met with: Patient and Family.  Pt was sleeping, spoke to pt's   spouse Cielo.  Discussed discharge plans for discharging home with   new pleural x catheter and Lovenox injections.  Offered home care   RN for support and education for the above and pt's spouse was in   agreement with this plan.  Offered choice for home care agencies   and patient requested to use [facility].  Referral sent   to [facility].  [facility] phone number added to AVS.  Completed [employer]   paperwork and fax completed paperwork for  pleural x catheter   supplies to [phone].    DATA   Principal Problem:    Pulmonary embolism (H)  Active Problems:    Genital herpes    Malignant ascites (Cytology 9/5/18)    Gastric adenocarcinoma (H)    Malignant pleural effusion (Cytology, right, 9/14/18)    Severe malnutrition (H)    Pulmonary emboli (H)       Cognitive Status: sleeping.        Contact information and PCP information verified: No  Lives With: child(mayra), dependent, spouse                    Insurance concerns: No Insurance issues identified  ASSESSMENT  Patient currently receives the following services:  none        Identified issues/concerns regarding health management: Patient's   spouse talked about patient's new dx of stomach ca as on 9/5 and   the effects of the chemotherapy on the patient.  Offered   resources to [employer].  SW to meet with pt's spouse   to complete paperwork.      PLAN  Financial costs for the patient include co-pay for home care .  Patient given options and choices for discharge yes .  Patient/family is agreeable to the plan?  Yes: home with [facility] RN  Patient anticipates discharging to home .        Patient anticipates needs for home equipment: No  Plan/Disposition: Home   Appointments: See [facility]  ([facility]) will continue to follow as   needed.             CBC with platelets   Result Value Ref Range    WBC 7.6 4.0 - 11.0 10e9/L    RBC Count 4.88 4.4 - 5.9 10e12/L    Hemoglobin 11.5 (L) 13.3 - 17.7 g/dL    Hematocrit 36.0 (L) 40.0 - 53.0 %    MCV 74 (L) 78 - 100 fl    MCH 23.6 (L) 26.5 - 33.0 pg    MCHC 31.9 31.5 - 36.5 g/dL    RDW 18.7 (H) 10.0 - 15.0 %    Platelet Count 350 150 - 450 10e9/L   Creatinine   Result Value Ref Range    Creatinine 0.58 (L) 0.66 - 1.25 mg/dL    GFR Estimate >90 >60 mL/min/1.7m2    GFR Estimate If Black >90 >60 mL/min/1.7m2

## 2018-09-29 NOTE — PROGRESS NOTES
[facility]    Hospitalist Progress Note    Assessment & Plan    38 year old male with gastric cancer, stage IV awaiting to start Chemo with Dr. Soto on 9/26, who presents with increased SOB and just had right thoracentesis on 9/14 with 1 liter removed, and already back with large right and moderate left pleural effusion, and new PE with negative leg ultrasound    Impression:   Principal Problem:    Pulmonary embolism (H)  Active Problems:    Genital herpes    Malignant ascites (Cytology 9/5/18)    Gastric adenocarcinoma (H)    Malignant pleural effusion (Cytology, right, 9/14/18)    Severe malnutrition (H)    Pulmonary emboli (H)    Possible SBP -- currently on Ceftriaxone 2 gm q24    Plan:  Discussed with wife -- she is not comfortable giving the Lovenox subcutaneous yet, and he is quite weak, will plan discharge tomorrow if doing better and have PT see him today.      DVT Prophylaxis: Lovenox  Code Status: Full Code    Disposition: Expected discharge tomorrow    Nicholas Elias MD  Pager [phone]  Cell Phone [phone]  Text Page (7am to 6pm)    Interval History   Poor appetite, mild abdominal pain.     Physical Exam   Temp: 97.9  F (36.6  C) Temp src: Oral BP: 134/74 Pulse: 73 Heart Rate: 111 Resp: 18 SpO2: 97 % O2 Device: None (Room air) Oxygen Delivery: 2 LPM  Vitals:    09/23/18 0658 09/26/18 0804   Weight: 70.3 kg (155 lb) 72.1 kg (159 lb)     Vital Signs with Ranges  Temp:  [96.2  F (35.7  C)-97.9  F (36.6  C)] 97.9  F (36.6  C)  Pulse:  [73] 73  Heart Rate:  [] 111  Resp:  [16-18] 18  BP: (131-137)/(72-93) 134/74  SpO2:  [93 %-99 %] 97 %  I/O last 3 completed shifts:  In: 3 [I.V.:3]  Out: 1025 [Urine:1025]    # Pain Assessment:  Current Pain Score 9/29/2018   Patient currently in pain? -   Pain score (0-10) 7   Pain location -   Pain descriptors -   - Ernie is experiencing pain due to gastric cancer. Pain management was discussed and the plan was created in a  collaborative fashion.  Ernie's response to the current recommendations: engaged  - see orders      Constitutional: Awake, alert, cooperative, no apparent distress  Respiratory: Clear, has right pleurx catheter (under gauze dressing)  Cardiovascular: Regular rate and rhythm, normal S1 and S2, and no murmur noted  GI: Normal bowel sounds, soft, mild epigastric tenderness  Extrem: No calf tenderness, no ankle edema  Neuro: Ox3, no focal motor or sensory deficits    Medications     - MEDICATION INSTRUCTIONS -       - MEDICATION INSTRUCTIONS -       sodium chloride         albumin human  12.5 g Intravenous Once     docusate sodium (COLACE) capsule 100 mg  100 mg Oral Daily     enoxaparin  80 mg Subcutaneous Q12H     heparin  5 mL Intracatheter Q28 Days     heparin lock flush  5-10 mL Intracatheter Q24H     influenza vaccine adult (product based on age)  0.5 mL Intramuscular Prior to discharge     OLANZapine zydis  5 mg Oral Daily     oxyCODONE  20 mg Oral Q12H     pantoprazole  40 mg Oral BID AC     sodium chloride (PF)  3 mL Intracatheter Q8H     valACYclovir  500 mg Oral Daily       Data     Recent Labs  Lab 09/29/18  0600 09/27/18  0750 09/26/18  0543 09/24/18  0845 09/23/18  0721   WBC 7.6 12.5* 10.2  --  9.8   HGB 11.5* 11.3* 11.8*  --  11.8*   MCV 74* 73* 73*  --  73*    361 387 362 375   INR  --   --   --  1.28*  --    NA  --   --   --   --  135   POTASSIUM  --   --   --   --  4.1   CHLORIDE  --   --   --   --  97   CO2  --   --   --   --  30   BUN  --   --   --   --  7   CR 0.58*  --   --   --  0.76   ANIONGAP  --   --   --   --  8   AROLDO  --   --   --   --  8.2*   GLC  --   --   --   --  107*   ALBUMIN  --   --   --   --  2.4*   PROTTOTAL  --   --   --   --  6.4*   BILITOTAL  --   --   --   --  0.5   ALKPHOS  --   --   --   --  87   ALT  --   --   --   --  20   AST  --   --   --   --  21   TROPI  --   --   --   --  <0.015       Imaging:   No results found for this or any previous visit (from the past 24  hour(s)).

## 2018-09-29 NOTE — PLAN OF CARE
Problem: Patient Care Overview  Goal: Plan of Care/Patient Progress Review  Outcome: No Change  A/O x4. VSS. C/o abdominal pain, prn oxycodone x2 and scheduled q12h OxyContin given, effective. C/o cough, prn robitussin given x2 with some relief. R pleurex drain and thoracentesis dressing CDI. C/o nausea prn Zofran & compazine given with little relief. Started on daily Zyprexa for nausea with better relief. Reg diet, very poor appetite. Voiding adequate in urinal.  BS active, passing flatus, no BM this shift. Sleepy throughout most of this afternoon. Heparin infusion stopped at 1900 per orders. Chemotherapy infusion completed at around 1700. Chemo precaution continued. Plan to start Lovenox at 2000 and discharge home tomorrow with Lovenox.

## 2018-09-29 NOTE — PROGRESS NOTES
"[facility] Progress Note  [facility]    Visit per pt request.  Pt's wife request prayer, \"it feels better to pray in community.\"  Pt and wife asked for prayers for their two young children, for pt's family who lives in [place] and can't be here, for wife's parents who are here helping take care of the children, and for wife, who is trying to take care of everything.  Pt said he was feeling weak.     offered listening, support and prayer.    I will follow for duration of stay.    Danica Fallon  Chaplain Resident  "

## 2018-09-30 NOTE — PROGRESS NOTES
[facility] Progress Note     Primary Oncologist/Hematologist:  Dr. Silva          Assessment and Plan:     Ketan is a 38 year old man admitted 9/23 with shortness of breath      1.  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    - He is clearly symptomatic from the metastatic disease therefore started on treatment while he is an inpatient.    - FOLFOX chemotherapy started in the hospital, Oxaliplatin, bolus 5FU and leucovorin 9/26. Start 5FU infusion over 48 hours. Discontinued pump on 9/28  - IV antiemetics per protocol  - Cycle 2 will be due 10/10/2018 in clinic.   - NP will also see him next week for with labs for an interim toxicity visit post chemotherapy    May be discharged from oncology standpoint.      2.  Thrombophilia related to underlying malignancy.    - He is currently on IV heparin.    - Plan to switch to enoxaparin at discharge  - Monitor for bleeding  - In the future would need to hold anticoagulation for platelets <50,000      3. Shortness of breath  - Related to PE, pleural fluid and ascites  - He has a PleurX in the right chest, minimal fluid off on Tuesday  - Paracentesis 2.1L 9/26 improved symptoms slightly  - CXR 9/26 shows increasing effusion on the left, ongoing effusion on the right and bibasilar atelectasis  - Wants to hold on thoracentesis on the left for now; could consider next week. Would require holding LMWH.  - Guaifenesin for cough PRN      4. SBP  - On ceftriaxone  - Cultures negative to date     5. Anemia  - From malignancy with iron deficiency  - He got 1 dose of IV iron as an outpatient.   - No bleeding reported     6. Leukocytosis, resolved    7. Nausea  - olanzapine 5 mg daily added  - Has prescription for olanzapine at home already  - Continue with ondansetron and prochlorperazine PRN        Interval History:  "  Nauseated without vomiting, some abd pain from cough, Breathing is fair. BM soft.  Cough and SOB not worse.              Review of Systems:   The 5 point Review of Systems is negative other than noted in the HPI             Medications:   Scheduled Medications    albumin human  12.5 g Intravenous Once     docusate sodium (COLACE) capsule 100 mg  100 mg Oral Daily     enoxaparin  80 mg Subcutaneous Q12H     heparin  5 mL Intracatheter Q28 Days     heparin lock flush  5-10 mL Intracatheter Q24H     influenza vaccine adult (product based on age)  0.5 mL Intramuscular Prior to discharge     OLANZapine zydis  5 mg Oral Daily     oxyCODONE  20 mg Oral Q12H     pantoprazole  40 mg Oral BID AC     sodium chloride (PF)  3 mL Intracatheter Q8H     valACYclovir  500 mg Oral Daily     PRN Medications  acetaminophen, albuterol, albuterol, diphenhydrAMINE, docusate sodium, EPINEPHrine, famotidine, guaiFENesin, heparin lock flush, HOLD MEDICATION, loperamide, LORazepam, LORazepam, LORazepam, - MEDICATION INSTRUCTIONS -, - MEDICATION INSTRUCTIONS -, melatonin, meperidine, methylPREDNISolone, naloxone, ondansetron **OR** ondansetron, oxyCODONE IR, polyethylene glycol, prochlorperazine, prochlorperazine, sodium chloride (PF), sodium chloride (PF), sodium chloride (PF), sodium chloride               Physical Exam:   Vitals were reviewed  Blood pressure 135/79, pulse 73, temperature 97.2  F (36.2  C), temperature source Oral, resp. rate 18, height 1.626 m (5' 4.02\"), weight 72.1 kg (159 lb), SpO2 96 %.  Wt Readings from Last 4 Encounters:   09/26/18 72.1 kg (159 lb)   09/20/18 69.9 kg (154 lb)   09/14/18 73.9 kg (163 lb)   09/10/18 74.3 kg (163 lb 12.8 oz)       I/O last 3 completed shifts:  In: -   Out: 250 [Urine:250]      Constitutional: Awake, alert, cooperative, no apparent distress   Lungs: NC oxygen. Right PleurX. Diminished breath sounds.   Cardiovascular: Regular rate and rhythm, normal S1 and S2, and no murmur noted "   Abdomen: Distended. Still soft. Active bowel sounds. Some tenderness in epigastrium.   Skin: No rashes, no cyanosis, no edema   Other: Port in the left anterior chest.              Data:   All laboratory data and imaging studies reviewed.    CMP    Recent Labs  Lab 09/29/18  0600   CR 0.58*   GFRESTIMATED >90   GFRESTBLACK >90     CBC    Recent Labs  Lab 09/29/18  0600 09/27/18  0750 09/26/18  0543 09/24/18  0845   WBC 7.6 12.5* 10.2  --    RBC 4.88 4.88 5.05  --    HGB 11.5* 11.3* 11.8*  --    HCT 36.0* 35.7* 36.9*  --    MCV 74* 73* 73*  --    MCH 23.6* 23.2* 23.4*  --    MCHC 31.9 31.7 32.0  --    RDW 18.7* 18.1* 18.1*  --     [phone]     INR    Recent Labs  Lab 09/24/18  0845   INR 1.28*     Data   No results found for this or any previous visit (from the past 24 hour(s)).

## 2018-09-30 NOTE — PROVIDER NOTIFICATION
MD Notification    Notified Person: MD    Notified Person Name: Dr. Santos    Notification Date/Time: 09/30/18 at 1350    Notification Interaction: Paged    Purpose of Notification: Pt is ready to discharge home and does not have orders for nausea and anxiety meds. While inpatient pt has been taking scheduled daily zyprexa  With prn zofran/compazine for nausea and prn ativan for anxiety.     Orders Received:

## 2018-09-30 NOTE — DISCHARGE SUMMARY
[facility]    Discharge Summary  Hospitalist    Date of Admission:  9/23/2018  Date of Discharge:  9/30/2018  Discharging Provider: Nicholas Elias MD    Discharge Diagnoses   Principal Problem:    Pulmonary embolism (H)  Active Problems:    Genital herpes    Malignant ascites (Cytology 9/5/18)    Gastric adenocarcinoma (H)    Malignant pleural effusion (Cytology, right, 9/14/18)    Severe malnutrition (H)    Pulmonary emboli (H)      History of Present Illness   38 year old male with Stage IV gastric cancer and abdominal Carcinomatosis, just had right thoracenteis on 9/14 and abd paracentesis on 9/17 then discharged home and now returns with increased SOB and weakness and has large right, moderate left pleural effusions, and moderate ascites.  No fever or chills. Was to start chemo this Wed 9/26 with Dr. Soto. And chest CT showed small pulm emboli, and leg ultrasound negative for DVT.      Hospital Course    Underwent right pleurx catheter placement and drainage of fluid.  Prior cytology showed malignant cells consistent with Gastric CA origin.  Paracentesis showed 5964 WBC's with 56% neutrophils.  Patient was treated with Ceftriaxone 2 gm IV daily x 5 days for possible SBP, and fluid cultures showed no growth.      Was seen by [facility] and he was started on chemo during this hospital stay, treated with FOLFOX, and he will follow up this next week with Dr. Silva in Oncology clinic.  He was treated with IV heparin initially for hypercoagulable state related to malignancy, and will continue Lovenox 80 mg bid subcutaneous, and may need to decrease dose over time as I expect he will continue to lose weight (currently at 72 KG).        Nicholas Elias MD  Pager: [phone]  Cell Phone:  [phone]       Significant Results and Procedures   As above    Pending Results   These results will be followed up by Dr. Elias  Unresulted Labs Ordered in the Past 30 Days of this Admission      Date and Time Order Name Status Description    9/14/2018 1344 Cytology non gyn In process     9/5/2018 1433 Surgical pathology exam In process     9/5/2018 1418 Fine needle aspiration In process           Code Status   Full Code       Primary Care Physician   Gay Winslow    Physical Exam   Temp: 97.2  F (36.2  C) Temp src: Oral BP: 135/79   Heart Rate: 115 Resp: 18 SpO2: 96 % O2 Device: None (Room air)    Vitals:    09/23/18 0658 09/26/18 0804   Weight: 70.3 kg (155 lb) 72.1 kg (159 lb)     Vital Signs with Ranges  Temp:  [96.6  F (35.9  C)-98.5  F (36.9  C)] 97.2  F (36.2  C)  Heart Rate:  [115-119] 115  Resp:  [18] 18  BP: (118-143)/(78-84) 135/79  SpO2:  [95 %-96 %] 96 %  I/O last 3 completed shifts:  In: -   Out: 250 [Urine:250]    Exam on discharge:   Sleepy, mild epigastric discomfort    # Discharge Pain Plan:   - During his hospitalization, Ernie experienced pain due to gastric cancer and mets.  The pain plan for discharge was discussed with Ernie and the plan was created in a collaborative fashion.    - Pain medication as ordered.     Discharge Disposition   Discharged to home with home care  Condition at discharge: Guarded    Consultations This Hospital Stay   PHARMACY TO DOSE HEPARIN  NUTRITION SERVICES ADULT IP CONSULT  HEMATOLOGY & ONCOLOGY IP CONSULT  PHARMACY TO DOSE HEPARIN  CARE TRANSITION RN/SW IP CONSULT    Time Spent on this Encounter   I spent a total of 35 minutes discharging this patient.     Discharge Orders     Home care nursing referral     Follow-up and recommended labs and tests    You are scheduled to have a labs done on Tuesday, October 2nd at 1:40 PM and then will see a Nurse Practitioner at 2:00 PM at [facility].    You are scheduled to have labs done on Wednesday, October 10th at 12:00 PM, you will see the Nurse Practitioner at 12:30 PM and then you will have a 4 hour chemo treatment at 1:00 PM at [facility].     Follow-up and recommended labs and tests     10/2 at 1:40 for labs and 2:00 appointment with [facility]- hospital follow up  10/10 at 12:00 for labs and 12:30 appointment followed by chemotherapy (about 4 hours)     Reason for your hospital stay   Gastric cancer with malignant pleural effusion and ascites.     Activity   Your activity upon discharge: activity as tolerated     Discharge Instructions   Call Dr. Santos at Pager [phone] if questions, or Cell Phone [phone].     MD face to face encounter   Documentation of Face to Face and Certification for Home Health Services    I certify that patient: Ernie Song is under my care and that I, or a nurse practitioner or physician's assistant working with me, had a face-to-face encounter that meets the physician face-to-face encounter requirements with this patient on: 9/30/2018.    This encounter with the patient was in whole, or in part, for the following medical condition, which is the primary reason for home health care: metastatic gastric cancer.    I certify that, based on my findings, the following services are medically necessary home health services: Nursing.    My clinical findings support the need for the above services because: Nurse is needed: To provide assessment and oversight required in the home to assure adherence to the medical plan due to: and drain Pleurx catheter weekly to help prevent shortness of breath.    Further, I certify that my clinical findings support that this patient is homebound (i.e. absences from home require considerable and taxing effort and are for medical reasons or [employer] services or infrequently or of short duration when for other reasons) because: Leaving home is medically contraindicated for the following reason(s): Dyspnea on exertion that makes it so they cannot leave their home for needed services without clinical deterioration...    Based on the above findings. I certify that this patient is confined to the home and needs  intermittent skilled nursing care, physical therapy and/or speech therapy.  The patient is under my care, and I have initiated the establishment of the plan of care.  This patient will be followed by a physician who will periodically review the plan of care.  Physician/Provider to provide follow up care: Gay Winslow    Attending hospital physician (the Medicare certified [facility] provider): Nicholas Elias  Physician Signature: See electronic signature associated with these discharge orders.  Date: 9/30/2018     Full Code     Diet   Follow this diet upon discharge: Orders Placed This Encounter     Room Service     Snacks/Supplements Adult: Other; PLUS2 shake at 10am and 2pm  (vanilla or strawberry); Between Meals     Combination Diet Regular Diet Adult       Discharge Medications   Current Discharge Medication List      START taking these medications    Details   enoxaparin (LOVENOX) 80 MG/0.8ML injection Inject 0.8 mLs (80 mg) Subcutaneous every 12 hours for 14 days  Qty: 28 Syringe, Refills: 0    Associated Diagnoses: Pulmonary embolism, bilateral (H)      LORazepam (ATIVAN) 0.5 MG tablet Take 1-2 tablets (0.5-1 mg) by mouth every 6 hours as needed (Breakthrough Nausea / Vomiting)  Qty: 60 tablet, Refills: 0    Associated Diagnoses: Gastric adenocarcinoma (H)         CONTINUE these medications which have CHANGED    Details   docusate sodium (COLACE) 100 MG capsule Take 1 capsule (100 mg) by mouth daily  Qty: 60 capsule, Refills: 3    Associated Diagnoses: Drug-induced constipation      ondansetron (ZOFRAN-ODT) 8 MG ODT tab Take 1 tablet (8 mg) by mouth every 8 hours as needed for nausea  Qty: 40 tablet, Refills: 3    Associated Diagnoses: Nausea      oxyCODONE IR (ROXICODONE) 5 MG tablet Take 1-2 tablets (5-10 mg) by mouth every 4 hours as needed for moderate to severe pain  Qty: 40 tablet, Refills: 0    Associated Diagnoses: Gastric adenocarcinoma (H)      prochlorperazine (COMPAZINE) 10 MG tablet  Take 1 tablet (10 mg) by mouth every 6 hours as needed for nausea or vomiting  Qty: 60 tablet, Refills: 3    Associated Diagnoses: Nausea         CONTINUE these medications which have NOT CHANGED    Details   acetaminophen (TYLENOL) 325 MG tablet Take 2 tablets (650 mg) by mouth every 4 hours as needed for mild pain  Qty: 100 tablet    Associated Diagnoses: Epigastric pain      ferrous sulfate (IRON) 325 (65 Fe) MG tablet Take 1 tablet (325 mg) by mouth every other day  Qty: 90 tablet, Refills: 2    Associated Diagnoses: Epigastric pain      oxyCODONE (OXYCONTIN) 20 MG 12 hr tablet Take 1 tablet (20 mg) by mouth every 12 hours  Qty: 30 tablet, Refills: 0    Associated Diagnoses: Gastric adenocarcinoma (H)      pantoprazole (PROTONIX) 40 MG EC tablet Take 1 tablet (40 mg) by mouth 2 times daily Take 30-60 minutes before a meal.  Qty: 180 tablet, Refills: 1    Associated Diagnoses: Gastric ulcer, unspecified chronicity, unspecified whether gastric ulcer hemorrhage or perforation present      valACYclovir (VALTREX) 500 MG tablet TAKE 1 TABLET (500 MG) BY MOUTH DAILY  Qty: 90 tablet, Refills: 3    Associated Diagnoses: Genital herpes simplex, unspecified site           Allergies   No Known Allergies  Data   Most Recent 3 CBC's:  Recent Labs   Lab Test  09/29/18   0600  09/27/18   0750  09/26/18   0543   WBC  7.6  12.5*  10.2   HGB  11.5*  11.3*  11.8*   MCV  74*  73*  73*   PLT  350  361  387      Most Recent 3 BMP's:  Recent Labs   Lab Test  09/29/18   0600  09/23/18   0721  09/05/18   1800  09/01/18   0940   NA   --   135  140  136   POTASSIUM   --   4.1  3.8  4.2   CHLORIDE   --   97  104  102   CO2   --   30  30  28   BUN   --   7  9  9   CR  0.58*  0.76  0.84  0.87   ANIONGAP   --   8  6  6   AROLDO   --   8.2*  8.7  8.2*   GLC   --   107*  77  91     Most Recent 2 LFT's:  Recent Labs   Lab Test  09/23/18   0721  09/05/18   1800   AST  21  15   ALT  20  18   ALKPHOS  87  74   BILITOTAL  0.5  0.4     Most Recent INR's  and Anticoagulation Dosing History:  Anticoagulation Dose History     Recent Dosing and Labs Latest Ref Rng & Units 2/26/2018 9/14/2018 9/24/2018    INR 0.86 - 1.14 1.04 1.14 1.28(H)        Most Recent 3 Troponin's:  Recent Labs   Lab Test  09/23/18   0721   TROPI  <0.015     Most Recent Cholesterol Panel:  Recent Labs   Lab Test  10/10/17   0750   CHOL  264*   LDL  169*   HDL  53   TRIG  210*     Most Recent 6 Bacteria Isolates From Any Culture (See EPIC Reports for Culture Details):  Recent Labs   Lab Test  09/26/18   0518  09/23/18   0932  09/23/18   0915  09/23/18   0721  07/11/18   1356  02/06/13   1639   CULT  No growth  No growth  No growth  No growth  No growth  No Beta Streptococcus isolated     Most Recent TSH, T4 and A1c Labs:  Recent Labs   Lab Test  10/10/17   0750   07/01/16   0816   TSH  3.49   < >  3.57   T4  0.90   < >   --    A1C   --    --   5.6    < > = values in this interval not displayed.

## 2018-09-30 NOTE — DISCHARGE SUMMARY
Patient discharged at 3:04 PM to home. IV was discontinued and port was de-accessed. Pain at time of discharge was 0/10. Belongings returned to patient. Lovenox injection administering education reinforced.  Discharge instructions and medications reviewed with patient and spouse.  Patient verbalized understanding and all questions were answered.  Prescriptions given to patient.  At time of discharge, patient condition was stable and left the unit on wheelchair escorted by KASH.

## 2018-09-30 NOTE — PLAN OF CARE
Problem: Patient Care Overview  Goal: Plan of Care/Patient Progress Review  VSS.  Pain adequately controlled with scheduled oxycontin and PRN oxycodone.  Denies nausea, SOB.  Pt self administered lovenox with assistance.  PleurX site CDI.  Probably discharge home today.

## 2018-09-30 NOTE — DISCHARGE INSTRUCTIONS
A follow-up appointment was scheduled for you [see above].  It is very important to go to it--bring these papers and your insurance card with you.  At the visit, talk about your hospital stay.  Tell your doctor how you feel.  Show your new list of medications.  Your doctor will update records, make sure you are still doing OK, and decide if any tests or medication changes are needed.      Your doctor has ordered home care to help you after your hospital stay.  They will contact you regarding your first visit.  This service will be provided by [facility].  If you have not received a call within 48 hours of discharge, please call them at [phone].

## 2018-09-30 NOTE — PLAN OF CARE
Problem: Patient Care Overview  Goal: Plan of Care/Patient Progress Review  Outcome: No Change  A/O x4. Pt report feelings of weakness. Sleepy throughout most of thi shift. Activity encouraged.  Denies SOB, on RA air satting in mid 90's. Abdomen pain managed with scheduled Oxycontin and prn oxycodone. C/o nausea, prn Zofran given with some relief, refused other antiemetic meds. Poor appetite, encouraged eating and fluids. Decreased urine output noted today, importance of fluid hydration discussed with pt and wife.  Lovenox handout given and attempted to do teachings but pt states he feels too weak to try learning. Pt's wife states she is not comfortable with learning or trying to administer Lovenox. Both pt and wife seem very anxious about discharging with Lovenox.  was called per pt's wife request and prayed with family. Plan to revisit Lovenox teaching tomorrow and discharge home.

## 2018-10-03 NOTE — IP AVS SNAPSHOT
[place] [address]    [place] [address]    Phone:  [phone]                                       After Visit Summary   10/3/2018    Ernie Song    MRN: [id number]           After Visit Summary Signature Page     I have received my discharge instructions, and my questions have been answered. I have discussed any challenges I see with this plan with the nurse or doctor.    ..........................................................................................................................................  Patient/Patient Representative Signature      ..........................................................................................................................................  Patient Representative Print Name and Relationship to Patient    ..................................................               ................................................  Date                                   Time    ..........................................................................................................................................  Reviewed by Signature/Title    ...................................................              ..............................................  Date                                               Time          22EPIC Rev 08/18

## 2018-10-03 NOTE — PROGRESS NOTES
US guided paracentesis. Consent obtained, time out taken. Pt drained of 1200 kitty fluid. Pt tolerated well. VSS. Derma-bond to the site for this drain and to previous site drain on left side abdomen which started leaking. Returned to care suites, tolerated some juice and discharged to home with wife.

## 2018-10-03 NOTE — IP AVS SNAPSHOT
MRN:[id number]                      After Visit Summary   10/3/2018    Ernie Song    MRN: [id number]           Visit Information        Department      10/3/2018 10:10 AM [facility]          Review of your medicines      UNREVIEWED medicines. Ask your doctor about these medicines        Dose / Directions    acetaminophen 325 MG tablet   Commonly known as:  TYLENOL   Used for:  Epigastric pain        Dose:  650 mg   Take 2 tablets (650 mg) by mouth every 4 hours as needed for mild pain   Quantity:  100 tablet   Refills:  0       dextromethorphan 30 MG/5ML liquid   Commonly known as:  DELSYM        Dose:  60 mg   Take 60 mg by mouth every 4 hours as needed for cough   Refills:  0       docusate sodium 100 MG capsule   Commonly known as:  COLACE   Used for:  Drug-induced constipation        Dose:  100 mg   Take 1 capsule (100 mg) by mouth daily   Quantity:  60 capsule   Refills:  3       enoxaparin 80 MG/0.8ML injection   Commonly known as:  LOVENOX   Used for:  Pulmonary embolism, bilateral (H)        Dose:  80 mg   Inject 0.8 mLs (80 mg) Subcutaneous every 12 hours for 14 days   Quantity:  28 Syringe   Refills:  0       ferrous sulfate 325 (65 Fe) MG tablet   Commonly known as:  IRON   Used for:  Epigastric pain        Dose:  325 mg   Take 1 tablet (325 mg) by mouth every other day   Quantity:  90 tablet   Refills:  2       LORazepam 0.5 MG tablet   Commonly known as:  ATIVAN   Used for:  Gastric adenocarcinoma (H)        Dose:  0.5-1 mg   Take 1-2 tablets (0.5-1 mg) by mouth every 6 hours as needed (Breakthrough Nausea / Vomiting)   Quantity:  60 tablet   Refills:  0       ondansetron 8 MG ODT tab   Commonly known as:  ZOFRAN-ODT   Used for:  Nausea        Dose:  8 mg   Take 1 tablet (8 mg) by mouth every 8 hours as needed for nausea   Quantity:  40 tablet   Refills:  3       * oxyCODONE 20 MG 12 hr tablet   Commonly known as:  OxyCONTIN   Used for:  Gastric  adenocarcinoma (H)        Dose:  20 mg   Take 1 tablet (20 mg) by mouth every 12 hours   Quantity:  30 tablet   Refills:  0       * oxyCODONE IR 5 MG tablet   Commonly known as:  ROXICODONE   Used for:  Gastric adenocarcinoma (H)        Dose:  5-10 mg   Take 1-2 tablets (5-10 mg) by mouth every 4 hours as needed for moderate to severe pain   Quantity:  40 tablet   Refills:  0       pantoprazole 40 MG EC tablet   Commonly known as:  PROTONIX   Used for:  Gastric ulcer, unspecified chronicity, unspecified whether gastric ulcer hemorrhage or perforation present        Dose:  40 mg   Take 1 tablet (40 mg) by mouth 2 times daily Take 30-60 minutes before a meal.   Quantity:  180 tablet   Refills:  1       prochlorperazine 10 MG tablet   Commonly known as:  COMPAZINE   Used for:  Nausea        Dose:  10 mg   Take 1 tablet (10 mg) by mouth every 6 hours as needed for nausea or vomiting   Quantity:  60 tablet   Refills:  3       valACYclovir 500 MG tablet   Commonly known as:  VALTREX   Used for:  Genital herpes simplex, unspecified site        TAKE 1 TABLET (500 MG) BY MOUTH DAILY   Quantity:  90 tablet   Refills:  3       ZYPREXA PO        Dose:  2.5 mg   Take 2.5 mg by mouth every morning   Refills:  0       * Notice:  This list has 2 medication(s) that are the same as other medications prescribed for you. Read the directions carefully, and ask your doctor or other care provider to review them with you.             Protect others around you: Learn how to safely use, store and throw away your medicines at www.disposemymeds.org.         Follow-ups after your visit        Your next 10 appointments already scheduled     Oct 03, 2018 11:30 AM CDT   US PARACENTESIS with CHANDAUS4,  IMAGING NURSE,  BODY RAD   [facility] ([facility])    [address] [address] [phone]           How do I prepare for my exam? (Food and drink instructions) No Food and Drink  Restrictions.  How do I prepare for my exam? (Other instructions) IF YOUR DOCTOR ALSO PRESCRIBED SEDATION DURING THE EXAM (medicine to help you relax): You will receive separate instructions about driving, eating, and additional tests that may be necessary prior to your exam day.  What should I wear: Wear comfortable clothes.  How long does the exam take: Aspiration (no sedation treatment takes about an hour.  For paracentesis, thoracentesis or sedation plan to spend at least three hours at the hospital.  What should I bring: Bring a list of your medicines, including vitamins, minerals and over-the-counter drugs. It is safest to leave personal items at home.  Do I need a :  No  is needed.  What do I need to tell my doctor: Tell your doctor in advance: * If you are or may be pregnant. * If you are taking Coumadin (or any other blood thinners) 5 days prior to the exam for any special instructions. * If you are diabetic to determine if your insulin needs have to be adjusted for the exam.  What should I do after the exam: Take it easy the rest of the day. You can return to normal activities the next day.  What is this test: This test uses a long, thin needle or tube to remove tissue, fluid, or other cells from your body. Pictures from an ultrasound will guide the needle to the right place. (Ultrasound uses sound waves to create pictures of the body on a video screen. You will not feel the sound waves.)  * A biopsy removes tissue or other cells from the body; we send the tissue or cells to a lab for testing. * Paracentesis removes fluid from the belly (abdomen) to relieve pressure, to test the fluid or both. * Thoracentesis removes fluid from the sac around the lungs to relieve pressure, to test the fluid or both. * Aspiration removes fluid from any part of the body, then the fluid is tested for disease or infection.  Who should I call with questions: If you have any questions, please call the Imaging  Department where you will have your exam. Directions, parking instructions, and other information is available on our website, [email or web address].               Care Instructions        Further instructions from your care team       Paracentesis Discharge Instructions     After you go home:      You may resume your normal diet.    Care of Puncture Site:      For the first 48 hrs, check your puncture site every couple hours while you are awake     If there is a bandaid - you may remove it tomorrow morning    You may shower tomorrow    No tub baths, whirlpools or swimming until your puncture site has fully healed    Fluid may leak from the site. Change the bandaid as needed - keep the site dry    If the fluid leaks for more than 48 hours, call your ordering provider     Activity:      You may go back to normal activity in 24 hours     Wait 48 hours before lifting, straining, exercise or other strenuous activity    Medicines:      You may resume all your medications    For minor pain, you may take Acetaminophen (Tylenol) or Ibuprofen (Advil)            Call the provider who ordered this procedure if:      The site is red, swollen, hot or tender    Blood or fluid is draining from the site    Chills or a fever greater than 101 F (38 C)    Pain that is getting worse    Leaking from the site that does not stop    Any questions or concerns      If you have questions call:        [facility] Radiology Dept @ [phone]      The provider who performed your procedure was _________________.       Additional Information About Your Visit        MyChart Information     [employer] gives you secure access to your electronic health record. If you see a primary care provider, you can also send messages to your care team and make appointments. If you have questions, please call your primary care clinic.  If you do not have a primary care provider, please call [phone] and they will assist you.        Care EveryWhere ID      This is your Care EveryWhere ID. This could be used by other organizations to access your [facility] medical records  [id number]        Your Vitals Were     Blood Pressure Pulse Temperature Respirations Pulse Oximetry       130/65 (BP Location: Left arm) 70 97  F (36.1  C) (Oral) 18 96%        Primary Care Provider Office Phone # Fax #    Gay Winslow, MARILIN [facility] [phone]      Equal Access to Services     NADINE ESCALANTE : Hadii aad ku hadasho Soomaali, waaxda luqadaha, qaybta kaalmada adeegyada, waxay idiin hayaan adeeg oralinaole sutton . So [facility] [phone].    ATENCIÓN: Si habla español, tiene a valente disposición servicios gratuitos de asistencia lingüística. Llame al [phone].    We comply with applicable federal civil rights laws and [place] laws. We do not discriminate on the basis of race, color, national origin, age, disability, sex, sexual orientation, or gender identity.            Thank you!     Thank you for choosing [facility] for your care. Our goal is always to provide you with excellent care. Hearing back from our patients is one way we can continue to improve our services. Please take a few minutes to complete the written survey that you may receive in the mail after you visit with us. Thank you!             Medication List: This is a list of all your medications and when to take them. Check marks below indicate your daily home schedule. Keep this list as a reference.      Medications           Morning Afternoon Evening Bedtime As Needed    acetaminophen 325 MG tablet   Commonly known as:  TYLENOL   Take 2 tablets (650 mg) by mouth every 4 hours as needed for mild pain                                dextromethorphan 30 MG/5ML liquid   Commonly known as:  DELSYM   Take 60 mg by mouth every 4 hours as needed for cough                                docusate sodium 100 MG capsule   Commonly known as:  COLACE   Take 1 capsule (100 mg) by mouth daily                                 enoxaparin 80 MG/0.8ML injection   Commonly known as:  LOVENOX   Inject 0.8 mLs (80 mg) Subcutaneous every 12 hours for 14 days                                ferrous sulfate 325 (65 Fe) MG tablet   Commonly known as:  IRON   Take 1 tablet (325 mg) by mouth every other day                                LORazepam 0.5 MG tablet   Commonly known as:  ATIVAN   Take 1-2 tablets (0.5-1 mg) by mouth every 6 hours as needed (Breakthrough Nausea / Vomiting)                                ondansetron 8 MG ODT tab   Commonly known as:  ZOFRAN-ODT   Take 1 tablet (8 mg) by mouth every 8 hours as needed for nausea                                * oxyCODONE 20 MG 12 hr tablet   Commonly known as:  OxyCONTIN   Take 1 tablet (20 mg) by mouth every 12 hours                                * oxyCODONE IR 5 MG tablet   Commonly known as:  ROXICODONE   Take 1-2 tablets (5-10 mg) by mouth every 4 hours as needed for moderate to severe pain                                pantoprazole 40 MG EC tablet   Commonly known as:  PROTONIX   Take 1 tablet (40 mg) by mouth 2 times daily Take 30-60 minutes before a meal.                                prochlorperazine 10 MG tablet   Commonly known as:  COMPAZINE   Take 1 tablet (10 mg) by mouth every 6 hours as needed for nausea or vomiting                                valACYclovir 500 MG tablet   Commonly known as:  VALTREX   TAKE 1 TABLET (500 MG) BY MOUTH DAILY                                ZYPREXA PO   Take 2.5 mg by mouth every morning                                * Notice:  This list has 2 medication(s) that are the same as other medications prescribed for you. Read the directions carefully, and ask your doctor or other care provider to review them with you.

## 2018-10-03 NOTE — PROGRESS NOTES
RADIOLOGY PROCEDURE NOTE  Patient name: Ernie Song  MRN: [id number]  : 1980    Pre-procedure diagnosis: Ascites  Post-procedure diagnosis: Same    Procedure Date/Time: October 3, 2018  12:05 PM  Procedure: Paracentesis  Estimated blood loss: None  Specimen(s) collected with description: ascitic fluid  The patient tolerated the procedure well with no immediate complications.  Significant findings:none    See imaging dictation for procedural details.    Provider name: Reggie Ruiz  Assistant(s):None

## 2018-10-03 NOTE — DISCHARGE INSTRUCTIONS

## 2018-10-05 NOTE — TELEPHONE ENCOUNTER
Routing refill request to provider for review/approval because:  Labs out of range:  Creatinine    Antivirals for Herpes Protocol Wkexii01/5 7:00 AM   Normal serum creatinine on file in past 12 months       Creatinine   Date Value Ref Range Status   09/29/2018 0.58 (L) 0.66 - 1.25 mg/dL Final     Last office visit:  9/10/18        Norma Corona RN,   [facility]

## 2018-10-07 NOTE — IP AVS SNAPSHOT
MRN:[id number]                      After Visit Summary   10/7/2018    Ernie Song    MRN: [id number]           Thank you!     Thank you for choosing [facility] for your care. Our goal is always to provide you with excellent care. Hearing back from our patients is one way we can continue to improve our services. Please take a few minutes to complete the written survey that you may receive in the mail after you visit with us. Thank you!        Patient Information     Date Of Birth          1980        About your hospital stay     You were admitted on:  October 8, 2018 You last received care in the:  [place] [address] [facility]    You were discharged on:  October 8, 2018        Reason for your hospital stay       SOB related to fluid around lung.                  Who to Call     For medical emergencies, please call 911.  For non-urgent questions about your medical care, please call your primary care provider or clinic, [phone]          Attending Provider     Provider Specialty    Pepe Cavazos MD Emergency Medicine    Vu, Bud Lopez MD Internal Medicine       Primary Care Provider Office Phone # Fax #    Gay MARILIN Garcia [facility] [phone]      After Care Instructions     Activity       Your activity upon discharge: activity as tolerated            Diet       Follow this diet upon discharge: Orders Placed This Encounter      Combination Diet 2 gm NA Diet            Discharge Instructions       Call Dr. Santos at Pager [phone] if questions, or Cell Phone [phone].                  Follow-up Appointments     Follow-up and recommended labs and tests        Follow up with Dr. Lubin with [facility] on Wed 10/10/18 as scheduled.                  Pending Results     Date and Time Order Name Status Description    10/8/2018 0812 US Thoracentesis Preliminary     10/7/2018 2237 Blood culture Preliminary     10/7/2018 2237 Blood culture Preliminary              Statement of Approval     Ordered          10/08/18 1554  I have reviewed and agree with all the recommendations and orders detailed in this document.  EFFECTIVE NOW     Comments:  Discharge after dinner if no new problems   Approved and electronically signed by:  Nicholas Santos MD             Admission Information     Date & Time Provider Department Dept. Phone    10/7/2018 Horace, Bud Lopez MD [place] [address] [facility] [phone]      Your Vitals Were     Blood Pressure Pulse Temperature Respirations Weight Pulse Oximetry    119/72 (BP Location: Right arm) 126 99.7  F (37.6  C) (Oral) 20 68 kg (150 lb) 94%    BMI (Body Mass Index)                   25.73 kg/m2           MyChart Information     [employer] gives you secure access to your electronic health record. If you see a primary care provider, you can also send messages to your care team and make appointments. If you have questions, please call your primary care clinic.  If you do not have a primary care provider, please call [phone] and they will assist you.        Care EveryWhere ID     This is your Care EveryWhere ID. This could be used by other organizations to access your [facility] medical records  [id number]        Equal Access to Services     NOELLE ESCALANTE : Hadmaicol Flor, nathalie ramirez, qaaime oquendo, neymar morgan. So [facility] [phone].    ATENCIÓN: Si habla español, tiene a valente disposición servicios gratuitos de asistencia lingüística. Anju al [phone].    We comply with applicable federal civil rights laws and [place] laws. We do not discriminate on the basis of race, color, national origin, age, disability, sex, sexual orientation, or gender identity.               Review of your medicines      CONTINUE these medicines which have NOT CHANGED        Dose / Directions    enoxaparin 80 MG/0.8ML injection   Commonly known as:  LOVENOX   Used for:  Pulmonary  embolism, bilateral (H)        Dose:  80 mg   Inject 0.8 mLs (80 mg) Subcutaneous every 12 hours for 14 days   Quantity:  28 Syringe   Refills:  0       guaiFENesin 20 mg/mL Soln solution   Commonly known as:  ROBITUSSIN        Dose:  20 mL   Take 20 mLs by mouth every 4 hours as needed for cough   Refills:  0       LORazepam 0.5 MG tablet   Commonly known as:  ATIVAN   Used for:  Gastric adenocarcinoma (H)        Dose:  0.5-1 mg   Take 1-2 tablets (0.5-1 mg) by mouth every 6 hours as needed (Breakthrough Nausea / Vomiting)   Quantity:  60 tablet   Refills:  0       * OXYCONTIN 20 MG 12 hr tablet   Generic drug:  oxyCODONE        Dose:  20 mg   Take 20 mg by mouth 3 times daily   Refills:  0       * oxyCODONE IR 5 MG tablet   Commonly known as:  ROXICODONE   Used for:  Gastric adenocarcinoma (H)        Dose:  10 mg   Take 2 tablets (10 mg) by mouth every 4 hours as needed for pain   Quantity:  40 tablet   Refills:  0       pantoprazole 40 MG EC tablet   Commonly known as:  PROTONIX   Used for:  Gastric ulcer, unspecified chronicity, unspecified whether gastric ulcer hemorrhage or perforation present        Dose:  40 mg   Take 1 tablet (40 mg) by mouth 2 times daily Take 30-60 minutes before a meal.   Quantity:  180 tablet   Refills:  1       prochlorperazine 10 MG tablet   Commonly known as:  COMPAZINE   Used for:  Nausea        Dose:  10 mg   Take 1 tablet (10 mg) by mouth every 6 hours as needed for nausea or vomiting   Quantity:  60 tablet   Refills:  3       valACYclovir 500 MG tablet   Commonly known as:  VALTREX   Used for:  Genital herpes simplex, unspecified site        TAKE ONE TABLET BY MOUTH ONE TIME DAILY   Quantity:  90 tablet   Refills:  3       ZYPREXA PO        Dose:  2.5 mg   Take 2.5 mg by mouth every morning   Refills:  0       * Notice:  This list has 2 medication(s) that are the same as other medications prescribed for you. Read the directions carefully, and ask your doctor or other care  provider to review them with you.         Where to get your medicines      Some of these will need a paper prescription and others can be bought over the counter. Ask your nurse if you have questions.     Bring a paper prescription for each of these medications     oxyCODONE IR 5 MG tablet                Protect others around you: Learn how to safely use, store and throw away your medicines at www.disposemymeds.org.        Information about OPIOIDS     PRESCRIPTION OPIOIDS: WHAT YOU NEED TO KNOW   We gave you an opioid (narcotic) pain medicine. It is important to manage your pain, but opioids are not always the best choice. You should first try all the other options your care team gave you. Take this medicine for as short a time (and as few doses) as possible.    Some activities can increase your pain, such as bandage changes or therapy sessions. It may help to take your pain medicine 30 to 60 minutes before these activities. Reduce your stress by getting enough sleep, working on hobbies you enjoy and practicing relaxation or meditation. Talk to your care team about ways to manage your pain beyond prescription opioids.    These medicines have risks:    DO NOT drive when on new or higher doses of pain medicine. These medicines can affect your alertness and reaction times, and you could be arrested for driving under the influence (DUI). If you need to use opioids long-term, talk to your care team about driving.    DO NOT operate heavy machinery    DO NOT do any other dangerous activities while taking these medicines.    DO NOT drink any alcohol while taking these medicines.     If the opioid prescribed includes acetaminophen, DO NOT take with any other medicines that contain acetaminophen. Read all labels carefully. Look for the word  acetaminophen  or  Tylenol.  Ask your pharmacist if you have questions or are unsure.    You can get addicted to pain medicines, especially if you have a history of addiction (chemical,  alcohol or substance dependence). Talk to your care team about ways to reduce this risk.    All opioids tend to cause constipation. Drink plenty of water and eat foods that have a lot of fiber, such as fruits, vegetables, prune juice, apple juice and high-fiber cereal. Take a laxative (Miralax, milk of magnesia, Colace, Senna) if you don t move your bowels at least every other day. Other side effects include upset stomach, sleepiness, dizziness, throwing up, tolerance (needing more of the medicine to have the same effect), physical dependence and slowed breathing.    Store your pills in a secure place, locked if possible. We will not replace any lost or stolen medicine. If you don t finish your medicine, please throw away (dispose) as directed by your pharmacist. The [place] [employer] has more information about safe disposal: https://www.pca.[facility].us/living-green/managing-unwanted-medications             Medication List: This is a list of all your medications and when to take them. Check marks below indicate your daily home schedule. Keep this list as a reference.      Medications           Morning Afternoon Evening Bedtime As Needed    enoxaparin 80 MG/0.8ML injection   Commonly known as:  LOVENOX   Inject 0.8 mLs (80 mg) Subcutaneous every 12 hours for 14 days                                guaiFENesin 20 mg/mL Soln solution   Commonly known as:  ROBITUSSIN   Take 20 mLs by mouth every 4 hours as needed for cough                                LORazepam 0.5 MG tablet   Commonly known as:  ATIVAN   Take 1-2 tablets (0.5-1 mg) by mouth every 6 hours as needed (Breakthrough Nausea / Vomiting)                                * OXYCONTIN 20 MG 12 hr tablet   Take 20 mg by mouth 3 times daily   Last time this was given:  20 mg on 10/8/2018  4:18 PM   Generic drug:  oxyCODONE                                * oxyCODONE IR 5 MG tablet   Commonly known as:  ROXICODONE   Take 2 tablets (10 mg) by mouth  every 4 hours as needed for pain   Last time this was given:  5 mg on 10/8/2018  9:59 AM                                pantoprazole 40 MG EC tablet   Commonly known as:  PROTONIX   Take 1 tablet (40 mg) by mouth 2 times daily Take 30-60 minutes before a meal.   Last time this was given:  40 mg on 10/8/2018  9:30 AM                                prochlorperazine 10 MG tablet   Commonly known as:  COMPAZINE   Take 1 tablet (10 mg) by mouth every 6 hours as needed for nausea or vomiting                                valACYclovir 500 MG tablet   Commonly known as:  VALTREX   TAKE ONE TABLET BY MOUTH ONE TIME DAILY                                ZYPREXA PO   Take 2.5 mg by mouth every morning                                * Notice:  This list has 2 medication(s) that are the same as other medications prescribed for you. Read the directions carefully, and ask your doctor or other care provider to review them with you.

## 2018-10-07 NOTE — IP AVS SNAPSHOT
[address], [address]    [facility] [address]    Phone:  [phone]                                       After Visit Summary   10/7/2018    Ernie Song    MRN: [id number]           After Visit Summary Signature Page     I have received my discharge instructions, and my questions have been answered. I have discussed any challenges I see with this plan with the nurse or doctor.    ..........................................................................................................................................  Patient/Patient Representative Signature      ..........................................................................................................................................  Patient Representative Print Name and Relationship to Patient    ..................................................               ................................................  Date                                   Time    ..........................................................................................................................................  Reviewed by Signature/Title    ...................................................              ..............................................  Date                                               Time          22EPIC Rev 08/18

## 2018-10-08 PROBLEM — J91.0 PLEURAL EFFUSION, MALIGNANT (H): Status: ACTIVE | Noted: 2018-01-01

## 2018-10-08 PROBLEM — I26.99 PULMONARY EMBOLI (H): Status: RESOLVED | Noted: 2018-01-01 | Resolved: 2018-01-01

## 2018-10-08 NOTE — ED NOTES
[facility]  ED Nurse Handoff Report    ED Chief complaint: Shortness of Breath (started having severe SOB at 1800 today, worse when laying down) and Leg Swelling (started yesterday, significantly worse at 1600 today)      ED Diagnosis:   Final diagnoses:   Malignant pleural effusion   Malignant ascites   Hyponatremia   SOB (shortness of breath)       Code Status: Full Code    Allergies: No Known Allergies    Activity level - Baseline/Home:  Independent    Activity Level - Current:   Stand with Assist     Needed?: No    Isolation: No  Infection: Not Applicable  Bariatric?: No    Vital Signs:   Vitals:    10/07/18 2220   BP: 140/69   Pulse: 125   Resp: 24   Temp: 98.7  F (37.1  C)   TempSrc: Oral   SpO2: 94%   Weight: 68 kg (150 lb)       Cardiac Rhythm: ,        Pain level: 0-10 Pain Scale: 5    Is this patient confused?: No   [place] - Suicide Severity Rating Scale Completed?  Yes  If yes, what color did the patient score?  White    Patient Report: Initial Complaint: SOB, bilateral leg swelling  Focused Assessment:   Pt recently diagnosed with stage 4 stomach cancer. Yesterday his legs started swelling, much worse today. Pt also feeling more SOB tonight, worse when lying down. Pt tachycardic. On 2L NC for comfort, was in low 90s on RA.   Tests Performed: labs, xray  Abnormal Results: SOB, tachycardic  Treatments provided: pt has port to drain fluid from around right lung, fluid drained by 88 staff.     Family Comments: wife supportive at bedside    OBS brochure/video discussed/provided to patient/family: N/A              Name of person given brochure if not patient:               Relationship to patient:     ED Medications: Medications - No data to display    Drips infusing?:  Yes- TKO saline    For the majority of the shift this patient was Green.   Interventions performed were n/a.    Severe Sepsis OR Septic Shock Diagnosis Present: No    To be done/followed up on inpatient unit:   n/a    ED NURSE PHONE NUMBER: [phone]

## 2018-10-08 NOTE — PROGRESS NOTES
Brief note, full H&P pending:    Patient presents with shortness of breath in the setting of stage IV gastric cancer with metastases, malignant pleural effusion status post Pleurx catheter placement, history of pulmonary embolisms on therapeutic subcutaneous Lovenox who receives regular paracenteses, last 10/3/18.    Patient is not hypoxic, though describes orthopnea and dyspnea on exertion.    Attempt at pleural effusion drainage through Pleurx catheter with minimal success in the emergency department given patient's symptoms of coughing and chest discomfort with utilization of Pleurx catheter.  This has been the case in the past as well.    Patient admitted to observation status for shortness of breath without hypoxia, likely paracentesis or left-sided thoracentesis if unable to tolerate Pleurx drainage.    Would request discussion with interventional radiology in a.m. regarding possible instillation of lidocaine prior to attempts at Pleurx drainage.  Will repeat attempt at Pleurx drainage following premedication with Ativan, low-dose IV Dilaudid if needed for coughing.    Again, consider repeat large volume paracentesis or thoracentesis if needed, though may need to hold Lovenox dose for these.    Bud Vu MD  6:27 AM

## 2018-10-08 NOTE — PROGRESS NOTES
RADIOLOGY PROCEDURE NOTE  Patient name: Ernei Song  MRN: [id number]  : 1980    Pre-procedure diagnosis: Left pleural effusion  Post-procedure diagnosis: Same    Procedure Date/Time: 2018  1:02 PM  Procedure: Thoracentesis  Estimated blood loss: None  Specimen(s) collected with description: Pleural fluid.  The patient tolerated the procedure well with no immediate complications.    See imaging dictation for procedural details and findings.    Provider name: Zackery Osullivan  Assistant(s):None

## 2018-10-08 NOTE — PLAN OF CARE
Problem: Patient Care Overview  Goal: Plan of Care/Patient Progress Review  Outcome: Adequate for Discharge Date Met: 10/08/18  Pt discharged from unit today 1815 escorted by spouse via wheelchair and nursing assistant.  Discharge medications sent with patient.  Port de-accessed.  Pleur-X drain dressing clean dry in tact. L Thoracentesis dressing clean dry intact.  Will follow up with Dr Silva primary oncologist on Wednesday 10/10.  He denied any further questions or concerns.

## 2018-10-08 NOTE — ED PROVIDER NOTES
History     Chief Complaint:  Shortness of breath and leg swelling    HPI   Ernie Song is a 38 year old male with a history of stage 4 stomach cancer, hyperlipidemia, and PE who presents with his wife to the ED for evaluation of shortness of breath and leg swelling. The patient was recently diagnosed with stage 4 stomach cancer and has undergone one chemo treatment with the next dose scheduled for next Wednesday. 2 weeks ago, the patient was seen in the ED and admitted for PE. He was discharged with Lovenox shots x2 daily and is still taking these. A port was placed in the right side of his chest and has been having fluid drained. Typically, 1,000 ml's is obtained each time. He has also been having periodic drainage from his belly at least once a week, the last being on Wednesday, and usually gets 1-2,000 ml's, however during the last drainage only 300 ml's was obtained. The patient reports worsening shortness of breath and cough over the last 3 days along with a low grade fever. 2 days ago, he developed right ankle swelling. They called the oncology clinic who said to elevate the foot and watch for shortness of breath and fever. Yesterday, he developed bilateral ankle swelling and this has since moved up bilateral legs. His difficulty breathing has worsened, notably during the night. He denies any chest pain, pressure, or heaviness along with no increased body aches, fever, or any other symptoms.    Allergies:  No known drug allergies    Medications:    acetaminophen (TYLENOL) 325 MG tablet  dextromethorphan (DELSYM) 30 MG/5ML liquid  docusate sodium (COLACE) 100 MG capsule  enoxaparin (LOVENOX) 80 MG/0.8ML injection  ferrous sulfate (IRON) 325 (65 Fe) MG tablet  LORazepam (ATIVAN) 0.5 MG tablet  OLANZapine (ZYPREXA PO)  ondansetron (ZOFRAN-ODT) 8 MG ODT tab  oxyCODONE (OXYCONTIN) 20 MG 12 hr tablet  oxyCODONE IR (ROXICODONE) 5 MG tablet  pantoprazole (PROTONIX) 40 MG EC tablet  prochlorperazine  (COMPAZINE) 10 MG tablet  valACYclovir (VALTREX) 500 MG tablet     Past Medical History:    Gastric adenocarcinoma  Genital herpes  Gynecomastia, male  Stage IV adenocarcinoma of stomach  Ulcer, gastric, acute  Hyperlipidemia  Tension headache  Blastocystis hominis infection  Palpitations  Elevated TSH  Microcytic anemia  Malignant ascites  PE  Severe malnutrition  Malignant pleural effusion     Past Surgical History:    EGD, combined x3  Insert port vascular access    Family History:    Prostate cancer  Asthma    Social History:  Smoking status: Former  Alcohol use: No  Marital Status:       Review of Systems   Constitutional: Positive for fatigue and fever (low grade). Negative for appetite change, chills and diaphoresis.   Respiratory: Positive for cough and shortness of breath. Negative for chest tightness.    Cardiovascular: Positive for leg swelling. Negative for chest pain.   Gastrointestinal: Negative for abdominal pain, diarrhea, nausea and vomiting.   Skin: Negative for rash.   All other systems reviewed and are negative.    Physical Exam   Patient Vitals for the past 24 hrs:   BP Temp Temp src Pulse Resp SpO2 Weight   10/07/18 2220 140/69 98.7  F (37.1  C) Oral 125 24 94 % 68 kg (150 lb)     Physical Exam  GENERAL: well developed, pleasant  HEAD: atraumatic  EYES: pupils reactive, extraocular muscles intact, conjunctivae normal  ENT:  mucus membranes moist  NECK:  trachea midline, normal range of motion  RESPIRATORY: mild tachypnea, diminished lung sounds in the bases bilaterally, no wheezes, Pleurx to the right chest, power port to the left chest  CVS: normal S1/S2, no murmurs, intact distal pulses, peripheral edema in both lower extremities, tachycardia  ABDOMEN: soft, nontender, mild distention with ascites  MUSCULOSKELETAL: no deformities  SKIN: warm and dry, no acute rashes or ulceration  NEURO: GCS 15, cranial nerves intact, alert and oriented x3  PSYCH:  Mood/affect normal    Emergency  Department Course     Imaging:  Radiographic findings were communicated with the patient and family who voiced understanding of the findings.    X-ray Chest, 2 views:  IMPRESSION: Again noted are moderate bilateral pleural effusions,  greater on the left, and bibasilar opacities, likely atelectasis. Left  pleural effusion appears slightly larger. Otherwise, no significant  change. Cardiomegaly. Left chest wall port with catheter tip in the  SVC.  Result per radiology.     Laboratory:  CBC: HGB 9.0 (L) o/w WNL (WBC 9.0, )  CMP:  (L), Glucose 111 (H), Calcium 7.4 (L), BUN 5 (L), Chloride 87 (L), Creatinine  0.59 (L), Albumin 1.9 (L), Protein total 5.7 (L) o/w WNL  Lactic Acid: 0.7  Blood gas venous: pH 7.42, PCO2 47, PO2 32, Bicarbonate 30 (H)  Procalcitonin: 0.40  Blood Culture x2: Pending  2303 - Troponin: <0.015  BNP: 124    Emergency Department Course:  Past medical records, nursing notes, and vitals reviewed.  2233: I performed an exam of the patient and obtained history, as documented above. GCS 15.    IV inserted and blood drawn.    The patient was sent for a x-ray while in the emergency department, findings above.    0023: I rechecked the patient. Explained findings to patient and spouse.    Findings and plan explained to the Patient and spouse who consents to admission.     0028: Discussed the patient with Dr. Vu, who will admit the patient to an inpatient bed for further monitoring, evaluation, and treatment.     Impression & Plan      Medical Decision Making:  Presents with increasing shortness of breath.  Patient has a complex of medical health problems, namely metastatic gastric cancer with malignant pleural effusions, malignant ascites, and recent small bilateral pulmonary embolisms.  Certainly other etiologies including pneumonia, congestive heart failure, anemia, viral illness, worsening pulmonary embolism amongst others are considered for his etiology of his shortness of  breath.    Patient's chest x-ray shows bilateral pleural effusions and his hemoglobin has dropped 2 units and his sodium is also low at 123 which is new.  Patient feels that he is in need of a paracentesis as well.  Patient does have a drain in his right chest that can be accessed and have asked to have the oncology nursing team to drain it.    His shortness of breath is multifactorial.  I believe it is a combination of the bilateral pleural effusions, ascites and possibly related to a drop in hemoglobin.  He denies any melena or GI bleeding history currently.  There is no infectious evidence supported by the workup today.  Patient is mildly tachycardic but looking at discharge summary when he was recently in the hospital he was tachycardic at discharge as well.  Patient is not toxic but does feel better with the oxygen in place.  Patient has had 1 dose of chemotherapy and was hoping to get a second treatment this week.    I spoke with Dr. Vu for admission.  Dr. Trierweiler here in the ER knows of the patient if there are issues while the patient is still here.    Diagnosis:    ICD-10-CM   1. Malignant pleural effusion J91.0   2. Malignant ascites R18.0   3. Hyponatremia E87.1   4. SOB (shortness of breath) R06.02       Disposition:  Admitted to Dr. Vu.      Jael Wendy  10/7/2018    EMERGENCY DEPARTMENT  I, Jael Nguyen, am serving as a scribe at 10:33 PM on 10/7/2018 to document services personally performed by Pepe Cavazos MD based on my observations and the provider's statements to me.        Pepe Cavazos MD  10/08/18 0100       Pepe Cavazos MD  10/08/18 0102

## 2018-10-08 NOTE — H&P
[facility]    History and Physical  Hospitalist       Date of Admission:  10/7/2018    Assessment & Plan   Ernie Song is a 38 year old male who presents with complaints of shortness of breath in the setting of stage IV gastric cancer with known metastases and malignant pleural effusion status post Pleurx catheter placement, history of pulmonary embolisms on therapeutic subcutaneous Lovenox, malignant ascites with last paracentesis 10/3/18.  No hypoxia, though persistent symptomatic shortness of breath and limited ability to tolerate Pleurx catheter pleural effusion drainage secondary to coughing and chest discomfort with access.    Shortness of breath: Multifactorial, though largely related to metastatic gastric cancer with malignant pleural effusion and malignant ascites.  Patient with a right sided Pleurx catheter, though more notable left-sided effusion.  Has been unable to tolerate Pleurx drainage given onset of coughing and chest discomfort.  Has tolerated needle thoracentesis without difficulty.  Was only able to have 300 mL drained in emergency department.  Post premedication on the floor, 550 mL drained.  -Palliative care consulted for assistance with symptom management.  Patient has not been able to make outpatient palliative care appointments given repeated hospital admissions.  -In AM, recommend discussing with IR re: recommendations for symptomatic pleurex access (coughing, chest discomfort) limiting ability to utilize for pleural effusion drainage.  Specifically, would request recommendations for possible lidocaine instillation prior to drainage.  -Reattempting Pleurx access/drain of right pleural effusion following IV Ativan administration, IV Dilaudid for cough if needed.  Patient may need to be premedicated for Pleurx drainage if symptoms do not resolve with lidocaine instillation.  Anticipate successful right thoracentesis drainage will likely improve abdominal ascites as  well as left pleural effusion, as I do not believe left effusion is loculated.  -Oximetry, ambulate to ensure no developing hypoxia.  Not hypoxic following ambulation in the emergency department.  -Can consider large volume paracentesis or left thoracentesis if needed, though would need to hold Lovenox dose for 1 of these procedures.  Last paracentesis with 1200 mL off 10/3/18.  -Discussed likely ongoing reaccumulation of malignant ascites and effusions with patient and wife at bedside.  Patient's albumin is 1.9 with poor long-term prognosis.  Continue to encourage high protein intake as able.  -Could consider TTE for evaluation of cor pulmonale in the setting of pulmonary emboli and worsening lower extremity edema.  I am uncertain, however, that this would  as patient is therapeutically anticoagulated, not currently hypoxic.  No chest pain or discomfort precipitating presentation.    ADDENDUM: held AM lovenox for possible paracentesis or L thoracentesis for symptoms given no further drainage from pleurex    Severe protein calorie malnutrition in the setting of malignancy: Hypoalbuminemia to 1.9, lower extremity edema, poor oral intake, ongoing weight loss.  Carcinomatosis will likely have some effect on patient's ability to tolerate oral intake as cancer progresses.  -If patient transitions to inpatient status, recommend nutrition consultation.  This can otherwise occur in the outpatient setting.  Discussed with patient and wife importance of maximizing high-protein caloric intake as able.    Hypochloremic hyponatremia: Suspect secondary to poor oral intake and third spacing of fluids  -Ace wraps to bilateral lower extremities to mobilize peripheral fluid  -Diet as tolerated, low threshold for conversion to sodium restricted diet  -Trend BMP  -Urine sodium, urine osmolality pending  -Attempt normalization of volume status with repeat Pleurx catheter drainage  -Holding on sodium administration given  significant third spacing of fluids in the setting of malignant effusions and ascites.    Stage IV gastric cancer, metastatic with peritoneal carcinomatosis: Currently undergoing FOLFOX chemotherapy under the guidance of [facility] and Dr. Silva.  Next due for chemotherapy Wednesday  -Continue outpatient follow-up with [facility].  If patient requires transition to inpatient hospitalization, recommend [facility] consultation to determine if patient is appropriate for next dose of chemotherapy.    Nausea: Likely multifactorial with chemotherapy as well as peritoneal carcinomatosis.  -Continue Zyprexa 2.5 mg every morning  -Continue OxyContin 20 mg every 12 hours; pending pharmacy reconciliation, this medication may need to be adjusted to 20 milligrams every 8 hours as this has been previously reported as his dose by wife.    Bilateral pulmonary emboli in the setting of malignancy: Noted on September 23 CT imaging of chest.  Currently without hypoxia, no chest pain.  -Continue subcutaneous Lovenox.  Dose has been reduced to 1 mg/kg adjusted weight twice daily.  Addendum: Morning dose Lovenox held as I anticipate thoracentesis versus paracentesis later today for symptom management given no further drainage from Pleurx    Iron deficiency anemia: Multifactorial in the setting of malignancy, history of gastric ulcerations with chronic gastritis on therapeutic anticoagulation, chemotherapy  -Monitor hemoglobin.  Note recent hemoglobin drop, though no reported bleeding    DVT Prophylaxis: Enoxaparin (Lovenox) subcutaneous (therapeutic)    Code Status: Full Code. Time limited trial of intubation for reverible causes. Confirmed with patient and wife at bedside on admission.    Disposition: Expected discharge potentially 10/8/18.  Patient is not hypoxic, and observation admission is for symptomatic treatment of shortness of breath, attempt to improve patient's ability to tolerate Pleurx catheter  drainage    [facility]    Primary Care Physician   Gay Winslow    Chief Complaint   Shortness of breath, lower extremity swelling    History is obtained from the patient, chart review, discussion with Dr Cavazos in the emergency department, discussion with patient's wife at bedside.     History of Present Illness   Ernie Song is a 38 year old male who presents with shortness of breath in the setting of stage IV gastric cancer with known metastases and malignant pleural effusion status post Pleurx catheter placement, history of pulmonary embolisms on therapeutic subcutaneous Lovenox, malignant ascites with last paracentesis 10/3/18.  No hypoxia, though persistent symptomatic shortness of breath and limited ability to tolerate Pleurx catheter pleural effusion drainage secondary to coughing and chest discomfort with access.    Along with his shortness of breath, patient has increased lower extremity swelling.  Has a known history of pulmonary emboli and is on therapeutic anticoagulation with subcutaneous Lovenox.  Worsening right greater than left lower extremity edema.  Lower extremity swelling has been more pronounced over the past 2-3 days per wife.    It was reported that only 300 mL of ascitic fluid was able to be obtained with 10/3/18 paracentesis.  By review of chart, however, it appears that patient had 1200 mL removed, which is consistent with his prior 1-2 L paracenteses.    Patient feels that he would benefit from left-sided thoracentesis or repeat paracentesis at this time as he does not believe his shortness of breath is related to right pleural effusion.  On chest x-ray, patient does have more significant effusion on the left side, though will likely have some benefit with increased drainage of right pleural effusion.  Only able to obtain 330 mL from Pleurx catheter in the emergency department as patient developed cough and chest discomfort.  Patient has had difficulty with Pleurx  drainage at last admission as well with similar symptoms per my discussion with nursing staff on the eighth floor as well as patient and wife in the emergency department.  Patient states that he tolerated needle thoracentesis without difficulty in the past, however.     Patient has a home nurse which comes 3 times weekly and is able to do Pleurx drainage, though has not attempted home drainage as of yet.    Multiple recent admissions for similar symptoms including 9/14 admission with thoracentesis and paracentesis, repeat admission 9/23-9/30 where patient was found to have bilateral pulmonary emboli.  Treated for possible SBP given neutrophilic ascites, negative cultures, thought related to malignancy.  Also initiated on chemotherapy during this admission.  Next due for FOLFOX chemotherapy 10/10/18.    Addendum: On arrival to the floor, additional 250 mL of fluid from right Pleurx catheter.  Unable to drain further fluid.  Patient still symptomatic with coughing with lung reexpansion despite Ativan and Dilaudid.  As patient still symptomatic with shortness of breath, a.m. Lovenox dose held with anticipated paracentesis versus left thoracentesis for symptom management.    Discussed CODE STATUS with patient and wife, and he confirms full CODE STATUS with limited trial of intubation/mechanical ventilation for reversible causes.    Unfortunately, patient's cancer is quite advanced and is functional status is diminishing.  Poor intermediate long-term prognosis.  Guarded short-term prognosis.    Past Medical History    I have reviewed this patient's medical history and updated it with pertinent information if needed.   Past Medical History:   Diagnosis Date     Gastric adenocarcinoma (H) 9/10/2018     Genital herpes      Gynecomastia, male 2016    Chest CT benign     Stage IV adenocarcinoma of stomach (H)      Ulcer, gastric, acute      Past Surgical History   I have reviewed this patient's surgical history and updated it  with pertinent information if needed.  Past Surgical History:   Procedure Laterality Date     ESOPHAGOSCOPY, GASTROSCOPY, DUODENOSCOPY (EGD), COMBINED N/A 2/26/2018    Procedure: COMBINED ESOPHAGOSCOPY, GASTROSCOPY, DUODENOSCOPY (EGD), BIOPSY SINGLE OR MULTIPLE;  gastroscopy;  Surgeon: Lalo Ibrahim MD;  Location:  GI     ESOPHAGOSCOPY, GASTROSCOPY, DUODENOSCOPY (EGD), COMBINED N/A 9/5/2018    Procedure: COMBINED ENDOSCOPIC ULTRASOUND, ESOPHAGOSCOPY, GASTROSCOPY, DUODENOSCOPY (EGD), FINE NEEDLE ASPIRATE/BIOPSY;  COMBINED ENDOSCOPIC ULTRASOUND, ESOPHAGOSCOPY, GASTROSCOPY, DUODENOSCOPY (EGD), FINE NEEDLE ASPIRATE;  Surgeon: Ever Owusu MD;  Location: [facility]     ESOPHAGOSCOPY, GASTROSCOPY, DUODENOSCOPY (EGD), COMBINED N/A 9/5/2018    Procedure: COMBINED ESOPHAGOSCOPY, GASTROSCOPY, DUODENOSCOPY (EGD), BIOPSY SINGLE OR MULTIPLE;;  Surgeon: Ever Owusu MD;  Location: [facility]     INSERT PORT VASCULAR ACCESS N/A 9/20/2018    Procedure: INSERT PORT VASCULAR ACCESS;  PORT PLACEMENT ;  Surgeon: Jamel Salazar MD;  Location: [facility]     NO HISTORY OF SURGERY         Prior to Admission Medications   Prior to Admission Medications   Prescriptions Last Dose Informant Patient Reported? Taking?   LORazepam (ATIVAN) 0.5 MG tablet  Spouse/Significant Other No No   Sig: Take 1-2 tablets (0.5-1 mg) by mouth every 6 hours as needed (Breakthrough Nausea / Vomiting)   OLANZapine (ZYPREXA PO)  Spouse/Significant Other Yes No   Sig: Take 2.5 mg by mouth every morning   acetaminophen (TYLENOL) 325 MG tablet  Spouse/Significant Other No No   Sig: Take 2 tablets (650 mg) by mouth every 4 hours as needed for mild pain   dextromethorphan (DELSYM) 30 MG/5ML liquid  Spouse/Significant Other Yes No   Sig: Take 60 mg by mouth every 4 hours as needed for cough   docusate sodium (COLACE) 100 MG capsule  Spouse/Significant Other No No   Sig: Take 1 capsule (100 mg) by mouth daily   enoxaparin (LOVENOX) 80  MG/0.8ML injection  Spouse/Significant Other No No   Sig: Inject 0.8 mLs (80 mg) Subcutaneous every 12 hours for 14 days   ferrous sulfate (IRON) 325 (65 Fe) MG tablet  Spouse/Significant Other Yes No   Sig: Take 1 tablet (325 mg) by mouth every other day   ondansetron (ZOFRAN-ODT) 8 MG ODT tab  Spouse/Significant Other No No   Sig: Take 1 tablet (8 mg) by mouth every 8 hours as needed for nausea   oxyCODONE (OXYCONTIN) 20 MG 12 hr tablet  Spouse/Significant Other No No   Sig: Take 1 tablet (20 mg) by mouth every 12 hours   Patient taking differently: Take 20 mg by mouth every 8 hours    oxyCODONE IR (ROXICODONE) 5 MG tablet  Spouse/Significant Other No No   Sig: Take 1-2 tablets (5-10 mg) by mouth every 4 hours as needed for moderate to severe pain   pantoprazole (PROTONIX) 40 MG EC tablet  Spouse/Significant Other No No   Sig: Take 1 tablet (40 mg) by mouth 2 times daily Take 30-60 minutes before a meal.   prochlorperazine (COMPAZINE) 10 MG tablet  Spouse/Significant Other No No   Sig: Take 1 tablet (10 mg) by mouth every 6 hours as needed for nausea or vomiting   valACYclovir (VALTREX) 500 MG tablet   No No   Sig: TAKE ONE TABLET BY MOUTH ONE TIME DAILY       Facility-Administered Medications: None     Allergies   No Known Allergies    Social History   I have reviewed this patient's social history and updated it with pertinent information if needed. Ernie Song  reports that he quit smoking about 13 years ago. His smoking use included Cigarettes. He has a 12.00 pack-year smoking history. He has never used smokeless tobacco. He reports that he does not drink alcohol or use illicit drugs.     Family History   I have reviewed this patient's family history and updated it with pertinent information if needed.   Family History   Problem Relation Age of Onset     Prostate Cancer Father      Other Cancer Maternal Grandmother      Other Cancer Other      Asthma Mother      Stomach Cancer Maternal Uncle         Review of Systems   The 10 point Review of Systems is negative other than noted in the HPI or here.  Low-grade temperatures without fevers  Significant dyspnea and fatigue on exertion  Orthopnea    Physical Exam   Temp: 98.7  F (37.1  C) Temp src: Oral BP: 126/87 Pulse: 111 Heart Rate: 123 Resp: 20 SpO2: 97 % O2 Device: None (Room air)    Vital Signs with Ranges  Temp:  [98.7  F (37.1  C)] 98.7  F (37.1  C)  Pulse:  [111-125] 111  Heart Rate:  [121-124] 123  Resp:  [16-24] 20  BP: (118-140)/() 126/87  SpO2:  [93 %-99 %] 97 %  150 lbs 0 oz    Constitutional: no acute distress, alert, though sleepy with pain medication administration.  Eyes: no scleral icterus or injection  HEENT: moist mucous membranes  Respiratory: breath sounds clear bilaterally to auscultation, no wheezes, no crackles, though significant pleural effusions, left greater than right.  Right Pleurx catheter dressed currently.  Cardiovascular: regular rate and rhythm, no murmur  GI: abdomen distended/protuberant with ascites, though not tense.  Lymph/Hematologic: Bilateral pitting lower extremity edema  Genitourinary: not examined  Skin: no rashes.  Left chest wall port site intact.  Musculoskeletal: muscular tone intact in all extremities  Neurologic: mental status grossly intact, no focal deficits, alert, nearly falls asleep several times during conversation following pain medication administration.  Wife states that this is typical at home as well as he has difficulty sleeping in the setting of orthopnea.  Psychiatric: normal affect    Data   Data reviewed today:  I personally reviewed the chest x-ray image(s) showing Bilateral pleural effusions, left greater than right.  Right Pleurx catheter in place.    Recent Labs  Lab 10/07/18  2303   WBC 9.0   HGB 9.0*   MCV 75*      *   POTASSIUM 3.9   CHLORIDE 87*   CO2 29   BUN 5*   CR 0.59*   ANIONGAP 7   AROLDO 7.4*   *   ALBUMIN 1.9*   PROTTOTAL 5.7*   BILITOTAL 1.1   ALKPHOS  88   ALT 37   AST 32   TROPI <0.015       Recent Results (from the past 24 hour(s))   XR Chest 2 Views    Narrative    XR CHEST 2 VIEWS   10/7/2018 11:20 PM     INDICATION: Shortness of breath.    COMPARISON: 9/28/2018.      Impression    IMPRESSION: Again noted are moderate bilateral pleural effusions,  greater on the left, and bibasilar opacities, likely atelectasis. Left  pleural effusion appears slightly larger. Otherwise, no significant  change. Cardiomegaly. Left chest wall port with catheter tip in the  SVC.    ESTHELA BAPTISTE MD

## 2018-10-08 NOTE — PHARMACY-ADMISSION MEDICATION HISTORY
Admission medication history interview status for the 10/7/2018  admission is complete. See EPIC admission navigator for prior to admission medications     Medication history source reliability:Moderate    Actions taken by pharmacist (provider contacted, etc):  Spoke w/ patient's wife who has a notebook that includes the patient's medication list and last administration times.      Additional medication history information not noted on PTA med list :   - Per patient's wife, they are currently waiting to hear back from patient's MD as to whether or not patient needs to continue to take Ferrous Sulfate.  He has stopped taking while waiting to hear back.    - Per patient's wife, patient essentially is taking Oxycodone 10 mg PO every 4 hours.      Medication reconciliation/reorder completed by provider prior to medication history? Yes    Time spent in this activity: 20 minutes    Prior to Admission medications    Medication Sig Last Dose Taking? Auth Provider   enoxaparin (LOVENOX) 80 MG/0.8ML injection Inject 0.8 mLs (80 mg) Subcutaneous every 12 hours for 14 days 10/7/2018 at 2000 Yes Nicholas Santos MD   guaiFENesin (ROBITUSSIN) 20 mg/mL SOLN solution Take 20 mLs by mouth every 4 hours as needed for cough  at prn Yes Unknown, Entered By History   LORazepam (ATIVAN) 0.5 MG tablet Take 1-2 tablets (0.5-1 mg) by mouth every 6 hours as needed (Breakthrough Nausea / Vomiting) 10/8/2018 at 0130 Yes Nicholas Santos MD   OLANZapine (ZYPREXA PO) Take 2.5 mg by mouth every morning 10/7/2018 at am Yes Reported, Patient   oxyCODONE (OXYCONTIN) 20 MG 12 hr tablet Take 20 mg by mouth 3 times daily 10/7/2018 at 2000 Yes Unknown, Entered By History   oxyCODONE IR (ROXICODONE) 5 MG tablet Take 10 mg by mouth every 4 hours as needed for pain 10/7/2018 at 2200 Yes Unknown, Entered By History   pantoprazole (PROTONIX) 40 MG EC tablet Take 1 tablet (40 mg) by mouth 2 times daily Take 30-60 minutes before a meal.  10/7/2018 at 1815 Yes Gay Winslow APRN CNP   prochlorperazine (COMPAZINE) 10 MG tablet Take 1 tablet (10 mg) by mouth every 6 hours as needed for nausea or vomiting 10/7/2018 at 1850 Yes Nicholas Santos MD   valACYclovir (VALTREX) 500 MG tablet TAKE ONE TABLET BY MOUTH ONE TIME DAILY  10/7/2018 at am Yes Gay Winslow APRN CNP Carolyn Dahlman, DinoraD, BCPS

## 2018-10-08 NOTE — PROGRESS NOTES
"Thoracentesis: Pt tolerated fairly well.  VSS. 1300 cc serous sanguinous fluid removed difficulty. Pt reported discomfort as fluid draining. Pt stated \"it feels like something is poking my lung\". Bandaid applied to site - CDI. CXR post procedure - No pneumothorax per Dr Osullivan.    1325 Pt back to rm 823-1 per cart & transport. Detailed report called to Ellie Preston RN.      "

## 2018-10-08 NOTE — DISCHARGE SUMMARY
[facility]    Discharge Summary  Hospitalist    Date of Admission:  10/7/2018  Date of Discharge:  10/8/2018  7:09 PM  Discharging Provider: Nicholas Elias MD    Discharge Diagnoses   Principal Problem:    Pleural effusion, malignant  Active Problems:    Malignant ascites (Cytology 9/5/18)    Gastric adenocarcinoma --Stage IV, on chemo    Pulmonary embolism -- on Lovenox    Severe malnutrition (H)      History of Present Illness   Ernie Song is an 38 year old male who presented with increased SOB, and has known metastatic gastric cancer with malignant ascites and bilateral pleural effusions.     Hospital Course   Had ultrasound paracentesis attempted, but minimal fluid available, then had left US guided thoracentesis with 1300 ml removed and breathing much improved.  He has follow up with Oncology in 2 days, home care reordered, and will continue drainage of right pleurx catheter as needed, and resume Lovenox 80 mg subcutaneous for hx of PE related to hypercoagulable state from gastric cancer.    Nicholas Elias MD  Pager: [phone]  Cell Phone:  [phone]       Significant Results and Procedures   As above    Pending Results   These results will be followed up by Dr. Elias  Unresulted Labs Ordered in the Past 30 Days of this Admission     Date and Time Order Name Status Description    10/7/2018 2237 Blood culture Preliminary     10/7/2018 2237 Blood culture Preliminary     9/14/2018 1344 Cytology non gyn In process     9/5/2018 1433 Surgical pathology exam In process     9/5/2018 1418 Fine needle aspiration In process           Code Status   Full Code       Primary Care Physician   Gay Winslow    Physical Exam   Temp: 99.7  F (37.6  C) Temp src: Oral BP: 119/72 Pulse: 126 Heart Rate: 118 Resp: 20 SpO2: 94 % O2 Device: None (Room air) Oxygen Delivery: 2 LPM  Vitals:    10/07/18 2220   Weight: 68 kg (150 lb)     Vital Signs with Ranges  Temp:  [97.4  F  (36.3  C)-99.7  F (37.6  C)] 99.7  F (37.6  C)  Pulse:  [111-128] 126  Heart Rate:  [114-130] 118  Resp:  [16-25] 20  BP: (114-146)/() 119/72  SpO2:  [92 %-99 %] 94 %  I/O last 3 completed shifts:  In: -   Out: 550 [Drains:550]    Exam on discharge:   Abdomin soft, mild epigastric tenderness.     # Discharge Pain Plan:   - During his hospitalization, Ernie experienced pain due to gastric cancer.  The pain plan for discharge was discussed with Ernie and the plan was created in a collaborative fashion.    - see orders      Discharge Disposition   Discharged to home  Condition at discharge: Fair    Consultations This Hospital Stay   SOCIAL WORK IP CONSULT  PALLIATIVE CARE ADULT IP CONSULT    Time Spent on this Encounter   I spent a total of 20 minutes discharging this patient.     Discharge Orders     Reason for your hospital stay   SOB related to fluid around lung.     Follow-up and recommended labs and tests    Follow up with Dr. Lubin with [facility] on Wed 10/10/18 as scheduled.     Activity   Your activity upon discharge: activity as tolerated     Discharge Instructions   Call Dr. Santos at Pager [phone] if questions, or Cell Phone [phone].     Full Code     Diet   Follow this diet upon discharge: Orders Placed This Encounter     Combination Diet 2 gm NA Diet       Discharge Medications   Current Discharge Medication List      CONTINUE these medications which have CHANGED    Details   oxyCODONE IR (ROXICODONE) 5 MG tablet Take 2 tablets (10 mg) by mouth every 4 hours as needed for pain  Qty: 40 tablet, Refills: 0    Associated Diagnoses: Gastric adenocarcinoma (H)         CONTINUE these medications which have NOT CHANGED    Details   enoxaparin (LOVENOX) 80 MG/0.8ML injection Inject 0.8 mLs (80 mg) Subcutaneous every 12 hours for 14 days  Qty: 28 Syringe, Refills: 0    Associated Diagnoses: Pulmonary embolism, bilateral (H)      guaiFENesin (ROBITUSSIN) 20 mg/mL SOLN solution Take 20 mLs by  mouth every 4 hours as needed for cough      LORazepam (ATIVAN) 0.5 MG tablet Take 1-2 tablets (0.5-1 mg) by mouth every 6 hours as needed (Breakthrough Nausea / Vomiting)  Qty: 60 tablet, Refills: 0    Associated Diagnoses: Gastric adenocarcinoma (H)      OLANZapine (ZYPREXA PO) Take 2.5 mg by mouth every morning      oxyCODONE (OXYCONTIN) 20 MG 12 hr tablet Take 20 mg by mouth 3 times daily      pantoprazole (PROTONIX) 40 MG EC tablet Take 1 tablet (40 mg) by mouth 2 times daily Take 30-60 minutes before a meal.  Qty: 180 tablet, Refills: 1    Associated Diagnoses: Gastric ulcer, unspecified chronicity, unspecified whether gastric ulcer hemorrhage or perforation present      prochlorperazine (COMPAZINE) 10 MG tablet Take 1 tablet (10 mg) by mouth every 6 hours as needed for nausea or vomiting  Qty: 60 tablet, Refills: 3    Associated Diagnoses: Nausea      valACYclovir (VALTREX) 500 MG tablet TAKE ONE TABLET BY MOUTH ONE TIME DAILY   Qty: 90 tablet, Refills: 3    Associated Diagnoses: Genital herpes simplex, unspecified site         STOP taking these medications       docusate sodium (COLACE) 100 MG capsule Comments:   Reason for Stopping:             Allergies   No Known Allergies  Data   Most Recent 3 CBC's:  Recent Labs   Lab Test  10/11/18   0748  10/07/18   2303  09/29/18   0600   WBC  5.9  9.0  7.6   HGB  9.5*  9.0*  11.5*   MCV  74*  75*  74*   PLT  472*  322  350      Most Recent 3 BMP's:  Recent Labs   Lab Test  10/11/18   0748  10/08/18   0628  10/07/18   2303   NA  129*  124*  123*   POTASSIUM  4.5  4.2  3.9   CHLORIDE  91*  88*  87*   CO2  32  29  29   BUN  7  4*  5*   CR  0.54*  0.52*  0.59*   ANIONGAP  6  7  7   ARLODO  8.4*  7.7*  7.4*   GLC  135*  109*  111*     Most Recent 2 LFT's:  Recent Labs   Lab Test  10/11/18   0748  10/07/18   2303   AST  28  32   ALT  30  37   ALKPHOS  110  88   BILITOTAL  0.4  1.1     Most Recent INR's and Anticoagulation Dosing History:  Anticoagulation Dose History      Recent Dosing and Labs Latest Ref Rng & Units 2/26/2018 9/14/2018 9/24/2018 10/11/2018    INR 0.86 - 1.14 1.04 1.14 1.28(H) 1.13        Most Recent 3 Troponin's:  Recent Labs   Lab Test  10/07/18   2303  09/23/18   0721   TROPI  <0.015  <0.015     Most Recent Cholesterol Panel:  Recent Labs   Lab Test  10/10/17   0750   CHOL  264*   LDL  169*   HDL  53   TRIG  210*     Most Recent 6 Bacteria Isolates From Any Culture (See EPIC Reports for Culture Details):  Recent Labs   Lab Test  10/07/18   2343  10/07/18   2303  09/26/18   0518  09/23/18   0932  09/23/18   0915  09/23/18   0721   CULT  No growth after 3 days  No growth after 3 days  No growth  No growth  No growth  No growth     Most Recent TSH, T4 and A1c Labs:  Recent Labs   Lab Test  10/10/17   0750   07/01/16   0816   TSH  3.49   < >  3.57   T4  0.90   < >   --    A1C   --    --   5.6    < > = values in this interval not displayed.

## 2018-10-08 NOTE — CONSULTS
"[facility]    Palliative Care Consultation     Ernie Song  MRN# [id number]  Date of Admission:  10/7/2018  Date of Service (when I saw the patient): 10/08/18  Reason for consult: Consulted by Dr. Vu for Symptom management    Assessment & Plan   Ernie Song is a 38 year old male with PMH significant for metastatic gastric cancer, who presents with dyspnea and cough, inability to tolerate pleural effusion drainage due to worsening of symptoms with lung re-expansion.  Has right sided Pleurx in place. Cancer is known to be metastatic to peritoneum.  He has had multiple admissions in the past month and was started on FOLFOX during admission two weeks ago.     Symptoms/Recommendations   Cough/Dyspnea: Resolved with large volume thoracentesis.    -Recommend considering left sided Pleurx catheter placement with next thoracentesis if symptoms return within the next few days.      Pain: Adequately controlled currently.     Anxiety: Discussed different management strategies.    -Start mirtazapine 7.5mg at bedtime (ordered for you).  This can help insomnia, anxiety, appetite, and nausea.     Support/Coping  Worship alida is central to Ketan's life, well supported by his wife's [employer] community.  Cielo notes that she and Ketan have had marital strain before his recent diagnosis.   -Will involve Palliative LICSW, Halie Mariano, and/or Palliative , Laura Sneed    Decisional Support, Goals of Care, Counseling & Coordination  Decisional Capacity Intact?  -Yes  Health Care Directive on File?  -No  Code Status/Resuscitation Preferences?  -full  Plan of Care?  Ketan feels that he has \"a lot to do still,\" including becoming a better father, , and son.  He believes that God will keep him alive until he achieves these things if he \"fights\" for more time.     Discussion  Introduced the scope of our practice to Cielo and Ketan. Discussed our potential roles for symptom management, " support/coping, and decisional support (aka goals of care).     See above.  Ketan had been attempting to see Palliative Care outpatient previously but has been admitted several times, preventing him from being seen.  His presenting symptoms have resolved with the thoracentesis and he may need smaller volume drainage more frequently to maintain adequate symptom control.  We discussed considering a left sided Pleurx catheter placement if symptoms quickly return.     Thank you for involving us in the care of this patient and family. We will continue to follow. Please do not hesitate to contact me with questions or concerns or the on-call provider for our team if evening or weekend.    Jael Arenas MD   Palliative Medicine   Pager [phone]    Attestation:  Total time on the floor involved in the patient's care: 72 minutes  Total time spent in counseling/care coordination: >50%    Chief Complaint   Cough and dyspnea    History is obtained from the patient, staff, and extensive chart review.     Ketan was diagnosed with gastric cancer around Labor Day and had a history of GI complaints since this past February.  He has been found to have stage IV gastric cancer, and has been given one dose of chemotherapy for this.  Following with Dr. Silva for management of his gastric cancer.     Presented with worsening dyspnea and cough.     Past Medical History    I have reviewed this patient's medical history and updated it with pertinent information if needed.   Past Medical History:   Diagnosis Date     Gastric adenocarcinoma (H) 9/10/2018     Genital herpes      Gynecomastia, male 2016    Chest CT benign     Stage IV adenocarcinoma of stomach (H)      Ulcer, gastric, acute        Past Surgical History   I have reviewed this patient's surgical history and updated it with pertinent information if needed.  Past Surgical History:   Procedure Laterality Date     ESOPHAGOSCOPY, GASTROSCOPY, DUODENOSCOPY (EGD), COMBINED N/A  "2/26/2018    Procedure: COMBINED ESOPHAGOSCOPY, GASTROSCOPY, DUODENOSCOPY (EGD), BIOPSY SINGLE OR MULTIPLE;  gastroscopy;  Surgeon: Lalo Ibrahim MD;  Location: [facility]     ESOPHAGOSCOPY, GASTROSCOPY, DUODENOSCOPY (EGD), COMBINED N/A 9/5/2018    Procedure: COMBINED ENDOSCOPIC ULTRASOUND, ESOPHAGOSCOPY, GASTROSCOPY, DUODENOSCOPY (EGD), FINE NEEDLE ASPIRATE/BIOPSY;  COMBINED ENDOSCOPIC ULTRASOUND, ESOPHAGOSCOPY, GASTROSCOPY, DUODENOSCOPY (EGD), FINE NEEDLE ASPIRATE;  Surgeon: Ever Owusu MD;  Location:  GI     ESOPHAGOSCOPY, GASTROSCOPY, DUODENOSCOPY (EGD), COMBINED N/A 9/5/2018    Procedure: COMBINED ESOPHAGOSCOPY, GASTROSCOPY, DUODENOSCOPY (EGD), BIOPSY SINGLE OR MULTIPLE;;  Surgeon: Ever Owusu MD;  Location: [facility]     INSERT PORT VASCULAR ACCESS N/A 9/20/2018    Procedure: INSERT PORT VASCULAR ACCESS;  PORT PLACEMENT ;  Surgeon: Jamel Salazar MD;  Location: [facility]     NO HISTORY OF SURGERY         Social History   Living situation: Lives with wife and two sons (6 and 8).     Family system: Many of his family are in [place].  In-laws live in [place].     Self-identified support system: Family, Judaism, alida.     Employment/education: Has worked in [employer] and enjoys this.     Activities/interests: Soccer.     Amish affiliation: Raised Worship, now identifies as Spiritism.     Involvement in alida community: Close to a Judaism community.     Impact of illness on patient: \"life stopped\" the day he received his cancer diagnosis.     Family History   I have reviewed this patient's family history and updated it with pertinent information if needed.   Family History   Problem Relation Age of Onset     Prostate Cancer Father      Other Cancer Maternal Grandmother      Other Cancer Other      Asthma Mother      Stomach Cancer Maternal Uncle        Allergies   No Known Allergies    Medications   Olanzapine 2.5mg qAM  Oxycontin 20mg Q8h  Lorazepam 0.5-1mg q4h PRN x " 1  Oxycodone 5-10mg q4h PRN x 1      Review of Systems   The comprehensive review of systems is negative other than noted here and in the assessment/plan.    Palliative Symptom Review (0=no symptom/no concern, 1=mild, 2=moderate, 3=severe):  Pain: left sided pleuritic chest pain upon admission, now minimal   Fatigue: 1  Nausea: 1  Constipation: 0  Diarrhea: 1  Depressive Symptoms: 0  Anxiety: 2, limits sleep  Drowsiness: 0  Poor Appetite: 0  Shortness of Breath: 3, now 0  Insomnia: 1  Other: cough, now resolved    Physical Exam   Temp: 97.4  F (36.3  C) Temp src: Oral BP: 141/71 Pulse: 126 Heart Rate: 121 Resp: 22 SpO2: 93 % O2 Device: None (Room air)    Vitals:    10/07/18 2220   Weight: 68 kg (150 lb)     CONSTITUTIONAL: NAD, A&Ox3. Calm and cooperative.  HEENT: NCAT, pupils equal, sclera clear, MMM  NECK: Supple  CARDIOVASCULAR: Tachycardic, regula.   RESPIRATORY: NL respiratory effort on room air, unlabored, no wheezes or crackles.    GASTROINTESTINAL: Soft, distended, non-tender to palpation.   MUSCULOSKELETAL: Edema present to bilateral ankles. Equal strength in all major muscle groups. Moving freely in chair.   SKIN: Warm and intact. No concerning lesions or rashes on exposed skin surfaces   NEUROLOGIC: Appropriately responsive during interview, drifts off to sleep occasionally.   PSYCH: Affect is full.  Drowsy at times, but appropriately interactive.     Data   Cr 0.52    Troponin neg  7.42/47    CXR Bilateral pleural effusions, L>R

## 2018-10-08 NOTE — PLAN OF CARE
Problem: Patient Care Overview  Goal: Plan of Care/Patient Progress Review  A&Ox4. HR tachy, tmax 99.4. LS diminished with expiratory wheezes and accessory muscle use, LANDERS. O2 sats stable on RA. Drained 250cc from PleurX drain at 0600, clamp wide open- no more fluid to drain, drainage kitty with pink tinge. Premedicated with ativan, dilaudid given during procedure for coughing per MD recommendation. While pluerX draining, pt had persistent coughing and c/o abdominal pain. Abdomen distended, BS active, reports BM yesterday. Port HL. Up with SBA, wife at bedside. Will continue to monitor.

## 2018-10-08 NOTE — PLAN OF CARE
Problem: Patient Care Overview  Goal: Plan of Care/Patient Progress Review  Outcome: No Change  Patient slept most of shift.  Oriented x 4, able to make needs known.  C/o 6/10 generalized pain, reported some relief with PO oxycodone.  Went down to have paracentesis however US showed little ascites.  Patient had a left thoracentesis instead and removed 1300cc.  Patient did not tolerate well and arrived to floor with severe left chest pain from coughing. 1mg IV dilaudid given with good relief.  LS are diminished with course ex wheezes in upper lung fields.  Productive cough.  Pain is exacerbated by coughing.  Labored breathing at rest, with eccessory muscle use. 96% on RA.  Patient became dyspneic with activity and now wearing 2L O2 for comfort.  Palliative care to still see patient.  Wife at bedside, supportive of care.

## 2018-10-08 NOTE — PROVIDER NOTIFICATION
MD Notification    Notified Person: MD    Notified Person Name: Dr. Santos    Notification Date/Time:  1340 October 8, 2018     Notification Interaction: over the phone    Purpose of Notification: patient having severe pain in left chest from coughing since the left thoracentesis.     Orders Received: Hydromorphone 1mg IV every 1 hour prn.     Comments:

## 2018-10-10 PROBLEM — C16.9 GASTRIC CANCER (H): Status: ACTIVE | Noted: 2018-01-01

## 2018-10-10 NOTE — IP AVS SNAPSHOT
MRN:[id number]                      After Visit Summary   10/10/2018    Ernie Song    MRN: [id number]           Thank you!     Thank you for choosing [facility] for your care. Our goal is always to provide you with excellent care. Hearing back from our patients is one way we can continue to improve our services. Please take a few minutes to complete the written survey that you may receive in the mail after you visit with us. Thank you!        Patient Information     Date Of Birth          1980        Designated Caregiver       Most Recent Value    Caregiver    Will someone help with your care after discharge? yes    Name of designated caregiver Cielo    Phone number of caregiver [phone]    Caregiver address [place]      About your hospital stay     You were admitted on:  October 10, 2018 You last received care in the:  [place] [address] [facility]    You were discharged on:  October 12, 2018        Reason for your hospital stay       Abdominal fluid from cancer causing difficulty breathing.                  Who to Call     For medical emergencies, please call 911.  For non-urgent questions about your medical care, please call your primary care provider or clinic, [phone]          Attending Provider     Provider Specialty    Judson Silva MD Oncology       Primary Care Provider Office Phone # Fax #    Gay MARILIN Garcia [facility] [phone]      After Care Instructions     Activity       Your activity upon discharge: activity as tolerated            Diet       Follow this diet upon discharge: Orders Placed This Encounter      Snacks/Supplements Adult: Other; Boost at 10-2 (left in the bottle)   (RD); Between Meals      Regular Diet Adult            Discharge Instructions                 Follow-up Appointments     Follow-up and recommended labs and tests        Follow up with Amee as scheduled next week with [facility], and appointment for  paracentesis with radiology next Tues, 10/16/18, at [facility] radiology department.  Do not use the Lovenox on Monday night (to avoid bleeding with the procedure on Tuesday).    Appointment's with [facility]    10/17 Port draw 2:00 p.m. Followed by Follow up appointment with NP Gm Duvall 2:30 p.m. And Survivorship class at 3:00 p.m.    You should receive a call from [facility] with confirmation of Paracentesis appointment for 10/16.  Please call them at [phone]                  Additional Services     Home care nursing referral       RN extended hours visit. RN to provide tube site care and management.    Your provider has ordered home care nursing services. If you have not been contacted within 2 days of your discharge please call the inpatient department phone number at [phone] .                  Pending Results     No orders found from 10/8/2018 to 10/11/2018.            Statement of Approval     Ordered          10/12/18 1233  I have reviewed and agree with all the recommendations and orders detailed in this document.  EFFECTIVE NOW     Approved and electronically signed by:  Nicholas Santos MD             Admission Information     Date & Time Provider Department Dept. Phone    10/10/2018 Judson Silva MD [place] [address] [facility] [phone]      Your Vitals Were     Blood Pressure Pulse Temperature Respirations Weight Pulse Oximetry    124/69 (BP Location: Right arm) 107 98.5  F (36.9  C) (Oral) 15 68.4 kg (150 lb 12.8 oz) 97%    BMI (Body Mass Index)                   25.87 kg/m2           MyChart Information     [facility] gives you secure access to your electronic health record. If you see a primary care provider, you can also send messages to your care team and make appointments. If you have questions, please call your primary care clinic.  If you do not have a primary care provider, please call [phone] and they will assist you.        Care EveryWhere ID     This  is your Care EveryWhere ID. This could be used by other organizations to access your [facility] medical records  [id number]        Equal Access to Services     NADINE ESCALANTE : Hadii aad ku hadstepanmarissa Flor, carinahannah cabezasmachoha, baljeet kadereck oquendo, neymar aleain hayaatolu johnsonchristina shah laavanitolu eddie. So [facility] [phone].    ATENCIÓN: Si habla español, tiene a valente disposición servicios gratuitos de asistencia lingüística. Llame al [phone].    We comply with applicable federal civil rights laws and [place] laws. We do not discriminate on the basis of race, color, national origin, age, disability, sex, sexual orientation, or gender identity.               Review of your medicines      START taking        Dose / Directions    HYDROmorphone 4 MG tablet   Commonly known as:  DILAUDID   Used for:  Gastric adenocarcinoma (H)        Dose:  4 mg   Take 1 tablet (4 mg) by mouth every 3 hours as needed for moderate to severe pain   Quantity:  40 tablet   Refills:  0       mirtazapine 7.5 MG Tabs tablet   Commonly known as:  REMERON   Used for:  Reactive depression        Dose:  7.5 mg   Take 1 tablet (7.5 mg) by mouth At Bedtime   Quantity:  30 tablet   Refills:  1         CONTINUE these medicines which may have CHANGED, or have new prescriptions. If we are uncertain of the size of tablets/capsules you have at home, strength may be listed as something that might have changed.        Dose / Directions    enoxaparin 60 MG/0.6ML injection   Commonly known as:  LOVENOX   This may have changed:    - medication strength  - how much to take        Dose:  60 mg   Inject 0.6 mLs (60 mg) Subcutaneous every 12 hours for 14 days   Quantity:  28 Syringe   Refills:  1       oxyCODONE 40 MG 12 hr tablet   Commonly known as:  OxyCONTIN   This may have changed:    - medication strength  - how much to take  - when to take this  - Another medication with the same name was removed. Continue taking this medication, and follow the directions you see  here.   Used for:  Gastric adenocarcinoma (H)        Dose:  40 mg   Take 1 tablet (40 mg) by mouth every 8 hours   Quantity:  50 tablet   Refills:  0         CONTINUE these medicines which have NOT CHANGED        Dose / Directions    guaiFENesin 20 mg/mL Soln solution   Commonly known as:  ROBITUSSIN        Dose:  20 mL   Take 20 mLs by mouth every 4 hours as needed for cough   Refills:  0       LORazepam 0.5 MG tablet   Commonly known as:  ATIVAN   Used for:  Gastric adenocarcinoma (H)        Dose:  0.5-1 mg   Take 1-2 tablets (0.5-1 mg) by mouth every 6 hours as needed (Breakthrough Nausea / Vomiting)   Quantity:  60 tablet   Refills:  0       pantoprazole 40 MG EC tablet   Commonly known as:  PROTONIX   Used for:  Gastric ulcer, unspecified chronicity, unspecified whether gastric ulcer hemorrhage or perforation present        Dose:  40 mg   Take 1 tablet (40 mg) by mouth 2 times daily Take 30-60 minutes before a meal.   Quantity:  180 tablet   Refills:  1       prochlorperazine 10 MG tablet   Commonly known as:  COMPAZINE   Used for:  Nausea        Dose:  10 mg   Take 1 tablet (10 mg) by mouth every 6 hours as needed for nausea or vomiting   Quantity:  60 tablet   Refills:  3       valACYclovir 500 MG tablet   Commonly known as:  VALTREX   Used for:  Genital herpes simplex, unspecified site        TAKE ONE TABLET BY MOUTH ONE TIME DAILY   Quantity:  90 tablet   Refills:  3       ZYPREXA PO        Dose:  2.5 mg   Take 2.5 mg by mouth every morning   Refills:  0            Where to get your medicines      These medications were sent to [facility] [address] [place] [address], [place] - [address] [place] [address], [facility] [address]     Phone:  [phone]     enoxaparin 60 MG/0.6ML injection         Some of these will need a paper prescription and others can be bought over the counter. Ask your nurse if you have questions.     Bring a paper prescription for each of these medications     HYDROmorphone 4  MG tablet    mirtazapine 7.5 MG Tabs tablet    oxyCODONE 40 MG 12 hr tablet                Protect others around you: Learn how to safely use, store and throw away your medicines at www.disposemymeds.org.        Information about OPIOIDS     PRESCRIPTION OPIOIDS: WHAT YOU NEED TO KNOW   We gave you an opioid (narcotic) pain medicine. It is important to manage your pain, but opioids are not always the best choice. You should first try all the other options your care team gave you. Take this medicine for as short a time (and as few doses) as possible.    Some activities can increase your pain, such as bandage changes or therapy sessions. It may help to take your pain medicine 30 to 60 minutes before these activities. Reduce your stress by getting enough sleep, working on hobbies you enjoy and practicing relaxation or meditation. Talk to your care team about ways to manage your pain beyond prescription opioids.    These medicines have risks:    DO NOT drive when on new or higher doses of pain medicine. These medicines can affect your alertness and reaction times, and you could be arrested for driving under the influence (DUI). If you need to use opioids long-term, talk to your care team about driving.    DO NOT operate heavy machinery    DO NOT do any other dangerous activities while taking these medicines.    DO NOT drink any alcohol while taking these medicines.     If the opioid prescribed includes acetaminophen, DO NOT take with any other medicines that contain acetaminophen. Read all labels carefully. Look for the word  acetaminophen  or  Tylenol.  Ask your pharmacist if you have questions or are unsure.    You can get addicted to pain medicines, especially if you have a history of addiction (chemical, alcohol or substance dependence). Talk to your care team about ways to reduce this risk.    All opioids tend to cause constipation. Drink plenty of water and eat foods that have a lot of fiber, such as fruits,  vegetables, prune juice, apple juice and high-fiber cereal. Take a laxative (Miralax, milk of magnesia, Colace, Senna) if you don t move your bowels at least every other day. Other side effects include upset stomach, sleepiness, dizziness, throwing up, tolerance (needing more of the medicine to have the same effect), physical dependence and slowed breathing.    Store your pills in a secure place, locked if possible. We will not replace any lost or stolen medicine. If you don t finish your medicine, please throw away (dispose) as directed by your pharmacist. The [place] Pollution [employer] has more information about safe disposal: https://www.pca.[facility].[place].us/living-green/managing-unwanted-medications             Medication List: This is a list of all your medications and when to take them. Check marks below indicate your daily home schedule. Keep this list as a reference.      Medications           Morning Afternoon Evening Bedtime As Needed    enoxaparin 60 MG/0.6ML injection   Commonly known as:  LOVENOX   Inject 0.6 mLs (60 mg) Subcutaneous every 12 hours for 14 days   Last time this was given:  60 mg on 10/12/2018  8:50 AM   Last time this was given:  8:50AM   Next Dose Due:  8:50PM                                   guaiFENesin 20 mg/mL Soln solution   Commonly known as:  ROBITUSSIN   Take 20 mLs by mouth every 4 hours as needed for cough   Last time this was given:  20 mLs on 10/12/2018  9:01 AM   Last time this was given:  9:01AM   Next Dose Due:  1:01PM                                HYDROmorphone 4 MG tablet   Commonly known as:  DILAUDID   Take 1 tablet (4 mg) by mouth every 3 hours as needed for moderate to severe pain   Last time this was given:  4 mg on 10/12/2018  3:29 PM   Last time this was given:  9:07AM   Next Dose Due:  12:07PM                                LORazepam 0.5 MG tablet   Commonly known as:  ATIVAN   Take 1-2 tablets (0.5-1 mg) by mouth every 6 hours as needed (Breakthrough Nausea  / Vomiting)   Last time this was given:  0.5 mg on 10/11/2018  7:00 AM   Next Dose Due:  Available anytime                                mirtazapine 7.5 MG Tabs tablet   Commonly known as:  REMERON   Take 1 tablet (7.5 mg) by mouth At Bedtime   Next Dose Due:  Tonight at bedtime                                oxyCODONE 40 MG 12 hr tablet   Commonly known as:  OxyCONTIN   Take 1 tablet (40 mg) by mouth every 8 hours   Last time this was given:  40 mg on 10/12/2018  1:06 PM   Last time this was given:  1:06PM   Next Dose Due:  9:06PM                                pantoprazole 40 MG EC tablet   Commonly known as:  PROTONIX   Take 1 tablet (40 mg) by mouth 2 times daily Take 30-60 minutes before a meal.   Last time this was given:  40 mg on 10/12/2018  8:49 AM   Last time this was given:  8:49AM   Next Dose Due:  9:00PM                                prochlorperazine 10 MG tablet   Commonly known as:  COMPAZINE   Take 1 tablet (10 mg) by mouth every 6 hours as needed for nausea or vomiting   Last time this was given:  10 mg on 10/12/2018  1:06 PM   Last time this was given:  1:06PM   Next Dose Due:  7:06PM                                valACYclovir 500 MG tablet   Commonly known as:  VALTREX   TAKE ONE TABLET BY MOUTH ONE TIME DAILY   Last time this was given:  500 mg on 10/12/2018  8:49 AM   Last time this was given:  8:49AM   Next Dose Due:  Tomorrow AM                                ZYPREXA PO   Take 2.5 mg by mouth every morning   Last time this was given:  2.5 mg on 10/12/2018  8:49 AM   Last time this was given:  8:49AM   Next Dose Due:  Tomorrow AM

## 2018-10-10 NOTE — H&P
IDENTIFYING DATA:  Metastatic gastric cancer     HISTORY OF PRESENT ILLNESS:   Ketan presents to clinic today, accompanied by his wife Cielo, for a routine follow up. He was last seen in the clinic for a visit on 10/2/2018.    Ketan was admitted to [facility] 10/7-8/2018 with increasing shortness of breath. They were able to get   500 mL out of the PleurX on the right and 1300 mL on the left. He has had issues with pain, cough and anxiety with thoracentesis. Since he left the hospital, he has continued to have coughing. He is not sleeping well. His shortness of breath is mildly improved.    Ketan continues to have issues with left chest wall and abdomen pain. His OxyContin was changed to 20 mg every 8 hours, but he is still requiring 10 mg every 4 hours for breakthrough pain.    His legs have become increasingly edematous as well. His albumin is 1.9.    No fever or chills. No cough or shortness of breath. No chest pain. No abdominal pain. Bowel and bladder function are stable. No bony pain. Remainder of review of systems are negative.  He was given cycle 2 FOLFOX today, but developed progressive abdominal swelling and edema.     The decision was made to admit him overnight for pain control as well as consideration of paracentesis vs abdominal PleurX.    ONCOLOGY HISTORY:  He presented to the hospital in September 2018 with history of a few years of abdominal pain. He reported having an episode of nausea followed by hematemesis end of February 2018 and underwent EGD under the care of Dr. Ibrahim on 2/26/2018. The study demonstrated nodular mucosa in the gastric body and the gastric antrum with oozing gastric ulcers in the lesser curvature of the stomach. Biopsies from the gastric body and the antrum were reported as showing no evidence of dysplasia or malignancy at that time but did show H pylori with chronic active gastritis. He was placed on proton pump inhibitor therapy.    He continued to have abdominal pain and as such  underwent a noncontrast CT abdomen and pelvis on 7/13/2018 and this demonstrated multiple soft tissue nodules as well as soft tissue stranding in the fat of the upper abdomen and along the lesser curvature of the stomach with findings suspicious for peritoneal metastatic disease. He did not appear to have a follow-up after this imaging study.    He presented to [facility] on 8/31/2018 with progressively worsening upper abdominal pain. CT abdomen and pelvis on 8/31/2018 was reported as showing thickening of the wall of the stomach, ascites, lymphadenopathy along the gastrohepatic ligament anterior to the distal body of the stomach as well as thickening of the omentum with findings felt to be suspicious for malignancy. He was felt to need another endoscopy and as such was referred to [facility] for further evaluation.    He was transferred to [facility] on 8/31/2018 and was discharged the next day with plan for outpatient EUS on 9/5/2018.    Repeat CT abdomen and pelvis on 9/5/2018 at [facility] demonstrated wall thickening of the gastric body suspicious for malignancy, enlarged lymph nodes anterior to the stomach, soft tissue stranding and nodularity in the fat of the abdomen concerning for progressive peritoneal metastatic disease and a small amount of ascites and bilateral pleural effusion with associated atelectasis.    Upper EUS on 9/5/2018 showed a 5.3 cm gastric ulcer with sonographic evidence of invasion into the serosa, regional gastric lymphadenopathy and ascites. The gastric wall was found to be diffusely thickened in the body as well worrisome for linitis.     Biopsy of the gastric ulcer was reported as being consistent with poorly differentiated adenocarcinoma with signet ring cell pattern. FNAB of perigastric lymph node and cytology from ascites fluid were all consistent with metastatic adenocarcinoma. HER-2 FISH from the gastric ulcer biopsy  showed no amplification. IHC for PD-L1 CPS score 9. mismatch repair DNA proteins are intact.    CT chest with contrast on 9/7/2018 showed bilateral pleural effusions right greater than left with associated lower lobe atelectasis, ascites, gastric wall thickening consistent with malignancy and adenopathy in the upper abdomen consistent with metastasis as well as peripheral nodularity of the left upper quadrant consistent with carcinomatosis.    He was deemed to have stage IV unresectable gastric cancer and was recommended palliative chemotherapy.    PAST MEDICAL HISTORY:   1. Genital herpes  2. Tension headache  3. Iron deficiency anemia    PAST SURGICAL HISTORY:   Not significant    FAMILY HISTORY:   History of prostate cancer in his father, stomach cancer in his maternal uncle and uterine cancer in his maternal grandmother. He has 4 brothers and 5 sisters and 2 sons.    SOCIAL HISTORY:   He reported smoking 1 pack a day for over 15 years and quit smoking approximately around 2007. He did report drinking 1-3 cans of beer on a daily basis over the last 4-5 years but quit drinking 2-3 months ago.    MEDICATIONS:   1. Lorazepam 0.5 mg every 6 hours as needed for nausea and vomiting  2. Robitussin-DM 5 mL every 4 hours as needed for cough  3. Valacyclovir 500 mg daily  4. Colace 100 mg twice daily as needed for constipation  5. Prochlorperazine 10 mg every 6 hours as needed for nausea  6. Enoxaparin 80 mg subcutaneously every 12 hours  7. Olanzapine 2.5 mg daily as needed for nausea  8. OxyContin 20 mg every 8 hours  9. OxyIR 10 mg every 4 hours as needed  10. Pantoprazole 40 mg twice daily  11. Ondansetron 8 mg every 8 hours as needed     ALLERGIES:   No known drug allergies.    PHYSICAL EXAM:  VITALS: Blood pressure 108/64, heart rate 127, temperature 99.3, oxygen 94% on room air and weight 154.8 pounds.  GENERAL: Pleasant, in no acute distress.  HEENT: Normocephalic, atraumatic. PERRL. Sclera are white. Conjunctivae  are clear. Oropharynx is clear without stomatitis or sores.   NECK: Supple. No adenopathy. No JVD. Trachea is midline.  CARDIAC: Tachycardia. Regular rate and rhythm, S1, S2. No murmurs. Port in the left anterior chest.   RESPIRATORY: Nonlabored. Lungs markedly diminished bilaterally on percussion and auscultation. Right sided chest wall with PleurX cathether.   ABDOMEN: Firm and distended. Minimally tender. Bowel sounds are hypoactive.   EXTREMITIES: 3+ bilateral lower extremity edema. Moves extremities slowly, but ambulating on own power.  DERM: No petechiae, rashes, or bruising.  NEURO: The patient is alert and oriented.    LAB REVIEW:  WBC 5.9, ANC 4, hemoglobin 8.9 and platelet count 441,000.   Iron studies 10/2/2018 shows iron 17, TIBC 215, percent saturation 8 and ferritin 499.    ASSESSMENT/PLAN:   1. Metastatic gastric cancer, HER-2 negative by FISH, IHC for PD-L1 positive, and DNA mismatch repair proteins intact  - Ketan was treated with cycle 1 FOLFOX 9/26  - Chemotherapy was reasonably well tolerated  - Labs reviewed today and sufficient for chemotherapy  - Cycle 2 FOLFOX given today  - Pump DC on Friday    2. Iron deficiency anemia due to chronic blood loss related to #1  - MCV still low and he remains anemic  - Still iron deficient  - Iron given today    3. Pain related to malignancy  - Pain is uncontrolled on current medications  - Increase OxyContin to 60 mg every 8 hour. This will take into account the 60 mg current OxyContin and 60 mg OxyIR that he is using. His pain is poorly controlled, so will increase to 180 mg total dose of OxyContin. Side effects will be sedation, confusion, itching, nausea.   - Change OxyIR to hydromorphone 4 mg every 3-4 hours orally as needed  - Continue with laxatives    4. Shortness of breath  - CXR tonight  - Consider draining PleurX tonight  - Consider thoracentesis on the left   - Consider paracentesis in AM  - Will hold AM LMWH for possible procedures    5. Edema  -  Multifactorial from hypoalbumin state, malnutrition, paracentesis, peritoneal carcinomatosis  - Elevate legs  - Compression stockings  - PT/OT consult for edema    6. PE, incidental on recent CT imaging  - He is on therapeutic LMWH  - Might need dose decrease as weight changes  - Hold for platelets <50,000 or active bleeding    7. Prophylaxis  - Continue therapeutic LMWH    8. Code status  - Full Code    Gm Jones APRCARMEN, CNP

## 2018-10-10 NOTE — IP AVS SNAPSHOT
[address], [address]    [facility] [address]    Phone:  [phone]                                       After Visit Summary   10/10/2018    Ernie Song    MRN: [id number]           After Visit Summary Signature Page     I have received my discharge instructions, and my questions have been answered. I have discussed any challenges I see with this plan with the nurse or doctor.    ..........................................................................................................................................  Patient/Patient Representative Signature      ..........................................................................................................................................  Patient Representative Print Name and Relationship to Patient    ..................................................               ................................................  Date                                   Time    ..........................................................................................................................................  Reviewed by Signature/Title    ...................................................              ..............................................  Date                                               Time          22EPIC Rev 08/18

## 2018-10-10 NOTE — PHARMACY-ADMISSION MEDICATION HISTORY
Admission medication history interview status for the 10/10/2018  admission is complete. See EPIC admission navigator for prior to admission medications     Medication history source reliability:Good   Pt provided a list of medications and last time doses were taken which was written out by his wife    Actions taken by pharmacist (provider contacted, etc):Talked with pt     Additional medication history information not noted on PTA med list :None    Medication reconciliation/reorder completed by provider prior to medication history? Yes    Time spent in this activity: 15 minutes    Prior to Admission medications    Medication Sig Last Dose Taking? Auth Provider   enoxaparin (LOVENOX) 80 MG/0.8ML injection Inject 0.8 mLs (80 mg) Subcutaneous every 12 hours for 14 days 10/10/2018 at 0800 Yes Nicholas Santos MD   guaiFENesin (ROBITUSSIN) 20 mg/mL SOLN solution Take 20 mLs by mouth every 4 hours as needed for cough PRN Yes Unknown, Entered By History   LORazepam (ATIVAN) 0.5 MG tablet Take 1-2 tablets (0.5-1 mg) by mouth every 6 hours as needed (Breakthrough Nausea / Vomiting) PRN Yes Nicholas Santos MD   OLANZapine (ZYPREXA PO) Take 2.5 mg by mouth every morning 10/10/2018 at 0800 Yes Reported, Patient   oxyCODONE (OXYCONTIN) 20 MG 12 hr tablet Take 20 mg by mouth 3 times daily 10/10/2018 at 1700 Yes Unknown, Entered By History   oxyCODONE IR (ROXICODONE) 5 MG tablet Take 2 tablets (10 mg) by mouth every 4 hours as needed for pain 10/10/2018 at 1440 Yes Nicholas Santos MD   pantoprazole (PROTONIX) 40 MG EC tablet Take 1 tablet (40 mg) by mouth 2 times daily Take 30-60 minutes before a meal. 10/10/2018 at 0800 Yes Gay Winslow APRN CNP   prochlorperazine (COMPAZINE) 10 MG tablet Take 1 tablet (10 mg) by mouth every 6 hours as needed for nausea or vomiting 10/10/2018 at Unknown time Yes Nicholas Santos MD   valACYclovir (VALTREX) 500 MG tablet TAKE ONE TABLET BY MOUTH ONE  TIME DAILY  10/10/2018 at 0900 Yes Gay Winslow APRN CNP

## 2018-10-11 PROBLEM — R06.02 SOB (SHORTNESS OF BREATH): Status: ACTIVE | Noted: 2018-01-01

## 2018-10-11 NOTE — PROGRESS NOTES
RADIOLOGY PROCEDURE NOTE  Patient name: Ernie Song  MRN: [id number]  : 1980    Pre-procedure diagnosis: Ascites  Post-procedure diagnosis: Same    Procedure Date/Time: 2018  9:08 AM  Procedure: Paracentesis  Estimated blood loss: None  Specimen(s) collected with description: Ascites.  The patient tolerated the procedure well with no immediate complications.    See imaging dictation for procedural details and findings.    Provider name: Zackery Osullivan  Assistant(s):None

## 2018-10-11 NOTE — PROGRESS NOTES
10/11/18 1354   Quick Adds   Quick Adds Edema Eval   Type of Visit Initial PT Evaluation   Living Environment   Lives With child(mayra), dependent;spouse   Living Arrangements house  (split level house)   Home Accessibility stairs to enter home;stairs within home   Number of Stairs to Enter Home 7   Number of Stairs Within Home 7   Stair Railings at Home inside, present on right side;outside, present on right side   Self-Care   Usual Activity Tolerance moderate   Current Activity Tolerance fair   Regular Exercise no   Equipment Currently Used at Home none   Activity/Exercise/Self-Care Comment Recently started home PT weekly, they have come 2x so far and started some strengthening exercises and walking program for 5 min at a time   Functional Level Prior   Ambulation 0-->independent   Transferring 0-->independent   Fall history within last six months no   Which of the above functional risks had a recent onset or change? none   General Information   Onset of Illness/Injury or Date of Surgery - Date 10/10/18   Referring Physician Gm Jones CNP   Patient/Family Goals Statement return home   Pertinent History of Current Problem (include personal factors and/or comorbidities that impact the POC) Admitted with SOB, abdominal and chest wall pain, LE Edema. PMH: metastatic gastric cancer, iron deficient anemia.   Precautions/Limitations fall precautions   Weight-Bearing Status - LLE full weight-bearing   Weight-Bearing Status - RLE full weight-bearing   General Observations spouse present   Edema General Information   Onset of Edema 10/05/18   Affected Body Part(s) Left LE;Right LE   Edema Etiology Chemo  (metastatic cancer)   Edema Precautions Active Cancer   Edema Examination/Assessment   Skin Condition Pitting;Dryness;Intact   Scar No   Ulcerations No   Stemmer Sign Positive   Skin Integrity mildly dry but otherwise intact and in good shape   Pitting Assessment 2+ edema in shins and 1+ in feet   Cognitive Status  "Examination   Orientation orientation to person, place and time   Level of Consciousness alert   Follows Commands and Answers Questions 100% of the time;able to follow multistep instructions   Personal Safety and Judgment intact   Memory intact   Pain Assessment   Patient Currently in Pain Yes, see Vital Sign flowsheet  (abdominal pain)   Integumentary/Edema   Integumentary/Edema Comments see above   Posture    Posture Not impaired   Range of Motion (ROM)   ROM Quick Adds No deficits were identified   Strength   Strength Comments Generalized weakness from cancer treatment   Bed Mobility   Bed Mobility Comments Independent bed mobility   Transfer Skills   Transfer Comments NT   Gait   Gait Comments NT   Balance   Balance Comments NT   Sensory Examination   Sensory Perception Comments denies numbness or tingling   General Therapy Interventions   Planned Therapy Interventions gait training;strengthening;transfer training   Edema: Planned Interventions Gradient compression bandaging;Edema exercises;Precautions to prevent infection/exacerbation;Education;Manual therapy   Clinical Impression   Criteria for Skilled Therapeutic Intervention yes, treatment indicated   PT Diagnosis Difficulty ambulating, LE edema   Edema: Patient Presentation Edema   Influenced by the following impairments LE edema, dec strength, balance, activity tolerance, SOB   Functional limitations due to impairments Difficulty ambulating and transferring   Clinical Presentation Stable/Uncomplicated   Clinical Presentation Rationale medically stable   Clinical Decision Making (Complexity) Low complexity   Therapy Frequency` daily   Predicted Duration of Therapy Intervention (days/wks) 5 days   Anticipated Discharge Disposition Home with Home Therapy   Risk & Benefits of therapy have been explained Yes   Patient, Family & other staff in agreement with plan of care Yes   [facility] AM-PAC TM \"6 Clicks\"   2016, Trustees of [facility], under " "license to [employer].  All rights reserved.   6 Clicks Short Forms Basic Mobility Inpatient Short Form   [facility] AM-PAC  \"6 Clicks\" V.2 Basic Mobility Inpatient Short Form   1. Turning from your back to your side while in a flat bed without using bedrails? 4 - None   2. Moving from lying on your back to sitting on the side of a flat bed without using bedrails? 4 - None   3. Moving to and from a bed to a chair (including a wheelchair)? 3 - A Little   4. Standing up from a chair using your arms (e.g., wheelchair, or bedside chair)? 3 - A Little   5. To walk in hospital room? 3 - A Little   6. Climbing 3-5 steps with a railing? 3 - A Little   Basic Mobility Raw Score (Score out of 24.Lower scores equate to lower levels of function) 20   Total Evaluation Time   Total Evaluation Time (Minutes) 10     "

## 2018-10-11 NOTE — PROGRESS NOTES
0858 VSS. Denies pain. Here for paracentesis, pt has had many, understands procedure. lovenox held this am per protocol. Labs ok per protocol.  0901 explained by dr mary and consented. 0902 time out done.  0913 VSS. Denies pain. Drained total of 1750 ml of clear kitty fluid from left lower abdomen, site is CDI, having dermabond put on site. Pt tolerated paracentesis well. No albumin needed per protocol. bandaid to site. Calling report to  alfonzo Richardson rn. Pt will go back via cart with transport back to 8.

## 2018-10-11 NOTE — PROGRESS NOTES
[facility]  Patient is currently open to home care services with [facility]. The patient is currently receiving RN/PT and SW services. [facility]  and team have been notified of patient admission. [facility] liaison will continue to follow patient during stay. If appropriate provide orders to resume home care at time of discharge.

## 2018-10-11 NOTE — PROGRESS NOTES
[facility] Progress Note  [facility]    Met with pt and his wife, whom I met last week during prior hospitalization.  Pt was speaking on phone with his sister, who is in [place].  Pt and his wife requested my prayers.  I will continue to follow, and will plan to check in again tomorrow.                                                                                                                                                 Sierra Sneed M.A.  Staff   Pager [phone]  Phone [phone]

## 2018-10-11 NOTE — PROGRESS NOTES
Palliative Care Social Work Note    Attempted to meet with Ernie this morning and later in the afternoon.  He was on the phone with family at my first attempt and in the middle of cares in preparation for a procedure at my second attempt.  I will re-attempt at another time.    RASHAWN Barry, [facility]   Palliative Care    Pgr:[phone]  Ph: [phone]

## 2018-10-11 NOTE — CONSULTS
"[facility]    Palliative Care Consultation     Ernie Song  MRN# [id number]  Date of Admission:  10/10/2018  Date of Service (when I saw the patient): 10/11/18  Reason for consult: Consulted by Dr. Silva for Pain management    Assessment & Plan   Ernie Song is a 38 year old male with PMH significant for metastatic gastric cancer, with recurrent hospitalizations, who was admitted with worsening abdominal distension and associated pain.  He appears over-sedated today and would benefit from de-escalation of opioids.     Symptoms/Recommendations   1. Pain: Associated with abdominal distension, known malignancy.  Has signs of oversedation today.   -Will decrease Oxycontin to 40mg q8h, hold afternoon dose and restart tonight  -Start continuous pulse oximetry given oversedation  -Continue hydromorphone PRN as written (4mg PO q3h PRN breakthrough pain)    2. Cough: Suppressed currently by opioids (and per wife hydromorphone has been more helpful for his cough than oxycodone, thus rotation to PRN hydromorphone ordered at admission)  -Will add tessalon perles TID PRN cough    3. Anxiety/Insomnia:   -Start mirtazapine tomorrow night (holding tonight due to oversedation)    Support/Coping  Two children, wife supportive as is his Bapt[employer] community  -Will involve Palliative LICSW, Halie Mariano, and/or Palliative , Laura Sneed    Decisional Support, Goals of Care, Counseling & Coordination  Decisional Capacity Intact?  -Typically yes, but Ketan could not stay awake through my conversation today to discuss medical decision making  Health Care Directive on File?  -No, at bedside working on completion  Code Status/Resuscitation Preferences?  -Currently has full code ordered, planning to readdress when Ketan is wakeful to communicate about this.   Plan of Care?  -Per conversation three days ago, his goal has been to extend life so that he can finish \"unfinished business\" as a  and " father. He was aware that his cancer is incurable.       Thank you for involving us in the care of this patient and family. We will continue to follow. Please do not hesitate to contact me with questions or concerns or the on-call provider for our team if evening or weekend.     Jael Arenas MD   Palliative Medicine   Pager [phone]    Attestation:  Total time on the floor involved in the patient's care: 78 minutes  Total time spent in counseling/care coordination: >50%    Chief Complaint   Abdominal pain     History is obtained from the patient, staff, and extensive chart review.     Ketan is a 38 year old man with history of recently diagnosed metastatic gastric cancer now s/p two treatments with FOLFOX who was readmitted to [facility] for abdominal pain.  Ketan was inpatient three days ago and was seen by me at that time for cough, which had improved with thoracentesis. He identified significant anxiety and low appetite and was started on mirtazapine but discharged before this medication could be administered.  He then presented to clinic yesterday for his second treatment with FOLFOX and was readmitted for pain control.     Past Medical History    I have reviewed this patient's medical history and updated it with pertinent information if needed.   Past Medical History:   Diagnosis Date     Gastric adenocarcinoma (H) 9/10/2018     Genital herpes      Gynecomastia, male 2016    Chest CT benign     Stage IV adenocarcinoma of stomach (H)      Ulcer, gastric, acute        Past Surgical History   I have reviewed this patient's surgical history and updated it with pertinent information if needed.  Past Surgical History:   Procedure Laterality Date     ESOPHAGOSCOPY, GASTROSCOPY, DUODENOSCOPY (EGD), COMBINED N/A 2/26/2018    Procedure: COMBINED ESOPHAGOSCOPY, GASTROSCOPY, DUODENOSCOPY (EGD), BIOPSY SINGLE OR MULTIPLE;  gastroscopy;  Surgeon: Lalo Ibrahim MD;  Location: [facility]     ESOPHAGOSCOPY, GASTROSCOPY,  "DUODENOSCOPY (EGD), COMBINED N/A 9/5/2018    Procedure: COMBINED ENDOSCOPIC ULTRASOUND, ESOPHAGOSCOPY, GASTROSCOPY, DUODENOSCOPY (EGD), FINE NEEDLE ASPIRATE/BIOPSY;  COMBINED ENDOSCOPIC ULTRASOUND, ESOPHAGOSCOPY, GASTROSCOPY, DUODENOSCOPY (EGD), FINE NEEDLE ASPIRATE;  Surgeon: Ever Owusu MD;  Location:  [facility]     ESOPHAGOSCOPY, GASTROSCOPY, DUODENOSCOPY (EGD), COMBINED N/A 9/5/2018    Procedure: COMBINED ESOPHAGOSCOPY, GASTROSCOPY, DUODENOSCOPY (EGD), BIOPSY SINGLE OR MULTIPLE;;  Surgeon: Ever Owusu MD;  Location:  GI     INSERT PORT VASCULAR ACCESS N/A 9/20/2018    Procedure: INSERT PORT VASCULAR ACCESS;  PORT PLACEMENT ;  Surgeon: Jamel Salazar MD;  Location:  [facility]     NO HISTORY OF SURGERY         Social History    Taken from consultation done on 10/8/18.   \"Living situation: Lives with wife and two sons (6 and 8).      Family system: Many of his family are in [place].  In-laws live in [place].      Self-identified support system: Family, Orthodoxy, alida.      Employment/education: Has worked in [employer] and enjoys this.      Activities/interests: Soccer.      Adventism affiliation: Raised Hoahaoism, now identifies as Confucianist.      Involvement in alida community: Close to a Orthodoxy community.      Impact of illness on patient: \"life stopped\" the day he received his cancer diagnosis. \"    Family History   I have reviewed this patient's family history and updated it with pertinent information if needed.   Family History   Problem Relation Age of Onset     Prostate Cancer Father      Other Cancer Maternal Grandmother      Other Cancer Other      Asthma Mother      Stomach Cancer Maternal Uncle        Allergies   No Known Allergies    Medications   Oxycontin 60mg q8h  Hydromorphone 4mg q3h PRN x 1  Lorazepam 0.5-1mg q6h PRN x 1  Compazine 10mg q6h PRN x 1  Olanzapine 2.5mg qday    (home meds were oxycontin 20mg BID, with oxycodone 10mg q4h PRN breakthrough pain)      Review of " "Systems   The comprehensive review of systems is negative other than noted here and in the assessment/plan.    Palliative Symptom Review (0=no symptom/no concern, 1=mild, 2=moderate, 3=severe):  Pain: 0 - no pain with current medications  Fatigue: 3  Nausea: 1  Constipation: Not asked today, three days ago he described diarrhea  Diarrhea: see above.   Depressive Symptoms: not asked.   Anxiety: 2, limits sleep  Drowsiness: 3, having trouble staying awake due to this.   Poor Appetite: Has a good appetite but eats small amounts.   Shortness of Breath: 0  Insomnia: 1    Physical Exam   Temp: 96.1  F (35.6  C) Temp src: Oral BP: 117/74 Pulse: 104   Resp: 16 SpO2: 94 % O2 Device: None (Room air)    There were no vitals filed for this visit.  CONSTITUTIONAL: NAD, sedated. Calm and cooperative.  HEENT: Pupils are 1mm bilaterally.  Sclera is clear.   NECK: Supple  CARDIOVASCULAR: Tachycardic, regular.   RESPIRATORY: NL respiratory effort on room air, unlabored, no wheezes or crackles.  Pleurx catheter not visualized. Respiratory rate rechecked when sleeping and found to be 10 breaths per minute.   GASTROINTESTINAL: Soft, distended, non-tender to palpation. Hypoactive bowel sounds.   SKIN: Warm and intact. No concerning lesions or rashes on exposed skin surfaces   NEUROLOGIC: Sedated.  Has difficulty maintaining wakefulness during conversation. Is able to waken and converse appropriately with stimulation.     Data   Cr 0.54  Hgb 9.5    CXR 10/11/18  \"IMPRESSION: Lung volumes are unchanged with persistent perihilar and  basilar opacities favored to represent atelectasis. Small bilateral  pleural effusions are unchanged. Right chest tube/catheter is in  unchanged position. Left chest port catheter is present with tip near  the atriocaval junction. Heart size is unchanged.\"    Paracentesis today with 1.75L of fluid drained.           "

## 2018-10-11 NOTE — PROGRESS NOTES
"[facility] Progress Note          Assessment and Plan:   1.  Metastatic gastric adenocarcinoma with peritoneal carcinomatosis, HER-2 negative    -Diagnosed 9/5/2018 following biopsy of lesser curvature gastric ulcer reported as poorly differentiated adenocarcinoma with signet ring cell pattern.  Has peritoneal carcinomatosis with malignant ascites and pleural effusions  -Started on palliative FOLFOX given every 2 weeks on 9/26/2018  -Received cycle 2 of FOLFOX on 10/10/2018.  Pump will be d/bernardo tomorrow once chemotherapy s complete.  Next cycle due on 10/24/2018.  -I had an extensive discussion with Ernie and his wife, Cielo.  They are aware that his cancer is incurable and treatment is being given with a palliative intent to improve/maintain his quality of life.  He would like to continue his treatments and \"does not want to give up \".  -at this time plan to continue with palliative chemotherapy every 2 weeks    2. Malignant ascites/pleural effusions  -related to peritoneal carcinomatosis  -would arrange for scheduled outpatient paracentesis (weekly every Tuesday).  They do not want an indwelling abdominal pigtail catheter.  I discussed this with Dr. Lopez from  and we will schedule these appointments for him.  -given his extensive peritoneal carcinomatosis he is unlikely to respond to diuretics and would need serial large volume paracentesis  -home health aide for right pleurx catheter drainage was supposed to visit him today.  Will have the appointment rescheduled.  -CXR today and drain R pleurx if needed  -he responding to chemotherapy, effusions/ascites may improve down the line     3. Pain   -repeated hospitalizations due to pain and SOB  -currently reports pain at 5/10 intensity  -changed oxycontin to 60mg q8 yesterday  -palliative care input to assist with pain management    4. Bilateral PE  -continue lovenox 1mg/kg s/c bid   -in the setting of incurable malignancy would continue " anticoagulation long term provided bleeding risk remains unchanged.    5. Iron deficiency anemia/thrombocytosis  -thrombocytosis reactive to iron deficiency   -received IV injectafer as outpatient   -continue to monitor counts     6.  Deconditioning  -physical therapy consulted     Code: full code    Called Dr. Elias to update him about the plan above and awaiting call back.    Judson Silva MD                   Interval History:   Reports pain at 5/10 intensity but pain worse on coughing.  Denied fevers.  Was able to get some sleep last night.                Review of Systems:   As per subjective, otherwise 5 systems reviewed and negative.           Physical Exam:   Blood pressure 130/80, pulse 111, temperature 96.8  F (36  C), temperature source Axillary, resp. rate 18, SpO2 97 %.      Vital Sign Ranges  Temperature Temp  Av.3  F (36.3  C)  Min: 96.8  F (36  C)  Max: 97.8  F (36.6  C)   Blood pressure Systolic (24hrs), Av , Min:130 , Max:131        Diastolic (24hrs), Av, Min:77, Max:80      Pulse Pulse  Av  Min: 111  Max: 113   Respirations Resp  Av.7  Min: 16  Max: 18   Pulse oximetry SpO2  Av %  Min: 95 %  Max: 97 %       No intake or output data in the 24 hours ending 10/11/18 0826    Constitutional:   No acute distress.   Skin:   No rashes, petechiae, or ecchymoses.   HEENT:   Normocephalic, atraumatic. Oropharynx clear with no mucosal lesions or thrush.   Neck:   Supple.   Lungs:   Bilateral decreased intensity of breath sounds    Cardiovascular:   Regular rate and rhythm with no murmurs, rubs, or gallops.   Abdomen:   Abdomen distended   Extremities:   Bilateral mild leg edema   Neurological:   CN II-XII grossly intact. No focal motor or sensory deficits.            Medications:     No current outpatient prescriptions on file.                Data:     Results for orders placed or performed during the hospital encounter of 10/10/18 (from the past 24 hour(s))   CBC with  platelets   Result Value Ref Range    WBC 5.9 4.0 - 11.0 10e9/L    RBC Count 3.89 (L) 4.4 - 5.9 10e12/L    Hemoglobin 9.5 (L) 13.3 - 17.7 g/dL    Hematocrit 28.9 (L) 40.0 - 53.0 %    MCV 74 (L) 78 - 100 fl    MCH 24.4 (L) 26.5 - 33.0 pg    MCHC 32.9 31.5 - 36.5 g/dL    RDW 20.4 (H) 10.0 - 15.0 %    Platelet Count 472 (H) 150 - 450 10e9/L   INR   Result Value Ref Range    INR 1.13 0.86 - 1.14

## 2018-10-11 NOTE — PROGRESS NOTES
[facility]    Hospitalist Progress Note    Assessment & Plan   Ernie Song is a 38 year old male with stage IV gastric cancer who was admitted on 10/10/2018 with increased abd pain and SOB related to malignant ascites:    Impression:   Principal Problem:    SOB (shortness of breath) related to malignant ascites/pleural effusions   -- Paracentesis today with 1750 ml removed, breathing better    Active Problems:    Genital herpes    Malignant ascites (Cytology 9/5/18)    Pulmonary embolism -- on Lovenox    Malignant pleural effusions -- bilateral   -- has right Pleurx catheter   -- left effusion last thoracentsis 10/7/18    Gastric cancer, stage IV with abdominal Carcinomatosis and lung mets/effusions   -- chemo per Dr. Silva    -Started on palliative FOLFOX given every 2 weeks on 9/26/2018    -Received cycle 2 of FOLFOX on 10/10/2018.  Pump will be d/bernardo tomorrow once    chemotherapy s complete.  Next cycle due on 10/24/2018.    Hyponatremia -- possibly related to chemo, vs paracentesis and thoracentesis with sodium loss   -- Urine sodium 10/8/18 <5 (not suggestive of SIADH)   -- will check bmp in AM    Plan:  Discussed with Dr. Silva, palliative care consulted as well (has been requested through the clinic but he has not been able to make it to appointment so will have him seen in hospital).  Next paracentesis is scheduled next Tues 10/16/18 -- and will continue weekly, or sooner as needed (aggressive tumor with significant fluid generated).  If responds to chemo should see drop off in requirement for paracentesis (only 2 weeks into chemo).  Spoke with wife -- she would prefer I talk to her  this afternoon about code status; she is realistic that the care is palliative and he wants to do everything but he doesn't want CPR or ventilator if it won't help -- but he wants everyone to know that he doesn't want to give up.      DVT Prophylaxis: Enoxaparin (Lovenox) subcutaneous, when  discharged will need to reduce his Lovenox from 80 mg bid to the current 60 mg bid because of weight loss. (called [facility] pharmacy to clarify the proper dose going forward).      Code Status: Full Code, and will continue to discuss    Disposition: Expected discharge in 1-2 days once meets with palliative care and pain and SOB adequately controlled.     Nicholas Elias MD  Pager [phone]  Cell Phone [phone]  Text Page (7am to 6pm)    Interval History   Feels better after paracentesis.      Physical Exam   Temp: 97.6  F (36.4  C) Temp src: Oral BP: 128/82 Pulse: 100   Resp: 18 SpO2: 95 % O2 Device: None (Room air)    There were no vitals filed for this visit.  Vital Signs with Ranges  Temp:  [96.8  F (36  C)-97.8  F (36.6  C)] 97.6  F (36.4  C)  Pulse:  [100-113] 100  Resp:  [16-18] 18  BP: (126-134)/(77-84) 128/82  SpO2:  [93 %-97 %] 95 %       # Pain Assessment:  Current Pain Score 10/11/2018   Patient currently in pain? sleeping: patient not able to self report   Pain score (0-10) 5   Pain location -   Pain descriptors -   - Ernie is experiencing pain due to gastric cancer. Pain management was discussed and the plan was created in a collaborative fashion.  Ernie's response to the current recommendations: engaged  - pain medications as ordered.     Constitutional: Awake, cooperative, no apparent distress  Respiratory: right pleurx catheter, lungs with full breath sounds  Cardiovascular: Regular rate and rhythm, normal S1 and S2, and no murmur noted  GI: Normal bowel sounds, soft, non-distended, mild epigastric tenderness  Extrem: No calf tenderness, trace bilateral ankle edema  Neuro: Ox3, sleepy, no focal motor or sensory deficits    Medications     Chemotherapy Infusing-Continuous Infusion       - MEDICATION INSTRUCTIONS -       - MEDICATION INSTRUCTIONS -         enoxaparin  60 mg Subcutaneous Q12H     lidocaine (buffered or not buffered)  5 mL Intradermal Once     OLANZapine (zyPREXA) tablet  2.5 mg  2.5 mg Oral QAM     oxyCODONE  60 mg Oral Q8H     pantoprazole  40 mg Oral BID     polyethylene glycol  17 g Oral Daily     senna-docusate  1 tablet Oral Daily     sodium chloride (PF)  3 mL Intracatheter Q8H     valACYclovir  500 mg Oral Daily       Data     Recent Labs  Lab 10/11/18  0748 10/08/18  0628 10/07/18  2303   WBC 5.9  --  9.0   HGB 9.5*  --  9.0*   MCV 74*  --  75*   *  --  322   INR 1.13  --   --    * 124* 123*   POTASSIUM 4.5 4.2 3.9   CHLORIDE 91* 88* 87*   CO2 32 29 29   BUN 7 4* 5*   CR 0.54* 0.52* 0.59*   ANIONGAP 6 7 7   AROLDO 8.4* 7.7* 7.4*   * 109* 111*   ALBUMIN 1.9*  --  1.9*   PROTTOTAL 6.2*  --  5.7*   BILITOTAL 0.4  --  1.1   ALKPHOS 110  --  88   ALT 30  --  37   AST 28  --  32   TROPI  --   --  <0.015       Imaging:   Recent Results (from the past 24 hour(s))   XR Chest 1 View    Narrative    CHEST ONE VIEW   10/11/2018 9:02 AM     HISTORY: Pleural effusion.     COMPARISON: 10/8/2018.      Impression    IMPRESSION: Lung volumes are unchanged with persistent perihilar and  basilar opacities favored to represent atelectasis. Small bilateral  pleural effusions are unchanged. Right chest tube/catheter is in  unchanged position. Left chest port catheter is present with tip near  the atriocaval junction. Heart size is unchanged.    SPENCER BRONSON MD   US Paracentesis    Narrative    ULTRASOUND PARACENTESIS  10/11/2018 9:26 AM     HISTORY:  High volume paracentesis with or without diagnostic fluid  analysis with labs to be drawn if ordered.    FINDINGS: Ultrasound was used to evaluate for the presence and best  approach for paracentesis. Written and oral informed consent was  obtained. A pause for the cause procedure to verify the correct  patient and correct procedure. The skin overlying the left lower  quadrant was prepped and draped in the usual sterile fashion. The  subcutaneous tissues were anesthetized with 8 mL 1% lidocaine. A  catheter was advanced into the  peritoneal space and 1.75 L of  straw  colored fluid was drained. There were no immediate complications.  Ultrasound images were permanently stored.  Patient left the  ultrasound suite in satisfactory condition.      Impression    IMPRESSION: Technically successful paracentesis without immediate  complications.

## 2018-10-12 NOTE — PROGRESS NOTES
"Continuous 5FU infusion completed. Outpatient pump discontinued. Port-a-cath flushed per protocol. Clinic outpatient \"saeed pack\" given to Gm VILLALTA to return to [facility].  "

## 2018-10-12 NOTE — PROGRESS NOTES
Writer did not get a chance to meet with the patient and spouse regarding discharge planning. Writer did confirm follow up appointments with [facility]:  10/17 Port draw 2:00 p.m. NP appt 2:30 p.m. With Gm Duvall and Survivorship class at 3:00 p.m.  Writer has a call into [facility] RN Cielo to confirm set up for every Tuesday para's starting 10/16. Put a note on discharge to have the patient call the clinic next week regarding this appointment  [facility] is aware of discharge and they have resumption orders.

## 2018-10-12 NOTE — PROGRESS NOTES
SP[facility]UAL [facility] Progress Note  [facility]    Follow-up visit with pt and his wife.  He is planning to discharge home today, and they are both hopeful that he will be able to stay out of the hospital longer this time.  Shared prayer with them.                                                                                                                                               Sierra Sneed M.A.  Staff   Pager [phone]  Phone [phone]

## 2018-10-12 NOTE — PROGRESS NOTES
"[facility] Progress Note          Assessment and Plan:   1.  Metastatic gastric adenocarcinoma with peritoneal carcinomatosis, HER-2 negative    -Diagnosed 9/5/2018 following biopsy of lesser curvature gastric ulcer reported as poorly differentiated adenocarcinoma with signet ring cell pattern.  Has peritoneal carcinomatosis with malignant ascites and pleural effusions  -Started on palliative FOLFOX given every 2 weeks on 9/26/2018  -Received cycle 2 of FOLFOX on 10/10/2018.  Pump will be d/bernardo today.  Next cycle due on 10/24/2018.  -I had an extensive discussion with Ernie and his wife, Cielo.  They are aware that his cancer is incurable and treatment is being given with a palliative intent to improve/maintain his quality of life.  He would like to continue his treatments and \"does not want to give up \".  -at this time plan to continue with palliative chemotherapy every 2 weeks    2. Malignant ascites/pleural effusions  -related to peritoneal carcinomatosis  -outpatient paracentesis (weekly every Tuesday, beginning on 10/16).  They do not want an indwelling abdominal pigtail catheter.  I discussed this with Dr. Lopez from  and we will schedule these appointments for him.  -given his extensive peritoneal carcinomatosis he is unlikely to respond to diuretics and would need serial large volume paracentesis  -has had paracentesis on 10/11/2018 with removal of 1.75L of ascitic fluid  -next paracentesis will be scheduled on 10/16/2018 as outpatient   -CXR10/11/2018 showing small bilateral pleural effusions  -home health for assistance with R pleurX drainage  -effusions may improve with response to current therapy    3. Pain   -repeated hospitalizations due to pain and SOB  -currently reports pain at 3-4/10 intensity  -oxycontin 40mg q8  -dilaudid 4mg q3 prn pain  -noted and greatly appreciate palliative/hospitalist assistance    4. Bilateral PE  -continue lovenox 1mg/kg s/c bid   -in the setting of " incurable malignancy would continue anticoagulation long term provided bleeding risk remains unchanged.    5. Iron deficiency anemia/thrombocytosis  -thrombocytosis reactive to iron deficiency   -received IV injectafer as outpatient   -continue to monitor counts     6.  Deconditioning  -physical therapy consulted     Code: full code    Disposition: SOB better after paracentesis.  Once pain adequately controlled anticipate can be discharged home with plan for outpatient paracentesis on 10/16.  We have scheduled an appointment for him to see our NP on 10/17.    Judson Silva MD               Interval History:   Reports pain at 3-4/10 intensity but pain worse on coughing.  Cough improved on dilaudid.  Denied fevers.  Was able to get some sleep last night.                Review of Systems:   As per subjective, otherwise 5 systems reviewed and negative.           Physical Exam:   Blood pressure 119/74, pulse 104, temperature 97.7  F (36.5  C), temperature source Oral, resp. rate 14, weight 68.4 kg (150 lb 12.8 oz), SpO2 98 %.      Vital Sign Ranges  Temperature Temp  Av.3  F (36.3  C)  Min: 96.8  F (36  C)  Max: 97.8  F (36.6  C)   Blood pressure Systolic (24hrs), Av , Min:130 , Max:131        Diastolic (24hrs), Av, Min:77, Max:80      Pulse Pulse  Av  Min: 111  Max: 113   Respirations Resp  Av.7  Min: 16  Max: 18   Pulse oximetry SpO2  Av %  Min: 95 %  Max: 97 %       No intake or output data in the 24 hours ending 10/11/18 0826    Constitutional:   No acute distress.   Skin:   No rashes, petechiae, or ecchymoses.   HEENT:   Normocephalic, atraumatic. Oropharynx clear with no mucosal lesions or thrush.   Neck:   Supple.   Lungs:   Bilateral decreased intensity of breath sounds    Cardiovascular:   Regular rate and rhythm with no murmurs, rubs, or gallops.   Abdomen:   Abdomen distension better.  Bowel sound heard   Extremities:   Bilateral mild leg edema   Neurological:   CN II-XII  grossly intact. No focal motor or sensory deficits.            Medications:     Current Outpatient Prescriptions   Medication Sig Dispense Refill     enoxaparin (LOVENOX) 60 MG/0.6ML injection Inject 0.6 mLs (60 mg) Subcutaneous every 12 hours for 14 days 28 Syringe 1                Data:     Results for orders placed or performed during the hospital encounter of 10/10/18 (from the past 24 hour(s))   XR Chest 1 View    Narrative    CHEST ONE VIEW   10/11/2018 9:02 AM     HISTORY: Pleural effusion.     COMPARISON: 10/8/2018.      Impression    IMPRESSION: Lung volumes are unchanged with persistent perihilar and  basilar opacities favored to represent atelectasis. Small bilateral  pleural effusions are unchanged. Right chest tube/catheter is in  unchanged position. Left chest port catheter is present with tip near  the atriocaval junction. Heart size is unchanged.    SPENCER BRONSON MD   US Paracentesis    Narrative    ULTRASOUND PARACENTESIS  10/11/2018 9:26 AM     HISTORY:  High volume paracentesis with or without diagnostic fluid  analysis with labs to be drawn if ordered.    FINDINGS: Ultrasound was used to evaluate for the presence and best  approach for paracentesis. Written and oral informed consent was  obtained. A pause for the cause procedure to verify the correct  patient and correct procedure. The skin overlying the left lower  quadrant was prepped and draped in the usual sterile fashion. The  subcutaneous tissues were anesthetized with 8 mL 1% lidocaine. A  catheter was advanced into the peritoneal space and 1.75 L of  straw  colored fluid was drained. There were no immediate complications.  Ultrasound images were permanently stored.  Patient left the  ultrasound suite in satisfactory condition.      Impression    IMPRESSION: Technically successful paracentesis without immediate  complications.    NIELS TADEO MD

## 2018-10-12 NOTE — PROGRESS NOTES
"Palliative Care Inpatient Clinical Social Work Assessment    Patient Information:  Ernie \"Ketan\" Donis is a 38 year old man with recently diagnosed gastric cancer stage IV, recently completed his second round of chemotherapy (he is 2 weeks out from the start of treatments). He was hospitalized for discomfort and SOB and will discharge later today. Palliative care is following for goals of care and symptom support. I met with Ketan and his wife, Cielo this morning.     Relevant Symptoms/Concerns     Physical:  Ketan was in bed, reports that he is feeling \"much better\" compared to yesterday after having a paracentesis and having his meds adjusted.    Psychological/Emotional/Existential:  I invited reflection on how he feels he is coping emotionally.  Both Ketan and Cielo describe feeling hopeful after seeing how he is feeling today, and anxious about his return home as he has been hospitalized multiple times in the past month for cough, shortness of breath and discomfort.  Cielo wonders if there is an environmental factor at their house, but is hopeful that his symptoms will be better managed with the medication adjustments that have been made this hospitalization.  Processing seemed concrete and focused on this hospitalization and immediate next steps.      Family/Social/Caregiver:   Wife, Cielo at bedside.  They have two children who are elementary school age. Per conversation with providers and chart review it does seem that there was some tension in their relationship prior to his diagnosis.    Developmental:     Mental Health:     End of Life:     Cultural/Mandaen/Spiritual:  Ketan is [place]oc[place], we did not explore his sense of identify connected to this today. WIth other providers he has implied that he feels some distress over past relationships and he feels he needs time to make ammends.    Grief/Loss:     Concurrent Stressors:       Comments:      Strengths     Physical: Feeling better today and planning to " discharge later.    Psychological/Emotional/Existential:     Family/Social/Caregiver:  Cielo at bedside and supportive in today's visit.    Developmental:     Mental Health:     End of Life:     Cultural/Restoration/Spiritual:     Grief/Loss:        Comments:      Goals/Decision Making/Advance Care Planning   Preferences:  Restorative   Concerns:     Documents:     Decision Making Issues:       Comments:      Resource Needs     Discharge Planning:  Per Unit/Program  and/or Care Coordinator   Other:     Comments:      Sources of Information   Patient:  x   Family:  x   Staff:  x   Chart Review:  x   Other:      Intervention (Check all that apply)    x   Assessment of palliative specific issues      x   Introduction of Palliative clinical social work interventions    x   Adjustment to illness counseling       Advanced care planning       Attended/participated in care conference       Behavioral interventions for symptom management    x   Facilitation of processing of thoughts/feelings      Family communication facilitated       Grief counseling       Goals of care discussion/facilitation       Life legacy work       Life review facilitation       Psychoeducation       Re-framing       Resource referral       Other:       Comments:      Plan:  Will follow up next week if discharge does not go as planned.    RASHAWN Barry, [facility]   Palliative Care    Pgr:[phone]  Ph: [phone]

## 2018-10-12 NOTE — PROGRESS NOTES
"[facility]    Palliative Care Progress Note    Ernie Song  MRN# [id number]  Date of Admission:  10/10/2018  Date of Service (when I saw the patient): 10/12/2018    Assessment & Plan   Ernie Song is a 38 year old male with PMH significant for metastatic gastric cancer, with recurrent hospitalizations, who was admitted with worsening abdominal distension and associated pain, improved with paracentesis and optimization of opioids.     Symptoms/Recommendations  1. Pain: Associated with abdominal distension, known malignancy.  Adequate control.   -Continue opioids at current doses.      2. Cough: Patient/wife feel that this has been better controlled by hydromorphone over oxycodone.   -Continue opioids as above  -Continue Robitussin/tessalon    3. Goals of Care: Discussed code status today.   -Full code requested.  See below.     Support/Coping  Two children, wife supportive as is his [employer] community  -Will involve Palliative LICSW, Halie Mariano, and/or Palliative , Laura Sneed    Decisional Support, Goals of Care, Counseling & Coordination  Decisional Capacity Intact?  -Yes  Health Care Directive on File?  -No, at bedside working on completion  Code Status/Resuscitation Preferences?  -Full code  Plan of Care?  -Per conversation three days ago, his goal has been to extend life so that he can finish \"unfinished business\" as a  and father. He was aware that his cancer is incurable.     Discussion  Ketan and I spent much of today's visit, in consult with his wife, discussing code status options.  We reviewed different options and outcomes, and after thorough discussion he requests FULL Code.  We reviewed that it would be appropriate to discuss this again in the future as his body changes from the cancer, and he agreed that if he was having functional decline and feeling weaker, he would discuss with his family and consider DNR/DNI.     Thank you for involving us in " the care of this patient and family. We will continue to follow. Please do not hesitate to contact me with questions or concerns or the on-call provider for our team if evening or weekend.    Jael Arenas MD  Palliative Medicine   Pager [phone]    Attestation:  Total time on the floor involved in the patient's care: 44 minutes  Total time spent in counseling/care coordination: >50%    Interval History   Ketan has been more wakeful today with adequate pain control.  Pain continues to be in the stomach.  Was woken up by O2 monitors. No significant dyspnea.  Cough is at baseline.     Medications   Mirtazapine 7.5mg at bedtime to start tonight  Olanzapine 2.5mg qAM  Oxycontin 40mg q8h, decreased from 60mg q8h for oversedation  Tessalon perles PRN x 0  Robitussin x 1  Hydromorphone 1mg q2h PRN pain, x 0  Hydromorphone 4mg q3h PRN x 12mg  Lorazepam PRN x 0  Compazine PRN x 1        Physical Exam   Temp: 97.7  F (36.5  C) Temp src: Oral BP: 119/74 Pulse: 104   Resp: 15 SpO2: 98 % O2 Device: None (Room air)    Vitals:    10/12/18 0619   Weight: 68.4 kg (150 lb 12.8 oz)     CONSTITUTIONAL: NAD, alert today. Calm and cooperative.  HEENT: Pupils are equal, 2mm.  Sclera is clear.   RESPIRATORY: NL respiratory effort on room air, unlabored, no audible wheezing.  Pleurx catheter not visualized.  GASTROINTESTINAL: Distended.   SKIN: Warm and intact. No concerning lesions or rashes on exposed skin surfaces   NEUROLOGIC: Alert, oriented, appropriately interactive.  Spontaneous movements are grossly nonfocal.     Data   Cr 0.54  Albumin 1.9 (labs from yesterday)

## 2018-10-12 NOTE — PROGRESS NOTES
Patient discharged at this time. Writer reviewed paperwork with patient and spouse Cielo, including medications and follow up appointments. All questions answered. Patient and spouse aware of para on Tuesday, 10/16. Given phone number to confirm time. Also aware of follow up appointment on 10/17/18 with Gm Jones NP. Spouse to transport patient home. All belongings/valuables/medications with patient at time of departure.

## 2018-10-12 NOTE — CONSULTS
SW Consult Note:    Care Transition Initial Assessment - CRISTINA  Reason For Consult: discharge planning  Met with: Chart Review and Cielo - Spouse  Principal Problem:    SOB (shortness of breath)  Active Problems:    Genital herpes    Malignant ascites (Cytology 9/5/18)    Pulmonary embolism -- on Lovenox    Malignant pleural effusions -- bilateral    Gastric cancer (H)       DATA  Lives With: child(mayra), dependent, spouse  Living Arrangements: house  Description of Support System: Supportive, Involved  Who is your support system?: Wife, Children  Support Assessment: Adequate family and caregiver support, Adequate social supports.   Identified issues/concerns regarding health management: Per social service protocol for discharge planning, patient was admitted on 10/10/18 with a primary diagnosis of SOB, Gastric Cancer.  Patient resides at home with his spouse and dependent children.  Patient was enrolled in [facility] while at home.  SW met with patient and spouse at previous admission and provided cancer resources.  SW met with spouse today while patient slept during visit.  Spouse stated that they received a juwan from the [employer].  Discussed their children and how they are doing with patient being in the hospital as well as her own self care.  SW provided reflective listening and supportive counseling throughout visit.         Quality Of Family Relationships: supportive, helpful, involved     ASSESSMENT  Cognitive Status:  awake, alert and oriented  Concerns to be addressed: Discharge planning/assistance with cancer resources.     PLAN  Financial costs for the patient includes: Spouse discussed costs of parking at the hospital.   Patient given options and choices for discharge: Home with home care.   Patient/family is agreeable to the plan?  Yes  Patient Goals and Preferences: Home with HC  Patient anticipates discharging to:  Home with HC    RASHAWN Warner, LGSW

## 2018-10-16 NOTE — PROGRESS NOTES
Care Suites Post Procedure Summary (without sedation)     Immediately prior to starting the procedure a Time Out was conducted with procedural staff and re-confirmed the patient s name, procedure, and site/side.      Consent obtained from patient after discussing the risks, benefits and alternatives.      Procedure: Paracentesis    Procedure Interventions:    Fluid (cc) removed: Yes. Removed 1900 ml of kitty colored fluid from right abdomen.     Tube/Drain placed: NA.    Patient tolerance: Patient tolerated the procedure well with no immediate complications.  Site dry and intact with no bleeding or drainage at completion. Site dressed by U/s tech. [place] hilton used per usual.  Post-procedure:    (See Doc Flow-sheets and MAR for additional information)

## 2018-10-16 NOTE — PROCEDURES
RADIOLOGY POST PROCEDURE NOTE    Patient name: Ernie Song  MRN: [id number]  : 1980    Pre-procedure diagnosis: Ascites  Post-procedure diagnosis: Same    Procedure Date/Time: 2018  11:17 AM  Procedure: US guided paracentesis  Estimated blood loss: None  Specimen(s) collected with description: none    The patient tolerated the procedure well with no immediate complications.  Significant findings:none    See imaging dictation for procedural details.    Provider name: Kristopher Hdz  Assistant(s):None

## 2018-10-16 NOTE — PROGRESS NOTES
Care Suites Discharge Summary    Discharge Criteria:   Discharge Criteria met per MD orders: Yes.   Vital signs stable.     Pt demonstrates ability to ambulate safely: Yes.  (See discharge questionnaire for additional information)    Discharge instructions & education:   Discharge instructions reviewed with patient and spouse. Patient verbalizes understanding.   Additional patient education provided: paracentesis home care instructions    Medications:   Patient will be discharging on new medications- No. Patient verbalizes reason for use, start date, and side effects NA.    Items returned to patient:   Home and hospital acquired medications returned to patient NA   Listed belongings gathered and returned to patient: Yes    Patient discharged to home with Andre Clark

## 2018-10-16 NOTE — DISCHARGE INSTRUCTIONS

## 2018-10-16 NOTE — PROGRESS NOTES
Care Suites Arrival    Reason for Visit: Paracentesis   Arrival interventions:assessment complete, labs WDL, discharge instructions given to pt and spouse.  Lovenox held this am.    Pt resting in bed/recyliner, denies additional needs at this time, call light in reach.

## 2018-10-16 NOTE — IP AVS SNAPSHOT
[place] [address]    [place] [address]    Phone:  [phone]                                       After Visit Summary   10/16/2018    Ernie Song    MRN: [id number]           After Visit Summary Signature Page     I have received my discharge instructions, and my questions have been answered. I have discussed any challenges I see with this plan with the nurse or doctor.    ..........................................................................................................................................  Patient/Patient Representative Signature      ..........................................................................................................................................  Patient Representative Print Name and Relationship to Patient    ..................................................               ................................................  Date                                   Time    ..........................................................................................................................................  Reviewed by Signature/Title    ...................................................              ..............................................  Date                                               Time          22EPIC Rev 08/18

## 2018-10-16 NOTE — IP AVS SNAPSHOT
MRN:[id number]                      After Visit Summary   10/16/2018    Ernie Song    MRN: [id number]           Visit Information        Department      10/16/2018 10:05 AM [facility]          Review of your medicines      UNREVIEWED medicines. Ask your doctor about these medicines        Dose / Directions    COLACE PO        Dose:  100 mg   Take 100 mg by mouth   Refills:  0       enoxaparin 60 MG/0.6ML injection   Commonly known as:  LOVENOX   Used for:  Other chronic pulmonary embolism without acute cor pulmonale (H)        Dose:  60 mg   Inject 0.6 mLs (60 mg) Subcutaneous every 12 hours for 14 days   Quantity:  28 Syringe   Refills:  1       guaiFENesin 20 mg/mL Soln solution   Commonly known as:  ROBITUSSIN        Dose:  20 mL   Take 20 mLs by mouth every 4 hours as needed for cough   Refills:  0       HYDROmorphone 4 MG tablet   Commonly known as:  DILAUDID   Used for:  Gastric adenocarcinoma (H)        Dose:  4 mg   Take 1 tablet (4 mg) by mouth every 3 hours as needed for moderate to severe pain   Quantity:  40 tablet   Refills:  0       LORazepam 0.5 MG tablet   Commonly known as:  ATIVAN   Used for:  Gastric adenocarcinoma (H)        Dose:  0.5-1 mg   Take 1-2 tablets (0.5-1 mg) by mouth every 6 hours as needed (Breakthrough Nausea / Vomiting)   Quantity:  60 tablet   Refills:  0       mirtazapine 7.5 MG Tabs tablet   Commonly known as:  REMERON   Used for:  Reactive depression        Dose:  7.5 mg   Take 1 tablet (7.5 mg) by mouth At Bedtime   Quantity:  30 tablet   Refills:  1       oxyCODONE 40 MG 12 hr tablet   Commonly known as:  OxyCONTIN   Used for:  Gastric adenocarcinoma (H)        Dose:  40 mg   Take 1 tablet (40 mg) by mouth every 8 hours   Quantity:  50 tablet   Refills:  0       pantoprazole 40 MG EC tablet   Commonly known as:  PROTONIX   Used for:  Gastric ulcer, unspecified chronicity, unspecified whether gastric ulcer hemorrhage or  perforation present        Dose:  40 mg   Take 1 tablet (40 mg) by mouth 2 times daily Take 30-60 minutes before a meal.   Quantity:  180 tablet   Refills:  1       polyethylene glycol Packet   Commonly known as:  MIRALAX/GLYCOLAX        Dose:  1 packet   Take 1 packet by mouth daily   Refills:  0       prochlorperazine 10 MG tablet   Commonly known as:  COMPAZINE   Used for:  Nausea        Dose:  10 mg   Take 1 tablet (10 mg) by mouth every 6 hours as needed for nausea or vomiting   Quantity:  60 tablet   Refills:  3       valACYclovir 500 MG tablet   Commonly known as:  VALTREX   Used for:  Genital herpes simplex, unspecified site        TAKE ONE TABLET BY MOUTH ONE TIME DAILY   Quantity:  90 tablet   Refills:  3       ZYPREXA PO        Dose:  2.5 mg   Take 2.5 mg by mouth every morning   Refills:  0                Protect others around you: Learn how to safely use, store and throw away your medicines at www.disposemymeds.org.         Follow-ups after your visit        Your next 10 appointments already scheduled     Oct 16, 2018 11:30 AM CDT   US PARACENTESIS with SHUS4,  IMAGING NURSE,  BODY RAD   [facility] Ultrasound ([facility])    [address] [place] [id number] [phone]           How do I prepare for my exam? (Food and drink instructions) No Food and Drink Restrictions.  How do I prepare for my exam? (Other instructions) IF YOUR DOCTOR ALSO PRESCRIBED SEDATION DURING THE EXAM (medicine to help you relax): You will receive separate instructions about driving, eating, and additional tests that may be necessary prior to your exam day.  What should I wear: Wear comfortable clothes.  How long does the exam take: Aspiration (no sedation treatment takes about an hour.  For paracentesis, thoracentesis or sedation plan to spend at least three hours at the hospital.  What should I bring: Bring a list of your medicines, including vitamins, minerals and over-the-counter  drugs. It is safest to leave personal items at home.  Do I need a :  No  is needed.  What do I need to tell my doctor: Tell your doctor in advance: * If you are or may be pregnant. * If you are taking Coumadin (or any other blood thinners) 5 days prior to the exam for any special instructions. * If you are diabetic to determine if your insulin needs have to be adjusted for the exam.  What should I do after the exam: Take it easy the rest of the day. You can return to normal activities the next day.  What is this test: This test uses a long, thin needle or tube to remove tissue, fluid, or other cells from your body. Pictures from an ultrasound will guide the needle to the right place. (Ultrasound uses sound waves to create pictures of the body on a video screen. You will not feel the sound waves.)  * A biopsy removes tissue or other cells from the body; we send the tissue or cells to a lab for testing. * Paracentesis removes fluid from the belly (abdomen) to relieve pressure, to test the fluid or both. * Thoracentesis removes fluid from the sac around the lungs to relieve pressure, to test the fluid or both. * Aspiration removes fluid from any part of the body, then the fluid is tested for disease or infection.  Who should I call with questions: If you have any questions, please call the Imaging Department where you will have your exam. Directions, parking instructions, and other information is available on our website, [email or web address].            Oct 23, 2018  9:30 AM CDT   US PARACENTESIS with CHANDAUS4,  IMAGING NURSE,  BODY RAD   [facility] Ultrasound ([facility])    [address] [place] [address]   [phone]           How do I prepare for my exam? (Food and drink instructions) No Food and Drink Restrictions.  How do I prepare for my exam? (Other instructions) IF YOUR DOCTOR ALSO PRESCRIBED SEDATION DURING THE EXAM (medicine to help you relax): You will  receive separate instructions about driving, eating, and additional tests that may be necessary prior to your exam day.  What should I wear: Wear comfortable clothes.  How long does the exam take: Aspiration (no sedation treatment takes about an hour.  For paracentesis, thoracentesis or sedation plan to spend at least three hours at the hospital.  What should I bring: Bring a list of your medicines, including vitamins, minerals and over-the-counter drugs. It is safest to leave personal items at home.  Do I need a :  No  is needed.  What do I need to tell my doctor: Tell your doctor in advance: * If you are or may be pregnant. * If you are taking Coumadin (or any other blood thinners) 5 days prior to the exam for any special instructions. * If you are diabetic to determine if your insulin needs have to be adjusted for the exam.  What should I do after the exam: Take it easy the rest of the day. You can return to normal activities the next day.  What is this test: This test uses a long, thin needle or tube to remove tissue, fluid, or other cells from your body. Pictures from an ultrasound will guide the needle to the right place. (Ultrasound uses sound waves to create pictures of the body on a video screen. You will not feel the sound waves.)  * A biopsy removes tissue or other cells from the body; we send the tissue or cells to a lab for testing. * Paracentesis removes fluid from the belly (abdomen) to relieve pressure, to test the fluid or both. * Thoracentesis removes fluid from the sac around the lungs to relieve pressure, to test the fluid or both. * Aspiration removes fluid from any part of the body, then the fluid is tested for disease or infection.  Who should I call with questions: If you have any questions, please call the Imaging Department where you will have your exam. Directions, parking instructions, and other information is available on our website, [email or web address].            Oct 30,  2018  9:30 AM CDT   US PARACENTESIS with CHANDAUS4, SH IMAGING NURSE, SH BODY RAD   [facility] Ultrasound ([facility])    [address] [place] [address] [phone]           How do I prepare for my exam? (Food and drink instructions) No Food and Drink Restrictions.  How do I prepare for my exam? (Other instructions) IF YOUR DOCTOR ALSO PRESCRIBED SEDATION DURING THE EXAM (medicine to help you relax): You will receive separate instructions about driving, eating, and additional tests that may be necessary prior to your exam day.  What should I wear: Wear comfortable clothes.  How long does the exam take: Aspiration (no sedation treatment takes about an hour.  For paracentesis, thoracentesis or sedation plan to spend at least three hours at the hospital.  What should I bring: Bring a list of your medicines, including vitamins, minerals and over-the-counter drugs. It is safest to leave personal items at home.  Do I need a :  No  is needed.  What do I need to tell my doctor: Tell your doctor in advance: * If you are or may be pregnant. * If you are taking Coumadin (or any other blood thinners) 5 days prior to the exam for any special instructions. * If you are diabetic to determine if your insulin needs have to be adjusted for the exam.  What should I do after the exam: Take it easy the rest of the day. You can return to normal activities the next day.  What is this test: This test uses a long, thin needle or tube to remove tissue, fluid, or other cells from your body. Pictures from an ultrasound will guide the needle to the right place. (Ultrasound uses sound waves to create pictures of the body on a video screen. You will not feel the sound waves.)  * A biopsy removes tissue or other cells from the body; we send the tissue or cells to a lab for testing. * Paracentesis removes fluid from the belly (abdomen) to relieve pressure, to test the fluid or both. *  Thoracentesis removes fluid from the sac around the lungs to relieve pressure, to test the fluid or both. * Aspiration removes fluid from any part of the body, then the fluid is tested for disease or infection.  Who should I call with questions: If you have any questions, please call the Imaging Department where you will have your exam. Directions, parking instructions, and other information is available on our website, [email or web address].            Nov 06, 2018  9:30 AM CST   US PARACENTESIS with SHUS4   [facility] Ultrasound ([facility] [place])    [address] [place] [address]   [phone]           How do I prepare for my exam? (Food and drink instructions) No Food and Drink Restrictions.  How do I prepare for my exam? (Other instructions) IF YOUR DOCTOR ALSO PRESCRIBED SEDATION DURING THE EXAM (medicine to help you relax): You will receive separate instructions about driving, eating, and additional tests that may be necessary prior to your exam day.  What should I wear: Wear comfortable clothes.  How long does the exam take: Aspiration (no sedation treatment takes about an hour.  For paracentesis, thoracentesis or sedation plan to spend at least three hours at the hospital.  What should I bring: Bring a list of your medicines, including vitamins, minerals and over-the-counter drugs. It is safest to leave personal items at home.  Do I need a :  No  is needed.  What do I need to tell my doctor: Tell your doctor in advance: * If you are or may be pregnant. * If you are taking Coumadin (or any other blood thinners) 5 days prior to the exam for any special instructions. * If you are diabetic to determine if your insulin needs have to be adjusted for the exam.  What should I do after the exam: Take it easy the rest of the day. You can return to normal activities the next day.  What is this test: This test uses a long, thin needle or tube to remove tissue, fluid, or  other cells from your body. Pictures from an ultrasound will guide the needle to the right place. (Ultrasound uses sound waves to create pictures of the body on a video screen. You will not feel the sound waves.)  * A biopsy removes tissue or other cells from the body; we send the tissue or cells to a lab for testing. * Paracentesis removes fluid from the belly (abdomen) to relieve pressure, to test the fluid or both. * Thoracentesis removes fluid from the sac around the lungs to relieve pressure, to test the fluid or both. * Aspiration removes fluid from any part of the body, then the fluid is tested for disease or infection.  Who should I call with questions: If you have any questions, please call the Imaging Department where you will have your exam. Directions, parking instructions, and other information is available on our website, [email or web address].               Care Instructions        Further instructions from your care team       Paracentesis Discharge Instructions     After you go home:      You may resume your normal diet.    Care of Puncture Site:      For the first 48 hrs, check your puncture site every couple hours while you are awake     If there is a bandaid - you may remove it tomorrow morning    You may shower tomorrow    No tub baths, whirlpools or swimming until your puncture site has fully healed    Fluid may leak from the site. Change the bandaid as needed - keep the site dry    If the fluid leaks for more than 48 hours, call your ordering provider     Activity:      You may go back to normal activity in 24 hours     Wait 48 hours before lifting, straining, exercise or other strenuous activity    Medicines:      You may resume all your medications    For minor pain, you may take Acetaminophen (Tylenol) or Ibuprofen (Advil)            Call the provider who ordered this procedure if:      The site is red, swollen, hot or tender    Blood or fluid is draining from the site    Chills or a fever  "greater than 101 F (38 C)    Pain that is getting worse    Leaking from the site that does not stop    Any questions or concerns      If you have questions call:        [facility] Radiology Dept @ [phone]      The provider who performed your procedure was _________________.       Additional Information About Your Visit        MyChart Information     [employer] gives you secure access to your electronic health record. If you see a primary care provider, you can also send messages to your care team and make appointments. If you have questions, please call your primary care clinic.  If you do not have a primary care provider, please call [phone] and they will assist you.        Care EveryWhere ID     This is your Care EveryWhere ID. This could be used by other organizations to access your [facility] medical records  [id number]        Your Vitals Were     Blood Pressure Pulse Temperature Respirations Height Weight    135/77 (BP Location: Right arm) 124 98.9  F (37.2  C) (Oral) 16 1.626 m (5' 4\") 68 kg (150 lb)    Pulse Oximetry BMI (Body Mass Index)                96% 25.75 kg/m2           Primary Care Provider Office Phone # Fax #    Gay Amaris Winslow, [facility] [phone]      Equal Access to Services     NADINE ESCALANTE AH: Hadii panda ku hadasho Soomaali, waaxda luqadaha, qaybta kaalmada adeegyada, waxay angelica hayphuc morgan. So [facility] [phone].    ATENCIÓN: Si habla español, tiene a valente disposición servicios gratuitos de asistencia lingüística. Llame al [phone].    We comply with applicable federal civil rights laws and [place] laws. We do not discriminate on the basis of race, color, national origin, age, disability, sex, sexual orientation, or gender identity.            Thank you!     Thank you for choosing [facility] for your care. Our goal is always to provide you with excellent care. Hearing back from our patients is one way we can continue to improve our " services. Please take a few minutes to complete the written survey that you may receive in the mail after you visit with us. Thank you!             Medication List: This is a list of all your medications and when to take them. Check marks below indicate your daily home schedule. Keep this list as a reference.      Medications           Morning Afternoon Evening Bedtime As Needed    COLACE PO   Take 100 mg by mouth                                enoxaparin 60 MG/0.6ML injection   Commonly known as:  LOVENOX   Inject 0.6 mLs (60 mg) Subcutaneous every 12 hours for 14 days                                guaiFENesin 20 mg/mL Soln solution   Commonly known as:  ROBITUSSIN   Take 20 mLs by mouth every 4 hours as needed for cough                                HYDROmorphone 4 MG tablet   Commonly known as:  DILAUDID   Take 1 tablet (4 mg) by mouth every 3 hours as needed for moderate to severe pain                                LORazepam 0.5 MG tablet   Commonly known as:  ATIVAN   Take 1-2 tablets (0.5-1 mg) by mouth every 6 hours as needed (Breakthrough Nausea / Vomiting)                                mirtazapine 7.5 MG Tabs tablet   Commonly known as:  REMERON   Take 1 tablet (7.5 mg) by mouth At Bedtime                                oxyCODONE 40 MG 12 hr tablet   Commonly known as:  OxyCONTIN   Take 1 tablet (40 mg) by mouth every 8 hours                                pantoprazole 40 MG EC tablet   Commonly known as:  PROTONIX   Take 1 tablet (40 mg) by mouth 2 times daily Take 30-60 minutes before a meal.                                polyethylene glycol Packet   Commonly known as:  MIRALAX/GLYCOLAX   Take 1 packet by mouth daily                                prochlorperazine 10 MG tablet   Commonly known as:  COMPAZINE   Take 1 tablet (10 mg) by mouth every 6 hours as needed for nausea or vomiting                                valACYclovir 500 MG tablet   Commonly known as:  VALTREX   TAKE ONE TABLET BY MOUTH  ONE TIME DAILY                                ZYPREXA PO   Take 2.5 mg by mouth every morning

## 2018-10-18 NOTE — PROGRESS NOTES
Admitted to care suites ambulatory for thoracentesis. Orders placed since this was added on late yesterday. Wife at bedside.  1330 Reviewed AVS with patient and his spouse.

## 2018-10-18 NOTE — PROGRESS NOTES
RADIOLOGY PROCEDURE NOTE  Patient name: Ernie Song  MRN: [id number]  : 1980    Pre-procedure diagnosis: Pleural effusion  Post-procedure diagnosis: Same    Procedure Date/Time: 2018  2:05 PM  Procedure: Left thoracentesis  Estimated blood loss: None  Specimen(s) collected with description: pleural fluid  The patient tolerated the procedure well with no immediate complications.  Significant findings:none    See imaging dictation for procedural details.    Provider name: Reggie Ruiz  Assistant(s):None  \

## 2018-10-18 NOTE — DISCHARGE INSTRUCTIONS
Thoracentesis Discharge Instructions     After you go home:      You may resume your normal diet    Care of Puncture Site:      For the first 48 hrs, check your puncture site every couple hours while you are awake     If there is a bandaid - you may remove it tomorrow morning    You may shower tomorrow    No tub baths, whirlpools or swimming until your puncture site has fully healed     Activity:      You may go back to normal activity in 24 hours    Wait 48 hours before lifting, straining, exercise or other strenuous activity    Medicines:      You may resume all your medications    For minor pain, you may take Acetaminophen (Tylenol) or Ibuprofen (Advil)            Call the provider who ordered this procedure if:      The site is red, swollen, hot or tender    Blood or fluid is draining from the site    You have chills or a fever greater than 101 F (38 C)    Shortness of breath    Pain that is getting worse    Any questions or concerns      Additional Information:      During this test, a needle will sometimes enter the lung. This can cause the lung to collapse - called a pneumothorax. Symptoms include severe chest pain, breathing problems or increased blood in your sputum (phlegm).     Call  911 or go to the Emergency Room if:      Severe chest pain or trouble breathing    Increased blood in your sputum (phlegm)    Bleeding that you cannot control      If you have questions call:        [facility] Radiology Dept @ [phone]      The provider who performed your procedure was _________________.

## 2018-10-18 NOTE — PROGRESS NOTES
Patient discharged to home by wheelchair at 3:36 PM 10/18/18.  Medication regimen discussed with patient and patient verbalizes understanding.  Diet and activity and wound care discussed with patient.  Upcoming appointments reviewed.  No questions at this time. CXR free from pneumothorax.

## 2018-10-18 NOTE — IP AVS SNAPSHOT
MRN:[id number]                      After Visit Summary   10/18/2018    Ernie Song    MRN: [id number]           Visit Information        Department      10/18/2018 12:22 PM [facility]          Review of your medicines      UNREVIEWED medicines. Ask your doctor about these medicines        Dose / Directions    COLACE PO        Dose:  100 mg   Take 100 mg by mouth   Refills:  0       enoxaparin 60 MG/0.6ML injection   Commonly known as:  LOVENOX   Used for:  Other chronic pulmonary embolism without acute cor pulmonale (H)        Dose:  60 mg   Inject 0.6 mLs (60 mg) Subcutaneous every 12 hours for 14 days   Quantity:  28 Syringe   Refills:  1       guaiFENesin 20 mg/mL Soln solution   Commonly known as:  ROBITUSSIN        Dose:  20 mL   Take 20 mLs by mouth every 4 hours as needed for cough   Refills:  0       HYDROmorphone 4 MG tablet   Commonly known as:  DILAUDID   Used for:  Gastric adenocarcinoma (H)        Dose:  4 mg   Take 1 tablet (4 mg) by mouth every 3 hours as needed for moderate to severe pain   Quantity:  40 tablet   Refills:  0       LORazepam 0.5 MG tablet   Commonly known as:  ATIVAN   Used for:  Gastric adenocarcinoma (H)        Dose:  0.5-1 mg   Take 1-2 tablets (0.5-1 mg) by mouth every 6 hours as needed (Breakthrough Nausea / Vomiting)   Quantity:  60 tablet   Refills:  0       mirtazapine 7.5 MG Tabs tablet   Commonly known as:  REMERON   Used for:  Reactive depression        Dose:  7.5 mg   Take 1 tablet (7.5 mg) by mouth At Bedtime   Quantity:  30 tablet   Refills:  1       oxyCODONE 40 MG 12 hr tablet   Commonly known as:  OxyCONTIN   Used for:  Gastric adenocarcinoma (H)        Dose:  40 mg   Take 1 tablet (40 mg) by mouth every 8 hours   Quantity:  50 tablet   Refills:  0       pantoprazole 40 MG EC tablet   Commonly known as:  PROTONIX   Used for:  Gastric ulcer, unspecified chronicity, unspecified whether gastric ulcer hemorrhage or  perforation present        Dose:  40 mg   Take 1 tablet (40 mg) by mouth 2 times daily Take 30-60 minutes before a meal.   Quantity:  180 tablet   Refills:  1       polyethylene glycol Packet   Commonly known as:  MIRALAX/GLYCOLAX        Dose:  1 packet   Take 1 packet by mouth daily   Refills:  0       prochlorperazine 10 MG tablet   Commonly known as:  COMPAZINE   Used for:  Nausea        Dose:  10 mg   Take 1 tablet (10 mg) by mouth every 6 hours as needed for nausea or vomiting   Quantity:  60 tablet   Refills:  3       valACYclovir 500 MG tablet   Commonly known as:  VALTREX   Used for:  Genital herpes simplex, unspecified site        TAKE ONE TABLET BY MOUTH ONE TIME DAILY   Quantity:  90 tablet   Refills:  3       ZYPREXA PO        Dose:  2.5 mg   Take 2.5 mg by mouth every morning   Refills:  0                Protect others around you: Learn how to safely use, store and throw away your medicines at www.disposemymeds.org.         Follow-ups after your visit        Your next 10 appointments already scheduled     Oct 18, 2018  2:00 PM CDT   US THORACENTESIS with SHUS4,  IMAGING NURSE,  BODY RAD   [facility]ound ([facility])    [address] [place] [id number] [phone]           How do I prepare for my exam? (Food and drink instructions) No Food and Drink Restrictions.  How do I prepare for my exam? (Other instructions) IF YOUR DOCTOR ALSO PRESCRIBED SEDATION DURING THE EXAM (medicine to help you relax): You will receive separate instructions about driving, eating, and additional tests that may be necessary prior to your exam day.  What should I wear: Wear comfortable clothes.  How long does the exam take: Aspiration (no sedation treatment takes about an hour.  For paracentesis, thoracentesis or sedation plan to spend at least three hours at the hospital.  What should I bring: Bring a list of your medicines, including vitamins, minerals and over-the-counter  drugs. It is safest to leave personal items at home.  Do I need a :  No  is needed.  What do I need to tell my doctor: Tell your doctor in advance: * If you are or may be pregnant. * If you are taking Coumadin (or any other blood thinners) 5 days prior to the exam for any special instructions. * If you are diabetic to determine if your insulin needs have to be adjusted for the exam.  What should I do after the exam: Take it easy the rest of the day. You can return to normal activities the next day.  What is this test: This test uses a long, thin needle or tube to remove tissue, fluid, or other cells from your body. Pictures from an ultrasound will guide the needle to the right place. (Ultrasound uses sound waves to create pictures of the body on a video screen. You will not feel the sound waves.)  * A biopsy removes tissue or other cells from the body; we send the tissue or cells to a lab for testing. * Paracentesis removes fluid from the belly (abdomen) to relieve pressure, to test the fluid or both. * Thoracentesis removes fluid from the sac around the lungs to relieve pressure, to test the fluid or both. * Aspiration removes fluid from any part of the body, then the fluid is tested for disease or infection.  Who should I call with questions: If you have any questions, please call the Imaging Department where you will have your exam. Directions, parking instructions, and other information is available on our website, [email or web address].            Oct 22, 2018 12:00 PM CDT   (Arrive by 10:30 AM)   IR INTRAPERITONEAL CATH TUNNEL ASCITES with SHIR1   [facility] Interventional Radiology ([facility])    [address] [place] [id number] [phone]           The day before the exam:   You may eat your regular diet.   You are encouraged to drink at least 8 eight ounce glasses of clear liquids.  Please wear loose clothing, such as a sweat suit or jogging clothes.  Avoid snaps, zippers and other metal. We may ask you to undress and put on a hospital gown.  Please bring any scans or X-rays taken at other hospitals, if similar tests were done. Also bring a list of your medicines, including vitamins, minerals and over-the-counter drugs. It is safest to leave personal items at home.  Someone will need to drive you to and from the hospital.  Tell your doctor in advance:   If you have allergies to x-ray contrast or iodine.   If you are or may be pregnant.   If you are taking Coumadin (or any other blood thinners) 5 days prior to the exam for any special instructions.   If you are diabetic to determine if your insulin needs have to be adjusted for the exam.  Your doctor will:   Need to do a history and physical within 30 days before this procedure.   Obtain necessary laboratory tests prior to the exam (Basic Metabolic Panel, CBCP, PTT and INR)   (No labs needed if you are having a tunneled catheter exchange or removal)  If you were given sedation, you cannot drive for 24 hours after the procedure, and an adult must be with you until then.  If you have any questions, please call the Imaging Department where you will have your exam. Directions, parking instructions, and other information are available on our website, [email or web address].               Care Instructions        Further instructions from your care team       Thoracentesis Discharge Instructions     After you go home:      You may resume your normal diet    Care of Puncture Site:      For the first 48 hrs, check your puncture site every couple hours while you are awake     If there is a bandaid - you may remove it tomorrow morning    You may shower tomorrow    No tub baths, whirlpools or swimming until your puncture site has fully healed     Activity:      You may go back to normal activity in 24 hours    Wait 48 hours before lifting, straining, exercise or other strenuous activity    Medicines:      You may resume all  your medications    For minor pain, you may take Acetaminophen (Tylenol) or Ibuprofen (Advil)            Call the provider who ordered this procedure if:      The site is red, swollen, hot or tender    Blood or fluid is draining from the site    You have chills or a fever greater than 101 F (38 C)    Shortness of breath    Pain that is getting worse    Any questions or concerns      Additional Information:      During this test, a needle will sometimes enter the lung. This can cause the lung to collapse - called a pneumothorax. Symptoms include severe chest pain, breathing problems or increased blood in your sputum (phlegm).     Call  911 or go to the Emergency Room if:      Severe chest pain or trouble breathing    Increased blood in your sputum (phlegm)    Bleeding that you cannot control      If you have questions call:        [facility] Radiology Dept @ [phone]      The provider who performed your procedure was _________________.         Additional Information About Your Visit        MyChart Information     [employer] gives you secure access to your electronic health record. If you see a primary care provider, you can also send messages to your care team and make appointments. If you have questions, please call your primary care clinic.  If you do not have a primary care provider, please call [phone] and they will assist you.        Care EveryWhere ID     This is your Care EveryWhere ID. This could be used by other organizations to access your [facility] medical records  [id number]         Primary Care Provider Office Phone # Fax #    Gay MARILIN Garcia [facility] [phone]      Equal Access to Services     [facility]INAL[facility] : Hadii panda gardiner hadasho Soomaali, waaxda luqadaha, qaybta kaalmada adeegyada, neymar sutton . So [facility] [phone].    ATENCIÓN: Si habla español, tiene a valente disposición servicios gratuitos de asistencia lingüística. Llame al  [phone].    We comply with applicable federal civil rights laws and [place] laws. We do not discriminate on the basis of race, color, national origin, age, disability, sex, sexual orientation, or gender identity.            Thank you!     Thank you for choosing [facility] for your care. Our goal is always to provide you with excellent care. Hearing back from our patients is one way we can continue to improve our services. Please take a few minutes to complete the written survey that you may receive in the mail after you visit with us. Thank you!             Medication List: This is a list of all your medications and when to take them. Check marks below indicate your daily home schedule. Keep this list as a reference.      Medications           Morning Afternoon Evening Bedtime As Needed    COLACE PO   Take 100 mg by mouth                                enoxaparin 60 MG/0.6ML injection   Commonly known as:  LOVENOX   Inject 0.6 mLs (60 mg) Subcutaneous every 12 hours for 14 days                                guaiFENesin 20 mg/mL Soln solution   Commonly known as:  ROBITUSSIN   Take 20 mLs by mouth every 4 hours as needed for cough                                HYDROmorphone 4 MG tablet   Commonly known as:  DILAUDID   Take 1 tablet (4 mg) by mouth every 3 hours as needed for moderate to severe pain                                LORazepam 0.5 MG tablet   Commonly known as:  ATIVAN   Take 1-2 tablets (0.5-1 mg) by mouth every 6 hours as needed (Breakthrough Nausea / Vomiting)                                mirtazapine 7.5 MG Tabs tablet   Commonly known as:  REMERON   Take 1 tablet (7.5 mg) by mouth At Bedtime                                oxyCODONE 40 MG 12 hr tablet   Commonly known as:  OxyCONTIN   Take 1 tablet (40 mg) by mouth every 8 hours                                pantoprazole 40 MG EC tablet   Commonly known as:  PROTONIX   Take 1 tablet (40 mg) by mouth 2 times daily Take 30-60 minutes before a  meal.                                polyethylene glycol Packet   Commonly known as:  MIRALAX/GLYCOLAX   Take 1 packet by mouth daily                                prochlorperazine 10 MG tablet   Commonly known as:  COMPAZINE   Take 1 tablet (10 mg) by mouth every 6 hours as needed for nausea or vomiting                                valACYclovir 500 MG tablet   Commonly known as:  VALTREX   TAKE ONE TABLET BY MOUTH ONE TIME DAILY                                ZYPREXA PO   Take 2.5 mg by mouth every morning

## 2018-10-18 NOTE — IP AVS SNAPSHOT
[place] [address]    [place] [address]    Phone:  [phone]                                       After Visit Summary   10/18/2018    Ernie Song    MRN: [id number]           After Visit Summary Signature Page     I have received my discharge instructions, and my questions have been answered. I have discussed any challenges I see with this plan with the nurse or doctor.    ..........................................................................................................................................  Patient/Patient Representative Signature      ..........................................................................................................................................  Patient Representative Print Name and Relationship to Patient    ..................................................               ................................................  Date                                   Time    ..........................................................................................................................................  Reviewed by Signature/Title    ...................................................              ..............................................  Date                                               Time          22EPIC Rev 08/18

## 2018-10-18 NOTE — PROGRESS NOTES
Pt admitted for thoracentesis,VSS, 700 ml of bloody fluid  removed from left side of chest, pt unable to complete due to increasing pain/coughing. Left chest site CDI with bandaid. Pt taken for CXR then returned to room via cart.

## 2018-10-22 NOTE — PROGRESS NOTES
Mr Song is here today to have a tunneled abdominal catheter placed for recurrent ascites. He has a recent diagnosis of gastric cancer c/b initially R pleural effusions. He had a pleurX placed on the right 9/23/18 which has not drained in a few tries. He has required a couple left thoras and maryann. The maryann have been for 1-2 L at a time. The left thoras have been 700-1300 mL.     He is feeling SOB today and according to his wife he never felt relief from the thora done on the Left last Friday. 700 mL was removed at that time. Per the note from that procedure 700 mL was drained before Ernie felt pain and the thora was stopped. There is still fluid left. Today the abdominal pleurX was placed and 400 mL removed and the patient also had 700 mL removed from the left pleural space. He again had pain after 700 mL was removed the the thora was stopped. He may have a trapped lung on the left which will not inflate with suction of fluid around it causing pain.     He felt slightly better (less SOB) today after his procedure. His HR has been consistently tachycardic today. Per his wife this has been normal for a while, at home also. HR in the 110s-120. He hardly eats. She can get him to drink some. He is being seen by home care tomorrow and coming in for chemo at [facility] on Wednesday. He had some abdominal pain from the catheter which has been followed well by the care suites RN and has been improving with his home medications given to him by his wife. A message was left with Gm Jones NP to discuss but not returned yet.     If the right catheter remains to not drain could consider a CT chest to evaluate the drain placement and fluid amount on the Right.  Could also consider placing a left pleurX.     Will continue to follow as needed.     Thanks [facility] Interventional Radiology CNP ([phone]) (phone [phone])       Temp:  [98.5  F (36.9  C)] 98.5  F (36.9  C)  Pulse:  [120] 120  Heart Rate:  [112-124]  120  Resp:  [18-32] 20  BP: (122-147)/() 124/80  SpO2:  [92 %-100 %] 93 %

## 2018-10-22 NOTE — IP AVS SNAPSHOT
MRN:[id number]                      After Visit Summary   10/22/2018    Ernie Song    MRN: [id number]           Visit Information        Department      10/22/2018 10:24 AM [facility]          Review of your medicines      UNREVIEWED medicines. Ask your doctor about these medicines        Dose / Directions    COLACE PO        Dose:  100 mg   Take 100 mg by mouth   Refills:  0       enoxaparin 60 MG/0.6ML injection   Commonly known as:  LOVENOX   Used for:  Other chronic pulmonary embolism without acute cor pulmonale (H)        Dose:  60 mg   Inject 0.6 mLs (60 mg) Subcutaneous every 12 hours for 14 days   Quantity:  28 Syringe   Refills:  1       guaiFENesin 20 mg/mL Soln solution   Commonly known as:  ROBITUSSIN        Dose:  20 mL   Take 20 mLs by mouth every 4 hours as needed for cough   Refills:  0       HYDROmorphone 4 MG tablet   Commonly known as:  DILAUDID   Used for:  Gastric adenocarcinoma (H)        Dose:  4 mg   Take 1 tablet (4 mg) by mouth every 3 hours as needed for moderate to severe pain   Quantity:  40 tablet   Refills:  0       LEXAPRO PO        Take by mouth daily   Refills:  0       LORazepam 0.5 MG tablet   Commonly known as:  ATIVAN   Used for:  Gastric adenocarcinoma (H)        Dose:  0.5-1 mg   Take 1-2 tablets (0.5-1 mg) by mouth every 6 hours as needed (Breakthrough Nausea / Vomiting)   Quantity:  60 tablet   Refills:  0       oxyCODONE 40 MG 12 hr tablet   Commonly known as:  OxyCONTIN   Used for:  Gastric adenocarcinoma (H)        Dose:  40 mg   Take 1 tablet (40 mg) by mouth every 8 hours   Quantity:  50 tablet   Refills:  0       pantoprazole 40 MG EC tablet   Commonly known as:  PROTONIX   Used for:  Gastric ulcer, unspecified chronicity, unspecified whether gastric ulcer hemorrhage or perforation present        Dose:  40 mg   Take 1 tablet (40 mg) by mouth 2 times daily Take 30-60 minutes before a meal.   Quantity:  180 tablet    Refills:  1       polyethylene glycol Packet   Commonly known as:  MIRALAX/GLYCOLAX        Dose:  1 packet   Take 1 packet by mouth daily   Refills:  0       prochlorperazine 10 MG tablet   Commonly known as:  COMPAZINE   Used for:  Nausea        Dose:  10 mg   Take 1 tablet (10 mg) by mouth every 6 hours as needed for nausea or vomiting   Quantity:  60 tablet   Refills:  3       valACYclovir 500 MG tablet   Commonly known as:  VALTREX   Used for:  Genital herpes simplex, unspecified site        TAKE ONE TABLET BY MOUTH ONE TIME DAILY   Quantity:  90 tablet   Refills:  3       ZYPREXA PO        Dose:  2.5 mg   Take 2.5 mg by mouth every morning   Refills:  0                Protect others around you: Learn how to safely use, store and throw away your medicines at www.disposemymeds.org.         Follow-ups after your visit        Your next 10 appointments already scheduled     Oct 22, 2018 12:00 PM CDT   (Arrive by 10:30 AM)   IR INTRAPERITONEAL CATH TUNNEL ASCITES with SHIR1   [facility] Interventional Radiology ([facility])    [address] [place] [id number] [phone]           The day before the exam:   You may eat your regular diet.   You are encouraged to drink at least 8 eight ounce glasses of clear liquids.  Please wear loose clothing, such as a sweat suit or jogging clothes. Avoid snaps, zippers and other metal. We may ask you to undress and put on a hospital gown.  Please bring any scans or X-rays taken at other hospitals, if similar tests were done. Also bring a list of your medicines, including vitamins, minerals and over-the-counter drugs. It is safest to leave personal items at home.  Someone will need to drive you to and from the hospital.  Tell your doctor in advance:   If you have allergies to x-ray contrast or iodine.   If you are or may be pregnant.   If you are taking Coumadin (or any other blood thinners) 5 days prior to the exam for any special  instructions.   If you are diabetic to determine if your insulin needs have to be adjusted for the exam.  Your doctor will:   Need to do a history and physical within 30 days before this procedure.   Obtain necessary laboratory tests prior to the exam (Basic Metabolic Panel, CBCP, PTT and INR)   (No labs needed if you are having a tunneled catheter exchange or removal)  If you were given sedation, you cannot drive for 24 hours after the procedure, and an adult must be with you until then.  If you have any questions, please call the Imaging Department where you will have your exam. Directions, parking instructions, and other information are available on our website, [email or web address].               Care Instructions        Further instructions from your care team       Pleurx Cath Insertion Discharge Instructions - Peritoneal    After you go home:      You may resume your normal diet    Have an adult stay with you for 6 hours if you received sedation       For 24 hours - due to the sedation you received:    Relax and take it easy    Do NOT make any important or legal decisions    Do NOT drive or operate machines at home or at work    Do NOT drink alcohol    Care of Abdominal Tube Site:      Keep the dressing clean & dry    Check the skin around the tube for signs of infection -  redness, swelling, drainage or tenderness - when you change your dressing    Do not allow the catheter to soak underwater in a tub, bath, pool, etc to prevent infection.    If you shower, place a waterproof cover over the dressing (such as plastic wrap)    You may take a bath if the water stays below your waist. Sponge bathe your upper body to keep the dressing from getting wet     Check your catheter every day:      Make sure the clamp on the tubing is securely closed     Check that the cap is tight     Your catheter is not likely to fall out. It is secured with stitches to your skin.  Also, a small cuff under the skin helps to hold the  catheter in place.  If the catheter does fall out, put firm pressure on the exit site with a clean gauze & seek medical attention immediately    Activity       You may go back to normal activity in 24 hours     No lifting, straining, exercise or other strenuous activity while tube is in place    Medicines:      You may resume all medications    For minor pain, you may take Acetaminophen (Tylenol) or Ibuprofen (Advil)                Call your provider, hospice or home health nurse if:      Blood or fluid is draining from around the site    The site is red, swollen, hot or tender    Chills or a fever greater than 101 F (38 C)    Pain that is getting worse    The abdominal tube falls out    Any questions or concerns    Call  911 or go to the Emergency Room if:      Severe pain or trouble breathing    Bleeding that you cannot control    Other Information:      Take your temperature daily     See video at [email or web address] for how to use drainage tube    Call your provider, hospice or home health nurse for drainage questions and/or supplies          The provider who performed your procedure was Dr. Brewer__________.        For issues with your tube, please call:         [facility] Dept at [phone]    Mon-Fri 7:00 am - 4:30 pm         Additional Information About Your Visit        Api[employer] Information     [employer] gives you secure access to your electronic health record. If you see a primary care provider, you can also send messages to your care team and make appointments. If you have questions, please call your primary care clinic.  If you do not have a primary care provider, please call [phone] and they will assist you.        Care EveryWhere ID     This is your Care EveryWhere ID. This could be used by other organizations to access your [facility] medical records  [id number]        Your Vitals Were     Blood Pressure Pulse Temperature Respirations Height Weight    139/87 (BP  "Location: Right arm) 120 98.5  F (36.9  C) (Oral) 18 1.626 m (5' 4\") 67.1 kg (148 lb)    Pulse Oximetry BMI (Body Mass Index)                94% 25.4 kg/m2           Primary Care Provider Office Phone # Fax #    MARILIN Le[facility] [phone]      Equal Access to Services     NADINE ESCALANTE : Hadii aad ku hadasho Soomaali, waaxda luqadaha, qaybta kaalmada adeegyada, waxay idiin hayaan adeeg kharash la'aan . So [facility] [phone].    ATENCIÓN: Si debbie castillo, tiene a valente disposición servicios gratuitos de asistencia lingüística. Llame al [phone].    We comply with applicable federal civil rights laws and [place] laws. We do not discriminate on the basis of race, color, national origin, age, disability, sex, sexual orientation, or gender identity.            Thank you!     Thank you for choosing [facility] for your care. Our goal is always to provide you with excellent care. Hearing back from our patients is one way we can continue to improve our services. Please take a few minutes to complete the written survey that you may receive in the mail after you visit with us. Thank you!             Medication List: This is a list of all your medications and when to take them. Check marks below indicate your daily home schedule. Keep this list as a reference.      Medications           Morning Afternoon Evening Bedtime As Needed    COLACE PO   Take 100 mg by mouth                                enoxaparin 60 MG/0.6ML injection   Commonly known as:  LOVENOX   Inject 0.6 mLs (60 mg) Subcutaneous every 12 hours for 14 days                                guaiFENesin 20 mg/mL Soln solution   Commonly known as:  ROBITUSSIN   Take 20 mLs by mouth every 4 hours as needed for cough                                HYDROmorphone 4 MG tablet   Commonly known as:  DILAUDID   Take 1 tablet (4 mg) by mouth every 3 hours as needed for moderate to severe pain                                LEXAPRO PO   Take by " mouth daily                                LORazepam 0.5 MG tablet   Commonly known as:  ATIVAN   Take 1-2 tablets (0.5-1 mg) by mouth every 6 hours as needed (Breakthrough Nausea / Vomiting)                                oxyCODONE 40 MG 12 hr tablet   Commonly known as:  OxyCONTIN   Take 1 tablet (40 mg) by mouth every 8 hours                                pantoprazole 40 MG EC tablet   Commonly known as:  PROTONIX   Take 1 tablet (40 mg) by mouth 2 times daily Take 30-60 minutes before a meal.                                polyethylene glycol Packet   Commonly known as:  MIRALAX/GLYCOLAX   Take 1 packet by mouth daily                                prochlorperazine 10 MG tablet   Commonly known as:  COMPAZINE   Take 1 tablet (10 mg) by mouth every 6 hours as needed for nausea or vomiting                                valACYclovir 500 MG tablet   Commonly known as:  VALTREX   TAKE ONE TABLET BY MOUTH ONE TIME DAILY                                ZYPREXA PO   Take 2.5 mg by mouth every morning

## 2018-10-22 NOTE — PROGRESS NOTES
Interventional Radiology Intra-procedural Nursing Note    Patient Name: Ernie Song  Medical Record Number: [id number]  Today's Date: October 22, 2018    Start Time: 1230  End of procedure time: 1306  Procedure: Pleurx catheter placement in the left lower abd. Thoracentesis of left pleural space.  Report given to: RN in [facility]  Time pt departs:  1310   : na    Other Notes: Site covered with gauze and tegaderm. Lower abd site with dermabond and tegaderm. Sites CDI. 400ml out of abd pleurx drain. 700ml out of left pleural space. No c/o pain at this time. Pt remains on RA. VSS. No further questions.     LINDSEY NEAL

## 2018-10-22 NOTE — PROGRESS NOTES
1320 Returned from IR per cart. Pleurex drain intact to (L) upper abdominal wall. VSS. Wife at bedside. Sleepy but arouses to verbal stimuli.  1600 Continue to observe for improved pain control. Report pain was #8, currently down to #6. Zakia NP updated. Wife at bedside, taking home narcotics per his schedule. Heart rate has come down from 120's to 116-118. BP stable.  1710 Discharged to home with wife. Port de-accessed. Patient feels his pain is better controlled but is still present.

## 2018-10-22 NOTE — PROGRESS NOTES
Interventional Radiology Pre-Procedure Sedation Assessment   Time of Assessment: 12:00 PM    Expected Level: Moderate Sedation    Indication: Sedation is required for the following type of Procedure: Abdominal tunneled catheter placement and possible left thoracentesisi with moderate IV sedation     Discussed need for possible left thoracentesis with NP Gm Jones who agreed with plan if needed.     Sedation and procedural consent: Risks, benefits and alternatives were discussed with Patient and Spouse    PO Intake: Appropriately NPO for procedure    ASA Class: Class 3 - SEVERE SYSTEMIC DISEASE, DEFINITE FUNCTIONAL LIMITATIONS.    Mallampati: Grade 1:  Soft palate, uvula, tonsillar pillars, and posterior pharyngeal wall visible    Lungs: Lungs clear bilaterally with bases decreased    Heart: Normal heart sounds with tachycardia    History and physical reviewed and no updates needed. I have reviewed the lab findings, diagnostic data, medications, and the plan for sedation. I have determined this patient to be an appropriate candidate for the planned sedation and procedure and have reassessed the patient IMMEDIATELY PRIOR to sedation and procedure.    Regina Barber, MARILIN CNP

## 2018-10-22 NOTE — DISCHARGE INSTRUCTIONS
Pleurx Cath Insertion Discharge Instructions - Peritoneal    After you go home:      You may resume your normal diet    Have an adult stay with you for 6 hours if you received sedation       For 24 hours - due to the sedation you received:    Relax and take it easy    Do NOT make any important or legal decisions    Do NOT drive or operate machines at home or at work    Do NOT drink alcohol    Care of Abdominal Tube Site:      Keep the dressing clean & dry    Check the skin around the tube for signs of infection -  redness, swelling, drainage or tenderness - when you change your dressing    Do not allow the catheter to soak underwater in a tub, bath, pool, etc to prevent infection.    If you shower, place a waterproof cover over the dressing (such as plastic wrap)    You may take a bath if the water stays below your waist. Sponge bathe your upper body to keep the dressing from getting wet     Check your catheter every day:      Make sure the clamp on the tubing is securely closed     Check that the cap is tight     Your catheter is not likely to fall out. It is secured with stitches to your skin.  Also, a small cuff under the skin helps to hold the catheter in place.  If the catheter does fall out, put firm pressure on the exit site with a clean gauze & seek medical attention immediately    Activity       You may go back to normal activity in 24 hours     No lifting, straining, exercise or other strenuous activity while tube is in place    Medicines:      You may resume all medications    For minor pain, you may take Acetaminophen (Tylenol) or Ibuprofen (Advil)                Call your provider, hospice or home health nurse if:      Blood or fluid is draining from around the site    The site is red, swollen, hot or tender    Chills or a fever greater than 101 F (38 C)    Pain that is getting worse    The abdominal tube falls out    Any questions or concerns    Call  911 or go to the Emergency Room if:      Severe  pain or trouble breathing    Bleeding that you cannot control    Other Information:      Take your temperature daily     See video at [email or web address] for how to use drainage tube    Call your provider, hospice or home health nurse for drainage questions and/or supplies          The provider who performed your procedure was Dr. Brewer__________.        For issues with your tube, please call:         [facility] Dept at [phone]    Mon-Fri 7:00 am - 4:30 pm

## 2018-10-22 NOTE — IP AVS SNAPSHOT
[place] [address]    [place] [address]    Phone:  [phone]                                       After Visit Summary   10/22/2018    Ernie Song    MRN: [id number]           After Visit Summary Signature Page     I have received my discharge instructions, and my questions have been answered. I have discussed any challenges I see with this plan with the nurse or doctor.    ..........................................................................................................................................  Patient/Patient Representative Signature      ..........................................................................................................................................  Patient Representative Print Name and Relationship to Patient    ..................................................               ................................................  Date                                   Time    ..........................................................................................................................................  Reviewed by Signature/Title    ...................................................              ..............................................  Date                                               Time          22EPIC Rev 08/18

## 2018-10-25 PROBLEM — D63.8 ANEMIA OF CHRONIC DISEASE: Status: ACTIVE | Noted: 2018-01-01

## 2018-10-25 PROBLEM — R05.9 COUGH: Status: ACTIVE | Noted: 2018-01-01

## 2018-10-25 PROBLEM — D50.9 MICROCYTIC ANEMIA: Status: RESOLVED | Noted: 2018-01-01 | Resolved: 2018-01-01

## 2018-10-25 PROBLEM — K92.1 MELENA: Status: RESOLVED | Noted: 2018-01-01 | Resolved: 2018-01-01

## 2018-10-25 PROBLEM — J91.0 PLEURAL EFFUSION, MALIGNANT (H): Status: RESOLVED | Noted: 2018-01-01 | Resolved: 2018-01-01

## 2018-10-25 NOTE — PHARMACY-VANCOMYCIN DOSING SERVICE
Pharmacy Vancomycin Initial Note  Date of Service 2018  Patient's  1980  38 year old, male    Indication: [facility] Pneumonia    Current estimated CrCl = Estimated Creatinine Clearance: 191.6 mL/min (based on Cr of 0.48).    Creatinine for last 3 days  10/25/2018:  1:34 AM Creatinine 0.48 mg/dL    Recent Vancomycin Level(s) for last 3 days  No results found for requested labs within last 72 hours.      Vancomycin IV Administrations (past 72 hours)      No vancomycin orders with administrations in past 72 hours.                Nephrotoxins and other renal medications (Future)    Start     Dose/Rate Route Frequency Ordered Stop    10/25/18 1100  piperacillin-tazobactam (ZOSYN) 4.5 g vial to attach to  mL bag      4.5 g  over 30 Minutes Intravenous EVERY 6 HOURS 10/25/18 0600      10/25/18 0615  vancomycin (VANCOCIN) 1000 mg in dextrose 5% 200 mL PREMIX      1,000 mg  200 mL/hr over 1 Hours Intravenous EVERY 8 HOURS 10/25/18 0608            Contrast Orders - past 72 hours (72h ago through future)    Start     Dose/Rate Route Frequency Ordered Stop    10/25/18 0337  iopamidol (ISOVUE-370) solution 59 mL      59 mL Intravenous ONCE 10/25/18 0336 10/25/18 0349                Plan:  1.  Start vancomycin  1000 mg IV q8h.   2.  Goal Trough Level: 15-20 mg/L   3.  Pharmacy will check trough levels as appropriate in 1-3 Days.    4. Serum creatinine levels will be ordered daily for the first week of therapy and at least twice weekly for subsequent weeks.    5. Ferris method utilized to dose vancomycin therapy: Method 1    Bam Gallegos

## 2018-10-25 NOTE — LETTER
Transition Communication Hand-off for Care Transitions to Next Level of Care Provider    Name: Ernie Song  : 1980  MRN #: [id number]  Primary Care Provider: Gay Winslow             Primary Care MD Name: Shira,   Primary Clinic: [address] [facility] [address]  Primary Care Clinic Name: [facility]   Reason for Hospitalization:  Bacterial sepsis (H) [A41.9] Loculated pleural effusion [J90]  Admit Date/Time: 10/25/2018  1:23 AM  Discharge Date: 10/27/18  Payor Source: Payor: PREFERREDONE / Plan: [employer] HMO / Product Type: PPO /     Readmission Assessment Measure (LO) Risk Score/category: HIGH    Plan of Care Goals/Milestone Events: Has gastric cancer with mets to lung. Adm with shortness of breath.  New Pleurex catheter placed.   Pleurex not consistently draining properly, but not enough reason to keep in hospital until Monday.         Reason for Communication Hand-off Referral: Fragility  Multiple providers/specialties    Discharge Plan: Discharging Saturday and Dr. Silva's office to contact patient Monday with an IR appointment to check on drain status.  Discharged with the Pleurex / Abn catheter, Home Care RN & OT, and Home O2.  PCP not scheduled due to close Hem/Onc followup within days of discharge.  Concern for non-adherence with plan of care: No    Discharge Needs Assessment:  Needs       Most Recent Value    # of Referrals Placed by [facility] External Care Coordination, Homecare, Specialty Providers, Durable Medical Equipment (DME), Communication hand-offs to next level of Care Providers      Follow-up specialty is recommended: Yes, IR & Hem/Onc    Follow-up plan:    [facility] follow up in [facility]  12:40    Any outstanding tests or procedures:    Procedures     Future Labs/Procedures    Oxygen Adult     Comments:    New Home Oxygen Order 2 liter(s) by nasal cannula continuously with use of portable tank. Expected treatment length is 2 months.. Test on conserving  device as applicable.    Patients who qualify for home O2 coverage under the CMS guidelines require ABG tests or O2 sat readings obtained closest to, but no earlier than 2 days prior to the discharge, as evidence of the need for home oxygen therapy. Testing must be performed while patient is in the chronic stable state. See notes for O2 sats.    I certify that this patient, Ernie Song has been under my care and that I, or a nurse practitioner or physician's assistant working with me, had a face-to-face encounter that meets the face-to-face encounter requirements with this patient on 10/27/2018. The patient, Ernie Song was evaluated or treated in whole, or in part, for the following medical condition, which necessitates the use of the ordered oxygen. Treatment Diagnosis: Malignant pleural effusions     Attending Provider: Nicholas Elias  Physician signature: See electronic signature associated with these discharge orders  Date of Order: October 27, 2018          Referrals     Future Labs/Procedures    Home care nursing referral     Comments:    RN extended hours visit. RN to provide tube site care and management and palliative care.    Your provider has ordered home care nursing services. If you have not been contacted within 2 days of your discharge please call the inpatient department phone number at [phone] .    Home Care OT Referral for Hospital Discharge     Comments:    OT to eval and treat resumption of care  Your provider has ordered home care - occupational therapy. If you have not been contacted within 2 days of your discharge please call the department phone number listed on the top of this document.          Key Recommendations:  Please be aware of your patient's hospitalization and provide clinic support as needed, thank you!      Lorraine Harris RN, BSN  [facility] Care Coordinator   Mobile Phone: [phone]      AVS/Discharge Summary is the source of truth; this is a helpful  guide for improved communication of patient story

## 2018-10-25 NOTE — H&P
Admitted:     10/25/2018      PRIMARY CARE PROVIDER:  Gay Winslow NP      ONCOLOGIST:  Judson Silva MD, [facility]      CHIEF COMPLAINT:  Cough, shortness of breath.      HISTORY OF PRESENT ILLNESS:  Ernie Song is a 38-year-old unfortunate male with gastric cancer with metastasis to pleural/peritoneal cavities, also with chronic pain, malignant effusions, leg edema, PE, iron deficiency anemia, was brought to the ER by EMS for evaluation of shortness of breath and cough.  I discussed with the patient and also Dr. Cruz, ED attending, who evaluated the patient in the ER, and reviewed his multiple admission, discharge summaries and IR visits for further medical information.      The patient reports he has had cough for several weeks but has worsened with yellowish sputum production in the last few days.  He denies fever, chills or worsening of pain.  No nausea or vomiting.  He has abdominal pain which is again unchanged.  Denies chills, diaphoresis.  No urinary symptoms.      The patient was in IR 3 days ago and had tunneled abdominal catheter placed.  He has a prior Pleurx catheter on the right side that was placed on 09/23.  He has had several thoracenteses bilateral and paracentesis.      REVIEW OF SYSTEMS:  All 10-point systems were reviewed and is negative other than mentioned in HPI.      In the ER, the patient was evaluated by Dr. Kacy Cruz.  The patient was tachycardic, but on chart review it looks like he has been tachycardic in 1-teens mostly, though heart rate was up to 120s in the ER.  Lab workup revealed mild hyponatremia and anemia at his baseline.  Procalcitonin was elevated at 0.33.  CT PE study was done which was negative for PE but demonstrated increased left pleural effusion along with improved right effusion.  Also, abdominal ascites, gastric wall thickening, gastrohepatic ligament adenopathy and peritoneal metastatic disease were noted.  Pleural thickening as well was  noted.  Chest x-ray showed no significant change from prior x-ray on 10/18.  Blood cultures were sent, influenza was checked, which was negative.  Vancomycin and Zosyn IV was ordered in ER for presumed pneumonia and he is being admitted for further management.      REVIEW OF SYSTEMS:  All 10-point systems were reviewed and is negative other than mentioned in HPI.  The patient reports he is hungry but feels full after eating very small amount.      PAST MEDICAL HISTORY:   1.  Metastatic gastric cancer with malignant effusions, including ascites and pleural effusions, status post multiple bilateral thoracentesis and paracentesis, now has right Pleurx catheter that was placed on 09/23 and abdominal catheter placed on 10/22.   2.  Hyponatremia.   3.  Pulmonary embolism.   4.  Genital herpes.     5.  Tension headache.   6.  Iron deficiency anemia.   7.  Severe protein malnutrition.      PAST SURGICAL HISTORY:  EGDs, port placement.      FAMILY HISTORY:  Father with prostate cancer.  Mom with asthma.  Maternal uncle had stomach cancer.      SOCIAL HISTORY:  Former smoker, 1 pack per day for 12 years and quit in 2004.  Denies alcohol or recreational drug use.  Lives with his wife.      ALLERGIES:  NO KNOWN DRUG ALLERGIES.      HOME MEDICATIONS:  Medication reconciliation is pending, but per report, he is on following medications:     1.  Lovenox 60 mg subcutaneous every 12 hours.     2.  Colace 100 mg by mouth as needed.   3.  Remeron 7.5 mg by mouth at bedtime.   4.  Hydromorphone 4 mg by mouth every 3 hours as needed for moderate to severe pain.   5.  OxyContin 40 mg p.o. q.8 hours.     6.  Guaifenesin 20 mL by mouth every 4 hours as needed for cough.   7.  Lorazepam 0.5-1 mg by mouth every 6 hours as needed.   8.  Zyprexa 25 mg by mouth every morning.   9.  Protonix 40 mg by mouth twice a day.   10.  Compazine 10 mg by mouth every 6 hours as needed.   11.  Valtrex 500 mg by mouth daily.      PHYSICAL EXAMINATION:    GENERAL:  On examination, patient is awake but dozing off in between conversation, looks malnourished with temporal wasting, he has some congested cough, but he does not appear to be in distress.   VITAL SIGNS:  Blood pressure 132/81, heart rate 123, temperature 95.8, respirations 20, pulse ox 95 on 2 liters nasal cannula.   HEENT:  PERRLA, EOMI.  Oral mucosa moist.   NECK:  Supple.   LUNGS:  Bilateral diminished breath sounds over the bases, coarse.  No wheezing.  He is mildly tachypneic with a respiratory rate in 20s and has shallow breaths.   CARDIOVASCULAR:  S1, S2 regular, tachycardia noted.  No murmur.   ABDOMEN:  Abdomen is distended, firm, mild diffuse tenderness noted, has drainage catheter in the left upper abdomen.   MUSCULOSKELETAL:  Bilateral leg edema with lymphedema wraps in place.   SKIN:  Pale.   NEUROLOGIC:  He is dozing off in between conversation, but wakes up and answers questions appropriately, follows verbal commands appropriately.  Cranial nerves II-XII intact.  Motor strength symmetrical.   Chest has a Pleurx catheter on the right, a Port-A-Cath in left upper chest.      LABORATORY DATA:  Sodium 129, potassium 4.2, chloride 93, bicarbonate 31, BUN 7, creatinine 0.48, albumin 1.8, total protein 6.3.  LFTs normal.  Lactic acid 0.9.  Procalcitonin 0.33, blood sugar 164.  WBC 7.3, hemoglobin 10.2, hematocrit 32, platelet 428,000.  Chest x-ray and CT chest, PE protocol report as above.  Influenza negative.      ASSESSMENT AND PLAN:  Ernie Song is a 38-year-old male with metastatic gastric cancer, PE, malnutrition, hyponatremia and iron deficiency anemia, was brought to the ER by EMS for evaluation of shortness of breath and cough.      1.  Metastatic gastric cancer with malignant effusions, including bilateral pleural effusions and prior thoracentesis, now with suspected pneumonia: The patient has ongoing cough for several weeks, but has a congested cough with yellowish sputum.   Denies fever or chills.  CT chest shows increased loculated effusion on the left.  No obvious consolidation or any postobstructive pneumonia, but procalcitonin is elevated.  He is on chemotherapy and in and out of hospital with invasive procedures, and is at increased risk of infections.  He has been started on vancomycin and Zosyn in the ER after blood culture, which I will continue.  Oncology consulted, already has Pleurx catheter on the right and catheter on his peritoneal space, likely needs drainage of the fluids and send them for Gram stain and culture.  Follow procalcitonin again and based on cultures and pleural fluid/peritoneal fluid analysis will decide if antibiotic needs to be continued after 48-72 hours.  Monitor for fever.  Follow cultures.  Also, we will discuss with Oncology if he should have Pleurx catheter on the left as well.   Supplemental oxygen, incentive spirometry, p.r.n. albuterol and mucolytics are ordered.   2.  Metastatic gastric cancer followed by Dr. Silva from Oncology.  He has a Port-A-Cath on the left chest and he is getting chemotherapy, Oncology has been consulted.   3.  Pulmonary embolism (PE).  The patient is on Lovenox, medication reconciliation is pending but will hold off on anticoagulation now until decision about Pleurx catheter placement on the left.   4.  Severe malnutrition.  Nutrition consult, n.p.o. for now.  Diet to be resumed once decided about Pleurx catheter placement, and needs nutritional supplement as well.   5.  Iron deficiency anemia, suspect due to gastric cancer and could also be due to chemo treatment.  Hemoglobin is stable at his baseline.   6.  Genital herpes.  PTA Valtrex will be continued once medication reconciliation is completed.   7.  Hyponatremia, suspect due to poor intake, could also be SIADH related to malignancy.  Gentle IV hydration.   8.  Bilateral leg edema, likely lymphedema due to ascites and compromised venous return.  He had PE but no DVT  and is already on treatment, so we will not get ultrasound at this point.      CODE STATUS:  Full code.  This was discussed with the patient.  He was evaluated by Palliative Care during his recent hospitalization.  We will consider reconsulting them to discuss goal of care with oncology's input as well.      Care plan was discussed with the patient.      DISPOSITION:  Anticipate 2+ days of hospitalist stay, inpatient orders entered.         CATHERINE ROBLES MD             D: 10/25/2018   T: 10/25/2018   MT: CC      Name:     ROMELIA MARK   MRN:      [id number]        Account:      [id number]   :      1980        Admitted:     10/25/2018                   Document: [id number]       cc: Gay Silva MD

## 2018-10-25 NOTE — PROGRESS NOTES
[facility]  Patient is currently open to home care services with [facility]. The patient is currently receiving RN and OT services. [facility]  and team have been notified of patient admission. [facility] liaison will continue to follow patient during stay. If appropriate provide orders to resume home care at time of discharge.

## 2018-10-25 NOTE — ED PROVIDER NOTES
History     Chief Complaint:  Cough    HPI   Ernie Song is a 38 year old male with PE on Lovenox, metastatic stage IV gastric adenocarcinoma currently receiving chemotherapy (last treatment 10/24/18, discharged home around 1700), and current pleural effusions who presents with a cough and shortness of breath. On 10/22/18, the patient was experiencing shortness of breath and underwent thoracentesis here at [facility] with 700 mL fluid removed from his left pleural space and 400 mL removed from left upper abdominal wall with right PleurX placement before being discharged home. Early this morning around 0100, the patient states he woke up with a cough and had one episode of bile-like emesis thereafter. He notes he continued to cough and feel short of breath and could not get comfortable, so he called EMS to bring him to the ED for further evaluation. Here in the ED, the patient reports he is coughing up yellow phlegm. He denies any chest pain, nausea, fevers, or other acute symptoms.    Allergies:  No known drug allergies    Medications:    Colace  Lovenox  Lexapro  Dilaudid  Ativan  Zyprexa  Oxycodone  Protonix  Miralax  Compazine  Valtrex    Past Medical History:    Gastric adenocarcinoma, stage IV  Bilateral malignant pleural effusions  Pulmonary embolism    Past Surgical History:    Insert port vascular access    Family History:    Prostate cancer  Asthma  Unspecified cancer  Stomach cancer    Social History:  Smoking status: Former smoker, quit 10/15/2004  Alcohol use: No  PCP: Gay Winslow   Oncologist: Dr. Silva through [facility]  Marital Status:  [2]     Review of Systems   Constitutional: Negative for chills and fever.   HENT: Positive for congestion.    Respiratory: Positive for cough and shortness of breath.    Cardiovascular: Negative for chest pain.   Gastrointestinal: Positive for vomiting. Negative for abdominal pain and nausea.   All other systems reviewed and are  "negative.    Physical Exam   Patient Vitals for the past 24 hrs:   BP Temp Temp src Pulse Heart Rate Resp SpO2 Height Weight   10/25/18 0532 - - - - 124 25 96 % - -   10/25/18 0531 (!) 142/97 - - - 123 25 99 % - -   10/25/18 0528 - - - - 117 22 100 % - -   10/25/18 0522 (!) 144/102 - - 117 120 17 98 % - -   10/25/18 0520 (!) 144/102 - - - 118 9 97 % - -   10/25/18 0422 - - - - 107 19 97 % - -   10/25/18 0356 128/87 - - - 109 14 99 % - -   10/25/18 0329 (!) 130/94 97.4  F (36.3  C) Oral 108 110 11 99 % - -   10/25/18 0318 - - - - 108 13 97 % - -   10/25/18 0246 - - - - 120 12 99 % - -   10/25/18 0244 132/79 - - - - - 99 % - -   10/25/18 0215 - - - - 121 12 96 % - -   10/25/18 0200 134/87 - - - 120 (!) 33 98 % - -   10/25/18 0129 134/81 98  F (36.7  C) Oral - 125 21 97 % 1.626 m (5' 4\") 66.2 kg (146 lb)     Physical Exam  Nursing note and vitals reviewed.  Constitutional:  Mild tachypnea.  HENT:   Head:    Atraumatic.   Mouth/Throat:   Oropharynx is clear and moist. No oropharyngeal exudate.   Eyes:    Pupils are equal, round, and reactive to light.   Neck:    Normal range of motion. Neck supple.      No tracheal deviation present. No thyromegaly present.   Cardiovascular:  Normal rate, regular rhythm, no murmur   Pulmonary/Chest: Crackles in right base with diminished breath sounds. Mild tachypnea. Port in left upper chest without any surrounding skin erythema or tenderness.   Abdominal:   Soft. Bowel sounds are normal. Distended abdomen with palpable ascites. Drain in left upper abdominal wall without erythema or swelling at the site. There is no tenderness. There is no rebound and no guarding.   Musculoskeletal:  1+ pitting edema bilaterally.  Lymphadenopathy:  No cervical adenopathy.   Neurological:   Alert and oriented to person, place, and time.   Skin:    Skin is warm and dry. No rash noted. No pallor.     Emergency Department Course   ECG (01:29:32):  Rate 124 bpm. ME interval 140. QRS duration 72. QT/QTc " 318/456. P-R-T axes 36 30 27. Sinus tachycardia. Otherwise normal ECG. Interpreted at 0137 by Kacy Cruz MD.    Imaging:  Radiographic findings were communicated with the patient who voiced understanding of the findings.    Chest XR, PA, & LAT:  No significant change. Moderate left and small right  pleural effusions with mild patchy opacities in the adjacent lower  lungs. This may just be atelectasis, but pneumonia could not be  excluded. Right PleurX catheter in place. Left chest wall port with  catheter tip in the SVC. Stable heart size.  As read by Radiology.    Chest CT, IV contrast only - PE protocol:  1. No visualized pulmonary embolism.  2. Right pleural effusion has significantly decreased. Right PleurX  catheter in place. Left pleural effusion is slightly loculated and  mildly increased. Moderate left base atelectasis.  3. Increased mediastinal adenopathy.  4. Again seen is upper abdominal ascites, gastric wall thickening,  gastrohepatic ligament adenopathy and peritoneal metastatic disease  all consistent with the patient's known malignancy.  As read by Radiology.    Laboratory:  ISTAT gases lactate: pH 7.34, pCO2 63 (H), pO2 66 (H), bicarbonate 33 (H), O2 sat 91, lactic acid 0.9  CBC: HGB 10.2 (L), o/w WNL (WBC 7.3, )  CMP: Sodium 129 (L), Chloride 93 (L), Glucose 164 (H), Creatinine 0.48 (L), Calcium 8.3 (L), Albumin 1.8 (L), Protein total 6.3 (L), o/w WNL  Procalcitonin: 0.33  Blood cultures: In process  Influenza A/B antigen: Influenza A negative, Influenza B negative    Interventions:  0331:  mL IV Bolus  0442: 4.5 g Zosyn IV  0508:  mL IV Bolus  0529: DuoNeb, 2.5mg/3 mL, Inhalation solution, Nebulizer     Emergency Department Course:  The patient arrived in the emergency department via EMS.  Past medical records, nursing notes, and vitals reviewed.  0138: I performed an exam of the patient and obtained history, as documented above. Patient placed on supplemental oxygen  given his low oxygen saturations on room air.  ECG performed, results above.  IV inserted and blood drawn. Influenza screening performed, results above.  The patient was sent for a chest x-ray while in the emergency department, findings above.    0330: I rechecked and re-examined the patient. He will be sent for a chest CT, findings above.    0442: I rechecked the patient again and explained the plan.    0502: I spoke to Dr. Hearn of the hospitalist service who accepts the patient for admission.     0520: Patient is more short of breath now, so we will give him a Duoneb.    0527: I rechecked and re-examined the patient.    Findings and plan explained to the patient who consents to admission. Discussed the patient with Dr. Hearn, who will admit the patient to an oncology bed with telemetry for further monitoring, evaluation, and treatment.     Impression & Plan    CMS Diagnoses:   The patient has signs of Sepsis as evidenced by:    1. 2 SIRS criteria, AND  2. Suspected infection, AND   3. Organ dysfunction: Elevated Procalcitonin    Time severe sepsis diagnosis confirmed = 0429 as this was the time when chest CT returned revealing pneumonia.    3 Hour Sepsis Bundle Completion:  1. Initial Lactic Acid Result:   Recent Labs   Lab Test  10/25/18   0138  10/07/18   2303  09/23/18   0721   LACT  0.9  0.7  1.1     2. Blood Cultures before Antibiotics: Yes  3. Broad Spectrum Antibiotics Administered: Yes - Zosyn (given in ED) and Vancomycin (to be given on admission)     4. 500 ml of IV fluids.  Ideal body weight: 59.2 kg (130 lb 8.2 oz)  Adjusted ideal body weight: 62 kg (136 lb 11.3 oz)    Sepsis reassessment:  1. Initial lactic acid was not elevated. Procalcitonin is elevated. Chest CT confirms infection. Patient remains tachycardic. He has had no fevers while here in the ED.  2. Vasopressors were not required during this patient's ED course.  I attest to having performed a repeat sepsis exam and assessment of  perfusion at 0442 and the results demonstrate no change.    Medical Decision Making:  Ernie Song is a 38 year old male who presents to the ED for evaluation of a cough. I found the patient to have sepsis syndrome, which I feel is due to a pulmonary infection with a loculated pleural effusion. He had no sign of pulmonary embolism on his chest CT. The fact that he underwent thoracentesis recently makes him at increased risk for infection as well as the fact that he is immunocompromised as a cancer patient currently undergoing chemotherapy. Therefore, he was treated with strong, broad-spectrum antibiotics, Vancomycin and Zosyn, and admitted to the oncology/telemetry floor under the care of Dr. Hearn.    Diagnosis:    ICD-10-CM   1. Bacterial sepsis (H) A41.9   2. Loculated pleural effusion J90     Disposition: Admitted to oncology with telemetry    Tori Wylie  10/25/2018    EMERGENCY DEPARTMENT    ITori, am serving as a scribe at 1:38 AM on 10/25/2018 to document services personally performed by Kacy Cruz MD based on my observations and the provider's statements to me.      Kacy Cruz MD  10/25/18 0642

## 2018-10-25 NOTE — PROVIDER NOTIFICATION
RECEIVING UNIT ED HANDOFF REVIEW    ED Nurse Handoff Report was reviewed by: VINAY VANG on October 25, 2018 at 5:38 AM

## 2018-10-25 NOTE — ED NOTES
[facility]  ED Nurse Handoff Report    ED Chief complaint: Cough (pt woke up this morning with a cough, vomited bile. pt reports SOB. no fever. pt's oxygen saturation on room air 86%. pt being treated for stomach cancer, has continuous chemo through port)      ED Diagnosis:   Final diagnoses:   None       Code Status: Full Code    Allergies: No Known Allergies    Activity level - Baseline/Home:  Independent    Activity Level - Current:   Independent     Needed?: No    Isolation: No- chemo therapy precautions   Infection: Not Applicable  Bariatric?: No    Vital Signs:   Vitals:    10/25/18 0318 10/25/18 0329 10/25/18 0356 10/25/18 0422   BP:  (!) 130/94 128/87    Pulse:  108     Resp: 13 11 14 19   Temp:  97.4  F (36.3  C)     TempSrc:  Oral     SpO2: 97% 99% 99% 97%   Weight:       Height:           Cardiac Rhythm: ,   Cardiac  Cardiac Rhythm: Sinus tachycardia    Pain level:      Is this patient confused?: No   [place] - Suicide Severity Rating Scale Completed?  Yes  If yes, what color did the patient score?  White    Patient Report: Initial Complaint: SOB, cough  Focused Assessment: SOB and cough with yellow sputum that began this morning. Patient hypoxic on room air at 86%. Pt being treated for stomach cancer- gets continuous chemotherapy through port to left chest. Patient on 2L oxygen via nasal cannula. Pt afebrile.  Tests Performed:   Results for orders placed or performed during the hospital encounter of 10/25/18   Chest XR,  PA & LAT    Narrative    XR CHEST 2 VW  10/25/2018 2:44 AM     INDICATION: Cough and shortness of breath - check for pneumonia or  recurrent pleural effusion - compare with most recent chest x-ray -  patient has gastric cancer with recurrent pleural effusions and right  PleurX catheter.    COMPARISON: Chest x-ray 10/18/2018. CT scan of the chest, abdomen and  pelvis dated 9/23/2018.      Impression    IMPRESSION: No significant change. Moderate left and small  right  pleural effusions with mild patchy opacities in the adjacent lower  lungs. This may just be atelectasis, but pneumonia could not be  excluded. Right PleurX catheter in place. Left chest wall port with  catheter tip in the SVC. Stable heart size.    ESTHELA BAPTISTE MD   Chest CT, IV contrast only - PE protocol    Narrative    CT CHEST PULMONARY EMBOLISM W CONTRAST  10/25/2018 3:52 AM     HISTORY: Known gastric cancer. Check for pulmonary embolism or  pneumonia and check right PleurX drain position. Cough and shortness  of breath.    TECHNIQUE: Volumetric acquisition of the chest after the  administration of 59 mL Isovue-370 IV contrast. Radiation dose for  this scan was reduced using automated exposure control, adjustment of  the mA and/or kV according to patient size, or iterative  reconstruction technique.     COMPARISON: 9/23/2018.    FINDINGS: No visualized pulmonary embolism. Large left pleural  effusion, slightly loculated and mildly increased. Small right pleural  effusion has significantly decreased with a PleurX catheter in place  extending along the medial aspect of the right pleura. There is some  pleural thickening bilaterally which may represent pleural metastatic  disease. Mild right base atelectasis. Moderate volume loss and  consolidation in the left lower lung, likely atelectasis.    Moderate mediastinal adenopathy, increased. Left chest wall port with  catheter tip in the SVC. Normal heart size. Upper abdominal ascites  and peritoneal metastatic disease again seen. Gastric wall thickening  and adenopathy in the upper abdomen most prominent in the  gastrohepatic ligament region, similar to prior study. No destructive  bone lesions.      Impression    IMPRESSION:  1. No visualized pulmonary embolism.  2. Right pleural effusion has significantly decreased. Right PleurX  catheter in place. Left pleural effusion is slightly loculated and  mildly increased. Moderate left base atelectasis.  3.  Increased mediastinal adenopathy.  4. Again seen is upper abdominal ascites, gastric wall thickening,  gastrohepatic ligament adenopathy and peritoneal metastatic disease  all consistent with the patient's known malignancy.    ESTHELA BAPTISTE MD   CBC with platelets + differential   Result Value Ref Range    WBC 7.3 4.0 - 11.0 10e9/L    RBC Count 4.06 (L) 4.4 - 5.9 10e12/L    Hemoglobin 10.2 (L) 13.3 - 17.7 g/dL    Hematocrit 32.0 (L) 40.0 - 53.0 %    MCV 79 78 - 100 fl    MCH 25.1 (L) 26.5 - 33.0 pg    MCHC 31.9 31.5 - 36.5 g/dL    RDW 21.4 (H) 10.0 - 15.0 %    Platelet Count 438 150 - 450 10e9/L    Diff Method Automated Method     % Neutrophils 83.9 %    % Lymphocytes 5.0 %    % Monocytes 10.5 %    % Eosinophils 0.0 %    % Basophils 0.0 %    % Immature Granulocytes 0.6 %    Nucleated RBCs 0 0 /100    Absolute Neutrophil 6.1 1.6 - 8.3 10e9/L    Absolute Lymphocytes 0.4 (L) 0.8 - 5.3 10e9/L    Absolute Monocytes 0.8 0.0 - 1.3 10e9/L    Absolute Eosinophils 0.0 0.0 - 0.7 10e9/L    Absolute Basophils 0.0 0.0 - 0.2 10e9/L    Abs Immature Granulocytes 0.0 0 - 0.4 10e9/L    Absolute Nucleated RBC 0.0    Comprehensive metabolic panel   Result Value Ref Range    Sodium 129 (L) 133 - 144 mmol/L    Potassium 4.2 3.4 - 5.3 mmol/L    Chloride 93 (L) 94 - 109 mmol/L    Carbon Dioxide 31 20 - 32 mmol/L    Anion Gap 5 3 - 14 mmol/L    Glucose 164 (H) 70 - 99 mg/dL    Urea Nitrogen 7 7 - 30 mg/dL    Creatinine 0.48 (L) 0.66 - 1.25 mg/dL    GFR Estimate >90 >60 mL/min/1.7m2    GFR Estimate If Black >90 >60 mL/min/1.7m2    Calcium 8.3 (L) 8.5 - 10.1 mg/dL    Bilirubin Total 0.2 0.2 - 1.3 mg/dL    Albumin 1.8 (L) 3.4 - 5.0 g/dL    Protein Total 6.3 (L) 6.8 - 8.8 g/dL    Alkaline Phosphatase 116 40 - 150 U/L    ALT 23 0 - 70 U/L    AST 19 0 - 45 U/L   Procalcitonin   Result Value Ref Range    Procalcitonin 0.33 ng/ml   ISTAT gases lactate ankur POCT   Result Value Ref Range    Ph Venous 7.34 7.32 - 7.43 pH    PCO2 Venous 63 (H) 40 -  50 mm Hg    PO2 Venous 66 (H) 25 - 47 mm Hg    Bicarbonate Venous 33 (H) 21 - 28 mmol/L    O2 Sat Venous 91 %    Lactic Acid 0.9 0.7 - 2.1 mmol/L   Influenza A/B antigen   Result Value Ref Range    Influenza A/B Agn Specimen Nasal     Influenza A Negative NEG^Negative    Influenza B Negative NEG^Negative     Abnormal Results: see above- SOB, hypoxic and left sided pneumonia  Treatments provided: see MAR    Family Comments: N/A    OBS brochure/video discussed/provided to patient/family: N/A              Name of person given brochure if not patient:               Relationship to patient:     ED Medications:   Medications   piperacillin-tazobactam (ZOSYN) 4.5 g vial to attach to  mL bag (not administered)   azithromycin (ZITHROMAX) 500 mg in sodium chloride 0.9 % 250 mL intermittent infusion (not administered)   0.9% sodium chloride BOLUS (0 mLs Intravenous Stopped 10/25/18 0358)   iopamidol (ISOVUE-370) solution 59 mL (59 mLs Intravenous Given 10/25/18 0349)   Saline flush (85 mLs Intravenous Given 10/25/18 0349)       Drips infusing?:  Yes    For the majority of the shift this patient was Green.   Interventions performed were N/A.    Severe Sepsis OR Septic Shock Diagnosis Present: No    To be done/followed up on inpatient unit:  administer vancomycin    ED NURSE PHONE NUMBER: [phone]

## 2018-10-25 NOTE — PROGRESS NOTES
Interventional Radiology Pre-Procedure Sedation Assessment   Time of Assessment: 3:11 PM    Expected Level: Moderate Sedation    Indication: Sedation is required for the following type of Procedure: Left tunneled chest tube placement (pleurX) and Right pleurX catheter removal with IV moderate sedation    Sedation and procedural consent: Risks, benefits and alternatives were discussed with Patient and Spouse     I consented and answered the questions that Konrad had regarding the procedure. He requested I talk with his spouse Cielo to discuss also. Phone consent was obtained from Cielo at 1500 and consent is in IR    PO Intake: Appropriately NPO for procedure    ASA Class: Class 3 - SEVERE SYSTEMIC DISEASE, DEFINITE FUNCTIONAL LIMITATIONS.    Mallampati: Grade 2:  Soft palate, base of uvula, tonsillar pillars, and portion of posterior pharyngeal wall visible    Lungs: Clear bilaterally in upper lobes, left base decreased coarse, right base decreased     Heart: Normal heart sounds with tachycardia    History and physical reviewed and no updates needed. I have reviewed the lab findings, diagnostic data, medications, and the plan for sedation. I have determined this patient to be an appropriate candidate for the planned sedation and procedure and have reassessed the patient IMMEDIATELY PRIOR to sedation and procedure.    Regina Llanos, APRN CNP

## 2018-10-25 NOTE — PHARMACY-ADMISSION MEDICATION HISTORY
Admission medication history interview status for the 10/25/2018  admission is complete. See EPIC admission navigator for prior to admission medications     Medication history source reliability:Good    Actions taken by pharmacist (provider contacted, etc): Chart review. Face to face interview with patient. Called Pharmacy to clarify a few of his medicaions.     Additional medication history information not noted on PTA med list :None    Medication reconciliation/reorder completed by provider prior to medication history? No    Time spent in this activity: 20 minutes    Prior to Admission medications    Medication Sig Last Dose Taking? Auth Provider   Docusate Sodium (COLACE PO) Take 100 mg by mouth 2 times daily as needed for constipation  Past Week at prn Yes Reported, Patient   enoxaparin (LOVENOX) 60 MG/0.6ML injection Inject 0.6 mLs (60 mg) Subcutaneous every 12 hours for 14 days 10/24/2018 at pm Yes Nicholas Santos MD   Escitalopram Oxalate (LEXAPRO PO) Take 10 mg by mouth daily  10/24/2018 at am Yes Reported, Patient   HYDROmorphone (DILAUDID) 4 MG tablet Take 1 tablet (4 mg) by mouth every 3 hours as needed for moderate to severe pain 10/24/2018 at pm Yes Nicholas Santos MD   OLANZapine (ZYPREXA PO) Take 2.5 mg by mouth every morning 10/24/2018 at am Yes Reported, Patient   oxyCODONE (OXYCONTIN) 40 MG 12 hr tablet Take 1 tablet (40 mg) by mouth every 8 hours 10/24/2018 at hs Yes Nicholas Santos MD   pantoprazole (PROTONIX) 40 MG EC tablet Take 1 tablet (40 mg) by mouth 2 times daily Take 30-60 minutes before a meal. 10/24/2018 at pm Yes Gay Winslow APRN CNP   polyethylene glycol (MIRALAX/GLYCOLAX) Packet Take 1 packet by mouth daily 10/24/2018 at am Yes Reported, Patient   valACYclovir (VALTREX) 500 MG tablet TAKE ONE TABLET BY MOUTH ONE TIME DAILY  10/24/2018 at am Yes Gay Winslow APRN CNP   guaiFENesin (ROBITUSSIN) 20 mg/mL SOLN solution Take 20 mLs by mouth  every 4 hours as needed for cough Unknown at prn  Unknown, Entered By History   LORazepam (ATIVAN) 0.5 MG tablet Take 1-2 tablets (0.5-1 mg) by mouth every 6 hours as needed (Breakthrough Nausea / Vomiting) Unknown at [facility]  MoiseNicholas cifuentes MD   mirtazapine (REMERON) 7.5 MG TABS tablet Take 7.5 mg by mouth At Bedtime   Unknown, Entered By History   prochlorperazine (COMPAZINE) 10 MG tablet Take 1 tablet (10 mg) by mouth every 6 hours as needed for nausea or vomiting Unknown at prn  Nicholas Santos MD

## 2018-10-25 NOTE — IP AVS SNAPSHOT
[address], [address]    [facility] [address]    Phone:  [phone]                                       After Visit Summary   10/25/2018    Ernie Song    MRN: [id number]           After Visit Summary Signature Page     I have received my discharge instructions, and my questions have been answered. I have discussed any challenges I see with this plan with the nurse or doctor.    ..........................................................................................................................................  Patient/Patient Representative Signature      ..........................................................................................................................................  Patient Representative Print Name and Relationship to Patient    ..................................................               ................................................  Date                                   Time    ..........................................................................................................................................  Reviewed by Signature/Title    ...................................................              ..............................................  Date                                               Time          22EPIC Rev 08/18

## 2018-10-25 NOTE — PROCEDURES
RADIOLOGY POST PROCEDURE NOTE    Patient name: Ernie Song  MRN: [id number]  : 1980    Pre-procedure diagnosis: metastatic colon cancer  Post-procedure diagnosis: Same    Procedure Date/Time: 2018  6:43 PM  Procedure:   Placement left tunneled pleural catheter  Removal right tunneled pleural catheter  Estimated blood loss: None  Specimen(s) collected with description: none    The patient tolerated the procedure well with no immediate complications.  Significant findings:none    See imaging dictation for procedural details.    Provider name: Kristopher Hdz  Assistant(s):None

## 2018-10-25 NOTE — CONSULTS
[facility] Consultation    Ernie Song MRN# [id number]   YOB: 1980 Age: 38 year old   Date of Admission: 10/25/2018  Requesting physician: Dr. Hearn  Reason for consult: Recurrent pleural effusion           Assessment and Plan:     Ketan is a 38 year old man admitted 10/25 with cough and shortness of breath    1. Cough and shortness of breath with recurrent pleural effusions  - Ketan has PleurX on the right side  - He has had recurring effusions on the left requiring regular thoracentesis procedures  - CXR 10/25 moderate left and small right pleural effusions with mild patchy opacities in the adjacent lower lungs.  This may be atelectasis, but pneumonia could not be excluded.  There is a right Pleurx catheter in place.  - CT Chest 10/25 no visualized pulmonary embolism.  Large left pleural effusion, slightly loculated and mildly increased.  The small right pleural effusion has significantly decreased with a Pleurx catheter in place.  There is some pleural thickening bilaterally which may represent pleural metastatic disease.  There is moderate volume loss and consolidation in the left lower lung, likely atelectasis.  Moderate mediastinal adenopathy is increased.  There is upper abdominal ascites, gastric wall thickening and gastrohepatic ligament adenopathy as well as peritoneal metastatic disease.  - WBC normal and lactic acid 0.9, but procalcitonin 0.33.  - With recent hospitalizations, he was started on empiric antibiotics with piperacillin/tazobactam and vancomycin  - Discussed with Dr. Silva. He prefers a PleurX on the left. Ketan would like to talk it over with this wife. Pleurodesis not likely to be helpful due to abdominal disease likely the reason for recurring effusions.    2. Metastatic gastric cancer  - Ketan has started cycle 3 FOLFOX  - Continue infusional 5FU until pump is out, then disconnnect  - No chemotherapy side effects thus far  - Continue current analgesic  regimen  - Plan for restaging later in November  - Chemotherapy is unlikely beneficial since there has been no improvement in symptoms    3. Pulmonary emboli  - Found 9/23/2018  - High risk for additional clotting while off of anticoagulation  - LMWH is on hold pending PleurX, but then will need to be resumed    4. Anorexia with weight loss and low albumin  - He is struggling with intake  - Weight is dropping  - Dietician consult    5. Nausea  - Continue PTA antiemetics  - Could add olanzapine which might help with appetite    Gm GASTON, CNP  [facility]  [phone]             Chief Complaint:   Cough (pt woke up this morning with a cough, vomited bile. pt reports SOB. no fever. pt's oxygen saturation on room air 86%. pt being treated for stomach cancer, has continuous chemo through port)         History of Present Illness:   This patient is a 38 year old male admitted 10/25 with cough and shortness of breath    Ketan was seen in the clinic yesterday for cycle 3 FOLFOX chemotherapy.  Dr. Taveras had planned on putting a Pleurx drain on the left lung given his recurrent shortness of breath and pleural effusions.  Overnight he was admitted with increasing cough and shortness of breath.    In the emergency department he underwent lab and imaging studies.  He does have progressive effusion in the left lung on CXR and CT.  There were no new blood clots in the lung.  It appears that his metastatic disease is slightly worse as well.    This morning he feels short of breath.  He is uncertain about having another Pleurx drain given the fact that he already has 2 (one in the right lung and one in his abdomen).  Nausea is fairly well controlled.  Pain is stable.    ONCOLOGY HISTORY:  He presented to the hospital early September 2018 with history of a few years of abdominal pain.  He reported having an episode of nausea followed by hematemesis end of February 2018 and underwent EGD under the care of Dr. Ibrahim on  2/26/2018.  The study demonstrated nodular mucosa in the gastric body and the gastric antrum with oozing gastric ulcers in the lesser curvature of the stomach.  Biopsies from the gastric body and the antrum were reported as showing no evidence of dysplasia or malignancy at that time but did show H pylori with chronic active gastritis.  He was placed on proton pump inhibitor therapy.    He continued to have abdominal pain and as such underwent a noncontrast CT abdomen and pelvis on 7/13/2018 and this demonstrated multiple soft tissue nodules as well as soft tissue stranding in the fat of the upper abdomen and along the lesser curvature of the stomach with findings suspicious for peritoneal metastatic disease.  He did not appear to have a follow-up after this imaging study.    He presented to [facility] on 8/31/2018 with progressively worsening upper abdominal pain.  CT abdomen and pelvis on 8/31/2018 was reported as showing thickening of the wall of the stomach, ascites, lymphadenopathy along the gastrohepatic ligament anterior to the distal body of the stomach as well as thickening of the omentum with findings felt to be suspicious for malignancy.  He was referred to [facility] for further evaluation.    He was transferred to [facility] on 8/31/2018 and was discharged the next day with plan for outpatient EUS on 9/5/2018.    Repeat CT abdomen and pelvis on 9/5/2018 at [facility] demonstrated wall thickening of the gastric body suspicious for malignancy, enlarged lymph nodes anterior to the stomach, soft tissue stranding and nodularity in the fat of the abdomen concerning for progressive peritoneal metastatic disease and a small amount of ascites and bilateral pleural effusion with associated atelectasis.    Upper EUS on 9/5/2018 showed a 5.3 cm gastric ulcer with sonographic evidence of invasion into the serosa, regional gastric lymphadenopathy and  ascites.  The gastric wall was found to be diffusely thickened in the body as well worrisome for linitis.      Biopsy of the gastric ulcer was reported as being consistent with poorly differentiated adenocarcinoma with signet ring cell pattern.  FNAB of perigastric lymph node and cytology from ascites fluid were all consistent with metastatic adenocarcinoma.  HER-2 FISH from the gastric ulcer biopsy showed no amplification.  IHC for mismatch repair DNA protein expression is pending.     CT chest with contrast on 9/7/2018 showed bilateral pleural effusions right greater than left with associated lower lobe atelectasis, ascites, gastric wall thickening consistent with malignancy and adenopathy in the upper abdomen consistent with metastasis as well as peripheral nodularity of the left upper quadrant consistent with carcinomatosis.     He was deemed to have stage IV unresectable gastric cancer and was initiated on palliative FOLFOX on 9/26/2018.      He need repeated thoracentesis and paracentesis due to recurrent malignant ascites.  He had placement on right tunneled pleurx catheter by IR at [facility] on 9/24/2018 and placement of abdominal pleurx catheter on 10/22/2018.      Repeat CT chest on 9/23/2018 demonstrated extensive peritoneal metastasis with ascites, bilateral large pleural effusions and small bilateral pulmonary emboli.  He has remained on therapeutic doses of Lovenox since the time.     He has had multiple hospital admissions for management of SOB related to recurrent ascites/pleural effusions and for adequate pain control.      He continues to receive palliative chemotherapy with FOLFOX given every 2 weeks.     He has had 700 cc of pleural fluid removed from his left pleural cavity on 10/18/2018 and another 700 cc removed again on 10/22/2018.  He also has had about 400 of ascitic fluid removed on 10/22/2018.          Physical Exam:   Vitals were reviewed  Blood pressure 120/75, pulse 117, temperature 96  F  "(35.6  C), temperature source Oral, resp. rate 16, height 1.626 m (5' 4\"), weight 67.3 kg (148 lb 6.4 oz), SpO2 91 %.  Temperatures:  Current - Temp: 96  F (35.6  C); Max - Temp  Av.8  F (36  C)  Min: 95.8  F (35.4  C)  Max: 98  F (36.7  C)  Respiration range: Resp  Av.2  Min: 9  Max: 33  Pulse range: Pulse  Av.5  Min: 108  Max: 117  Blood pressure range: Systolic (24hrs), Av , Min:120 , Max:144   ; Diastolic (24hrs), Av, Min:75, Max:102    Pulse oximetry range: SpO2  Av.3 %  Min: 91 %  Max: 100 %  No intake or output data in the 24 hours ending 10/25/18 1005    GENERAL: He is dyspneic at rest, lying with the head of bed elevated.  SKIN: No rashes or jaundice.  HEENT: Normocephalic, atraumatic. Eyes anicteric. Oropharynx is clear.  HEART: Tachycardia. Regular rate and rhythm with no murmurs. Port in the left anterior chest. Chemotherapy is running.  LUNGS: Right PleurX. Diminished bilaterally.  ABDOMEN: Mildly distended. PleurX in the left abdomen.  EXTREMITIES: Bilateral leg swelling.  MENTAL: Alert and oriented, but he is sedated this morning.  NEURO: Cranial nerves II through XII grossly intact with no focal motor or sensory deficits.            Past Medical History:   I have reviewed this patient's past medical history  Past Medical History:   Diagnosis Date     Gastric adenocarcinoma (H) 9/10/2018     Genital herpes      Gynecomastia, male 2016    Chest CT benign     Stage IV adenocarcinoma of stomach (H)      Ulcer, gastric, acute              Past Surgical History:   I have reviewed this patient's past surgical history  Past Surgical History:   Procedure Laterality Date     ESOPHAGOSCOPY, GASTROSCOPY, DUODENOSCOPY (EGD), COMBINED N/A 2018    Procedure: COMBINED ESOPHAGOSCOPY, GASTROSCOPY, DUODENOSCOPY (EGD), BIOPSY SINGLE OR MULTIPLE;  gastroscopy;  Surgeon: Lalo Ibrahim MD;  Location: [facility]     ESOPHAGOSCOPY, GASTROSCOPY, DUODENOSCOPY (EGD), COMBINED N/A 2018 "    Procedure: COMBINED ENDOSCOPIC ULTRASOUND, ESOPHAGOSCOPY, GASTROSCOPY, DUODENOSCOPY (EGD), FINE NEEDLE ASPIRATE/BIOPSY;  COMBINED ENDOSCOPIC ULTRASOUND, ESOPHAGOSCOPY, GASTROSCOPY, DUODENOSCOPY (EGD), FINE NEEDLE ASPIRATE;  Surgeon: Ever Owusu MD;  Location:  GI     ESOPHAGOSCOPY, GASTROSCOPY, DUODENOSCOPY (EGD), COMBINED N/A 9/5/2018    Procedure: COMBINED ESOPHAGOSCOPY, GASTROSCOPY, DUODENOSCOPY (EGD), BIOPSY SINGLE OR MULTIPLE;;  Surgeon: Ever Owusu MD;  Location:  GI     INSERT PORT VASCULAR ACCESS N/A 9/20/2018    Procedure: INSERT PORT VASCULAR ACCESS;  PORT PLACEMENT ;  Surgeon: Jamel Salazar MD;  Location: [facility]     NO HISTORY OF SURGERY                 Social History:   I have reviewed this patient's social history  Social History   Substance Use Topics     Smoking status: Former Smoker     Packs/day: 1.00     Years: 12.00     Types: Cigarettes     Quit date: 10/15/2004     Smokeless tobacco: Never Used     Alcohol use No      Comment: none currently             Family History:   I have reviewed this patient's family history  Family History   Problem Relation Age of Onset     Prostate Cancer Father      Other Cancer Maternal Grandmother      Other Cancer Other      Asthma Mother      Stomach Cancer Maternal Uncle              Allergies:   No Known Allergies          Medications:   I have reviewed this patient's current medications  Prescriptions Prior to Admission   Medication Sig Dispense Refill Last Dose     Docusate Sodium (COLACE PO) Take 100 mg by mouth 2 times daily as needed for constipation    Past Week at prn     enoxaparin (LOVENOX) 60 MG/0.6ML injection Inject 0.6 mLs (60 mg) Subcutaneous every 12 hours for 14 days 28 Syringe 1 10/24/2018 at pm     Escitalopram Oxalate (LEXAPRO PO) Take 10 mg by mouth daily    10/24/2018 at am     HYDROmorphone (DILAUDID) 4 MG tablet Take 1 tablet (4 mg) by mouth every 3 hours as needed for moderate to severe pain  40 tablet 0 10/24/2018 at pm     OLANZapine (ZYPREXA PO) Take 2.5 mg by mouth every morning   10/24/2018 at am     oxyCODONE (OXYCONTIN) 40 MG 12 hr tablet Take 1 tablet (40 mg) by mouth every 8 hours 50 tablet 0 10/24/2018 at hs     pantoprazole (PROTONIX) 40 MG EC tablet Take 1 tablet (40 mg) by mouth 2 times daily Take 30-60 minutes before a meal. 180 tablet 1 10/24/2018 at pm     polyethylene glycol (MIRALAX/GLYCOLAX) Packet Take 1 packet by mouth daily   10/24/2018 at am     valACYclovir (VALTREX) 500 MG tablet TAKE ONE TABLET BY MOUTH ONE TIME DAILY  90 tablet 3 10/24/2018 at am     guaiFENesin (ROBITUSSIN) 20 mg/mL SOLN solution Take 20 mLs by mouth every 4 hours as needed for cough   Unknown at prn     LORazepam (ATIVAN) 0.5 MG tablet Take 1-2 tablets (0.5-1 mg) by mouth every 6 hours as needed (Breakthrough Nausea / Vomiting) 60 tablet 0 Unknown at prn     mirtazapine (REMERON) 7.5 MG TABS tablet Take 7.5 mg by mouth At Bedtime        prochlorperazine (COMPAZINE) 10 MG tablet Take 1 tablet (10 mg) by mouth every 6 hours as needed for nausea or vomiting 60 tablet 3 Unknown at prn     Current Facility-Administered Medications Ordered in Epic   Medication Dose Route Frequency Last Rate Last Dose     acetaminophen (TYLENOL) tablet 650 mg  650 mg Oral Q4H PRN         albuterol neb solution 2.5 mg  3 mL Nebulization Q2H PRN         Chemotherapy Infusing-Continuous Infusion   Does not apply Q8H         melatonin tablet 1 mg  1 mg Oral At Bedtime PRN         naloxone (NARCAN) injection 0.1-0.4 mg  0.1-0.4 mg Intravenous Q2 Min PRN         ondansetron (ZOFRAN-ODT) ODT tab 4 mg  4 mg Oral Q6H PRN        Or     ondansetron (ZOFRAN) injection 4 mg  4 mg Intravenous Q6H PRN         oxyCODONE IR (ROXICODONE) tablet 5-10 mg  5-10 mg Oral Q3H PRN   5 mg at 10/25/18 0623     Patient is already receiving mechanical prophylaxis   Does not apply Continuous PRN         piperacillin-tazobactam (ZOSYN) 4.5 g vial to attach to NS  100 mL bag  4.5 g Intravenous Q6H         polyethylene glycol (MIRALAX/GLYCOLAX) Packet 17 g  17 g Oral Daily PRN         potassium chloride (KLOR-CON) Packet 20-40 mEq  20-40 mEq Oral or Feeding Tube Q2H PRN         potassium chloride 10 mEq in 100 mL intermittent infusion with 10 mg lidocaine  10 mEq Intravenous Q1H PRN         potassium chloride 10 mEq in 100 mL sterile water intermittent infusion (premix)  10 mEq Intravenous Q1H PRN         potassium chloride 20 mEq in 50 mL intermittent infusion  20 mEq Intravenous Q1H PRN         potassium chloride SA (K-DUR/KLOR-CON M) CR tablet 20-40 mEq  20-40 mEq Oral Q2H PRN         senna-docusate (SENOKOT-S;PERICOLACE) 8.6-50 MG per tablet 1 tablet  1 tablet Oral BID PRN        Or     senna-docusate (SENOKOT-S;PERICOLACE) 8.6-50 MG per tablet 2 tablet  2 tablet Oral BID PRN         sodium chloride 0.9% infusion   Intravenous Continuous 75 mL/hr at 10/25/18 0623       vancomycin (VANCOCIN) 1000 mg in dextrose 5% 200 mL PREMIX  1,000 mg Intravenous Q8H 200 mL/hr at 10/25/18 0812 1,000 mg at 10/25/18 0812     No current [facility]-ordered outpatient prescriptions on file.             Review of Systems:   The 10 point Review of Systems is negative other than noted in the HPI.            Data:   Data   Results for orders placed or performed during the hospital encounter of 10/25/18 (from the past 24 hour(s))   EKG 12-lead, tracing only   Result Value Ref Range    Interpretation ECG Click View Image link to view waveform and result    Blood culture   Result Value Ref Range    Specimen Description Blood Right Arm     Special Requests Aerobic and anaerobic bottles received     Culture Micro No growth after 5 hours    CBC with platelets + differential   Result Value Ref Range    WBC 7.3 4.0 - 11.0 10e9/L    RBC Count 4.06 (L) 4.4 - 5.9 10e12/L    Hemoglobin 10.2 (L) 13.3 - 17.7 g/dL    Hematocrit 32.0 (L) 40.0 - 53.0 %    MCV 79 78 - 100 fl    MCH 25.1 (L) 26.5 - 33.0 pg    MCHC 31.9  31.5 - 36.5 g/dL    RDW 21.4 (H) 10.0 - 15.0 %    Platelet Count 438 150 - 450 10e9/L    Diff Method Automated Method     % Neutrophils 83.9 %    % Lymphocytes 5.0 %    % Monocytes 10.5 %    % Eosinophils 0.0 %    % Basophils 0.0 %    % Immature Granulocytes 0.6 %    Nucleated RBCs 0 0 /100    Absolute Neutrophil 6.1 1.6 - 8.3 10e9/L    Absolute Lymphocytes 0.4 (L) 0.8 - 5.3 10e9/L    Absolute Monocytes 0.8 0.0 - 1.3 10e9/L    Absolute Eosinophils 0.0 0.0 - 0.7 10e9/L    Absolute Basophils 0.0 0.0 - 0.2 10e9/L    Abs Immature Granulocytes 0.0 0 - 0.4 10e9/L    Absolute Nucleated RBC 0.0    Comprehensive metabolic panel   Result Value Ref Range    Sodium 129 (L) 133 - 144 mmol/L    Potassium 4.2 3.4 - 5.3 mmol/L    Chloride 93 (L) 94 - 109 mmol/L    Carbon Dioxide 31 20 - 32 mmol/L    Anion Gap 5 3 - 14 mmol/L    Glucose 164 (H) 70 - 99 mg/dL    Urea Nitrogen 7 7 - 30 mg/dL    Creatinine 0.48 (L) 0.66 - 1.25 mg/dL    GFR Estimate >90 >60 mL/min/1.7m2    GFR Estimate If Black >90 >60 mL/min/1.7m2    Calcium 8.3 (L) 8.5 - 10.1 mg/dL    Bilirubin Total 0.2 0.2 - 1.3 mg/dL    Albumin 1.8 (L) 3.4 - 5.0 g/dL    Protein Total 6.3 (L) 6.8 - 8.8 g/dL    Alkaline Phosphatase 116 40 - 150 U/L    ALT 23 0 - 70 U/L    AST 19 0 - 45 U/L   Procalcitonin   Result Value Ref Range    Procalcitonin 0.33 ng/ml   ISTAT gases lactate ankur POCT   Result Value Ref Range    Ph Venous 7.34 7.32 - 7.43 pH    PCO2 Venous 63 (H) 40 - 50 mm Hg    PO2 Venous 66 (H) 25 - 47 mm Hg    Bicarbonate Venous 33 (H) 21 - 28 mmol/L    O2 Sat Venous 91 %    Lactic Acid 0.9 0.7 - 2.1 mmol/L   Influenza A/B antigen   Result Value Ref Range    Influenza A/B Agn Specimen Nasal     Influenza A Negative NEG^Negative    Influenza B Negative NEG^Negative   Blood culture   Result Value Ref Range    Specimen Description Blood Right Arm     Special Requests Aerobic and anaerobic bottles received     Culture Micro No growth after 5 hours    Chest XR,  PA & LAT     Narrative    XR CHEST 2 VW  10/25/2018 2:44 AM     INDICATION: Cough and shortness of breath - check for pneumonia or  recurrent pleural effusion - compare with most recent chest x-ray -  patient has gastric cancer with recurrent pleural effusions and right  PleurX catheter.    COMPARISON: Chest x-ray 10/18/2018. CT scan of the chest, abdomen and  pelvis dated 9/23/2018.      Impression    IMPRESSION: No significant change. Moderate left and small right  pleural effusions with mild patchy opacities in the adjacent lower  lungs. This may just be atelectasis, but pneumonia could not be  excluded. Right PleurX catheter in place. Left chest wall port with  catheter tip in the SVC. Stable heart size.    ESTHELA BAPTISTE MD   Chest CT, IV contrast only - PE protocol    Narrative    CT CHEST PULMONARY EMBOLISM W CONTRAST  10/25/2018 3:52 AM     HISTORY: Known gastric cancer. Check for pulmonary embolism or  pneumonia and check right PleurX drain position. Cough and shortness  of breath.    TECHNIQUE: Volumetric acquisition of the chest after the  administration of 59 mL Isovue-370 IV contrast. Radiation dose for  this scan was reduced using automated exposure control, adjustment of  the mA and/or kV according to patient size, or iterative  reconstruction technique.     COMPARISON: 9/23/2018.    FINDINGS: No visualized pulmonary embolism. Large left pleural  effusion, slightly loculated and mildly increased. Small right pleural  effusion has significantly decreased with a PleurX catheter in place  extending along the medial aspect of the right pleura. There is some  pleural thickening bilaterally which may represent pleural metastatic  disease. Mild right base atelectasis. Moderate volume loss and  consolidation in the left lower lung, likely atelectasis.    Moderate mediastinal adenopathy, increased. Left chest wall port with  catheter tip in the SVC. Normal heart size. Upper abdominal ascites  and peritoneal metastatic  disease again seen. Gastric wall thickening  and adenopathy in the upper abdomen most prominent in the  gastrohepatic ligament region, similar to prior study. No destructive  bone lesions.      Impression    IMPRESSION:  1. No visualized pulmonary embolism.  2. Right pleural effusion has significantly decreased. Right PleurX  catheter in place. Left pleural effusion is slightly loculated and  mildly increased. Moderate left base atelectasis.  3. Increased mediastinal adenopathy.  4. Again seen is upper abdominal ascites, gastric wall thickening,  gastrohepatic ligament adenopathy and peritoneal metastatic disease  all consistent with the patient's known malignancy.    ESTHELA BAPTISTE MD

## 2018-10-25 NOTE — PLAN OF CARE
Problem: Patient Care Overview  Goal: Plan of Care/Patient Progress Review  Outcome: No Change  Pt rested most of the day. 5FU chemo infusing through port, from the office. IVF and abx through peripheral line. Pt has existing pluerx drains on right chest and left abd. Will get an additional pleurx drain on left chest this afternoon. Pt has been NPO all day. Lungs dim, on room air. C/o abd pain, PRN oxycodone given. Up with SBA.

## 2018-10-25 NOTE — ED NOTES
Bed: ED25  Expected date: 10/25/18  Expected time: 1:10 AM  Means of arrival: Ambulance  Comments:  [address] 38M [place] pt; SOB

## 2018-10-25 NOTE — ED NOTES
RN at bedside, patient diaphoretic. Temperature checked at 97.4f. VSS. Patient updated on plan of care

## 2018-10-25 NOTE — PLAN OF CARE
Problem: Patient Care Overview  Goal: Plan of Care/Patient Progress Review  Outcome: No Change   Arrived to floor 615. A&Ox4. Tachycardia/tachypnea other VSS on 2L via N/C. Denies SOB. Pain in abdomen managed with oxycodone x1. Right pleurx catheter clamped in place, left chest old dressing from pleurx catheter. Port left chest with chemo bag. NPO for possible procedure. Hemo/Onc consult. Chemo precautions. Ax1. Vanco and Zosyn. NS 75/hr. Discharge pending, will continue to monitor.

## 2018-10-26 NOTE — PROVIDER NOTIFICATION
Approx 1615, pt having dyspnea/SOB  and 's  at rest after failed attempts to drain fluid from abdomen.   Dr Santos was notified with orders back  to recheck O2 sats/HR with activity prior to discharge and if sats are <88% to hold discharge.Text paged Hospitalist at 1700 regarding that pt's  HR increased to 154/min with  O2 sat dropping to 84% with ambulation down perez at 1630. Will hold discharge today. Hospitalist to reassess status in am and will need O2 orders for potential discharge tomorrow.

## 2018-10-26 NOTE — PROVIDER NOTIFICATION
Per bedside RN patient's oncologist Dr. Silva wanted assistance with EPIC and orders for drains (Chest and abdomen).  Writer called [facility] and Dr. Silva gave a verbal for the followin. For Chest Pleurex drain every 2 days up to 1000 ML as needed.    2. For Abdomen Pleurex drain every 2 days up to 1700 mL as needed    Informed [facility] that writer will put these orders in EPIC but that Provider should have access to EPIC to write these in as we are not always available to assist.

## 2018-10-26 NOTE — CONSULTS
Care Transition Initial Assessment - RN    Reason For Consult: discharge planning   Met with: Patient and Family.  DATA   Principal Problem:    SOB (shortness of breath)  Active Problems:    Malignant ascites (Cytology 9/5/18)    Gastric adenocarcinoma --Stage IV, on chemo    Pulmonary embolism -- on Lovenox    Malignant pleural effusions -- bilateral    Severe malnutrition (H)    Gastric cancer (H)    Cough    Anemia of chronic disease       Cognitive Status: awake, alert and oriented.  Primary Care Clinic Name: [facility]   Primary Care MD Name: Shira,   Contact information and PCP information verified: Yes  Lives With: child(mayra), dependent, spouse  Insurance concerns: No Insurance issues identified  ASSESSMENT  Patient currently receives the following services:  [facility] RN/OT and primary [facility] Dr. Silva.        Identified issues/concerns regarding health management: patient with Gastric CA with mets undergoing palliative chemotherapy with Dr. Silva.  Patient continues to be readmitted for shortness of breath requiring paracentesis and IV antibiotics for infection work up.  Patient underwent placement of a chest and abdominal drain this stay.  Plan is for the patient to discharge to home, resume Home[facility] RN/OT with continued teaching for draining.  Patient is currently on 1LPM at 95% and bedside is attempting to wean the patient for discharge.  Met briefly with the patient and spouse regarding discharge planning.  Patient and spouse and wondering if he will get drained from the pleurx from his abdomen today.  Bedside was addressing this with the Oncology provider.  Writer did call [facility] to get follow up appointment in AVS.  Writer also confirmed with Norma RN(for Dr. Silva) at [facility] parameters and frequency of draining both chest and abdomen this information is in [facility] AVS.  Updated Homecare RN regarding discharge plan and they will attempt to see the patient 10/27 if possible.  Patient  and spouse aware of follow up recommendations. Writer did not send hand off to [facility] as they have discussed the case at [facility].   PLAN  Financial costs for the patient include n/a .  Patient given options and choices for discharge yes .  Patient/family is agreeable to the plan?  Yes:   Patient anticipates discharging to home with resumption of Homecare RN/OT and [facility] follow up9  .        Patient anticipates needs for home equipment: No  Plan/Disposition: Home   Appointments:     [facility] follow up in [facility] 11/7 12:40  Care  (CTS) will continue to follow as needed.

## 2018-10-26 NOTE — PLAN OF CARE
Problem: Patient Care Overview  Goal: Plan of Care/Patient Progress Review  POD 1 L pleurex catheter placement. Pt is A&Ox4. VSS ex tachycardia at times. Tele sinus tach. On 1L O2 overnight- wean as tolerated. C/o pain in abdomen/ribs, given scheduled oxycontin and PRN oxycodone x1. LS diminished, LANDERS. +1-2 edema in BLEs, covered with wraps. Regular diet, but low appetite. C/o nausea at times, compazine available. L pleuex dressings WDL, scant amount of old drainage. R dressing from pleurex removal CDI. Chemo infusing continuously into L chest port. Up with SBA to bathroom. Chemo precautions maintained. Plans pending. Will continue to monitor.

## 2018-10-26 NOTE — IR NOTE
Interventional Radiology Intra-procedural Nursing Note    Patient Name: Ernie Song  Medical Record Number: [id number]  Today's Date: October 25, 2018    Start Time: 1828  End of procedure time: 1841  Procedure: left pleurex catheter placement, right pleurex catheter removal  Report given to: station 88 rn  Time pt departs:  1900  : n/a    Other Notes: pt tolerated well. Given 50mcg Fentanyl IV during procedure for comfort. VSS. Left pleurex catheter inserted, site c/d/i, right existing pleurex was removed, dressing c/d/i, 700ml dark sanguineous fluid drained from newly placed left side catheter. Pt returns to floor in stable condition o2 sats on room air at baseline low 90's no respiratory distress    Margarita Wilson RN

## 2018-10-26 NOTE — PROGRESS NOTES
"7A-7p shift note  I saw an active order at shift start for a thoracetesis on left lung for today. Pt had left PleurX cath placed yesterday and thus should NOT not need thoracentesis on left side.  Called [facility] and left message with Dr Silva's nurse. Received call back around 12noon. I told her we will need orders for care/ withdrawing pleural fluid frequency and max amt - still waiting reply/orders for this.   Patient also has and left abdominal \"pleurX type\" drain placed 10-22-18 PTA for assumed withdrawing ascites - We have no active orders for cares on this drain- [facility] office also was told of this issue.  Unable to discharge pt until above care for above is defined/ordered by Dr Silva. We will need those PTA orders for previously scheduled outpt thoracentesis cancelled by MD. Care Cooridinator notified of above.   Lungs diminished on left. On O2 at 1LPM. Is forgetful at times. Denies LANDERS. Luis A of left flank (pleurx cath sites) controlled on scheduled oral Oycontin SR and with Oxycodone for breakthru discomfort.  "

## 2018-10-26 NOTE — PLAN OF CARE
Problem: Patient Care Overview  Goal: Plan of Care/Patient Progress Review  Outcome: No Change   Pt A&Ox4, VSS ex tachycardic on 1-2L NC when needed. Denies SOB. Pain in abdomen, PRN dilaudid x1 & scheduled oxycodone covering. LS diminished. Tele Sinus Tachy. POD 0 L chest pleurx catheter placement, small amount red drainage on dressing, R chest pleurx catheter removed, dressing CDI. PIV infusing NS 75 ml/hr, port left chest infusing chemo bag. Vanco and zosyn continued. Regular diet, poor appetite. Provider sticky note left for nutrition consult. C/o of nausea, compazine given x1. Bedrest with bathroom privileges. Chemo precautions maintained. Discharge pending, nursing to continue to monitor.

## 2018-10-26 NOTE — PROGRESS NOTES
Patient received Lovenox this morning.  A 12 hour hold is required prior to a thoracentesis.  Will put in an order to hold the a.m. Dose of Lovenox 10-27-18 in preparation for an a.m. Procedure time.

## 2018-10-26 NOTE — PROGRESS NOTES
[facility]ology Progress Note          Assessment and Plan:   1. Metastatic gastric cancer with peritoneal carcinomatosis, Her-2/adrian negative  -Diagnosed with poorly differentiated adenocarcinoma with poorly cohesive and signet ring pattern following EUS and biopsy of 5.3 cm gastric ulcer with sonographic evidence of invasion into the serosa, regional gastric lymphadenopathy and ascites.  Gastric wall was found to be diffusely thickened in the body as well worrisome for linitis.  Her-2 by FISH was negative  -CT abdomen and pelvis 9/5/2018 showed progressive peritoneal metastatic disease, regional lymphadenopathy, ascites and bilateral pleural effusions  -started on palliative FOLFOX given every 2 weeks on 9/26/2018.  Pump will be discontinued after he finishes 3rd cycle of chemo today  -next cycle of chemo due on 11/7/2018  -reviewed CT chest 10/25/2018 showing bilateral pleural effusions, pleural thickening concerning for possible pleural metastatic disease, increased mediastinal adenopathy  -readdressed goals of care.  He would like to continue with these treatments.  -signet ring pattern on pathology, findings concerning for linitis and presentation with peritoneal carcinomatosis all portend overall poor prognosis.  However he is young and insisting on continuing therapy at this time.    -would repeat scans following cycle 4 of chemotherapy.  If scans show progressive disease I do not think further treatment options would make a meaningful impact to his quality of life or survival.  Would favor transition to comfort cares at that time.    2. Malignant bilateral pleural effusions/ascites  -I did discuss with Dr. Salazar who concurred with me that pleurodesis is unlikely to be helpful and he too favored pleural catheter placement  -left sided pleural catheter placed by IR 10/25/2018  -abdominal catheter placed on 10/22/2018  -he has home health aide visiting him 3 times a week and should assist with fluid  removals.  -ascitic fluid to be drained prior to discharge (has indwelling catheter)    3. Bilateral pulmonary emboli  -diagnosed following CT chest on 9/23/2018  -related to thrombophilia of malignancy  -continue lovenox 1mg/kg bid     4. Severe malnutrition/failure to thrive/cancer cachexia  ->10% weight loss over the last 6 months   -albumin 1.5g/dl  -nutrition consult  -supplement meals with boost/ensure    5. Anemia  -related to iron deficiency/malignany/chemotherapy  -did receive IV iron replacement therapy as outpatient   -H&H stable and continue to monitor     6.  Pain  -rates pain at 5/10 intensity  -oxycontin 40mg q8   -dilaudid 4mg q3h prn for breakthrough pain    Currently empirically being treated for HCAP.  Remains afebrile with no leukocytosis.  If clinically doing well and cultures negative beyond 48 hours would consider discontinuing antibiotics.      Full code  Disposition: home in 1-2 days per hospitalist    Judson Silva    Addendum 10/26/2918 at 1:40pm:    Had extensive discussion with Cielo Dykes.  I told her that I had discussed with Ernie about the overall poor prognosis and his lack of response to the treatments so far.  He has bad prognostic markers and aggressive incurable metastatic gastric cancer.  His weight and functional status is declining.  I do not see a meaningful benefit to his quality of life or survival with continuing chemotherapy.  Transition to comfort cares would be very reasonable should Ernie choose this.  I offered to have her talk to hospice and she is agreeable.     Home health aide has been visiting him 3 times/week.  Would like fluid (as much as tolerated) to be taken out from abdominal/pleural catheters at home every 2 days.  [place] [facility] to meet family at home.    Dr. Silva    Addendum 10/26/2018 at 6:15pm  Given dropping O2 sats on ambulation plan to hold off discharge today.  Abdominal catheter apparently not draining and needs to be evaluated by IR  "prior to discharge.    Dr. Silva                 Interval History:   No acute events overnight.  Reports SOB better following thoracentesis yesterday.  DEnied fevers.  Has had bowel movement this morning               Review of Systems:   As per subjective, otherwise 5 systems reviewed and negative.           Physical Exam:   Blood pressure 121/68, pulse 117, temperature 95.1  F (35.1  C), temperature source Oral, resp. rate 18, height 1.626 m (5' 4\"), weight 66.7 kg (147 lb), SpO2 96 %.      Vital Sign Ranges  Temperature Temp  Av.6  F (35.9  C)  Min: 95.1  F (35.1  C)  Max: 98.6  F (37  C)   Blood pressure Systolic (24hrs), Av , Min:114 , Max:125        Diastolic (24hrs), Av, Min:68, Max:87      Pulse No Data Recorded   Respirations Resp  Av  Min: 16  Max: 20   Pulse oximetry SpO2  Av %  Min: 92 %  Max: 96 %         Intake/Output Summary (Last 24 hours) at 10/26/18 0796  Last data filed at 10/26/18 0441   Gross per 24 hour   Intake             1343 ml   Output             1300 ml   Net               43 ml       Constitutional:   No acute distress. Lost significant weight   Skin:   No rashes, petechiae, or ecchymoses.   HEENT:   Normocephalic, atraumatic. Oropharynx clear with no mucosal lesions or thrush.  Bitemporal wasting   Neck:   Supple.   Lungs:   Decreased breath sounds left side.  Small hematoma at left pleural catheter placement site   Cardiovascular:   Regular rate and rhythm with no murmurs, rubs, or gallops.   Abdomen:   Flanks full, abdominal catheter in place   Extremities:   Bilateral leg edema   Neurological:   CN II-XII grossly intact. No focal motor or sensory deficits.            Medications:     No current outpatient prescriptions on file.                Data:     Results for orders placed or performed during the hospital encounter of 10/25/18 (from the past 24 hour(s))   Glucose by meter   Result Value Ref Range    Glucose 153 (H) 70 - 99 mg/dL   CBC with platelets " differential   Result Value Ref Range    WBC 4.9 4.0 - 11.0 10e9/L    RBC Count 3.91 (L) 4.4 - 5.9 10e12/L    Hemoglobin 9.4 (L) 13.3 - 17.7 g/dL    Hematocrit 31.0 (L) 40.0 - 53.0 %    MCV 79 78 - 100 fl    MCH 24.0 (L) 26.5 - 33.0 pg    MCHC 30.3 (L) 31.5 - 36.5 g/dL    RDW 22.0 (H) 10.0 - 15.0 %    Platelet Count 385 150 - 450 10e9/L    Diff Method Automated Method     % Neutrophils 77.7 %    % Lymphocytes 12.6 %    % Monocytes 8.5 %    % Eosinophils 0.8 %    % Basophils 0.0 %    % Immature Granulocytes 0.4 %    Nucleated RBCs 0 0 /100    Absolute Neutrophil 3.8 1.6 - 8.3 10e9/L    Absolute Lymphocytes 0.6 (L) 0.8 - 5.3 10e9/L    Absolute Monocytes 0.4 0.0 - 1.3 10e9/L    Absolute Eosinophils 0.0 0.0 - 0.7 10e9/L    Absolute Basophils 0.0 0.0 - 0.2 10e9/L    Abs Immature Granulocytes 0.0 0 - 0.4 10e9/L    Absolute Nucleated RBC 0.0

## 2018-10-26 NOTE — PHARMACY-VANCOMYCIN DOSING SERVICE
Pharmacy Vancomycin Note  Date of Service 2018  Patient's  1980   38 year old, male    Indication: [facility] Pneumonia  Goal Trough Level: 15-20 mg/L  Day of Therapy: 2  Current Vancomycin regimen:  1000 mg IV q8h    Current estimated CrCl = Estimated Creatinine Clearance: 197.1 mL/min (based on Cr of 0.47).    Creatinine for last 3 days  10/25/2018:  1:34 AM Creatinine 0.48 mg/dL  10/26/2018:  7:15 AM Creatinine 0.47 mg/dL    Recent Vancomycin Levels (past 3 days)  10/26/2018:  7:15 AM Vancomycin Level 9.2 mg/L    Vancomycin IV Administrations (past 72 hours)                   vancomycin (VANCOCIN) 1000 mg in dextrose 5% 200 mL PREMIX (mg) 1,000 mg New Bag 10/26/18 0014     1,000 mg New Bag 10/25/18 1702     1,000 mg New Bag  0812                Nephrotoxins and other renal medications (Future)    Start     Dose/Rate Route Frequency Ordered Stop    10/26/18 0800  vancomycin (VANCOCIN) 1,250 mg in sodium chloride 0.9 % 250 mL intermittent infusion      1,250 mg  over 90 Minutes Intravenous EVERY 8 HOURS 10/26/18 0814      10/25/18 1200  valACYclovir (VALTREX) tablet 500 mg      500 mg Oral DAILY 10/25/18 1145      10/25/18 1100  piperacillin-tazobactam (ZOSYN) 4.5 g vial to attach to  mL bag      4.5 g  over 30 Minutes Intravenous EVERY 6 HOURS 10/25/18 0600               Contrast Orders - past 72 hours (72h ago through future)    Start     Dose/Rate Route Frequency Ordered Stop    10/25/18 0337  iopamidol (ISOVUE-370) solution 59 mL      59 mL Intravenous ONCE 10/25/18 0336 10/25/18 0349          Interpretation of levels and current regimen:  Trough level is  Subtherapeutic    Has serum creatinine changed > 50% in last 72 hours: No    Renal Function: Stable    Plan:  1.  Increase Dose to 1250 mg IV q8h  2.  Pharmacy will check trough levels as appropriate in 1-3 Days.    3. Serum creatinine levels will be ordered daily for the first week of therapy and at least twice weekly for  subsequent weeks.      Ruchi Burns        .

## 2018-10-26 NOTE — DISCHARGE SUMMARY
[facility]    Discharge Summary  Hospitalist    Date of Admission:  10/25/2018  Date of Discharge:  10/27/2018  Discharging Provider: Nicholas Elias MD    Discharge Diagnoses   Principal Problem:    SOB (shortness of breath)  Active Problems:    Malignant ascites (Cytology 9/5/18) related to gastric cancer    Gastric adenocarcinoma --Stage IV, on chemo    Pulmonary embolism -- on Lovenox    Malignant pleural effusions -- bilateral    Severe malnutrition (H)    Gastric cancer (H)    Cough    Anemia of chronic disease      History of Present Illness    38-year-old unfortunate male with gastric cancer with metastasis to pleural/peritoneal cavities, also with chronic pain, malignant effusions, leg edema, PE, iron deficiency anemia, was brought to the ER by EMS for evaluation of shortness of breath and cough.  I discussed with the patient and also Dr. Cruz, ED attending, who evaluated the patient in the ER, and reviewed his multiple admission, discharge summaries and IR visits for further medical information. He had a abdominal pleurx catheter placed 3 days earlier, and on chest Xray now has a loculated left moderate pleural effusion.    Hospital Course   Seen by radiology, right pleurx catheter removed since not draining, and left pleurx catheter placed.  Seen by oncology (Dr. Silva), and patient says he wants additional chemo, even though he has had 3 cycles so far with no clinical response, and continues to decline with present albumin at 1.5 (was 1.8 1 week ago).  Wife was inquiring about hospice, and will have hospice home care meet with family at home, otherwise he will follow up with Dr. Silva weekly as scheduled, but if no measurable response by 4 th cycle Dr. Silva says he will no longer offer chemo to this patient and would strongly encourage hospice involvement.     Nicholas Elias MD  Pager: [phone]  Cell Phone:  [phone]       Significant Results and Procedures    As above    Pending Results   These results will be followed up by Dr. Santos  Unresulted Labs Ordered in the Past 30 Days of this Admission     Date and Time Order Name Status Description    10/25/2018 0148 Blood culture Preliminary     10/25/2018 0148 Blood culture Preliminary     9/14/2018 1344 Cytology non gyn In process     9/5/2018 1433 Surgical pathology exam In process     9/5/2018 1418 Fine needle aspiration In process           Code Status   Full Code       Primary Care Physician   Gay Winslow    Physical Exam   Temp: 96.8  F (36  C) Temp src: Oral BP: 134/87   Heart Rate: 120 (while sitting at bedside) Resp: 18 SpO2: 97 % O2 Device: Nasal cannula Oxygen Delivery: 2 LPM  Vitals:    10/25/18 0554 10/26/18 0220 10/27/18 0532   Weight: 67.3 kg (148 lb 6.4 oz) 66.7 kg (147 lb) 66.7 kg (147 lb)     Vital Signs with Ranges  Temp:  [96.2  F (35.7  C)-97.9  F (36.6  C)] 96.8  F (36  C)  Heart Rate:  [117-154] 120  Resp:  [16-22] 18  BP: (124-145)/(75-90) 134/87  SpO2:  [84 %-98 %] 97 %  I/O last 3 completed shifts:  In: 1200 [P.O.:600; I.V.:600]  Out: 30 [Drains:30]    Exam on discharge:   Cachetic  Pleasant, cooperative    # Discharge Pain Plan:   - During his hospitalization, Ernie experienced pain due to gastric cancer.  The pain plan for discharge was discussed with Ernie and the plan was created in a collaborative fashion.    - medications as ordered.       Discharge Disposition   Discharged to home with palliative care, and will have home hospice care see patient.   Condition at discharge: Terminal    Consultations This Hospital Stay   PHARMACY TO DOSE VANCO  SOCIAL WORK IP CONSULT  HEMATOLOGY & ONCOLOGY IP CONSULT  PHARMACY TO DOSE VANCO  CARE TRANSITION RN/SW IP CONSULT  SOCIAL WORK IP CONSULT    Time Spent on this Encounter   I spent a total of 35 minutes discharging this patient.     Discharge Orders     Home care nursing referral     Home Care OT Referral for Hospital Discharge      Reason for your hospital stay   Malignant left pleural effusion.     Activity   Your activity upon discharge: activity as tolerated     Discharge Instructions   Call Dr. Santos at Pager [phone] if questions, or Cell Phone [phone].     MD face to face encounter   Documentation of Face to Face and Certification for Home Health Services    I certify that patient: Ernie Song is under my care and that I, or a nurse practitioner or physician's assistant working with me, had a face-to-face encounter that meets the physician face-to-face encounter requirements with this patient on: 10/26/2018.    This encounter with the patient was in whole, or in part, for the following medical condition, which is the primary reason for home health care: palliative care related to Pleurx tube and malignant pleural effusion.    I certify that, based on my findings, the following services are medically necessary home health services: Nursing.    My clinical findings support the need for the above services because: Nurse is needed: To provide assessment and oversight required in the home to assure adherence to the medical plan due to: left pleurx catheter..    Further, I certify that my clinical findings support that this patient is homebound (i.e. absences from home require considerable and taxing effort and are for medical reasons or Jainism services or infrequently or of short duration when for other reasons) because: Leaving home is medically contraindicated for the following reason(s): Dyspnea on exertion that makes it so they cannot leave their home for needed services without clinical deterioration...    Based on the above findings. I certify that this patient is confined to the home and needs intermittent skilled nursing care, physical therapy and/or speech therapy.  The patient is under my care, and I have initiated the establishment of the plan of care.  This patient will be followed by a physician who  will periodically review the plan of care.  Physician/Provider to provide follow up care: Gay Winslow    Attending hospital physician (the Medicare certified [facility] provider): Nicholas Elias  Physician Signature: See electronic signature associated with these discharge orders.  Date: 10/26/2018     Tubes and drains   You are going home with the following tubes or drains: Pleurx Chest and Abdomen    Drain every 2 days as needed for the following    1.  Pleurx for chest up to 1000 mL of fluid (one bottle) as needed for shortness of breath. Last drain was 10/25  2.  Pleurx for abdomen up to 1700 mL as needed for abdominal distension or pain.   Last draining was on 10/26     Follow-up and recommended labs and tests    Please keep your scheduled appointment with [facility] on 11/07/18 at 12:40 p.m. For labs and follow up with Dr. Silva.  Please call [facility] if you have further questions at [phone]     Full Code     Oxygen Adult   [facility] Order 2 liter(s) by nasal cannula continuously with use of portable tank. Expected treatment length is 2 months.. Test on conserving device as applicable.    Patients who qualify for home O2 coverage under the CMS guidelines require ABG tests or O2 sat readings obtained closest to, but no earlier than 2 days prior to the discharge, as evidence of the need for home oxygen therapy. Testing must be performed while patient is in the chronic stable state. See notes for O2 sats.    I certify that this patient, Ernie Song has been under my care and that I, or a nurse practitioner or physician's assistant working with me, had a face-to-face encounter that meets the face-to-face encounter requirements with this patient on 10/27/2018. The patient, Ernie Song was evaluated or treated in whole, or in part, for the following medical condition, which necessitates the use of the ordered oxygen. Treatment Diagnosis: Malignant pleural effusions      Attending Provider: Nicholas Elias  Physician signature: See electronic signature associated with these discharge orders  Date of Order: October 27, 2018     Diet   Follow this diet upon discharge: Orders Placed This Encounter     Regular Diet Adult       Discharge Medications   Current Discharge Medication List      CONTINUE these medications which have NOT CHANGED    Details   Docusate Sodium (COLACE PO) Take 100 mg by mouth 2 times daily as needed for constipation       Escitalopram Oxalate (LEXAPRO PO) Take 10 mg by mouth daily       HYDROmorphone (DILAUDID) 4 MG tablet Take 1 tablet (4 mg) by mouth every 3 hours as needed for moderate to severe pain  Qty: 40 tablet, Refills: 0    Associated Diagnoses: Gastric adenocarcinoma (H)      OLANZapine (ZYPREXA PO) Take 2.5 mg by mouth every morning      oxyCODONE (OXYCONTIN) 40 MG 12 hr tablet Take 1 tablet (40 mg) by mouth every 8 hours  Qty: 50 tablet, Refills: 0    Associated Diagnoses: Gastric adenocarcinoma (H)      pantoprazole (PROTONIX) 40 MG EC tablet Take 1 tablet (40 mg) by mouth 2 times daily Take 30-60 minutes before a meal.  Qty: 180 tablet, Refills: 1    Associated Diagnoses: Gastric ulcer, unspecified chronicity, unspecified whether gastric ulcer hemorrhage or perforation present      polyethylene glycol (MIRALAX/GLYCOLAX) Packet Take 1 packet by mouth daily      valACYclovir (VALTREX) 500 MG tablet TAKE ONE TABLET BY MOUTH ONE TIME DAILY   Qty: 90 tablet, Refills: 3    Associated Diagnoses: Genital herpes simplex, unspecified site      guaiFENesin (ROBITUSSIN) 20 mg/mL SOLN solution Take 20 mLs by mouth every 4 hours as needed for cough      LORazepam (ATIVAN) 0.5 MG tablet Take 1-2 tablets (0.5-1 mg) by mouth every 6 hours as needed (Breakthrough Nausea / Vomiting)  Qty: 60 tablet, Refills: 0    Associated Diagnoses: Gastric adenocarcinoma (H)      prochlorperazine (COMPAZINE) 10 MG tablet Take 1 tablet (10 mg) by mouth every 6 hours as  needed for nausea or vomiting  Qty: 60 tablet, Refills: 3    Associated Diagnoses: Nausea         STOP taking these medications       enoxaparin (LOVENOX) 60 MG/0.6ML injection Comments:   Reason for Stopping:             Allergies   No Known Allergies  Data   Most Recent 3 CBC's:  Recent Labs   Lab Test  10/26/18   0715  10/25/18   0134  10/11/18   0748   WBC  4.9  7.3  5.9   HGB  9.4*  10.2*  9.5*   MCV  79  79  74*   PLT  385  438  472*      Most Recent 3 BMP's:  Recent Labs   Lab Test  10/26/18   0715  10/25/18   0134  10/11/18   0748   NA  135  129*  129*   POTASSIUM  3.9  4.2  4.5   CHLORIDE  98  93*  91*   CO2  31  31  32   BUN  9  7  7   CR  0.47*  0.48*  0.54*   ANIONGAP  6  5  6   AROLDO  8.0*  8.3*  8.4*   GLC  115*  164*  135*     Most Recent 2 LFT's:  Recent Labs   Lab Test  10/26/18   0715  10/25/18   0134   AST  21  19   ALT  21  23   ALKPHOS  92  116   BILITOTAL  0.3  0.2     Most Recent INR's and Anticoagulation Dosing History:  Anticoagulation Dose History     Recent Dosing and Labs Latest Ref Rng & Units 2/26/2018 9/14/2018 9/24/2018 10/11/2018    INR 0.86 - 1.14 1.04 1.14 1.28(H) 1.13        Most Recent 3 Troponin's:  Recent Labs   Lab Test  10/07/18   2303  09/23/18   0721   TROPI  <0.015  <0.015     Most Recent Cholesterol Panel:  Recent Labs   Lab Test  10/10/17   0750   CHOL  264*   LDL  169*   HDL  53   TRIG  210*     Most Recent 6 Bacteria Isolates From Any Culture (See EPIC Reports for Culture Details):  Recent Labs   Lab Test  10/25/18   0158  10/25/18   0130  10/07/18   2343  10/07/18   2303  09/26/18   0518  09/23/18   0932   CULT  No growth after 1 day  No growth after 1 day  No growth  No growth  No growth  No growth     Most Recent TSH, T4 and A1c Labs:  Recent Labs   Lab Test  10/10/17   0750   07/01/16   0816   TSH  3.49   < >  3.57   T4  0.90   < >   --    A1C   --    --   5.6    < > = values in this interval not displayed.

## 2018-10-26 NOTE — PROGRESS NOTES
"[facility]    Hospitalist Progress Note    Assessment & Plan   Ernie Song is a 38 year old male who was admitted on 10/25/2018 SOB with known gastric cancer and malignant pleural and abdominal fluid contributing to SOB:    Impression:   Principal Problem:    SOB (shortness of breath) related to recurrent malignant effusions   -- had Pleurx catheter now placed on left (right removed), has abd pleurx as well  Active Problems:    Malignant ascites (Cytology 9/5/18)    Gastric adenocarcinoma --Stage IV, on chemo    Pulmonary embolism -- on Lovenox    Malignant pleural effusions -- bilateral    Severe malnutrition -- albumin down to 1.5    Gastric cancer (H)    Cough    Anemia of chronic disease      Plan:  Discussed with patient, still wants to try more chemo, \"I don't want to give up\".  Wife is realistic and open to the idea of hospice. She would like O2 at home for when he gets SOB.  Will check O2 sat with ambulation.  Will have Hospice nurse visit family at home.   Discussed with Dr. Silva -- he is ready to stop chemo when ever patient says to do so, and if no better after next cycle (4th) Dr. Silva says he will tell him that he is going to stop it due to lack of response, and is not going to offer any second line therapy as it is unlikely to be of benefit.     DVT Prophylaxis: Enoxaparin (Lovenox) SQ  Code Status: Prior -- still full code    Disposition: Expected discharge later today if does not need O2, otherwise will have to arrange it (then probably home on Saturday).     Nicholas Elias MD  Pager [phone]  Cell Phone [phone]  Text Page (7am to 6pm)    Interval History   This morning felt \"OK\", pain is a 4 out of 10, not sob at rest but dypnea with any antivity.      Physical Exam   Temp: 97.9  F (36.6  C) Temp src: Oral BP: 137/83   Heart Rate: 135 Resp: 18 SpO2: 92 % O2 Device: None (Room air) Oxygen Delivery: 1 LPM  Vitals:    10/25/18 0129 10/25/18 0554 10/26/18 " 0220   Weight: 66.2 kg (146 lb) 67.3 kg (148 lb 6.4 oz) 66.7 kg (147 lb)     Vital Signs with Ranges  Temp:  [95.1  F (35.1  C)-98.6  F (37  C)] 97.9  F (36.6  C)  Heart Rate:  [105-135] 135  Resp:  [16-20] 18  BP: (120-137)/(68-92) 137/83  SpO2:  [90 %-96 %] 92 %  I/O last 3 completed shifts:  In: 1583 [P.O.:240; I.V.:1343]  Out: 1300 [Urine:600; Drains:700]    # Pain Assessment:  Current Pain Score 10/26/2018   Patient currently in pain? yes   Pain score (0-10) -   Pain location -   Pain descriptors -   - Ernie is experiencing pain due to gastric cancer. Pain management was discussed and the plan was created in a collaborative fashion.  Ernie's response to the current recommendations: engaged  - medications as ordered.    Constitutional: Awake, alert, cooperative, no apparent distress  Respiratory: Clear to auscultation bilaterally, no crackles or wheezing  Cardiovascular: Regular rate and rhythm, normal S1 and S2, and no murmur noted  GI: Normal bowel sounds, soft, non-distended, mild epigastric tenderness  Extrem: No calf tenderness, no ankle edema  Neuro: Ox3, no focal motor or sensory deficits    Medications     - MEDICATION INSTRUCTIONS -         enoxaparin  60 mg Subcutaneous Q12H     escitalopram (LEXAPRO) tablet 10 mg  10 mg Oral Daily     heparin  5 mL Intracatheter Q28 Days     heparin lock flush  5-10 mL Intracatheter Q24H     OLANZapine (zyPREXA) tablet 2.5 mg  2.5 mg Oral QAM     oxyCODONE  40 mg Oral Q8H     pantoprazole  40 mg Oral BID     polyethylene glycol  17 g Oral Daily     valACYclovir  500 mg Oral Daily       Data     Recent Labs  Lab 10/26/18  0715 10/25/18  0134   WBC 4.9 7.3   HGB 9.4* 10.2*   MCV 79 79    438    129*   POTASSIUM 3.9 4.2   CHLORIDE 98 93*   CO2 31 31   BUN 9 7   CR 0.47* 0.48*   ANIONGAP 6 5   AROLDO 8.0* 8.3*   * 164*   ALBUMIN 1.5* 1.8*   PROTTOTAL 5.5* 6.3*   BILITOTAL 0.3 0.2   ALKPHOS 92 116   ALT 21 23   AST 21 19       Imaging:   Recent Results  "(from the past 24 hour(s))   IR Chest Tube Drain Tunneled Left    Narrative    PROCEDURE(S):   Placement of a left sided tunneled pleural drainage catheter  Removal of a right-sided tunneled pleural drainage catheter    DATE OF PROCEDURE:   10/25/2018 6:51 PM    OPERATORS:    Kristopher Hdz MD    Medications: 1% Lidocaine SQ,     CONTRAST:   None    REFERENCED AIR KERMA: 0 mGy  FLUOROSCOPY TIME: 0 minutes    ESTIMATED BLOOD LOSS:  Minimal    COMPLICATIONS:  None    PRE-PROCEDURE DIAGNOSIS: Metastatic colon cancer  POST-PROCEDURE DIAGNOSIS: Same    CLINICAL HISTORY/INDICATION:  38-year-old male with metastatic colon cancer resulting in large  volume recurrent left pleural effusion. History of a prior tunneled  right pleural drain, output from the right pleural drain has stopped  and CT shows minimal fluid. Likely self pleurodesed from the tunneled  catheter.    PROCEDURES AND FINDINGS:  Following a discussion of the risks, benefits, indications and  alternatives to treatment, appropriate informed consent was obtained.   The patient was brought to the interventional radiology suite and  placed on the table. The bilateral posterior thorax inclusive of the  existing right sided tunneled pleural drainage catheter was prepped  and draped in usual sterile fashion.  A time out was performed per  hospital universal protocol policy to ensure correct patient, site and  procedure to be performed.     Preliminary ultrasound evaluation of the left chest was performed and  demonstrates a large pleural effusion.  An ultrasound image was  archived. Local anesthesia was obtained with 1% Lidocaine.  Under  direct ultrasound guidance, a 18-gauge access needle was advanced into  the left pleural space with return of straw-colored fluid. A 0.035\"  guidewire was then advanced through the access sheath and the tract  was serially dilated, and a peel away sheath was placed. Attention was  then turned to creation of a subcutaneous tunnel. " Local anesthesia was  obtained along the tract with 1% Lidocaine. A suitable puncture for  the tunnel was made and the Pleurx catheter was then tunneled through  the skin and advanced through the peel away sheath into the pleural  space. Approximately 0.7 liters of pleural fluid was removed. The  catheter was sutured to the skin with 0 silk and a sterile dressing  was applied. The puncture site was closed uneventfully with 4-0  Monocryl.     Next, the retention sutures for the right-sided tunneled catheter were  cut. Local anesthesia was achieved with 1% lidocaine at the catheter  exit site and along the subcutaneous tunnel. Gentle traction was  applied to the tunneled catheter removing it in its entirety and a  sterile occlusive dressing was applied.    Throughout the procedure, the patient was monitored by a radiology  nurse for cardiac rhythm, blood pressure and oxygen saturation which  remained stable. The patient tolerated the procedure well and left  interventional radiology in stable condition.      Impression    IMPRESSION:  1.  Placement of a tunneled left pleural Pleurx drainage catheter, as  detailed above.  2.  Removal of a tunneled right pleural Pleurx drainage catheter    ELLEN HDZ MD   IR Chest Tube Removal Tunneled Right    Narrative    PROCEDURE(S):   Placement of a left sided tunneled pleural drainage catheter  Removal of a right-sided tunneled pleural drainage catheter    DATE OF PROCEDURE:   10/25/2018 6:51 PM    OPERATORS:    Ellen Hdz MD    Medications: 1% Lidocaine SQ,     CONTRAST:   None    REFERENCED AIR KERMA: 0 mGy  FLUOROSCOPY TIME: 0 minutes    ESTIMATED BLOOD LOSS:  Minimal    COMPLICATIONS:  None    PRE-PROCEDURE DIAGNOSIS: Metastatic colon cancer  POST-PROCEDURE DIAGNOSIS: Same    CLINICAL HISTORY/INDICATION:  38-year-old male with metastatic colon cancer resulting in large  volume recurrent left pleural effusion. History of a prior tunneled  right pleural drain, output  "from the right pleural drain has stopped  and CT shows minimal fluid. Likely self pleurodesed from the tunneled  catheter.    PROCEDURES AND FINDINGS:  Following a discussion of the risks, benefits, indications and  alternatives to treatment, appropriate informed consent was obtained.   The patient was brought to the interventional radiology suite and  placed on the table. The bilateral posterior thorax inclusive of the  existing right sided tunneled pleural drainage catheter was prepped  and draped in usual sterile fashion.  A time out was performed per  hospital universal protocol policy to ensure correct patient, site and  procedure to be performed.     Preliminary ultrasound evaluation of the left chest was performed and  demonstrates a large pleural effusion.  An ultrasound image was  archived. Local anesthesia was obtained with 1% Lidocaine.  Under  direct ultrasound guidance, a 18-gauge access needle was advanced into  the left pleural space with return of straw-colored fluid. A 0.035\"  guidewire was then advanced through the access sheath and the tract  was serially dilated, and a peel away sheath was placed. Attention was  then turned to creation of a subcutaneous tunnel. Local anesthesia was  obtained along the tract with 1% Lidocaine. A suitable puncture for  the tunnel was made and the Pleurx catheter was then tunneled through  the skin and advanced through the peel away sheath into the pleural  space. Approximately 0.7 liters of pleural fluid was removed. The  catheter was sutured to the skin with 0 silk and a sterile dressing  was applied. The puncture site was closed uneventfully with 4-0  Monocryl.     Next, the retention sutures for the right-sided tunneled catheter were  cut. Local anesthesia was achieved with 1% lidocaine at the catheter  exit site and along the subcutaneous tunnel. Gentle traction was  applied to the tunneled catheter removing it in its entirety and a  sterile occlusive dressing " was applied.    Throughout the procedure, the patient was monitored by a radiology  nurse for cardiac rhythm, blood pressure and oxygen saturation which  remained stable. The patient tolerated the procedure well and left  interventional radiology in stable condition.      Impression    IMPRESSION:  1.  Placement of a tunneled left pleural Pleurx drainage catheter, as  detailed above.  2.  Removal of a tunneled right pleural Pleurx drainage catheter    ELLEN DIAS MD

## 2018-10-26 NOTE — PROGRESS NOTES
"[facility] Progress Note  [facility]    Visit with pt and his wife, who are known to me from several previous hospital stays.  Pt clearly feeling not well (\"complains of feeling full\").  Per request, shared prayer with pt and his wife.  They say, \"We pray all day, every day.\"   team continues to be available for support.  I encouraged them to ask for support if they wish for it over the weekend, and I will plan to check in again Monday if pt remains hospitalized.                                                                                                                                                 Sierra Sneed M.A.  Staff   Pager [phone]  Phone [phone]      "

## 2018-10-26 NOTE — PROVIDER NOTIFICATION
1600 note:   Was ordered to draw off abdominal fluid from abdominal pleurX style drain prior to pt discharging home today.  The attempted to draw off ascites fluid from abdominal pleurX style drain was unsuccessfrul - only able to withdraw 30cc yellowish red fluid. 2nd and 3rd attempts also failed. Pt still has discomfort at this abd site as well as of his chest lung PleurX site too. Medicated earlier with Oxycodone  Call placed to Dr Silva at [phone]- message left with his answering service seeking of advice from oncologist. RN  holding discharge today till advice received from physician regarding above.

## 2018-10-27 NOTE — PROGRESS NOTES
Home Oxygen    O2 sat 84% on room air with ambulation.     Discharge with home O2 at 2 LPM per NC.    Life expectancy 2 months or less related to gastric cancer and bilateral malignant effusions.     Nicholas Elias   Pager: [phone]  Cell Phone:  [phone]

## 2018-10-27 NOTE — PLAN OF CARE
Problem: Patient Care Overview  Goal: Plan of Care/Patient Progress Review  Outcome: Adequate for Discharge Date Met: 10/27/18  Patient discharged today at approx 1430. Escorted in w/c by NA. Transportation via family. Discharge instructions given to patient and wife in writing and reviewed verbally. Patient expressed understanding of all info received and had no further questions. All belongings sent home with patient.  Pt and wife aware that he will need to go to  IR as outpt visit to recheck abdominal  (pleurX style) drain on Monday (10/29/2018).  Unable to draw any fluid off of this drain yesterday - assumed plugged or needs adjusting?

## 2018-10-27 NOTE — PROVIDER NOTIFICATION
1830 text paged on call Hospitalist asking for consideration for IV fluids due to tachy cardia, very poor po intake with stage 4 gastric cancer. Awaiting reply

## 2018-10-27 NOTE — PROGRESS NOTES
SW:    D: SW following for discharge needs. SW placed order for 02 to . SW met with patient and informed of plan to discharge with 02 and plans to deliver it to room before discharge.     P: No other needs identified at this time. Plan is for patient to discharge home with  and 02.

## 2018-10-27 NOTE — PROGRESS NOTES
[facility] Progress Note          Assessment and Plan:   1. Metastatic gastric cancer with peritoneal carcinomatosis, Her-2/adrian negative  -Diagnosed with poorly differentiated adenocarcinoma with poorly cohesive and signet ring pattern following EUS and biopsy of 5.3 cm gastric ulcer with sonographic evidence of invasion into the serosa, regional gastric lymphadenopathy and ascites.  Gastric wall was found to be diffusely thickened in the body as well worrisome for linitis.  Her-2 by FISH was negative  -CT abdomen and pelvis 9/5/2018 showed progressive peritoneal metastatic disease, regional lymphadenopathy, ascites and bilateral pleural effusions  -started on palliative FOLFOX given every 2 weeks on 9/26/2018.   -completed cycle 3 of FOLFOX 10/26/2018  -no favorable response to treatments noted so far  -continuing to clinically deteriorate  -readdressed goals of care and recommended transition to comfort cares.    -would arrange for follow up as outpatient for ongoing discussion about goals of care     2. Malignant bilateral pleural effusions/ascites  -I did discuss with Dr. Salazar who concurred with me that pleurodesis is unlikely to be helpful and he too favored pleural catheter placement  -left sided pleural catheter placed by IR 10/25/2018  -abdominal catheter placed on 10/22/2018.    -unable to draw fluid from abdominal catheter - needs IR to re-check.  Unfortunately IR not available over the weekend.    -discharge today with plan for IR recheck on Monday (10/29/2018).  Home health aide for fluid removal (1700cc from left sided abdominal catheter and over 700cc from left sided thoracic catheter) every 2 days beginning on 10/30.  Our office will schedule these appointments for him.    3. Bilateral pulmonary emboli  -diagnosed following CT chest on 9/23/2018  -related to thrombophilia of malignancy  -on lovenox 1mg/kg bid   -hold Lovenox 10/29 morning dose for anticipated IR procedure and resume  "after the procedure    4. Severe malnutrition/failure to thrive/cancer cachexia  ->10% weight loss over the last 6 months   -albumin 1.5g/dl  -supplement meals with boost/ensure    5. Anemia  -related to iron deficiency/malignany/chemotherapy  -did receive IV iron replacement therapy as outpatient   -H&H stable and continue to monitor     6.  Pain  -rates pain at 5/10 intensity  -oxycontin 40mg q8   -dilaudid 4mg q3h prn for breakthrough pain    Full code  Disposition: discussed with Ernie and wife, Cielo.  He would like to be discharged home today.  May need O2 arranged prior to discharge.  Our office will arrange for appointment with IR for abdominal catheter recheck on 10/29/2018.      Judson Silva                 Interval History:   Oxygen levels dropping with ambulation.  Short of breath with minimal activity.  Denied fevers.              Review of Systems:   As per subjective, otherwise 5 systems reviewed and negative.           Physical Exam:   Blood pressure 124/75, pulse 117, temperature 96.3  F (35.7  C), temperature source Oral, resp. rate 18, height 1.626 m (5' 4\"), weight 66.7 kg (147 lb), SpO2 97 %.      Vital Sign Ranges  Temperature Temp  Av.6  F (35.9  C)  Min: 95.1  F (35.1  C)  Max: 98.6  F (37  C)   Blood pressure Systolic (24hrs), Av , Min:114 , Max:125        Diastolic (24hrs), Av, Min:68, Max:87      Pulse No Data Recorded   Respirations Resp  Av  Min: 16  Max: 20   Pulse oximetry SpO2  Av %  Min: 92 %  Max: 96 %         Intake/Output Summary (Last 24 hours) at 10/26/18 2298  Last data filed at 10/26/18 0441   Gross per 24 hour   Intake             1343 ml   Output             1300 ml   Net               43 ml       Constitutional:   No acute distress. Lost significant weight   Skin:   No rashes, petechiae, or ecchymoses.   HEENT:   Normocephalic, atraumatic. Oropharynx clear with no mucosal lesions or thrush.  Bitemporal wasting   Neck:   Supple.   Lungs:   " Decreased breath sounds left side.     Cardiovascular:   Regular rate and rhythm with no murmurs, rubs, or gallops.   Abdomen:   Flanks full, abdominal catheter in place   Extremities:   Bilateral leg edema   Neurological:   CN II-XII grossly intact. No focal motor or sensory deficits.            Medications:     No current outpatient prescriptions on file.                Data:     Results for orders placed or performed during the hospital encounter of 10/25/18 (from the past 24 hour(s))   Care Transition RN/SW IP Consult    Narrative    Norma Carrasco RN     10/26/2018  2:23 PM  Care Transition Initial Assessment - RN    Reason For Consult: discharge planning   Met with: Patient and Family.  DATA   Principal Problem:    SOB (shortness of breath)  Active Problems:    Malignant ascites (Cytology 9/5/18)    Gastric adenocarcinoma --Stage IV, on chemo    Pulmonary embolism -- on Lovenox    Malignant pleural effusions -- bilateral    Severe malnutrition (H)    Gastric cancer (H)    Cough    Anemia of chronic disease       Cognitive Status: awake, alert and oriented.  Primary Care Clinic Name: [facility]   Primary Care MD Name: Shira,   Contact information and PCP information verified: Yes  Lives With: child(mayra), dependent, spouse  Insurance concerns: No Insurance issues identified  ASSESSMENT  Patient currently receives the following services:  [facility] RN/OT and primary [facility] Dr. Silva.        Identified issues/concerns regarding health management: patient   with Gastric CA with mets undergoing palliative chemotherapy with   Dr. Silva.  Patient continues to be readmitted for shortness of   breath requiring paracentesis and IV antibiotics for infection   work up.  Patient underwent placement of a chest and abdominal   drain this stay.  Plan is for the patient to discharge to home,   resume Homecare RN/OT with continued teaching for draining.    Patient is currently on 1LPM at 95% and bedside is  attempting to   wean the patient for discharge.  Met briefly with the patient and   spouse regarding discharge planning.  Patient and spouse and   wondering if he will get drained from the pleurx from his abdomen   today.  Bedside was addressing this with the Oncology provider.    Writer did call [facility] to get follow up appointment in AVS.    Writer also confirmed with Norma RN(for Dr. Silva) at [facility]   parameters and frequency of draining both chest and abdomen this   information is in venkat AVS.  Updated Homecare RN regarding   discharge plan and they will attempt to see the patient 10/27 if   possible.  Patient and spouse aware of follow up recommendations.   Writer did not send hand off to [facility] as they have   discussed the case at [facility].   PLAN  Financial costs for the patient include n/a .  Patient given options and choices for discharge yes .  Patient/family is agreeable to the plan?  Yes:   Patient anticipates discharging to home with resumption of   Homecare RN/OT and [facility] follow up9  .        Patient anticipates needs for home equipment: No  Plan/Disposition: Home   Appointments:     [facility] follow up in AVS 11/7 12:40  Care  (CTS) will continue to follow as   needed.

## 2018-10-27 NOTE — PROGRESS NOTES
Care Coordination:    Per Dr. Silva, his office will contact patient with IR plan.    To ensure pt has office information, I added the following to AVS:    **NOTE:**  Dr. Silva's office will arrange for appointment with IR for abdominal catheter recheck on 10/29/2018.    + You may call 8am Monday AM to ask if you should or should not eat breakfast,and for the appointment time  [facility]  Phone [phone]  OFFICE HOURS M-F 8am-5pm Address:   [facility] [address] [facility] [address]  [facility] [address]        Lorraine Harris RN, BSN  [facility] Care Coordinator   Mobile Phone: [phone]

## 2018-10-27 NOTE — PLAN OF CARE
Problem: Patient Care Overview  Goal: Plan of Care/Patient Progress Review  A&Ox4, VSS on 2L N/C unable to wean. Tele: Sinus tach. C/o abd pain prn diluadid given x3 and scheduled oxycontin. LS diminished with LANDERS. Regular diet with poor appetite. Denies nausea overnight. PIV SL. Port infusing NS with 75 ml/hr. Both L and R pleurx catheters clamped. SBA. Chemo precautions maintained.

## 2018-10-27 NOTE — DISCHARGE INSTRUCTIONS
1. If having chills, check your temperature. If temp is >100.4 call MD  2. Call MD if having pain that is not controlled on current pain medications  3. Remember to take stool softeners and laxatives as needed if remaining on narcotic pain meds to prevent constipation  4. Call MD if having any swelling, drainage, redness of the surgical dressing sites5.  5. Call MD if having shortness of breath that is not controlled with rest, oxygen or removal of fluid from left chest/lung PleurX catheter   6.  Remember to have your abdominal ( Pleurx cath style) drain checked this Monday as mention below.     Oxygen Provider:  Arranged through [employer], contact number [phone].  If you have any questions or concerns please call the oxygen company directly.    **NOTE:**  Dr. Silva's office will arrange for appointment with IR for abdominal catheter recheck on 10/29/2018.    + Call 8am Monday AM to ask if you should or should not eat breakfast,and for the appointment time  [facility]  Phone [phone]  OFFICE HOURS M-F 8am-5pm Address:   [address]   [place] [address]         __ Check to make sure your Plurex catheter supplies are with you at discharge.

## 2018-10-27 NOTE — PLAN OF CARE
Problem: Patient Care Overview  Goal: Plan of Care/Patient Progress Review  Outcome: No Change  7A-7p shift  Last evening discharge home held due to developing SOB and LANDERS with  HR increased to 154/min with  O2 sat dropping to 84% with ambulation in perez. This am  Tele showing sinus tach RVS with 's Resp 18-20 nonlabored. Lungs clear.  Abd semi-firm slight distension. States had BM formed and large last night -though not witnessed by staff.  Scheduled Oxycontin and prn oral Dilaudid  Controlling his abdominal pain as well as the abdominal and chest PleurX cath drain sites. Appetite remains poor. Denies n/v. Ace wraps on both low extremities intact. Up indep in room with safety reminders for watching O2 and IV tubings.  Anticipate discharge home later today - will need orders for home O2 and HHC

## 2018-10-27 NOTE — PROGRESS NOTES
Received intake call for home oxygen at 11:13AM. Reviewed patient's chart; all documentation is in chart.   11:25AM- Called to offer choice and patient is okay with [facility] setting them up. Discussed equipment with patient and informed them that we would be to bedside with oxygen in the next 2 hours. He asked that I call his Wife Cielo at [phone] to inform her so she could be present for setup. I called wife and left detailed message about bringing oxygen equipment to hospital bedside and that we would be there right around 1:00-1:30pm.    Spoke with care coordinator, Sydnie, confirmed we received the order, and provided them with ETA of oxygen.

## 2018-10-30 PROBLEM — C79.9 METASTATIC ADENOCARCINOMA (H): Status: ACTIVE | Noted: 2018-01-01

## 2018-10-30 NOTE — ED NOTES
[facility]  ED Nurse Handoff Report    ED Chief complaint: Altered Mental Status      ED Diagnosis:   Final diagnoses:   Weakness   Altered mental status, unspecified altered mental status type   Metastatic cancer (H)   Encounter for palliative care       Code Status: Comfort Care    Allergies:   Allergies   Allergen Reactions     Tegaderm Chg Dressing [Chlorhexidine]      Takes off skin       Activity level - Baseline/Home:  Total Care    Activity Level - Current:   Total Care     Needed?: No    Isolation: No  Infection: Not Applicable  Bariatric?: No    Vital Signs:   Vitals:    10/30/18 0630 10/30/18 0635 10/30/18 0640 10/30/18 0645   BP: 126/85      Resp: (!) 2 (!) 6 8 (!) 1   Temp:       TempSrc:       SpO2: 98% 95% 97% 91%       Cardiac Rhythm: ,   Cardiac  Cardiac Rhythm: Sinus tachycardia    Pain level:      Is this patient confused?: No   Davie - Suicide Severity Rating Scale Completed?  No, secondary to pt unresponsive  If yes, what color did the patient score?  N/A    Patient Report: Initial Complaint: decreased LOC  Focused Assessment: Pt arrived with shallow infrequent resp. MD called wife and she confirmed only comfort cares.  His port was accessed & IV started before code status was verified.  Pt is unresponsive with shallow infrequent respiration. HR is slowly dropping as it was 157 for EMS and now is between 118-126.  O2 2L NC for comfort care. Spouse gave 2.5mg dilaudid about 40 minutes before pt arrived.      Tests Performed: labs  Abnormal Results:   Results for orders placed or performed during the hospital encounter of 10/30/18   CBC with platelets differential   Result Value Ref Range    WBC 7.4 4.0 - 11.0 10e9/L    RBC Count 3.80 (L) 4.4 - 5.9 10e12/L    Hemoglobin 9.8 (L) 13.3 - 17.7 g/dL    Hematocrit 32.4 (L) 40.0 - 53.0 %    MCV 85 78 - 100 fl    MCH 25.8 (L) 26.5 - 33.0 pg    MCHC 30.2 (L) 31.5 - 36.5 g/dL    RDW 21.7 (H) 10.0 - 15.0 %    Platelet Count 379  150 - 450 10e9/L    Diff Method Automated Method     % Neutrophils 87.3 %    % Lymphocytes 4.6 %    % Monocytes 7.3 %    % Eosinophils 0.1 %    % Basophils 0.0 %    % Immature Granulocytes 0.7 %    Nucleated RBCs 0 0 /100    Absolute Neutrophil 6.5 1.6 - 8.3 10e9/L    Absolute Lymphocytes 0.3 (L) 0.8 - 5.3 10e9/L    Absolute Monocytes 0.5 0.0 - 1.3 10e9/L    Absolute Eosinophils 0.0 0.0 - 0.7 10e9/L    Absolute Basophils 0.0 0.0 - 0.2 10e9/L    Abs Immature Granulocytes 0.1 0 - 0.4 10e9/L    Absolute Nucleated RBC 0.0    Basic metabolic panel   Result Value Ref Range    Sodium 135 133 - 144 mmol/L    Potassium 3.5 3.4 - 5.3 mmol/L    Chloride 94 94 - 109 mmol/L    Carbon Dioxide 38 (H) 20 - 32 mmol/L    Anion Gap 3 3 - 14 mmol/L    Glucose 144 (H) 70 - 99 mg/dL    Urea Nitrogen 11 7 - 30 mg/dL    Creatinine 0.67 0.66 - 1.25 mg/dL    GFR Estimate >90 >60 mL/min/1.7m2    GFR Estimate If Black >90 >60 mL/min/1.7m2    Calcium 8.7 8.5 - 10.1 mg/dL       Treatments provided: comfort care    Family Comments: wife lives about 20 minutes and in route.    OBS brochure/video discussed/provided to patient/family: N/A              Name of person given brochure if not patient:               Relationship to patient:     ED Medications: Medications - No data to display    Drips infusing?:  No    For the majority of the shift this patient was Green.   Interventions performed were none.    Severe Sepsis OR Septic Shock Diagnosis Present: No    To be done/followed up on inpatient unit:  comfort care    ED NURSE PHONE NUMBER: [phone]

## 2018-10-30 NOTE — ED PROVIDER NOTES
History     Chief Complaint:  Altered Mental Status     HPI The patient's HPI is limited due to the mental status of the patient.  Supplemented by EMS report review of electronic records.      Ernie Song is a 38 year old male, with a history of metastatic stage IV gastric adenocarcinoma currently receiving chemotherapy, who presents via EMS for altered mental status, generalized weakness, and inability to function at home.  Per EMS report, the patient was recently admitted on 10/25 and discharged yesterday, see note below for specifics, for his stage IV stomach cancer, but continues to feel weak and altered, prompting EMS. Additionally, the patient has been moaning en route and EMS also notes that the family is very tearful.  He apparently took 2.5 mg of oral Dilaudid per EMS report just prior to the transport.    Per recent discharge summary, there is been strong consideration to discontinue his chemotherapy as it has not yielded any clinical benefit, and hospice has been discussed though he is not yet formally enrolled.    I spoke to his wife by phone early in his ED course to obtain additional information.  She agreed with plan for comfort care as his condition has worsened significantly and is not thought to be curable.    HPI 10/25 seen by Dr. Cruz:  Ernie Song is a 38 year old male with PE on Lovenox, metastatic stage IV gastric adenocarcinoma currently receiving chemotherapy (last treatment 10/24/18, discharged home around 1700), and current pleural effusions who presents with a cough and shortness of breath. On 10/22/18, the patient was experiencing shortness of breath and underwent thoracentesis here at [facility] with 700 mL fluid removed from his left pleural space and 400 mL removed from left upper abdominal wall with right PleurX placement before being discharged home. Early this morning around 0100, the patient states he woke up with a cough and had one episode of bile-like  emesis thereafter. He notes he continued to cough and feel short of breath and could not get comfortable, so he called EMS to bring him to the ED for further evaluation. Here in the ED, the patient reports he is coughing up yellow phlegm. He denies any chest pain, nausea, fevers, or other acute symptoms.    Medical Decision Making:  Ernie Song is a 38 year old male who presents to the ED for evaluation of a cough. I found the patient to have sepsis syndrome, which I feel is due to a pulmonary infection with a loculated pleural effusion. He had no sign of pulmonary embolism on his chest CT. The fact that he underwent thoracentesis recently makes him at increased risk for infection as well as the fact that he is immunocompromised as a cancer patient currently undergoing chemotherapy. Therefore, he was treated with strong, broad-spectrum antibiotics, Vancomycin and Zosyn, and admitted to the oncology/telemetry floor under the care of Dr. Hearn.      Allergies:  Tegaderm Chg Dressing [Chlorhexidine]      Medications:    Colace  Lovenox  Lexapro  Dilaudid  Ativan  Zyprexa  Oxycodone  Protonix  Compazine  Valtrex    Past Medical History:    Gastric adenocarcinoma  Genital herpes  Gynecomastia  Stage IV adenocarcinoma of stomach  Gastric ulcer    Past Surgical History:    EGD combined x3  Insert port vascular access    Family History:    Prostate cancer  Asthma    Social History:  Presents via EMS  Former Smoker - quit 2004  Negative for alcohol use.  Oncologist: Dr. Silva through [facility]  Marital Status:   [2]     Review of Systems   Unable to perform ROS: Mental status change       Physical Exam     Patient Vitals for the past 24 hrs:   BP Temp Temp src Heart Rate Resp SpO2   10/30/18 0700 - - - - (!) 2 -   10/30/18 0655 - - - 130 (!) 4 95 %   10/30/18 0650 - - - 118 (!) 7 93 %   10/30/18 0645 138/85 - - 126 (!) 1 91 %   10/30/18 0640 - - - 126 8 97 %   10/30/18 0635 - - - 128 (!) 6 95 %    10/30/18 0630 126/85 - - 133 (!) 2 98 %   10/30/18 0625 - - - 134 (!) 3 99 %   10/30/18 0620 - - - 133 (!) 3 97 %   10/30/18 0615 135/88 - - 133 (!) 7 98 %   10/30/18 0605 127/87 - - 133 12 100 %   10/30/18 0601 (!) 140/100 98.2  F (36.8  C) Temporal - - 98 %   10/30/18 0555 - - - 135 14 90 %      Physical Exam  General: Very ill-appearing male recumbent in stable 1  HENT: Mucous membranes slightly dry, oropharynx clear  CV: Tachycardic with regular rate, palpable peripheral pulses, no murmur, L sided chest port  Resp: Coarse and diminished breath sounds bilaterally, no wheezing, no stridor, drain in place in left chest  GI: Abdomen soft but slightly distended diffusely, catheter in place from left abdomen  MSK: No midline spine tenderness or acute bony deformity in extremities  Skin: Tattoos to chest, no petechiae, gauze dressing to right chest.  Neuro: Eyes closed, no nystagmus, pupils reactive, moves all extremities spontaneously but diffusely weak, moans frequently but does not give any clear speech in English or Emirati  Psych: Nonverbal so unable to fully assess      Emergency Department Course     Laboratory:  CBC: WBC: 7.4, HGB: 9.8 (L), PLT: 379  BMP: Glucose 144 (H), Carbon Dioxide: 38 (H), o/w WNL (Creatinine: 0.67)    Emergency Department Course:  0545  Patient arrived via EMS. I performed an exam of the patient as documented above.     I performed electronic chart review in K[place]u.  The patient was placed on continuous cardiac and pulse ox monitoring.    0556 I had called the patient's wife and spoke to her about the patient's care plan.    0601 I had rechecked the patient.     Blood drawn. This was sent to the lab for further testing, results above.    0601 I rechecked the patient.     0621  I consulted with Dr. Vu of the hospitalist services. They are in agreement to accept the patient for admission.    Findings and plan explained to the Patient who consents to admission. Discussed the patient with  Dr. Vu, who will admit the patient to a medical bed for further monitoring, evaluation, and treatment.        Impression & Plan      Medical Decision Making:  Unfortunately it seems that he is nearing the end of life from progressive and previously diagnosed metastatic gastric cancer.  His evidence of profoundly altered mental status as well as weakness and abnormal respirations.  He developed some brief periods of apnea later in his ED course.  Given that there was Enzo talk of hospice care as well as his overall very poor prognosis, I spoke with his wife very early in the ED course and we readily agreed to focus on his comfort rather than perform advanced diagnostic testing or invasive procedures.  She understands that he is likely to die in the very near future, and I would not be surprised if he dies in the coming minutes to hours.  He will be admitted to the hospital for further care as he is clearly unable to receive adequate care in his residence at this time.    Diagnosis:    ICD-10-CM    1. Weakness R53.1 CBC with platelets differential     Basic metabolic panel   2. Altered mental status, unspecified altered mental status type R41.82    3. Metastatic cancer (H) C79.9    4. Encounter for palliative care Z51.5        Disposition:  Admitted to Dr. Vu    I, Yue Sanford, am serving as a scribe on 10/30/2018 at 5:53 AM to personally document services performed by Lalo Eid MD based on my observations and the provider's statements to me.      This record was created at least in part using electronic voice recognition software, so please excuse any typographical errors.      Yue Sanford  10/30/2018    EMERGENCY DEPARTMENT       Lalo Eid MD  10/30/18 0800

## 2018-10-30 NOTE — H&P
[facility]    History and Physical  Hospitalist       Date of Admission:  10/30/2018    Assessment & Plan   Ernie Song is a 38 year old male who presents with weakness, non-responsiveness.    Nonresponsive state: Patient actively dying from stage IV metastatic gastric adenocarcinoma.  Stage IV metastatic gastric adenocarcinoma: Completed 3 cycles of FOLFOX chemotherapy with Dr. Silva, though no clinical response and rapid progressive decline.  As of last hospitalization 10/25-10/27, oncology no longer offering chemotherapy and hospice was to meet patient at home.  Patient with malignant ascites, bilateral malignant pleural effusions requiring bilateral Pleurx catheters for recurrent drainage.  -Transition to comfort cares at this time  -Comfort medications ordered    Severe protein calorie malnutrition in the setting of malignancy: N.p.o. at this time given nonresponsive state.    Pulmonary embolism in the setting of hypercoagulable state with active malignancy  -Prior to admission Lovenox injections have been discontinued as patient is transitioning to comfort cares    Anemia of chronic disease.  No longer following as patient comfort cares    DVT Prophylaxis: have stopped lovenox injections. Transitioning to comfort cares    Code Status: DNR / DNI, now transitioned to comfort care    Disposition: Patient is actively dying.  I anticipate death within the next 24 hours     Bud Lopez Callaway    Primary Care Physician   Gay Winslow    Chief Complaint   EMS contacted as patient was weak and nonresponsive at home.    History is obtained from chart review, discussion with Dr Eid in the emergency department. I am familiar with patient from one of his recent admissions.  Patient is unable to provide any history given nonresponsive state.  Family is currently en route to the hospital, and I did not call for repeat history as recently obtained by Dr. Eid.    History of Present  Illness   Ernie Song is a 38 year old male who presents from home after wife contacted EMS for patient becoming nonresponsive.  Multiple hospitalizations related to metastatic gastric adenocarcinoma over the past month, progressive worsening weakness.    Chronic cough and shortness of breath associated both with weakness and deconditioning as well as malignant bilateral pleural effusions requiring bilateral Pleurx catheter placement and recurrent drainage.       patient is unable to provide any history at this time.  Per EMS, patient took 2.5 mg of Dilaudid as per his home prescription approximately 40 minutes prior to their arrival.  Has been weak and sleepy, progressively worsening over the past month in relation to cancer.  Wife contacted EMS as he was unresponsive.  Found to have tachycardia to the 150s range, agonal breathing.    On arrival to the emergency department, Dr. Eid discussed with patient's wife over the telephone patient's clinical state.  As above, patient was recently discontinued from chemotherapy given ongoing decompensation and no clinical response.  At last discharge 10/27/18, patient was still full code, the plan had been for hospice evaluation at the patient's home.  ED provider discussed with patient's wife, and decision was made to transition to DNR/DNI and comfort cares.  Hospitalist service was contacted for admission with patient actively dying.    Assessed patient in the emergency department.  Following wife's conversation with Dr. Eid, she was on her way to the hospital from [place], [place].  I did not discuss again to confirm prior data and planning with wife as she is en route.    At this time, patient is tachycardic to the 130s.  Blood pressures in the 120 Systolic range.  Patient with agonal gasping respiratory pattern, periods of apnea lasting 7-10 seconds.  Patient is actively dying.  He is nonresponsive to mildly noxious stimuli.  Pupils 2 mm  bilaterally.     Past Medical History    I have reviewed this patient's medical history and updated it with pertinent information if needed.   Past Medical History:   Diagnosis Date     Gastric adenocarcinoma (H) 9/10/2018     Genital herpes      Gynecomastia, male 2016    Chest CT benign     Stage IV adenocarcinoma of stomach (H)      Ulcer, gastric, acute      Past Surgical History   I have reviewed this patient's surgical history and updated it with pertinent information if needed.  Past Surgical History:   Procedure Laterality Date     ESOPHAGOSCOPY, GASTROSCOPY, DUODENOSCOPY (EGD), COMBINED N/A 2/26/2018    Procedure: COMBINED ESOPHAGOSCOPY, GASTROSCOPY, DUODENOSCOPY (EGD), BIOPSY SINGLE OR MULTIPLE;  gastroscopy;  Surgeon: Lalo Ibrahim MD;  Location: [facility]     ESOPHAGOSCOPY, GASTROSCOPY, DUODENOSCOPY (EGD), COMBINED N/A 9/5/2018    Procedure: COMBINED ENDOSCOPIC ULTRASOUND, ESOPHAGOSCOPY, GASTROSCOPY, DUODENOSCOPY (EGD), FINE NEEDLE ASPIRATE/BIOPSY;  COMBINED ENDOSCOPIC ULTRASOUND, ESOPHAGOSCOPY, GASTROSCOPY, DUODENOSCOPY (EGD), FINE NEEDLE ASPIRATE;  Surgeon: Ever Owusu MD;  Location: [facility]     ESOPHAGOSCOPY, GASTROSCOPY, DUODENOSCOPY (EGD), COMBINED N/A 9/5/2018    Procedure: COMBINED ESOPHAGOSCOPY, GASTROSCOPY, DUODENOSCOPY (EGD), BIOPSY SINGLE OR MULTIPLE;;  Surgeon: Ever Owusu MD;  Location: [facility]     INSERT PORT VASCULAR ACCESS N/A 9/20/2018    Procedure: INSERT PORT VASCULAR ACCESS;  PORT PLACEMENT ;  Surgeon: Jamel Salazar MD;  Location: [facility]     NO HISTORY OF SURGERY        Prior to Admission Medications   Prior to Admission Medications   Prescriptions Last Dose Informant Patient Reported? Taking?   Docusate Sodium (COLACE PO)  Self Yes No   Sig: Take 100 mg by mouth 2 times daily as needed for constipation    Escitalopram Oxalate (LEXAPRO PO)  Pharmacy Yes No   Sig: Take 10 mg by mouth daily    HYDROmorphone (DILAUDID) 4 MG tablet  Pharmacy No No    Sig: Take 1 tablet (4 mg) by mouth every 3 hours as needed for moderate to severe pain   LORazepam (ATIVAN) 0.5 MG tablet  Self No No   Sig: Take 1-2 tablets (0.5-1 mg) by mouth every 6 hours as needed (Breakthrough Nausea / Vomiting)   OLANZapine (ZYPREXA PO)  Pharmacy Yes No   Sig: Take 2.5 mg by mouth every morning   enoxaparin (LOVENOX) 60 MG/0.6ML injection   No No   Sig: Inject 0.6 mLs (60 mg) Subcutaneous every 12 hours Hold lovenox 10/29/2018 am dose in anticipation of IR procedure. Resume previous dose after the procedure.   guaiFENesin (ROBITUSSIN) 20 mg/mL SOLN solution  Self Yes No   Sig: Take 20 mLs by mouth every 4 hours as needed for cough   oxyCODONE (OXYCONTIN) 40 MG 12 hr tablet  Self No No   Sig: Take 1 tablet (40 mg) by mouth every 8 hours   pantoprazole (PROTONIX) 40 MG EC tablet  Self No No   Sig: Take 1 tablet (40 mg) by mouth 2 times daily Take 30-60 minutes before a meal.   polyethylene glycol (MIRALAX/GLYCOLAX) Packet  Self Yes No   Sig: Take 1 packet by mouth daily   prochlorperazine (COMPAZINE) 10 MG tablet  Self No No   Sig: Take 1 tablet (10 mg) by mouth every 6 hours as needed for nausea or vomiting   valACYclovir (VALTREX) 500 MG tablet  Self No No   Sig: TAKE ONE TABLET BY MOUTH ONE TIME DAILY       Facility-Administered Medications: None     Allergies   Allergies   Allergen Reactions     Tegaderm Chg Dressing [Chlorhexidine]      Takes off skin       Social History   I have reviewed this patient's social history and updated it with pertinent information if needed. Ernie Song  reports that he quit smoking about 14 years ago. His smoking use included Cigarettes. He has a 12.00 pack-year smoking history. He has never used smokeless tobacco. He reports that he does not drink alcohol or use illicit drugs.     Family History   I have reviewed this patient's family history and updated it with pertinent information if needed.   Family History   Problem Relation Age of Onset      Prostate Cancer Father      Other Cancer Maternal Grandmother      Other Cancer Other      Asthma Mother      Stomach Cancer Maternal Uncle        Review of Systems   Patient is unresponsive and unable to provide a review of systems.    Physical Exam   Temp: 98.2  F (36.8  C) Temp src: Temporal BP: 126/85   Heart Rate: 126 Resp: (!) 1 SpO2: 91 % O2 Device: Nasal cannula Oxygen Delivery: 2 LPM  Vital Signs with Ranges  Temp:  [98.2  F (36.8  C)] 98.2  F (36.8  C)  Heart Rate:  [126-135] 126  Resp:  [1-14] 1  BP: (126-140)/() 126/85  SpO2:  [90 %-100 %] 91 %  0 lbs 0 oz    Constitutional: Nonverbal/nonresponsive.  Appears to be actively dying  Eyes: pupils 2 mm. Does not open eyes spontaneously.  HEENT: moist mucous membranes  Respiratory: Agonal breathing with prolonged periods of apnea lasting approximately 7-10 seconds.  Cardiovascular: tachycardia to 130 range.   Lymph/Hematologic: LE edema 1+ bilaterally, slightly more on L.   Musculoskeletal: Muscular wasting of chest wall, upper extremities.  Neurologic: Patient is nonresponsive to mildly noxious stimuli.  Psychiatric: Unable to assess    Data   Data reviewed today:  I personally reviewed no new imaging at this time.  Patient transition to comfort cares while in the emergency department and no laboratory studies or imaging studies were obtained

## 2018-10-30 NOTE — PROGRESS NOTES
Brief, no charge note    Called by nursing with family concern for non-functioning pleurex catheter for malignant ascites; wife states he was scheduled for the procedure this am    Patient admitted this am unresponsive, thought to be actively dying and made comfort cares.  On my exam patient still unresponsive but appears comfortable  Abdomen does look tense; pleurex catheter in place but not draining     I'm not sure if the procedure is going to be of much benefit in this dying patient but due to family concerns I talked to NP Zakia from IR who knew about the patient (procedure was scheduled for this am) who will come to talk to family and evaluate patient and do the needful

## 2018-10-30 NOTE — PROGRESS NOTES
SPIRITUAL HEALTH SERVICES Progress Note  [facility]    Paged to visit and provide support to pt/wife.  They are known to me from multiple hospital stays in the past few weeks.  Pt arrived at the hospital early in the morning (after spending weekend at home).    Pt was largely unresponsive.  Pt's wife at bedside, with his family in [place] (mother, two sisters) on Facetime.    Contacted Fr. Davis and he came to the hospital and anointed the pt.  Provided support to pt's wife, and assisted her in using the  phone to communicate with pt's family.    Pt has two sons under age 10.  They are at school, and may come from school to visit pt later in the day.    Provided support to pt's wife.  SH team will remain available.                                                                                                                                                 Sierra Sneed M.A.  Staff   Pager [phone]  Phone [phone]        Addendum:  Provided pt's wife with Gone From My [facility] book, as well as information on death and grief for children.  Will continue to follow.

## 2018-10-30 NOTE — PROGRESS NOTES
US demonstrates no measurable ascites to drain at this time. This was communicated to Dr Hill and the family.     Thanks Zakia [facility] Interventional Radiology CNP ([phone]) (phone [phone])

## 2018-10-30 NOTE — CONSULTS
Care Transition Initial Assessment - SW  Reason For Consult: grief and loss  Met with: PATIENT,FAMILY  Active Problems:    Metastatic adenocarcinoma (H)       DATA  Lives With: child(mayra), dependent, spouse  Living Arrangements: house  Description of Support System: Supportive, Involved  Who is your support system?: Wife  Support Assessment: Adequate family and caregiver support.   Identified issues/concerns regarding health management:SW following for emotional support  Quality Of Family Relationships: supportive, involved     ASSESSMENT  Cognitive Status:  Unresponsive  Concerns to be addressed: Patient is a 38 year old male who was admitted to the hospital for metastatic adenocarcinoma. Prior to hospitalization patient was living at home with spouse and 2 young children. Patient's spouse has elected to have patient transition to comfort care and Per MD note:   Patient is actively dying.  I anticipate death within the next 24 hours.    SW spoke with patient's spouse and family to offer support and any resources needed at this time. Family has a  home they are planning to use and have family from [place] on facetime providing prayer. Family is aware that SW is available for support if needed. SW did not discuss a disposition plan with hospice at this time.      PLAN  Financial costs for the patient includes   Patient given options and choices for discharge  Patient/family is agreeable to the plan?  YES  Patient Goals and Preferences:   Patient anticipates discharging to:      MARISSA Ventura   *[phone]

## 2018-10-30 NOTE — ED NOTES
Bed: ED20  Expected date: 10/30/18  Expected time: 5:45 AM  Means of arrival: Ambulance  Comments:  [address] 38M [address]. Lev. Cons.

## 2018-10-30 NOTE — PROGRESS NOTES
Tashi was admitted early this morning as he became more unresponsive under comfort cares. Cielo Dykes is concerned that the drain has never worked and that a build up of ascites may be causing him discomfort.     He had a left lung PleurX placed 10/25 for recurrent pleural effusion and the R lung pleurX was removed. On 10/22/18 he had an abdominal pleurX placed for recurrent ascites but only had a minimal amount of fluid 400 mL at the time of placement. Prior to that he had 1.5 to 2 L at a time that was drained. CT scan done which included the upper abdomen on 10/25 had minimal ascites but a fair amount of edema.     The patient's abdomen is firm and mildly distended.     It could be that he may not have enough ascites to drain, it could be aspirating bowel, or fluid could be in a different area of his abdmen/pelvis than where the drain is.     Discussed with Cielo. She doesn't want to do anything that is going to make him mreo uncomfortable. Recommended doing a portable limited abdominal US to check for ascites first and plan from there. Order placed for US.     Thanks [facility] Interventional Radiology CNP ([phone]) (phone [phone])

## 2018-10-31 PROBLEM — Z51.5 TERMINAL CARE: Status: ACTIVE | Noted: 2018-01-01

## 2018-10-31 NOTE — PLAN OF CARE
Problem: Patient Care Overview  Goal: Plan of Care/Patient Progress Review  Outcome: Declining  Comfort cares continued. Wife at bedside.  visited pt and wife this AM. Pt is unresponsive and lethargic. Pain/restless/dyspnea is well managed with SL morphine & IV ativan, IV dilaudid used for breakthrough pain/dyspnea. RR 18-22. VSS and assessment deferred per comfort cares. Frequent turn/repo per comfort. Frequent oral cares provided. On 3 LPM NC for comfort. Bruce patent with adequate clear yellow output. Port TKO with NS at 10 ml/hr. Will continue providing support.

## 2018-10-31 NOTE — PLAN OF CARE
Problem: Patient Care Overview  Goal: Plan of Care/Patient Progress Review  Comfort cares. Pt is mostly unresponsive. Pulls at gown and is restless at times. Pain and restlessness managed with SL morphine and IV ativan on regular schedule- given IV dilaudid for breakthrough pain when needed. On 3L O2 for comfort. Abd distended. Bruce patent with clear yellow output. NPO, frequent oral cares per wife at bedside. Pleurex sites WDL. R PIV SL. Port HL. Activity bedrest with turn/repo q2hr. Plans pending. Will continue to monitor.

## 2018-10-31 NOTE — PROGRESS NOTES
SPIRITUAL HEALTH SERVICES Progress Note  [facility]    Visit with pt's wife at pt's bedside.  Pt was resting quietly during our visit.    Cielo talked about events of the last day, including visits from many of Ernie's friends last evening.  His sons came to visit yesterday, too.  (Today they are back in school).  Pt's own pastors from [facility] came to visit yesterday.  Pt's family is on Facetime (from [place]).  Cielo's parents went home to [place] yesterday, and hope to return to [place] later in the week.  (Cielo also has a brother who lives in [place] with his family.)    Cielo's friend Tami arrived during my visit.  She appears to be a steadfast support for Cielo, and cared for their boys last night.    Provided conversation, support and prayer.    team remains available                                                                                                                                               Sierra Sneed M.A.  Staff   Pager [phone]  Phone [phone]

## 2018-10-31 NOTE — PROGRESS NOTES
[facility]  Hospitalist Progress Note   10/31/2018          Assessment and Plan:       Ernie Song is a 38 year old male with medical history of gastric adenocarcinoma brought into the ED via EMS for nonresponsiveness on 10/30/2018.    Metastatic gastric adenocarcinoma stage IV.  Malignant bilateral pleural effusion requiring bilateral Pleurx catheter  Malignant ascites.  Adult failure to thrive with severe protein energy malnutrition  Hospice care patient -impending death  Multiple hospitalizations related to metastatic gastric adenocarcinoma over the past month, progressive worsening weakness.  Per EMS, patient took 2.5 mg of Dilaudid as per his home prescription approximately 40 minutes prior to their arrival.  Has been weak and sleepy, progressively worsening over the past month in relation to cancer.  Wife contacted EMS as he was unresponsive. Found to have tachycardia to the 150s range, agonal breathing.     As above, patient was recently discontinued from chemotherapy given ongoing decompensation and no clinical response.  At last discharge 10/27/18, patient was still full code, the plan had been for hospice evaluation at the patient's home.  ED provider discussed with patient's wife, and decision was made to transition to DNR/DNI and comfort cares.     He had a left lung PleurX placed 10/25 for recurrent pleural effusion and the R lung pleurX was removed. On 10/22/18 he had an abdominal pleurX placed for recurrent ascites but only had a minimal amount of fluid 400 mL at the time of placement. Prior to that he had 1.5 to 2 L at a time that was drained. CT scan done which included the upper abdomen on 10/25 had minimal ascites but a fair amount of edema.  During this hospitalization patient's wife had concerns if he would benefit from IR guided drainage for abdominal ascites.  Repeat US 10/30 - no measurable ascites to drain at this time.  IR recommended no drainage of ascites at  this time.     Patient currently has agonal gasping respiratory pattern, nonresponsive to noxious stimuli.  Patient actively dying.  Patient's wife Cielo has multiple questions regarding ascites/current condition, addressed. No acute intervention at this time as she would not like to make him more uncomfortable.    Continue comfort care medications -Ativan IV/p.o.  Morphine IV/p.o.  IV/p.o. Zofran as needed for vomiting.  Supportive care.    H/o gastric ulcers & chronic gastritis associated with H. Pylori.  Microcytic Anemia, Iron Deficiency  Pulmonary embolism   Comfort care.    Active Diet Order      NPO for Medical/Clinical Reasons Except for: Ice Chips    DVT Prophylaxis:  None  Code Status: DNR/DNI  Disposition: Expected discharge -impending death.  Discussed with Cielo, she would like him to be comfortable in the hospital and is emotional about transition to home during this process.    Discussed with the utilization team, switch to inpatient.    Patient, his wife Cielo, interdisciplinary team involved in care and agrees with plan.  Total time - Greater than 25 min. More than 50% of time spent in direct patient care, care coordination, caregiver counseling, and formalizing plan of care.     Joseph Crespo MD        Interval History:      Patient has been lying in bed.  Nonverbal/nonresponsive.  Receiving frequent doses of oral Ativan/morphine with intermittent IV doses.  Family supportive, wife Cielo by the bedside.         Physical Exam:        Physical Exam   Heart Rate:  [134] 134  Resp:  [12-14] 13    Intake/Output Summary (Last 24 hours) at 10/31/18 0837  Last data filed at 10/31/18 0642   Gross per 24 hour   Intake                0 ml   Output             2175 ml   Net            -2175 ml     Constitutional: Nonverbal/nonresponsive.  Appears to be actively dying  Eyes: pupils 2 mm. Does not open eyes spontaneously.  Respiratory: Agonal breathing with prolonged periods of apnea lasting approximately 7-10  seconds.  Cardiovascular: tachycardia to 130 range.   Lymph/Hematologic: LE edema 1+ bilaterally, slightly more on Left.   Musculoskeletal: Muscular wasting of chest wall, upper extremities.  Neurologic: Patient is nonresponsive to mildly noxious stimuli.  Psychiatric: Unable to assess         Medications:          heparin  5 mL Intracatheter Q28 Days     heparin lock flush  5-10 mL Intracatheter Q24H     acetaminophen, acetaminophen, artificial saliva, atropine, [START ON 11/2/2018] bisacodyl, heparin lock flush, HYDROmorphone, hypromellose-dextran, LORazepam **OR** LORazepam **OR** LORazepam, metoclopramide **OR** metoclopramide, mineral oil-hydrophilic petrolatum, morphine **OR** morphine HIGH CONCENTRATE, naloxone, ondansetron **OR** ondansetron, prochlorperazine **OR** prochlorperazine **OR** prochlorperazine, sodium chloride (PF), sodium chloride (PF)         Data:      All new lab and imaging data was reviewed.

## 2018-10-31 NOTE — PROVIDER NOTIFICATION
MD Notification    Notified Person: MD    Notified Person Name: Dhiraj    Notification Date/Time: 10/31/18 6228    Notification Interaction: Phone     Purpose of Notification: Poor pain control and increased restlessness. Possible to increase frequency/dose of pain meds/ativan?     Orders Received: Increased frequency of ativan and morphine     Comments:

## 2018-11-01 NOTE — PROGRESS NOTES
Hospitalist service cross-cover note:     Called regarding agitation, restlessness, apparent discomfort while on comfort cares despite receiving maximum doses and frequencies of lorazepam, morphine, hydromorphone. Increased hydromorphone IV dose, liquid morphine dose and added haldol IV for agitation.     Teodoro Dailey MD   Hospitalist  [phone]

## 2018-11-01 NOTE — PROGRESS NOTES
[facility]  Hospitalist Progress Note   11/01/2018          Assessment and Plan:       Ernie Song is a 38 year old male with medical history of gastric adenocarcinoma brought into the ED via EMS for nonresponsiveness on 10/30/2018.    Metastatic gastric adenocarcinoma stage IV.  Malignant bilateral pleural effusion requiring bilateral Pleurx catheter  Malignant ascites.  Adult failure to thrive with severe protein energy malnutrition  Hospice care patient -impending death  Multiple hospitalizations related to metastatic gastric adenocarcinoma over the past month, progressive worsening weakness.  Per EMS, patient took 2.5 mg of Dilaudid as per his home prescription approximately 40 minutes prior to their arrival.  Has been weak and sleepy, progressively worsening over the past month in relation to cancer.  Wife contacted EMS as he was unresponsive. Found to have tachycardia to the 150s range, agonal breathing.     As above, patient was recently discontinued from chemotherapy given ongoing decompensation and no clinical response.  At last discharge 10/27/18, patient was still full code, the plan had been for hospice evaluation at the patient's home.  ED provider discussed with patient's wife, and decision was made to transition to DNR/DNI and comfort cares.     He had a left lung PleurX placed 10/25 for recurrent pleural effusion and the R lung pleurX was removed. On 10/22/18 he had an abdominal pleurX placed for recurrent ascites but only had a minimal amount of fluid 400 mL at the time of placement. Prior to that he had 1.5 to 2 L at a time that was drained. CT scan done which included the upper abdomen on 10/25 had minimal ascites but a fair amount of edema.  During this hospitalization patient's wife had concerns if he would benefit from IR guided drainage for abdominal ascites.  Repeat US 10/30 - no measurable ascites to drain at this time.  IR recommended no drainage of ascites at  this time.     Continue comfort care medications -Ativan IV/p.o.  Morphine IV/p.o.  IV/p.o. Zofran as needed for vomiting.  Supportive care.    H/o gastric ulcers & chronic gastritis associated with H. Pylori.  Microcytic Anemia, Iron Deficiency  Pulmonary embolism   Comfort care.    Active Diet Order      NPO for Medical/Clinical Reasons Except for: Ice Chips    DVT Prophylaxis:  None  Code Status: DNR/DNI  Disposition: Expected discharge -impending death.  Discussed with Cielo, she would like him to be comfortable in the hospital and is emotional about transition to home during this process.    Total time spend 35 min >50% spend on coordination of care including discussed with  Wife, care team etc        Rosemarie Rosado MD        Interval History:      Continue to be unresponsive , discussed with  Wife, she understands that he is at the end of his life and have no specific preference to have him home, she made the decision for comfort only during this admission, she wishes that his suffering ends soon, would like to get spiritual care etc, ok to get hospice involved, patient has no oral intake and haven't woken up, she believes that he is fighting inside and she has discussed about her plans [place] his family [place].         Physical Exam:        Physical Exam        Intake/Output Summary (Last 24 hours) at 10/31/18 0837  Last data filed at 10/31/18 0642   Gross per 24 hour   Intake                0 ml   Output             2175 ml   Net            -2175 ml     Constitutional:comatose   Respiratory: basilar crackles+  Cardiovascular: Regular rate and rhythm, normal S1 and S2, and no murmur noted  GI: Normal bowel sounds, distended, non-tender  Skin/Integumen: No rashes, no cyanosis  Neuro : comatose                Data:      All new lab and imaging data was reviewed.

## 2018-11-01 NOTE — PROVIDER NOTIFICATION
MD Notification    Notified Person: MD    Notified Person Name: Hernán Dailey     Notification Date/Time: 10/31/18 at 1908    Notification Interaction: Paged    Purpose of Notification: Comfort cares pt, has been very restless, moaning for the past 1-2 hours, all PRN meds have been given with little relief.  Orders Received: Ordered prn haldol q6h and increased dilaudid and morphine dose.

## 2018-11-01 NOTE — PROGRESS NOTES
SW Note:    D/I:  SW met with patient's wife Cielo, patient's two sons and two friends of Cielo's.  SW met to offer support, check in with spouse and to provide some resources from the [employer].  SW provided reflective listening and supportive counseling to patient's spouse.  Patient's family from [place] was on Facetime, watching patient and providing prayer for patient while SW was present in the room.  Cielo discussed her children while they were out of the room and their concerns about being present while patient passes.  SW offered ongoing support as needed and will continue to assist as requested.    Luis Brian, RASHAWN, LGSW

## 2018-11-01 NOTE — PLAN OF CARE
Problem: Patient Care Overview  Goal: Plan of Care/Patient Progress Review  Pt is unresponsive, formal assessment and vital signs deferred to comfort cares. Pt is getting prn morphine, dilaudid, haldol, atropine and ativan. ON 3L of O2 for comfort. Bruce with light red output. Port TKO with NS at 10 ml/hr. Bedrest maintained with turn and repositioning and frequent oral cares.

## 2018-11-02 NOTE — DISCHARGE SUMMARY
[facility]    Discharge Summary  Hospitalist    Date of Admission:  10/30/2018  Date of Discharge: 11/2/18, patient passed away   Discharging Provider: Rosemarie Rosado MD    Discharge Diagnoses    Terminal metastatic adenocarcinoma.  Patient passed away at 9:05 am 11/1/18    History of Present Illness   Ernie Song is a 38 year old male who presents from home after wife contacted EMS for patient becoming nonresponsive.  Multiple hospitalizations related to metastatic gastric adenocarcinoma over the past month, progressive worsening weakness.     Chronic cough and shortness of breath associated both with weakness and deconditioning as well as malignant bilateral pleural effusions requiring bilateral Pleurx catheter placement and recurrent drainage.        patient is unable to provide any history at this time.  Per EMS, patient took 2.5 mg of Dilaudid as per his home prescription approximately 40 minutes prior to their arrival.  Has been weak and sleepy, progressively worsening over the past month in relation to cancer.  Wife contacted EMS as he was unresponsive.  Found to have tachycardia to the 150s range, agonal breathing.     On arrival to the emergency department, Dr. Eid discussed with patient's wife over the telephone patient's clinical state.  As above, patient was recently discontinued from chemotherapy given ongoing decompensation and no clinical response.  At last discharge 10/27/18, patient was still full code, the plan had been for hospice evaluation at the patient's home.  ED provider discussed with patient's wife, and decision was made to transition to DNR/DNI and comfort cares.  Hospitalist service was contacted for admission with patient actively dying.    Hospital Course   Ernie Song was admitted on 10/30/2018.  The following problems were addressed during his hospitalization:    Active Problems:    Metastatic adenocarcinoma (H)    Terminal care  following  admission he continued to be unresponsive and he was on comfort measures only , his condition deteriorated progressively and he passed away on 18 , 9:05 AM .        Rosemarie Rosado MD    Significant Results and Procedures       Pending Results   These results will be followed up by none  Unresulted Labs Ordered in the Past 30 Days of this Admission     Date and Time Order Name Status Description    2018 1344 Cytology non gyn In process     2018 1433 Surgical pathology exam In process     2018 1418 Fine needle aspiration In process           Code Status   Comfort Care       Primary Care Physician   Gay Winslow    Physical Exam             Resp: 26        There were no vitals filed for this visit.  Vital Signs with Ranges  Resp:  [26] 26  I/O last 3 completed shifts:  In: -   Out: 550 [Urine:550]    The patient was examined on the day of discharge.    Discharge Disposition   Discharged to home  Patient passed away while on comfort cares only   Condition at discharge:     Consultations This Hospital Stay   SOCIAL WORK IP CONSULT  SOCIAL WORK IP CONSULT    Time Spent on this Encounter   I, Rosemarie Rosado, personally saw the patient today and spent less than or equal to 30 minutes discharging this patient.    Discharge Orders   No discharge procedures on file.  Discharge Medications   Current Discharge Medication List      CONTINUE these medications which have NOT CHANGED    Details   Docusate Sodium (COLACE PO) Take 100 mg by mouth 2 times daily as needed for constipation       enoxaparin (LOVENOX) 60 MG/0.6ML injection Inject 0.6 mLs (60 mg) Subcutaneous every 12 hours Hold lovenox 10/29/2018 am dose in anticipation of IR procedure. Resume previous dose after the procedure.  Qty: 20 Syringe, Refills: 0    Associated Diagnoses: Other acute pulmonary embolism without acute cor pulmonale (H)      Escitalopram Oxalate (LEXAPRO PO) Take 10 mg by mouth daily       guaiFENesin (ROBITUSSIN) 20  mg/mL SOLN solution Take 20 mLs by mouth every 4 hours as needed for cough      HYDROmorphone (DILAUDID) 4 MG tablet Take 1 tablet (4 mg) by mouth every 3 hours as needed for moderate to severe pain  Qty: 40 tablet, Refills: 0    Associated Diagnoses: Gastric adenocarcinoma (H)      LORazepam (ATIVAN) 0.5 MG tablet Take 1-2 tablets (0.5-1 mg) by mouth every 6 hours as needed (Breakthrough Nausea / Vomiting)  Qty: 60 tablet, Refills: 0    Associated Diagnoses: Gastric adenocarcinoma (H)      OLANZapine (ZYPREXA PO) Take 2.5 mg by mouth every morning      oxyCODONE (OXYCONTIN) 40 MG 12 hr tablet Take 1 tablet (40 mg) by mouth every 8 hours  Qty: 50 tablet, Refills: 0    Associated Diagnoses: Gastric adenocarcinoma (H)      pantoprazole (PROTONIX) 40 MG EC tablet Take 1 tablet (40 mg) by mouth 2 times daily Take 30-60 minutes before a meal.  Qty: 180 tablet, Refills: 1    Associated Diagnoses: Gastric ulcer, unspecified chronicity, unspecified whether gastric ulcer hemorrhage or perforation present      polyethylene glycol (MIRALAX/GLYCOLAX) Packet Take 1 packet by mouth daily      prochlorperazine (COMPAZINE) 10 MG tablet Take 1 tablet (10 mg) by mouth every 6 hours as needed for nausea or vomiting  Qty: 60 tablet, Refills: 3    Associated Diagnoses: Nausea      valACYclovir (VALTREX) 500 MG tablet TAKE ONE TABLET BY MOUTH ONE TIME DAILY   Qty: 90 tablet, Refills: 3    Associated Diagnoses: Genital herpes simplex, unspecified site           Allergies   No Known Allergies  Data   Most Recent 3 CBC's:  Recent Labs   Lab Test  10/30/18   0550  10/26/18   0715  10/25/18   0134   WBC  7.4  4.9  7.3   HGB  9.8*  9.4*  10.2*   MCV  85  79  79   PLT  379  385  438      Most Recent 3 BMP's:  Recent Labs   Lab Test  10/30/18   0550  10/26/18   0715  10/25/18   0134   NA  135  135  129*   POTASSIUM  3.5  3.9  4.2   CHLORIDE  94  98  93*   CO2  38*  31  31   BUN  11  9  7   CR  0.67  0.47*  0.48*   ANIONGAP  3  6  5   AROLDO  8.7   8.0*  8.3*   GLC  144*  115*  164*     Most Recent 2 LFT's:  Recent Labs   Lab Test  10/26/18   0715  10/25/18   0134   AST  21  19   ALT  21  23   ALKPHOS  92  116   BILITOTAL  0.3  0.2     Most Recent INR's and Anticoagulation Dosing History:  Anticoagulation Dose History     Recent Dosing and Labs Latest Ref Rng & Units 2/26/2018 9/14/2018 9/24/2018 10/11/2018    INR 0.86 - 1.14 1.04 1.14 1.28(H) 1.13        Most Recent 3 Troponin's:  Recent Labs   Lab Test  10/07/18   2303  09/23/18   0721   TROPI  <0.015  <0.015     Most Recent Cholesterol Panel:  Recent Labs   Lab Test  10/10/17   0750   CHOL  264*   LDL  169*   HDL  53   TRIG  210*     Most Recent 6 Bacteria Isolates From Any Culture (See EPIC Reports for Culture Details):  Recent Labs   Lab Test  10/25/18   0158  10/25/18   0130  10/07/18   2343  10/07/18   2303  09/26/18   0518  09/23/18   0932   CULT  No growth  No growth  No growth  No growth  No growth  No growth     Most Recent TSH, T4 and A1c Labs:  Recent Labs   Lab Test  10/10/17   0750   07/01/16   0816   TSH  3.49   < >  3.57   T4  0.90   < >   --    A1C   --    --   5.6    < > = values in this interval not displayed.     Results for orders placed or performed during the hospital encounter of 10/30/18   US Abdomen Limited Portable    Narrative    ULTRASOUND ABDOMEN LIMITED PORTABLE 10/30/2018 2:00 PM     HISTORY: Ascites check. History of left lower quadrant abdominal  PleurX placed 10/22/2018 which has not worked for drainage.    COMPARISON: None.      Impression    IMPRESSION: No significant ascites.    NIELS TADEO MD

## 2018-11-02 NOTE — PLAN OF CARE
Problem: Dying Patient, Actively (Adult)  Intervention: Relieve Dyspnea  Comfort Cares, VS Deferred.  Somnolent, PO high concentrate morphine and ativan given for pain and dyspnea with continued good result.  Repositioned q 2hrs or when pt wife Cielo requests.  Bruce with minimal output.  Bed bath and shampoo cap given today.   Family and friends at beside.

## 2018-11-02 NOTE — PROGRESS NOTES
[facility]    Pronouncement Note:  2018   Time Called: 0916    RRT called for: Called by nursing for cessation of breathing     PRONOUNCEMENT:  --  at 0905 on 2018     --> Autopsy offered to family: Declined  --> Death Certificate to be directed to Dr. Rosemarie Rosado/ Internal Medicine Hospitalist.  --> Attending physician, Dr. Rosado, notified of death.    Exam:  - No spontaneous respirations observed or auscultated.   - No s1s2 / heart sounds.    Events leading to death:  Ernie Song is a 38 year old male who was admitted on 10/30/2018 for decreased mentation in the setting of gastric adenocarcinoma, unresponsive to 3 cycles of chemotherapy.    Medical history significant for:   Gastric adenocarcinoma  Genital herpes  Gynecomastia  Stage IV adenocarcinoma of stomach  Gastric ulcer      MARILIN Ordonez CNP  Hospitalist - House Officer  Pager: [phone] (4t - 2b)

## 2019-05-12 NOTE — PLAN OF CARE
Poison Ivy Dermatitis  Poison ivy dermatitis is inflammation of the skin that is caused by the allergens on the leaves of the poison ivy plant. The skin reaction often involves redness, swelling, blisters, and extreme itching.  What are the causes?  This condition is caused by a specific chemical (urushiol) found in the sap of the poison ivy plant. This chemical is sticky and can be easily spread to people, animals, and objects. You can get poison ivy dermatitis by:  · Having direct contact with a poison ivy plant.  · Touching animals, other people, or objects that have come in contact with poison ivy and have the chemical on them.    What increases the risk?  This condition is more likely to develop in:  · People who are outdoors often.  · People who go outdoors without wearing protective clothing, such as closed shoes, long pants, and a long-sleeved shirt.    What are the signs or symptoms?  Symptoms of this condition include:  · Redness and itching.  · A rash that often includes bumps and blisters. The rash usually appears 48 hours after exposure.  · Swelling. This may occur if the reaction is more severe.    Symptoms usually last for 1-2 weeks. However, the first time you develop this condition, symptoms may last 3-4 weeks.  How is this diagnosed?  This condition may be diagnosed based on your symptoms and a physical exam. Your health care provider may also ask you about any recent outdoor activity.  How is this treated?  Treatment for this condition will vary depending on how severe it is. Treatment may include:  · Hydrocortisone creams or calamine lotions to relieve itching.  · Oatmeal baths to soothe the skin.  · Over-the-counter antihistamine tablets.  · Oral steroid medicine for more severe outbreaks.    Follow these instructions at home:  · Take or apply over-the-counter and prescription medicines only as told by your health care provider.  · Wash exposed skin as soon as possible with soap and cold  Problem: Patient Care Overview  Goal: Plan of Care/Patient Progress Review  Outcome: Declining  Admitted to 88 at 7:30am. Comfort cares. Non responsive for the most part but did wake up and communicate once. Pt restless at times, calling out, moaning, pulling gown off. IV ativan given x2, effective. SL morphine x2 for pain, and IV dilaudid x1. Abdomen firm, pleurex in place. Pleurex not draining per family- they originally had an appt with IR this morning to fix the pleurex but came to ED instead due to pt's declining status. Zakia from IR called, abdominal US ordered, no fluid in abdomen, just inflammation and edema. Wife at bedside, involved. Their kids 7,9 plan to stop by later this afternoon. Bruce placed, draining well. T&R. Dispo status unknown at this time.        [employer] Patient Education © 2019 [employer]

## 2019-11-12 NOTE — IP AVS SNAPSHOT
[facility]    [address]    [place] [address]    Phone:  [phone]    Fax:  [phone]                                       After Visit Summary   9/20/2018    Ernie Song    MRN: [id number]           After Visit Summary Signature Page     I have received my discharge instructions, and my questions have been answered. I have discussed any challenges I see with this plan with the nurse or doctor.    ..........................................................................................................................................  Patient/Patient Representative Signature      ..........................................................................................................................................  Patient Representative Print Name and Relationship to Patient    ..................................................               ................................................  Date                                   Time    ..........................................................................................................................................  Reviewed by Signature/Title    ...................................................              ..............................................  Date                                               Time          22EPIC Rev 08/18         negative...

## 2020-11-15 NOTE — CONSULTS
Care Transition Initial Assessment - RN        Met with: Patient and Family.  Pt was sleeping, spoke to pt's spouse Cielo.  Discussed discharge plans for discharging home with new pleural x catheter and Lovenox injections.  Offered home care RN for support and education for the above and pt's spouse was in agreement with this plan.  Offered choice for home care agencies and patient requested to use [facility].  Referral sent to [facility].  [facility] phone number added to [facility].  Completed [employer] paperwork and fax completed paperwork for pleural x catheter supplies to [phone].    DATA   Principal Problem:    Pulmonary embolism (H)  Active Problems:    Genital herpes    Malignant ascites (Cytology 9/5/18)    Gastric adenocarcinoma (H)    Malignant pleural effusion (Cytology, right, 9/14/18)    Severe malnutrition (H)    Pulmonary emboli (H)       Cognitive Status: sleeping.        Contact information and PCP information verified: No  Lives With: child(mayra), dependent, spouse                    Insurance concerns: No Insurance issues identified  ASSESSMENT  Patient currently receives the following services:  none        Identified issues/concerns regarding health management: Patient's spouse talked about patient's new dx of stomach ca as on 9/5 and the effects of the chemotherapy on the patient.  Offered resources to [employer] & [employer].  SW to meet with pt's spouse to complete paperwork.      PLAN  Financial costs for the patient include co-pay for home care .  Patient given options and choices for discharge yes .  Patient/family is agreeable to the plan?  Yes: home with [facility] RN  Patient anticipates discharging to home .        Patient anticipates needs for home equipment: No  Plan/Disposition: Home   Appointments: See [facility]      Care  (CTS) will continue to follow as needed.             Respiratory

## 2022-07-08 NOTE — PLAN OF CARE
Problem: Patient Care Overview  Goal: Plan of Care/Patient Progress Review  Pt is unresponsive, formal assessment and vital signs deferred to comfort cares. Pt is getting prn morphine, and ativan. ON 3L of O2 for comfort. Bruce with light yellow output. Port TKO with NS at 10 ml/hr. Bedrest maintained with turn and repositioning and frequent oral cares.        Requested medication(s) are due for refill today: Yes  Patient has already received a courtesy refill: No  Other reason request has been forwarded to provider:

## 2024-12-05 NOTE — PLAN OF CARE
How Severe Is Your Skin Lesion?: mild Problem: Patient Care Overview  Goal: Plan of Care/Patient Progress Review  Outcome: No Change  AO x 4. VSS on 3 L ex tachycardic at times. C/o tolerable pain in abd with oxycontin/oxycodone. Denies LANDERS. Infrequent productive cough, green phlegm. Given Zofran x 1. Pleurex drain placfed 9/24. Reg diet, poor po. L port infusing heparin at 16 mL/hr. SBA, calls appropriately. Wife would like to talk to MD regarding plan.          Have Your Skin Lesions Been Treated?: not been treated Is This A New Presentation, Or A Follow-Up?: Skin Lesions Additional History: Patient reports for two areas of concern on her right thigh and left upper back

## 2024-12-16 NOTE — DISCHARGE SUMMARY
[facility] [place]    Discharge Summary  Hospitalist    Date of Admission:  10/10/2018  Date of Discharge:  10/12/2018  Discharging Provider: Nicholas Elias MD    Discharge Diagnoses   Principal Problem:    SOB (shortness of breath) -- related to malignant ascites and abdominal distension   Active Problems:    Genital herpes    Malignant ascites (Cytology 9/5/18)    Pulmonary embolism -- on Lovenox    Malignant pleural effusions -- bilateral    Gastric cancer (H)    Severe Malnutrition with weight loss    History of Present Illness   38 year old male with gastric cancer, stage IV on Chemo with Dr. Silva starting 9/26, who was directly admitted from oncology clinic because of SOB and abdominal pain, and is 2 weeks out from the start of chemo.  In the last month he has been hospitalized (admit or observation care) 6 previous times and has had 6 procedures to remove malignant pleural or ascitic fluid to help with discomfort and SOB.  He has had outpatient appointments with palliative care, but never been able to make them because he was either in the hospital or his symptoms would not allow it.      Hospital Course   Admitted to the hospital to treat SOB and control pain.  Did undergo paracentesis with 1.75 liters removed.  Was seen by Palliative care.  Initially received IV Dilaudid, but Oxycontin was increased to 60 mg tid with good pain relief but excessive sedation, dose dropped to 40 mg tid and still had good pain relief but without excessive sedation.   Patient and wife met with palliative care, he was switched to oral Dilaudid as he thought it worked better than oxycodone IR, and code status was discussed -- he realizes this is all for palliation with no expectation of cure, but wants to be full code at present with the thought of DNR/DNI when chemo no longer thought to be of benefit.  He has had 6 fluid drainage procedures in the last month, all done urgently because of pain or SOB, and  Hpi Title: Evaluation of Skin Lesions Additional History: Hx NMSC. advised he may be better served by scheduling procedures in advance, and is set up for therapeutic paracentesis at [facility] every Tuesday, and can schedule more frequently if need arises.  He will hold his Lovenox on Monday night to lessen the risk of bleeding.  Also his Lovenox dose was decreased to 60 mg bid (done from 80 mg bid) because of weight loss.     Nicholas Elias MD  Pager: [phone]  Cell Phone:  [phone]       Significant Results and Procedures   As above    Pending Results   These results will be followed up by Dr. Elias  Unresulted Labs Ordered in the Past 30 Days of this Admission     Date and Time Order Name Status Description    10/7/2018 2237 Blood culture Preliminary     10/7/2018 2237 Blood culture Preliminary     9/14/2018 1344 Cytology non gyn In process     9/5/2018 1433 Surgical pathology exam In process     9/5/2018 1418 Fine needle aspiration In process           Code Status   Full Code       Primary Care Physician   Gay Winslow    Physical Exam   Temp: 98.5  F (36.9  C) Temp src: Oral BP: 124/69 Pulse: 107   Resp: 14 SpO2: 97 % O2 Device: None (Room air)    Vitals:    10/12/18 0619   Weight: 68.4 kg (150 lb 12.8 oz)     Vital Signs with Ranges  Temp:  [97.3  F (36.3  C)-98.5  F (36.9  C)] 98.5  F (36.9  C)  Pulse:  [] 107  Resp:  [14-16] 14  BP: (119-125)/(69-75) 124/69  SpO2:  [95 %-98 %] 97 %  I/O last 3 completed shifts:  In: -   Out: 1275 [Urine:1275]    Exam on discharge:   Lungs with full breath sounds  Abdomin mildly tender, minimally distended at present.     # Discharge Pain Plan:   - During his hospitalization, Ernie experienced pain due to gastric cancer.  The pain plan for discharge was discussed with Ernie and the plan was created in a collaborative fashion.    - per orders      Discharge Disposition   Discharged to home with palliative home care  Condition at discharge: Fair    Consultations This Hospital Stay    OCCUPATIONAL THERAPY ADULT IP CONSULT  PHYSICAL THERAPY ADULT IP CONSULT  SOCIAL WORK IP CONSULT  PALLIATIVE CARE ADULT IP CONSULT    Time Spent on this Encounter   I spent a total of 20 minutes discharging this patient.     Discharge Orders     Home care nursing referral     Reason for your hospital stay   Abdominal fluid from cancer causing difficulty breathing.     Follow-up and recommended labs and tests    Follow up with Shira as scheduled next week with [facility], and appointment for paracentesis with radiology next Tues, 10/16/18, at [facility] radiology department.  Do not use the Lovenox on Monday night (to avoid bleeding with the procedure on Tuesday).     Activity   Your activity upon discharge: activity as tolerated     Discharge Instructions     MD face to face encounter   Documentation of Face to Face and Certification for Home Health Services    I certify that patient: Ernie Song is under my care and that I, or a nurse practitioner or physician's assistant working with me, had a face-to-face encounter that meets the physician face-to-face encounter requirements with this patient on: 10/12/2018.    This encounter with the patient was in whole, or in part, for the following medical condition, which is the primary reason for home health care: gastric cancer with malignant ascites and pleural fluid, to help drain pleurx catheter.    I certify that, based on my findings, the following services are medically necessary home health services: Nursing.    My clinical findings support the need for the above services because: Nurse is needed: To provide assessment and oversight required in the home to assure adherence to the medical plan due to: drain pleurx catheter..    Further, I certify that my clinical findings support that this patient is homebound (i.e. absences from home require considerable and taxing effort and are for medical reasons or [employer] services or  infrequently or of short duration when for other reasons) because: Leaving home is medically contraindicated for the following reason(s): Dyspnea on exertion that makes it so they cannot leave their home for needed services without clinical deterioration...    Based on the above findings. I certify that this patient is confined to the home and needs intermittent skilled nursing care, physical therapy and/or speech therapy.  The patient is under my care, and I have initiated the establishment of the plan of care.  This patient will be followed by a physician who will periodically review the plan of care.  Physician/Provider to provide follow up care: Gay Winslow    Attending hospital physician (the Medicare certified [facility] provider): Nicholas Elias  Physician Signature: See electronic signature associated with these discharge orders.  Date: 10/12/2018     Full Code     Diet   Follow this diet upon discharge: Orders Placed This Encounter     Snacks/Supplements Adult: Other; Boost at 10-2 (left in the bottle)   (RD); Between Meals     Regular Diet Adult       Discharge Medications   Current Discharge Medication List      START taking these medications    Details   HYDROmorphone (DILAUDID) 4 MG tablet Take 1 tablet (4 mg) by mouth every 3 hours as needed for moderate to severe pain  Qty: 40 tablet, Refills: 0    Associated Diagnoses: Gastric adenocarcinoma (H)      mirtazapine (REMERON) 7.5 MG TABS tablet Take 1 tablet (7.5 mg) by mouth At Bedtime  Qty: 30 tablet, Refills: 1    Associated Diagnoses: Reactive depression         CONTINUE these medications which have CHANGED    Details   enoxaparin (LOVENOX) 60 MG/0.6ML injection Inject 0.6 mLs (60 mg) Subcutaneous every 12 hours for 14 days  Qty: 28 Syringe, Refills: 1    Associated Diagnoses: Other chronic pulmonary embolism without acute cor pulmonale (H)      oxyCODONE (OXYCONTIN) 40 MG 12 hr tablet Take 1 tablet (40 mg) by mouth every 8 hours  Qty:  50 tablet, Refills: 0    Associated Diagnoses: Gastric adenocarcinoma (H)         CONTINUE these medications which have NOT CHANGED    Details   guaiFENesin (ROBITUSSIN) 20 mg/mL SOLN solution Take 20 mLs by mouth every 4 hours as needed for cough      LORazepam (ATIVAN) 0.5 MG tablet Take 1-2 tablets (0.5-1 mg) by mouth every 6 hours as needed (Breakthrough Nausea / Vomiting)  Qty: 60 tablet, Refills: 0    Associated Diagnoses: Gastric adenocarcinoma (H)      OLANZapine (ZYPREXA PO) Take 2.5 mg by mouth every morning      pantoprazole (PROTONIX) 40 MG EC tablet Take 1 tablet (40 mg) by mouth 2 times daily Take 30-60 minutes before a meal.  Qty: 180 tablet, Refills: 1    Associated Diagnoses: Gastric ulcer, unspecified chronicity, unspecified whether gastric ulcer hemorrhage or perforation present      prochlorperazine (COMPAZINE) 10 MG tablet Take 1 tablet (10 mg) by mouth every 6 hours as needed for nausea or vomiting  Qty: 60 tablet, Refills: 3    Associated Diagnoses: Nausea      valACYclovir (VALTREX) 500 MG tablet TAKE ONE TABLET BY MOUTH ONE TIME DAILY   Qty: 90 tablet, Refills: 3    Associated Diagnoses: Genital herpes simplex, unspecified site         STOP taking these medications       oxyCODONE IR (ROXICODONE) 5 MG tablet Comments:   Reason for Stopping:             Allergies   No Known Allergies  Data   Most Recent 3 CBC's:  Recent Labs   Lab Test  10/11/18   0748  10/07/18   2303  09/29/18   0600   WBC  5.9  9.0  7.6   HGB  9.5*  9.0*  11.5*   MCV  74*  75*  74*   PLT  472*  322  350      Most Recent 3 BMP's:  Recent Labs   Lab Test  10/11/18   0748  10/08/18   0628  10/07/18   2303   NA  129*  124*  123*   POTASSIUM  4.5  4.2  3.9   CHLORIDE  91*  88*  87*   CO2  32  29  29   BUN  7  4*  5*   CR  0.54*  0.52*  0.59*   ANIONGAP  6  7  7   AROLDO  8.4*  7.7*  7.4*   GLC  135*  109*  111*     Most Recent 2 LFT's:  Recent Labs   Lab Test  10/11/18   0748  10/07/18   2303   AST  28  32   ALT  30  37   ALKPHOS   110  88   BILITOTAL  0.4  1.1     Most Recent INR's and Anticoagulation Dosing History:  Anticoagulation Dose History     Recent Dosing and Labs Latest Ref Rng & Units 2/26/2018 9/14/2018 9/24/2018 10/11/2018    INR 0.86 - 1.14 1.04 1.14 1.28(H) 1.13        Most Recent 3 Troponin's:  Recent Labs   Lab Test  10/07/18   2303  09/23/18   0721   TROPI  <0.015  <0.015     Most Recent Cholesterol Panel:  Recent Labs   Lab Test  10/10/17   0750   CHOL  264*   LDL  169*   HDL  53   TRIG  210*     Most Recent 6 Bacteria Isolates From Any Culture (See EPIC Reports for Culture Details):  Recent Labs   Lab Test  10/07/18   2343  10/07/18   2303  09/26/18   0518  09/23/18   0932  09/23/18   0915  09/23/18   0721   CULT  No growth after 4 days  No growth after 4 days  No growth  No growth  No growth  No growth     Most Recent TSH, T4 and A1c Labs:  Recent Labs   Lab Test  10/10/17   0750   07/01/16   0816   TSH  3.49   < >  3.57   T4  0.90   < >   --    A1C   --    --   5.6    < > = values in this interval not displayed.

## 2024-12-23 NOTE — IP AVS SNAPSHOT
MRN:[id number]                      After Visit Summary   10/25/2018    Ernie Song    MRN: [id number]           Thank you!     Thank you for choosing [facility] for your care. Our goal is always to provide you with excellent care. Hearing back from our patients is one way we can continue to improve our services. Please take a few minutes to complete the written survey that you may receive in the mail after you visit with us. Thank you!        Patient Information     Date Of Birth          1980        Designated Caregiver       Most Recent Value    Caregiver    Will someone help with your care after discharge? yes    Name of designated caregiver Cielo Dykes    Phone number of caregiver [phone]    Caregiver address n/a      About your hospital stay     You were admitted on:  October 25, 2018 You last received care in the:  [facility] [address] [facility]    You were discharged on:  October 27, 2018        Reason for your hospital stay       Malignant left pleural effusion.                  Who to Call     For medical emergencies, please call 911.  For non-urgent questions about your medical care, please call your primary care provider or clinic, [phone]          Attending Provider     Provider Specialty    Kacy Cruz MD Emergency Medicine    Tom, Nicholas Toledo MD Internal Medicine       Primary Care Provider Office Phone # Fax #    Gay McculloughMARILIN Bosch [facility] [phone]      After Care Instructions     Activity       Your activity upon discharge: activity as tolerated            Diet       Follow this diet upon discharge: Orders Placed This Encounter      Regular Diet Adult            Discharge Instructions       Call Dr. Santos at Pager [phone] if questions, or Cell Phone [phone].            Oxygen Adult       [facility] Order 2 liter(s) by nasal cannula continuously with use of portable tank. Expected treatment length is 2  months.. Test on conserving device as applicable.    Patients who qualify for home O2 coverage under the CMS guidelines require ABG tests or O2 sat readings obtained closest to, but no earlier than 2 days prior to the discharge, as evidence of the need for home oxygen therapy. Testing must be performed while patient is in the chronic stable state. See notes for O2 sats.    I certify that this patient, Ernie Song has been under my care and that I, or a nurse practitioner or physician's assistant working with me, had a face-to-face encounter that meets the face-to-face encounter requirements with this patient on 10/27/2018. The patient, Ernie Song was evaluated or treated in whole, or in part, for the following medical condition, which necessitates the use of the ordered oxygen. Treatment Diagnosis: Malignant pleural effusions     Attending Provider: Nicholas Elias  Physician signature: See electronic signature associated with these discharge orders  Date of Order: October 27, 2018            Tubes and drains       You are going home with the following tubes or drains: Pleurx Chest and Abdomen    Drain every 2 days as needed for the following    1.  Pleurx for chest up to 1000 mL of fluid (one bottle) as needed for shortness of breath. Last drain was 10/25  2.  Pleurx for abdomen up to 1700 mL as needed for abdominal distension or pain.   Last draining was on 10/26                  Follow-up Appointments     Follow-up and recommended labs and tests        Please keep your scheduled appointment with [facility] on 11/07/18 at 12:40 p.m. For labs and follow up with Dr. Silva.  Please call [facility] if you have further questions at [phone]                  Additional Services     Home Care OT Referral for Hospital Discharge       OT to eval and treat resumption of care  Your provider has ordered home care - occupational therapy. If you have not been contacted within 2 days of your  discharge please call the department phone number listed on the top of this document.            Home care nursing referral       RN extended hours visit. RN to provide tube site care and management and palliative care.    Your provider has ordered home care nursing services. If you have not been contacted within 2 days of your discharge please call the inpatient department phone number at [phone] .                  Further instructions from your care team       1. If having chills, check your temperature. If temp is >100.4 call MD  2. Call MD if having pain that is not controlled on current pain medications  3. Remember to take stool softeners and laxatives as needed if remaining on narcotic pain meds to prevent constipation  4. Call MD if having any swelling, drainage, redness of the surgical dressing sites5.  5. Call MD if having shortness of breath that is not controlled with rest, oxygen or removal of fluid from left chest/lung PleurX catheter   6.  Remember to have your abdominal ( Pleurx cath style) drain checked this Monday as mention below.     Oxygen Provider:  Arranged through [employer]), contact number [phone].  If you have any questions or concerns please call the oxygen company directly.    **NOTE:**  Dr. Silva's office will arrange for appointment with IR for abdominal catheter recheck on 10/29/2018.    + Call 8am Monday AM to ask if you should or should not eat breakfast,and for the appointment time  [facility]  Phone [phone]  OFFICE HOURS M-F 8am-5pm Address:   [address]  [facility] [address]         __ Check to make sure your Plurex catheter supplies are with you at discharge.      Pending Results     Date and Time Order Name Status Description    10/25/2018 0148 Blood culture Preliminary     10/25/2018 0148 Blood culture Preliminary             Statement of Approval     Ordered          10/27/18 6813  I have reviewed and agree with all  "the recommendations and orders detailed in this document.  EFFECTIVE NOW     Approved and electronically signed by:  Judson Silva MD           10/27/18 1307  I have reviewed and agree with all the recommendations and orders detailed in this document.  EFFECTIVE NOW     Approved and electronically signed by:  Nicholas Santos MD           10/26/18 1218  I have reviewed and agree with all the recommendations and orders detailed in this document.  EFFECTIVE NOW     Approved and electronically signed by:  Nicholas Santos MD             Admission Information     Date & Time Provider Department Dept. Phone    10/25/2018 Nicholas Santos MD [place] [address] [facility] [phone]      Your Vitals Were     Blood Pressure Pulse Temperature Respirations Height Weight    134/87 (BP Location: Left arm) 117 96.8  F (36  C) (Oral) 18 1.626 m (5' 4\") 66.7 kg (147 lb)    Pulse Oximetry BMI (Body Mass Index)                97% 25.23 kg/m2          [employer] Information     [employer] gives you secure access to your electronic health record. If you see a primary care provider, you can also send messages to your care team and make appointments. If you have questions, please call your primary care clinic.  If you do not have a primary care provider, please call [phone] and they will assist you.        Care EveryWhere ID     This is your Care EveryWhere ID. This could be used by other organizations to access your [facility] medical records  [id number]        Equal Access to Services     NADINE ESCALANTE : Hadii panda arredondoo Soradhaali, waaxda luqadaha, qaybta kaalmada adechristinayada, neymar morgan. So [facility] [phone].    ATENCIÓN: Si habla español, tiene a valente disposición servicios gratuitos de asistencia lingüística. Llame al [phone].    We comply with applicable federal civil rights laws and [place] laws. We do not discriminate on the basis of race, color, national origin, " age, disability, sex, sexual orientation, or gender identity.               Review of your medicines      CONTINUE these medicines which may have CHANGED, or have new prescriptions. If we are uncertain of the size of tablets/capsules you have at home, strength may be listed as something that might have changed.        Dose / Directions    enoxaparin 60 MG/0.6ML injection   Commonly known as:  LOVENOX   This may have changed:  additional instructions        Dose:  60 mg   Inject 0.6 mLs (60 mg) Subcutaneous every 12 hours Hold lovenox 10/29/2018 am dose in anticipation of IR procedure. Resume previous dose after the procedure.   Quantity:  20 Syringe   Refills:  0         CONTINUE these medicines which have NOT CHANGED        Dose / Directions    COLACE PO        Dose:  100 mg   Take 100 mg by mouth 2 times daily as needed for constipation   Refills:  0       guaiFENesin 20 mg/mL Soln solution   Commonly known as:  ROBITUSSIN        Dose:  20 mL   Take 20 mLs by mouth every 4 hours as needed for cough   Refills:  0       HYDROmorphone 4 MG tablet   Commonly known as:  DILAUDID   Used for:  Gastric adenocarcinoma (H)        Dose:  4 mg   Take 1 tablet (4 mg) by mouth every 3 hours as needed for moderate to severe pain   Quantity:  40 tablet   Refills:  0       LEXAPRO PO        Dose:  10 mg   Take 10 mg by mouth daily   Refills:  0       LORazepam 0.5 MG tablet   Commonly known as:  ATIVAN   Used for:  Gastric adenocarcinoma (H)        Dose:  0.5-1 mg   Take 1-2 tablets (0.5-1 mg) by mouth every 6 hours as needed (Breakthrough Nausea / Vomiting)   Quantity:  60 tablet   Refills:  0       oxyCODONE 40 MG 12 hr tablet   Commonly known as:  OxyCONTIN   Used for:  Gastric adenocarcinoma (H)        Dose:  40 mg   Take 1 tablet (40 mg) by mouth every 8 hours   Quantity:  50 tablet   Refills:  0       pantoprazole 40 MG EC tablet   Commonly known as:  PROTONIX   Used for:  Gastric ulcer, unspecified chronicity, unspecified  whether gastric ulcer hemorrhage or perforation present        Dose:  40 mg   Take 1 tablet (40 mg) by mouth 2 times daily Take 30-60 minutes before a meal.   Quantity:  180 tablet   Refills:  1       polyethylene glycol Packet   Commonly known as:  MIRALAX/GLYCOLAX        Dose:  1 packet   Take 1 packet by mouth daily   Refills:  0       prochlorperazine 10 MG tablet   Commonly known as:  COMPAZINE   Used for:  Nausea        Dose:  10 mg   Take 1 tablet (10 mg) by mouth every 6 hours as needed for nausea or vomiting   Quantity:  60 tablet   Refills:  3       valACYclovir 500 MG tablet   Commonly known as:  VALTREX   Used for:  Genital herpes simplex, unspecified site        TAKE ONE TABLET BY MOUTH ONE TIME DAILY   Quantity:  90 tablet   Refills:  3       ZYPREXA PO        Dose:  2.5 mg   Take 2.5 mg by mouth every morning   Refills:  0            Where to get your medicines      Some of these will need a paper prescription and others can be bought over the counter. Ask your nurse if you have questions.     Bring a paper prescription for each of these medications     enoxaparin 60 MG/0.6ML injection                Protect others around you: Learn how to safely use, store and throw away your medicines at www.disposemymeds.org.             Medication List: This is a list of all your medications and when to take them. Check marks below indicate your daily home schedule. Keep this list as a reference.      Medications           Morning Afternoon Evening Bedtime As Needed    COLACE PO   Take 100 mg by mouth 2 times daily as needed for constipation                                   enoxaparin 60 MG/0.6ML injection   Commonly known as:  LOVENOX   Inject 0.6 mLs (60 mg) Subcutaneous every 12 hours Hold lovenox 10/29/2018 am dose in anticipation of IR procedure. Resume previous dose after the procedure.   Last time this was given:  60 mg on 10/26/2018  7:55 PM            Except PLEASE remember to  hold (do not give) the  Monday 10-29-18 morning dose prior to procedure. Then resume medication as directed                          guaiFENesin 20 mg/mL Soln solution   Commonly known as:  ROBITUSSIN   Take 20 mLs by mouth every 4 hours as needed for cough                                   HYDROmorphone 4 MG tablet   Commonly known as:  DILAUDID   Take 1 tablet (4 mg) by mouth every 3 hours as needed for moderate to severe pain   Last time this was given:  4 mg on 10/27/2018  1:12 PM                                   LEXAPRO PO   Take 10 mg by mouth daily   Last time this was given:  10 mg on 10/27/2018  9:58 AM                                   LORazepam 0.5 MG tablet   Commonly known as:  ATIVAN   Take 1-2 tablets (0.5-1 mg) by mouth every 6 hours as needed (Breakthrough Nausea / Vomiting)   Last time this was given:  0.5 mg on 10/25/2018  6:05 PM                                   oxyCODONE 40 MG 12 hr tablet   Commonly known as:  OxyCONTIN   Take 1 tablet (40 mg) by mouth every 8 hours   Last time this was given:  40 mg on 10/27/2018  1:13 PM            Take at 6:00am       Take at 2:00pm           Take at 10:00pm           pantoprazole 40 MG EC tablet   Commonly known as:  PROTONIX   Take 1 tablet (40 mg) by mouth 2 times daily Take 30-60 minutes before a meal.   Last time this was given:  40 mg on 10/27/2018  7:43 AM            Take 30-60 mimutes before breakfast               Take before late night going to bed           polyethylene glycol Packet   Commonly known as:  MIRALAX/GLYCOLAX   Take 1 packet by mouth daily   Last time this was given:  17 g on 10/27/2018  9:53 AM                                   prochlorperazine 10 MG tablet   Commonly known as:  COMPAZINE   Take 1 tablet (10 mg) by mouth every 6 hours as needed for nausea or vomiting   Last time this was given:  10 mg on 10/27/2018  9:54 AM                                   valACYclovir 500 MG tablet   Commonly known as:  VALTREX   TAKE ONE TABLET BY MOUTH ONE TIME DAILY    Last time this was given:  500 mg on 10/27/2018  1:14 PM                                   ZYPREXA PO   Take 2.5 mg by mouth every morning   Last time this was given:  2.5 mg on 10/27/2018  9:58 AM                                             More Information        Pleural Effusion     Pleural effusion is fluid buildup between the layers of tissue that line the outside of the lungs.   Your healthcare provider has told you that you have pleural effusion. Pleural effusion means that you have extra fluid between the pleura. This area is called the pleural space. The pleura are 2 layers of thin, smooth tissue that surround the lungs and line the chest. The pleural space usually holds only a small amount of fluid. This fluid lubricates the pleura. But if too much fluid fills the space, it can make it hard or painful to breathe.  There are 2 types of pleural effusion:    Inflammatory. This is caused by a lung disease like pneumonia or lung cancer, both of which irritates the pleura.    Noninflammatory. This is caused by abnormal fluid pressures inside the lungs. The pressure can be caused by congestive heart failure (CHF). In CHF, extra fluid collects inside the lung tissues because of a weak heart muscle. This extra fluid then leaks into the pleural space. Other causes of noninflammatory pleural effusions include kidney disease, liver disease, and malnutrition.  What are the symptoms of pleural effusion?  The symptoms of pleural effusion include:    Sharp pains in the chest, especially when taking a breath, coughing, or sneezing    Trouble breathing    Cough    Fever  What are the causes of pleural effusion?  Common causes include:    Congestive heart failure    Cirrhosis of the liver    Pneumonia    Viral lung infection    Cancer    Blood clot in the lung (pulmonary embolism)    Heart surgery  Chest infections like pneumonia and heart disease are the most common causes. Less common causes include lung cancer.  How is  pleural effusion diagnosed?  Your healthcare provider will examine you and ask about your health history. Tests include:    Blood tests    Analysis of fluid in pleural space, chest X-ray, CT scan, or ultrasound  How is pleural effusion treated?  The extra fluid may be drained from the pleural space. This is done with a procedure called thoracentesis. This procedure uses a thin needle to draw out the fluid from the pleural space. In some cases, a tube is placed in the chest to drain the extra fluid. The tube will likely stay in place for several days.  You may have other treatments, depending on the cause of your pleural effusion. If it s because of a bacterial infection, you will be given antibiotics to fight the infection. If it s because of a heart condition, you will be given medicines and other treatment for your heart. Your healthcare provider can tell you more about the cause of your pleural effusion and your treatment choices.  What are the long-term concerns?  If untreated, pleural effusion can lead to serious health problems, such as collapsed lung from fluid filling the pleural space.  Call 911  Call 911 if you have:    Trouble breathing    Worsening chest pain   When to call your healthcare provider   Call your healthcare provider right away if you have:    Continued coughing    Fever  Date Last Reviewed: 12/1/2016 2000-2017 [employer]. [address] [place] [address], [place] [address]. All rights reserved. This information is not intended as a substitute for professional medical care. Always follow your healthcare professional's instructions.              ---

## 2025-01-09 NOTE — PROVIDER NOTIFICATION
MD Hernández called to bedside d/t decreased responsiveness and hypotension. New orders placed.    MD Notification    Notified Person: MD    Notified Person Name: Dr. Jonesmarley    Notification Date/Time: 10/31/18 at 0730    Notification Interaction: Paged  Purpose of Notification: Pt is requiring frequent IV pain/anxiety meds and his port is frequently heparin flushed.  Could we TKO his port?      Orders Received: TKO orders (10 ml/hr)

## (undated) DEVICE — GOWN IMPERVIOUS ZONED LG

## (undated) DEVICE — DECANTER BAG 2002S

## (undated) DEVICE — PACK MINOR SBA15MIFSE

## (undated) DEVICE — SOL NACL 0.9% IRRIG 1000ML BOTTLE 07138-09

## (undated) DEVICE — SU VICRYL 4-0 PS-2 18" UND J496H

## (undated) DEVICE — LINEN TOWEL PACK X5 5464

## (undated) DEVICE — DRAPE LAP TRANSVERSE 29421

## (undated) DEVICE — NDL 19GA 1.5"

## (undated) DEVICE — SU DERMABOND MINI DHVM12

## (undated) DEVICE — SOL WATER IRRIG 1000ML BOTTLE 2F7114

## (undated) DEVICE — GLOVE PROTEXIS W/NEU-THERA 6.5  2D73TE65

## (undated) DEVICE — SOL NACL 0.9% INJ 250ML BAG 2B1322Q

## (undated) DEVICE — BLADE KNIFE SURG 11 371111

## (undated) DEVICE — GLOVE PROTEXIS W/NEU-THERA 7.5  2D73TE75

## (undated) DEVICE — SYR 10ML SLIP TIP W/O NDL

## (undated) DEVICE — LINEN GOWN OVERSIZE 5408

## (undated) DEVICE — DRAPE SHEET REV FOLD 3/4 9349

## (undated) DEVICE — SU VICRYL 3-0 SH 27" J316H

## (undated) DEVICE — PREP CHLORAPREP W/ORANGE TINT 10.5ML 260715

## (undated) RX ORDER — ONDANSETRON 2 MG/ML
INJECTION INTRAMUSCULAR; INTRAVENOUS
Status: DISPENSED
Start: 2018-01-01

## (undated) RX ORDER — OXYCODONE HYDROCHLORIDE 5 MG/1
TABLET ORAL
Status: DISPENSED
Start: 2018-01-01

## (undated) RX ORDER — PROPOFOL 10 MG/ML
INJECTION, EMULSION INTRAVENOUS
Status: DISPENSED
Start: 2018-01-01

## (undated) RX ORDER — HEPARIN SODIUM (PORCINE) LOCK FLUSH IV SOLN 100 UNIT/ML 100 UNIT/ML
SOLUTION INTRAVENOUS
Status: DISPENSED
Start: 2018-01-01

## (undated) RX ORDER — FENTANYL CITRATE 50 UG/ML
INJECTION, SOLUTION INTRAMUSCULAR; INTRAVENOUS
Status: DISPENSED
Start: 2018-01-01

## (undated) RX ORDER — CEFAZOLIN SODIUM 1 G/3ML
INJECTION, POWDER, FOR SOLUTION INTRAMUSCULAR; INTRAVENOUS
Status: DISPENSED
Start: 2018-01-01

## (undated) RX ORDER — CIPROFLOXACIN 2 MG/ML
INJECTION, SOLUTION INTRAVENOUS
Status: DISPENSED
Start: 2018-01-01